# Patient Record
Sex: FEMALE | Race: BLACK OR AFRICAN AMERICAN | NOT HISPANIC OR LATINO | Employment: UNEMPLOYED | URBAN - METROPOLITAN AREA
[De-identification: names, ages, dates, MRNs, and addresses within clinical notes are randomized per-mention and may not be internally consistent; named-entity substitution may affect disease eponyms.]

---

## 2017-02-14 ENCOUNTER — OFFICE VISIT (OUTPATIENT)
Dept: NEUROSURGERY | Facility: CLINIC | Age: 57
End: 2017-02-14
Payer: COMMERCIAL

## 2017-02-14 VITALS
HEIGHT: 63 IN | HEART RATE: 74 BPM | TEMPERATURE: 98 F | DIASTOLIC BLOOD PRESSURE: 97 MMHG | SYSTOLIC BLOOD PRESSURE: 140 MMHG | BODY MASS INDEX: 36.37 KG/M2 | WEIGHT: 205.25 LBS

## 2017-02-14 DIAGNOSIS — M51.9 LUMBAR DISC DISEASE: ICD-10-CM

## 2017-02-14 DIAGNOSIS — M50.90 CERVICAL DISC DISEASE: Primary | ICD-10-CM

## 2017-02-14 PROCEDURE — 99213 OFFICE O/P EST LOW 20 MIN: CPT | Mod: S$GLB,,, | Performed by: PHYSICIAN ASSISTANT

## 2017-02-14 PROCEDURE — 99999 PR PBB SHADOW E&M-EST. PATIENT-LVL IV: CPT | Mod: PBBFAC,,, | Performed by: PHYSICIAN ASSISTANT

## 2017-02-14 NOTE — LETTER
February 20, 2017      Gavino Figueroa MD  [address]  [place] [address]           [facility] - Neurosurgery 7th Fl  [address]  [facility] [address]  Phone: [phone]          Patient: Carmen Son   MR Number: [id number]   YOB: 1960   Date of Visit: 2/14/2017       Dear Dr. Gavino Figueroa:    Thank you for referring Carmen Son to me for evaluation. Attached you will find relevant portions of my assessment and plan of care.    If you have questions, please do not hesitate to call me. I look forward to following Carmen Son along with you.    Sincerely,    ELIA Li    Enclosure  CC:  No Recipients    If you would like to receive this communication electronically, please contact [email or web address] or [phone] to request more information on [employer] Link access.    For providers and/or their staff who would like to refer a patient to [facility], please contact us through our one-stop-shop provider referral line, [facility], at [phone].    If you feel you have received this communication in error or would no longer like to receive these types of communications, please e-mail [email or web address]

## 2017-02-16 NOTE — PROGRESS NOTES
[facility] - Neurosurgery 7th Fl  Neurosurgery    SUBJECTIVE:     Chief Complaint: Follow-up neck and back pain    History of Present Illness:  Patient is a 56 y.o. female who presents for follow up after last evaluation on 11/29/2016. This is a pt with L4-5, L5-S1 disc disease with an L4-5 spondylolisthesis. She also has a disc herniation at C4-5 and C6-7. When Dr. Denton last saw her she stated that her neck paresthesias were well controlled on Xanax and following the recent occipital nerve root block. Her back pain was also improved after completing physical therapy. She presents today for routine follow-up and states that her pain is still intermittent in her neck and back. Denies any radicular upper or lower extremity pain/paresthesias. Denies any new weakness or bowel/bladder incontinence. Patient is seen by an outside pain management specialist and had a recent lumbar POORNIMA in 1/2017. She continues to take Advil and Xanax with some improvement.     Review of patient's allergies indicates:  No Known Allergies    Past Medical History   Diagnosis Date    Diabetes mellitus     Diet controlled gestational diabetes mellitus in puerperium     History of seasonal allergies      Past Surgical History   Procedure Laterality Date    Right knee scope       Family History   Problem Relation Age of Onset    Diabetes type II Sister     Hyperlipidemia Sister     Diabetes type II Other      Social History   Substance Use Topics    Smoking status: Never Smoker    Smokeless tobacco: None    Alcohol use No        Review of Systems:  Constitutional: no fever, chills or night sweats. No changes in weight   Eyes: no visual changes   ENT: no nasal congestion or sore throat   Respiratory: no cough or shortness of breath   Cardiovascular: no chest pain or palpitations   Gastrointestinal: no nausea or vomiting   Genitourinary: no hematuria or dysuria   Integument/Breast: no rash or pruritis   Hematologic/Lymphatic: no easy bruising  or lymphadenopathy   Musculoskeletal: + neck and back pain  Neurological: no seizures or tremors   Behavioral/Psych: no auditory or visual hallucinations   Endocrine: no heat or cold intolerance     OBJECTIVE:     Vital Signs (Most Recent):  Temp: 98 °F (36.7 °C) (02/14/17 1547)  Pulse: 74 (02/14/17 1547)  BP: (!) 140/97 (02/14/17 1547)    Physical Exam:  General: well developed, well nourished, no distress.   Head: normocephalic, atraumatic  Neurologic: Alert and oriented. Thought content appropriate.  GCS: Motor: 6/Verbal: 5/Eyes: 4 GCS Total: 15  Mental Status: Awake, Alert, Oriented x3  Cranial nerves: face symmetric, tongue midline, CN II-XII grossly intact.   Eyes: pupils equal, round, reactive to light with accomodation, EOMI. Unable to appreciate optic disc. Red reflex intact bilaterally.   Sensory: intact to light touch throughout    Motor Strength:Moves all extremities spontaneously with good tone.  Full strength upper and lower extremities. No abnormal movements seen.     Strength  Deltoids Triceps Biceps Wrist Extension Wrist Flexion Hand    Upper: R 5/5 5/5 5/5 5/5 5/5 5/5    L 5/5 5/5 5/5 5/5 5/5 5/5     Iliopsoas Quadriceps Knee  Flexion Tibialis  anterior Gastro- cnemius EHL   Lower: R 5/5 5/5 5/5 5/5 5/5 5/5    L 5/5 5/5 5/5 5/5 5/5 5/5     DTR's - 2 + and symmetric in UE and LE  Pronator Drift: no drift noted  Finger-to-nose: Intact bilaterally  Stover: absent  Clonus: absent  Babinski: absent  Pulses: 2+ and symmetric radial and dorsalis pedis. No lower extremity edema  Straight leg raise: negative  Gait: normal  Tandem Gait: No difficulty         Able to walk on heels & toes  Cervical ROM: full, denies pain  Lumbar ROM: full, denies   Denies pain to cervical or lumbar palpation  SI Joint tenderness: Negative   Pain on Hip ROM: Negative  Pulm: Normal respiratory effort.  Abd: Soft, no distention or tenderness.  Skin: Intact. No visible rashes or lesions.     Diagnostic Results:  None  new    ASSESSMENT/PLAN:     56 year old female with both cervical and lumbar disc disease, no evidence of radiculopathy or myelopathy.     -Patient continues to do well with conservative treatment.  -Will follow-up prn. Patient informed to contact the clinic with any worsening of her symptoms.     Thank you,    Nori Nicholson PA-C  Neurosurgery

## 2017-08-25 ENCOUNTER — TELEPHONE (OUTPATIENT)
Dept: NEUROSURGERY | Facility: CLINIC | Age: 57
End: 2017-08-25

## 2017-08-25 NOTE — TELEPHONE ENCOUNTER
CHANGE IN DR GAY'S SCHEDULE HAD TO MOVE APPT TO 09/01/2017 TO 0900. ROSANNE ON Vm, AND PUT A COPY OF APPT CARD IN THE MAIL

## 2017-09-01 ENCOUNTER — OFFICE VISIT (OUTPATIENT)
Dept: NEUROSURGERY | Facility: CLINIC | Age: 57
End: 2017-09-01
Payer: COMMERCIAL

## 2017-09-01 ENCOUNTER — TELEPHONE (OUTPATIENT)
Dept: NEUROSURGERY | Facility: CLINIC | Age: 57
End: 2017-09-01

## 2017-09-01 VITALS
BODY MASS INDEX: 36.68 KG/M2 | RESPIRATION RATE: 14 BRPM | SYSTOLIC BLOOD PRESSURE: 148 MMHG | TEMPERATURE: 98 F | WEIGHT: 207 LBS | HEIGHT: 63 IN | HEART RATE: 84 BPM | DIASTOLIC BLOOD PRESSURE: 98 MMHG

## 2017-09-01 DIAGNOSIS — M40.202 KYPHOSIS OF CERVICAL REGION, UNSPECIFIED KYPHOSIS TYPE: ICD-10-CM

## 2017-09-01 DIAGNOSIS — M51.9 LUMBAR DISC DISEASE: Primary | ICD-10-CM

## 2017-09-01 DIAGNOSIS — M50.10 CERVICAL DISC DISORDER WITH RADICULOPATHY: ICD-10-CM

## 2017-09-01 PROCEDURE — 99214 OFFICE O/P EST MOD 30 MIN: CPT | Mod: S$GLB,,, | Performed by: NEUROLOGICAL SURGERY

## 2017-09-01 PROCEDURE — 99999 PR PBB SHADOW E&M-EST. PATIENT-LVL V: CPT | Mod: PBBFAC,,, | Performed by: NEUROLOGICAL SURGERY

## 2017-09-01 PROCEDURE — 3008F BODY MASS INDEX DOCD: CPT | Mod: S$GLB,,, | Performed by: NEUROLOGICAL SURGERY

## 2017-09-01 NOTE — LETTER
September 1, 2017      Gavino Figueroa MD  [address] [facility] [facility]  [place] [address]           [facility] - Neurosurgery 7th Fl  [address]  [facility] [address]  Phone: [phone]          Patient: Carmen Son   MR Number: [id number]   YOB: 1960   Date of Visit: 9/1/2017       Dear Dr. Gavino Figueroa:    Thank you for referring Carmen Son to me for evaluation. Attached you will find relevant portions of my assessment and plan of care.    If you have questions, please do not hesitate to call me. I look forward to following Carmen Son along with you.    Sincerely,    Leo Denton MD    Enclosure  CC:  No Recipients    If you would like to receive this communication electronically, please contact [email or web address] or [phone] to request more information on [employer] Link access.    For providers and/or their staff who would like to refer a patient to [facility], please contact us through our one-stop-shop provider referral line, [facility], at [phone].    If you feel you have received this communication in error or would no longer like to receive these types of communications, please e-mail [email or web address]

## 2017-09-01 NOTE — TELEPHONE ENCOUNTER
----- Message from Paulino Goyal sent at 9/1/2017 11:05 AM CDT -----  Contact: Patient @ [phone]  Patient is returning a missed call, pls return call

## 2017-09-01 NOTE — PROGRESS NOTES
Subjective:    I, Mili Gibbs, am scribing for, and in the presence of, Dr. Denton.     Patient ID: Carmen Son is a 57 y.o. female.    Chief Complaint: No chief complaint on file.    TANISHA Pt is a 57 y.o. female who was last seen in February 2017. She has both cervical and lumbar pathology with known C4/5 and C6/7 disc herniations, as well as a lumbar L4/5 spondylolisthesis. She last saw our PA in February. At that time, there were no signs of true radiculopathy or myelopathy, hence we elected for non-operative intervention. Patient states that she is here today to discuss surgical options. She describes increasing pain in the neck and left shoulder blade with intermittent radiation down the left arm. She reports undergoing a nerve block earlier this year with some improvement of the radicular pain. She denies weakness, but endorses intermittent numbness of the LUE.. She also reports low back pain with radiation in the buttocks, bilateral hips, and thighs. Patient reports receiving a lumbar POORNIMA earlier this year.    Review of Systems   Constitutional: Negative for activity change, chills, fatigue and fever.   HENT: Negative for facial swelling, sinus pressure and trouble swallowing.    Eyes: Negative for visual disturbance.   Respiratory: Negative.    Cardiovascular: Negative.    Gastrointestinal: Negative for diarrhea, nausea and vomiting.   Genitourinary: Negative.    Musculoskeletal: Positive for arthralgias (b/l hip pain), back pain, myalgias (LUE; buttocks pain; thigh pain) and neck pain. Negative for joint swelling.   Neurological: Positive for numbness (LUE). Negative for seizures, weakness and headaches.   Psychiatric/Behavioral: Negative.        Objective:      Physical Exam:    Constitutional: She appears well-developed.     Eyes: Pupils are equal, round, and reactive to light. EOM are normal. Right eye exhibits no discharge. Left eye exhibits no discharge.     Abdominal: Soft.     Skin: Skin  displays no rash on trunk and no rash on extremities.     Psych/Behavior: She is alert. She is oriented to person, place, and time.     Musculoskeletal:        Neck: There is no tenderness.        Back: Range of motion is full.        Right Upper Extremities: Range of motion is full. Muscle strength is 5/5. Tone is normal.        Left Upper Extremities: Range of motion is full. Muscle strength is 5/5. Tone is normal.       Right Lower Extremities: Range of motion is full. Muscle strength is 5/5. Tone is normal.        Left Lower Extremities: Range of motion is full. Muscle strength is 5/5. Tone is normal.     Neurological:        Coordination: She has a normal Romberg Test and normal tandem walking coordination.        Sensory: There is no sensory deficit in the trunk. There is no sensory deficit in the extremities.        DTRs: DTRs are DTRS NORMAL AND SYMMETRICnormal and symmetric. Tricep reflexes are 2+ on the right side and 2+ on the left side. Bicep reflexes are 2+ on the right side and 2+ on the left side. Brachioradialis reflexes are 2+ on the right side and 2+ on the left side. Patellar reflexes are 2+ on the right side and 2+ on the left side. Achilles reflexes are 2+ on the right side and 2+ on the left side. She displays no Babinski's sign on the right side. She displays no Babinski's sign on the left side.        Cranial nerves: Cranial nerve(s) II, III, IV, V, VI, VII, VIII, IX, X, XI and XII are intact.       Pt complains of increasing discomfort of neck and shoulder areas with intermittent radiculopathy down to hands.  Denies any weakness, but does have intermittent numbness.  Patient also complains of increasing bilateral hip and thigh pain.  No Stover's   No clonus   Full Strength   Negative SLR    Imaging:   No recent updated imaging.    MRI C- spine, dated 6/28/2016  shows loss of cervical lordosis and increased cervical kyphosis with disc herniation at C4/5 and C6/7.     MRI L-spine, dated  6/28/2016, continues to show grade I spondylolisthesis  at L4/5 and disc bulge at L5/S1.     Assessment/Plan:   Pt with cervical and lumbar disc diseases, now with increasing symptoms. She has had ESIs in both neck and lumbar spine. Now considering possible cervical intervention, but needs updated imaging of her neck and back since it has been well over a year and a half since last imaging was done. She has had injections, but not PT, so I think it is reasonable to initiation PT for her back and neck. We will plan to see her back after CT, x-rays, and MRIs are done for neck and back to discuss any possible surgical or non-surgical treatment options.     I, Dr. Denton, personally performed the services described in this documentation as scribed by Mili Gibbs in my presence, and it is both accurate and complete.

## 2017-09-01 NOTE — TELEPHONE ENCOUNTER
NOTIFIED THE PATIENT DR GAY ORDERED XRAYS, MRI C AND L SPINE AND CT CSPINE. SCHEDULED ALL FOR 10/17/2017 STARTING AT 0900 AND FOLLOW UP WITH DR GAY DIRECTLY AFTER. I ALSO PUT A COPY IN THE MAIL

## 2017-10-17 ENCOUNTER — HOSPITAL ENCOUNTER (OUTPATIENT)
Dept: RADIOLOGY | Facility: HOSPITAL | Age: 57
Discharge: HOME | End: 2017-10-17
Attending: NEUROLOGICAL SURGERY
Payer: COMMERCIAL

## 2017-10-17 ENCOUNTER — OFFICE VISIT (OUTPATIENT)
Dept: NEUROSURGERY | Facility: CLINIC | Age: 57
End: 2017-10-17
Payer: COMMERCIAL

## 2017-10-17 VITALS
SYSTOLIC BLOOD PRESSURE: 131 MMHG | BODY MASS INDEX: 38.49 KG/M2 | DIASTOLIC BLOOD PRESSURE: 65 MMHG | WEIGHT: 217.31 LBS | TEMPERATURE: 98 F | HEART RATE: 72 BPM

## 2017-10-17 DIAGNOSIS — M50.10 CERVICAL DISC DISORDER WITH RADICULOPATHY: ICD-10-CM

## 2017-10-17 DIAGNOSIS — M51.9 LUMBAR DISC DISEASE: ICD-10-CM

## 2017-10-17 DIAGNOSIS — M40.202 KYPHOSIS OF CERVICAL REGION, UNSPECIFIED KYPHOSIS TYPE: ICD-10-CM

## 2017-10-17 DIAGNOSIS — M50.20 CERVICAL DISC HERNIATION: ICD-10-CM

## 2017-10-17 DIAGNOSIS — G95.9 CERVICAL MYELOPATHY: Primary | ICD-10-CM

## 2017-10-17 PROCEDURE — 99215 OFFICE O/P EST HI 40 MIN: CPT | Mod: S$GLB,,, | Performed by: NEUROLOGICAL SURGERY

## 2017-10-17 PROCEDURE — 72082 X-RAY EXAM ENTIRE SPI 2/3 VW: CPT | Mod: TC

## 2017-10-17 PROCEDURE — 72114 X-RAY EXAM L-S SPINE BENDING: CPT | Mod: 26,,, | Performed by: RADIOLOGY

## 2017-10-17 PROCEDURE — 72148 MRI LUMBAR SPINE W/O DYE: CPT | Mod: 26,,, | Performed by: RADIOLOGY

## 2017-10-17 PROCEDURE — 72125 CT NECK SPINE W/O DYE: CPT | Mod: 26,,, | Performed by: RADIOLOGY

## 2017-10-17 PROCEDURE — 72114 X-RAY EXAM L-S SPINE BENDING: CPT | Mod: TC

## 2017-10-17 PROCEDURE — 72148 MRI LUMBAR SPINE W/O DYE: CPT | Mod: TC

## 2017-10-17 PROCEDURE — 72141 MRI NECK SPINE W/O DYE: CPT | Mod: TC

## 2017-10-17 PROCEDURE — 72125 CT NECK SPINE W/O DYE: CPT | Mod: TC

## 2017-10-17 PROCEDURE — 99999 PR PBB SHADOW E&M-EST. PATIENT-LVL III: CPT | Mod: PBBFAC,,, | Performed by: NEUROLOGICAL SURGERY

## 2017-10-17 PROCEDURE — 72050 X-RAY EXAM NECK SPINE 4/5VWS: CPT | Mod: 26,,, | Performed by: RADIOLOGY

## 2017-10-17 PROCEDURE — 72050 X-RAY EXAM NECK SPINE 4/5VWS: CPT | Mod: TC

## 2017-10-17 PROCEDURE — 72082 X-RAY EXAM ENTIRE SPI 2/3 VW: CPT | Mod: 26,,, | Performed by: RADIOLOGY

## 2017-10-17 PROCEDURE — 72141 MRI NECK SPINE W/O DYE: CPT | Mod: 26,,, | Performed by: RADIOLOGY

## 2017-10-17 NOTE — PROGRESS NOTES
Subjective:       Patient ID: Carmen Son is a 57 y.o. female.    Chief Complaint: Follow-up    HPI Pt is a 57 y.o. female who has both cervical and lumbar issues, presents for follow up after last evaluation on 9/1/2017. We have tried to treat her with POORNIMA without success. We have initiated PT.  We wanted to get updated imaging. The patient is currently participating in PT. She complains of continued significant neck discomfort and bilateral hand tingling and numbness. Pt states she is still dropping things often. She also endorses lower back and buttock pain.     Review of Systems   Constitutional: Negative for activity change, fatigue and fever.   HENT: Negative for facial swelling.    Eyes: Negative.    Respiratory: Negative.    Cardiovascular: Negative.    Gastrointestinal: Negative for diarrhea, nausea and vomiting.   Genitourinary: Negative.    Musculoskeletal: Positive for back pain, myalgias, neck pain and neck stiffness.   Neurological: Positive for tremors, weakness and numbness. Negative for seizures and headaches.   Psychiatric/Behavioral: Negative.        Objective:      Physical Exam:    Constitutional: She appears well-developed.     Eyes: Pupils are equal, round, and reactive to light. EOM are normal. Right eye exhibits no discharge. Left eye exhibits no discharge.     Abdominal: Soft.     Skin: Skin displays no rash on trunk and no rash on extremities.     Psych/Behavior: She is alert. She is oriented to person, place, and time.     Musculoskeletal:        Neck: There is no tenderness. Tone is abnormal.        Back: Range of motion is full.        Right Upper Extremities: Range of motion is full. Muscle strength is 4/5. Tone is normal.        Left Upper Extremities: Range of motion is full. Muscle strength is 4/5. Tone is normal.       Right Lower Extremities: Range of motion is full. Muscle strength is 5/5. Tone is normal.        Left Lower Extremities: Range of motion is full. Muscle  strength is 5/5. Tone is normal.     Neurological:        Coordination: She has a normal Romberg Test and normal tandem walking coordination.        Sensory: There is no sensory deficit in the trunk. There is no sensory deficit in the extremities.        DTRs: DTRs are DTRS NORMAL AND SYMMETRICnormal and symmetric. Tricep reflexes are 2+ on the right side and 2+ on the left side. Bicep reflexes are 2+ on the right side and 2+ on the left side. Brachioradialis reflexes are 2+ on the right side and 2+ on the left side. Patellar reflexes are 2+ on the right side and 2+ on the left side. Achilles reflexes are 2+ on the right side and 2+ on the left side. She displays no Babinski's sign on the right side. She displays no Babinski's sign on the left side.        Cranial nerves: Cranial nerve(s) II, III, IV, V, VI, VII, VIII, IX, X, XI and XII are intact.             Pt has break away weakness of both arms and hands - particularly in . Still complains of tingling and numbness of both hands   Pt does intermittent drop objects and increasing difficulty with her fine hand .  Her pain fits more of a left C5 and C7 distribution.   No florian's   No clonus.       Imaging:   MRI C-spine, dated 10/17/2017, shows several herniated discs and cord compression. She has a large herniated disc at C4/5 with spinal cord compression and a large herniated disc at C6/7 with spinal cord compression. She has a small disc herniation at C3/4. There is some early cord signal change and bilateral foraminal stenosis      MRI L-spine, dated 10/17/2017, shows a mild spondylolisthesis of L4 on L5 and mild disc bulge at L5/S1.     Assessment/Plan:   Pt with two level cervical disc disease at C4-5 and C6-7. She has been symptomatic for more than 6 months and getting worse over last several months. She has signs of radiculopathy and myelopathy. She has failed PT, pain meds, muscle relaxants, and injections. I think pt would benefit as this  stage from a discectomy and  decompression. I would advocate for a disc arthoplasty at both levels due to her age and the fact that her facets are still fairly well preserved but a cervical discectomy and fusion at both levels is reasonable. Will plan for a two level disc arthoplasty, but discussed the possibility of needing a fusion. I have discussed the risks/benefits, indications, and alternatives for the proposed procedure in detail.  I have answered all of their questions and the pt wishes to proceed with surgery.  We will schedule the pt.      I, Dr. Denton, personally performed the services described in this documentation as scribed by Mili Gibbs in my presence, and it is both accurate and complete.

## 2017-10-27 ENCOUNTER — TELEPHONE (OUTPATIENT)
Dept: NEUROSURGERY | Facility: CLINIC | Age: 57
End: 2017-10-27

## 2017-10-27 DIAGNOSIS — M54.12 CERVICAL RADICULOPATHY: ICD-10-CM

## 2017-10-27 DIAGNOSIS — M50.00 HERNIATION OF INTERVERTEBRAL DISC OF CERVICAL SPINE WITH MYELOPATHY: Primary | ICD-10-CM

## 2017-10-27 DIAGNOSIS — M50.121 CERVICAL DISC DISORDER AT C4-C5 LEVEL WITH RADICULOPATHY: ICD-10-CM

## 2017-11-30 ENCOUNTER — TELEPHONE (OUTPATIENT)
Dept: NEUROSURGERY | Facility: CLINIC | Age: 57
End: 2017-11-30

## 2017-11-30 DIAGNOSIS — Z98.890 S/P CERVICAL DISC REPLACEMENT: Primary | ICD-10-CM

## 2017-12-08 ENCOUNTER — ANESTHESIA EVENT (OUTPATIENT)
Dept: SURGERY | Facility: HOSPITAL | Age: 57
End: 2017-12-08

## 2017-12-08 DIAGNOSIS — M79.603 PAIN OF UPPER EXTREMITY, UNSPECIFIED LATERALITY: Primary | ICD-10-CM

## 2017-12-08 NOTE — ANESTHESIA PREPROCEDURE EVALUATION
Anesthesia Assessment: Preoperative EQUATION    Planned Procedure: Procedure(s) (LRB):  ARTHRODESIS-CERVICAL (N/A)  Requested Anesthesia Type:General  Surgeon: Leo Denton MD  Service: Neurosurgery  Known or anticipated Date of Surgery:1/8/2018  Optimization:  Anesthesia Preop Clinic Assessment NOT Indicated   Plan:    Testing:  Hematology Profile, BMP, PT/INR and T&S       Consultation:Patient's PCP for a statement of optimization      Patient  TO scheduled Medical Appointment: 12/27/17  Cleared for surgery placed in media 12/27/17 pg 5 of 5  RECEIVED UPDATE FROM PCP PLACED IN MEDIA 1/4/18  Navigation: Tests Scheduled.              Consults scheduled.             Results will be tracked by Preop Clinic.                No anesthesia preop clinic visit.                                                                                                                  12/08/2017  Carmen Son is a 57 y.o., female.    Pre-op Assessment         Review of Systems  Anesthesia Hx:  No problems with previous Anesthesia    Social:  No Alcohol Use, Non-Smoker    Hematology/Oncology:  Hematology Normal   Oncology Normal     EENT/Dental:EENT/Dental Normal   Cardiovascular:   Exercise tolerance: good Hypertension, well controlled    Pulmonary:  Pulmonary Normal    Renal/:  Renal/ Normal     Hepatic/GI:   GERD, well controlled    Musculoskeletal:   Arthritis     Neurological:  Neurology Normal    Endocrine:   Diabetes, well controlled, type 2    Psych:   anxiety depression         NNEKA JORDAN 12/8/17    Anesthesia Plan  Type of Anesthesia, risks & benefits discussed:  Anesthesia Type:    Patient's Preference:   Intra-op Monitoring Plan:   Intra-op Monitoring Plan Comments:   Post Op Pain Control Plan:   Post Op Pain Control Plan Comments:   Induction:    Beta Blocker:         Informed Consent:    ASA Score:      Day of Surgery Review of History & Physical:        Anesthesia Plan Notes: Dr. Denton has been made aware  of patient's decreased WBC count.  He has requested that it be repeated on the AM of surgery -- ordered.

## 2018-01-02 ENCOUNTER — HOSPITAL ENCOUNTER (OUTPATIENT)
Dept: CARDIOLOGY | Facility: CLINIC | Age: 58
Discharge: HOME | End: 2018-01-02
Payer: COMMERCIAL

## 2018-01-02 ENCOUNTER — LAB VISIT (OUTPATIENT)
Dept: LAB | Facility: HOSPITAL | Age: 58
End: 2018-01-02
Attending: ANESTHESIOLOGY
Payer: COMMERCIAL

## 2018-01-02 DIAGNOSIS — M79.603 PAIN OF UPPER EXTREMITY, UNSPECIFIED LATERALITY: ICD-10-CM

## 2018-01-02 DIAGNOSIS — I10 HYPERTENSION, UNSPECIFIED TYPE: ICD-10-CM

## 2018-01-02 DIAGNOSIS — I10 HYPERTENSION, UNSPECIFIED TYPE: Primary | ICD-10-CM

## 2018-01-02 LAB
ABO + RH BLD: NORMAL
ANION GAP SERPL CALC-SCNC: 11 MMOL/L
BLD GP AB SCN CELLS X3 SERPL QL: NORMAL
BUN SERPL-MCNC: 23 MG/DL
CALCIUM SERPL-MCNC: 9.5 MG/DL
CHLORIDE SERPL-SCNC: 106 MMOL/L
CO2 SERPL-SCNC: 27 MMOL/L
CREAT SERPL-MCNC: 1 MG/DL
ERYTHROCYTE [DISTWIDTH] IN BLOOD BY AUTOMATED COUNT: 14.1 %
EST. GFR  (AFRICAN AMERICAN): >60 ML/MIN/1.73 M^2
EST. GFR  (NON AFRICAN AMERICAN): >60 ML/MIN/1.73 M^2
GLUCOSE SERPL-MCNC: 97 MG/DL
HCT VFR BLD AUTO: 42 %
HGB BLD-MCNC: 12.9 G/DL
INR PPP: 0.9
MCH RBC QN AUTO: 25.2 PG
MCHC RBC AUTO-ENTMCNC: 30.7 G/DL
MCV RBC AUTO: 82 FL
PLATELET # BLD AUTO: 229 K/UL
PMV BLD AUTO: 10.8 FL
POTASSIUM SERPL-SCNC: 3.7 MMOL/L
PROTHROMBIN TIME: 9.8 SEC
RBC # BLD AUTO: 5.11 M/UL
SODIUM SERPL-SCNC: 144 MMOL/L
WBC # BLD AUTO: 2.95 K/UL

## 2018-01-02 PROCEDURE — 85027 COMPLETE CBC AUTOMATED: CPT

## 2018-01-02 PROCEDURE — 86901 BLOOD TYPING SEROLOGIC RH(D): CPT

## 2018-01-02 PROCEDURE — 93000 ELECTROCARDIOGRAM COMPLETE: CPT | Mod: S$GLB,,, | Performed by: INTERNAL MEDICINE

## 2018-01-02 PROCEDURE — 80048 BASIC METABOLIC PNL TOTAL CA: CPT

## 2018-01-02 PROCEDURE — 85610 PROTHROMBIN TIME: CPT

## 2018-01-02 PROCEDURE — 36415 COLL VENOUS BLD VENIPUNCTURE: CPT

## 2018-01-03 ENCOUNTER — TELEPHONE (OUTPATIENT)
Dept: NEUROSURGERY | Facility: CLINIC | Age: 58
End: 2018-01-03

## 2018-01-03 DIAGNOSIS — G95.9 CERVICAL MYELOPATHY: Primary | ICD-10-CM

## 2018-01-03 NOTE — TELEPHONE ENCOUNTER
----- Message from Catalina Lazaro sent at 1/3/2018 11:13 AM CST -----  Contact: self @ [phone]  Pt says she was in on yesterday.  Would like to speak with Herlinda concerning her Surgery on Monday.  Would like to know exactly what is being done.  Pls call.

## 2018-01-05 ENCOUNTER — TELEPHONE (OUTPATIENT)
Dept: NEUROSURGERY | Facility: CLINIC | Age: 58
End: 2018-01-05

## 2018-01-05 NOTE — TELEPHONE ENCOUNTER
Patient has been informed that she is in the add on, and will be called bi the surgery desk with an arrival time. Patient was also informed that she would be given a two hour arrival time to get here for here surgery.

## 2018-01-05 NOTE — TELEPHONE ENCOUNTER
----- Message from Rochelle Lundy sent at 1/5/2018 10:11 AM CST -----  Contact: Pt can be reached at [phone]  Pt is calling to speak with the nurse concerning time and instructions for procedure on Monday.      Thank you!

## 2018-01-07 PROBLEM — M50.90 CERVICAL DISC DISEASE: Chronic | Status: ACTIVE | Noted: 2018-01-07

## 2018-01-08 ENCOUNTER — ANESTHESIA (OUTPATIENT)
Dept: SURGERY | Facility: HOSPITAL | Age: 58
End: 2018-01-08

## 2018-01-08 ENCOUNTER — TELEPHONE (OUTPATIENT)
Dept: NEUROSURGERY | Facility: CLINIC | Age: 58
End: 2018-01-08

## 2018-01-08 NOTE — TELEPHONE ENCOUNTER
----- Message from Paulino Goyal sent at 1/8/2018  9:40 AM CST -----  Contact: Patient @ [phone]  Patient is calling to cancel the 1-15 surgery, pls call

## 2018-01-16 ENCOUNTER — TELEPHONE (OUTPATIENT)
Dept: NEUROSURGERY | Facility: CLINIC | Age: 58
End: 2018-01-16

## 2018-01-16 DIAGNOSIS — G95.9 CERVICAL MYELOPATHY: ICD-10-CM

## 2018-01-16 DIAGNOSIS — M50.20 HNP (HERNIATED NUCLEUS PULPOSUS), CERVICAL: Primary | ICD-10-CM

## 2018-01-19 ENCOUNTER — TELEPHONE (OUTPATIENT)
Dept: NEUROSURGERY | Facility: CLINIC | Age: 58
End: 2018-01-19

## 2018-01-19 DIAGNOSIS — Z98.890 S/P CERVICAL DISC REPLACEMENT: Primary | ICD-10-CM

## 2018-01-23 ENCOUNTER — OFFICE VISIT (OUTPATIENT)
Dept: PAIN MEDICINE | Facility: CLINIC | Age: 58
End: 2018-01-23
Attending: ANESTHESIOLOGY
Payer: COMMERCIAL

## 2018-01-23 VITALS
SYSTOLIC BLOOD PRESSURE: 141 MMHG | BODY MASS INDEX: 37.89 KG/M2 | DIASTOLIC BLOOD PRESSURE: 96 MMHG | WEIGHT: 213.88 LBS | TEMPERATURE: 98 F | HEIGHT: 63 IN

## 2018-01-23 DIAGNOSIS — M50.90 CERVICAL DISC DISEASE: Primary | Chronic | ICD-10-CM

## 2018-01-23 DIAGNOSIS — M51.36 DDD (DEGENERATIVE DISC DISEASE), LUMBAR: ICD-10-CM

## 2018-01-23 DIAGNOSIS — M54.16 LUMBAR RADICULOPATHY: ICD-10-CM

## 2018-01-23 DIAGNOSIS — M47.899 FACET SYNDROME: ICD-10-CM

## 2018-01-23 PROBLEM — M51.369 DDD (DEGENERATIVE DISC DISEASE), LUMBAR: Status: ACTIVE | Noted: 2018-01-23

## 2018-01-23 PROCEDURE — 99204 OFFICE O/P NEW MOD 45 MIN: CPT | Mod: S$GLB,,, | Performed by: ANESTHESIOLOGY

## 2018-01-23 PROCEDURE — 99999 PR PBB SHADOW E&M-EST. PATIENT-LVL III: CPT | Mod: PBBFAC,,, | Performed by: ANESTHESIOLOGY

## 2018-01-23 RX ORDER — CELECOXIB 200 MG/1
CAPSULE ORAL
Qty: 60 CAPSULE | Refills: 2 | Status: SHIPPED | OUTPATIENT
Start: 2018-01-23 | End: 2018-04-24

## 2018-01-23 NOTE — PROGRESS NOTES
"  Chronic Pain - New Consult    Referring Physician: Zachary, [place]    Chief Complaint:   Chief Complaint   Patient presents with    Back Pain    Leg Pain        SUBJECTIVE: Disclaimer: This note has been generated using voice-recognition software. There may be typographical errors that have been missed during proof-reading    Initial encounter:    Carmen Son presents to the clinic for the evaluation of back pain. The pain started in 2015 and symptoms have been worsening.    Brief history: 58 y/o woman here with low back that started in 2015. Pain is intermittent, sharp to the hips, back of the legs to the bottom of the feet. Patient was following with Pain Management at  getting POORNIMA with benefit.    Pain Description:    The pain is located in the back area and radiates to the legs.      At BEST  5/10     At WORST  8/10 on the WORST day.      On average pain is rated as 5/10.     Today the pain is rated as 7/10    The pain is described as aching and tingling      Symptoms interfere with daily activity.     Exacerbating factors: Standing and Walking.      Mitigating factors nothing.     Patient denies .  Patient denies any suicidal or homicidal ideations    Pain Medications:  Current:  Advil 400mg BID    Tried in Past:  NSAIDs -mobic  TCA -Never  SNRI - cymbalta - caused insomnia  Anti-convulsants - gabapentin tried in the past, but caused patient to "unravel"  Muscle Relaxants -Never  Opioids-Never    Physical Therapy/Home Exercise: yes, still does exercises daily     report:  Reviewed and consistent with medication use as prescribed.     Pain Procedures: caudal epidural steroid injections (5/2017, 12/2017); 3x in 2016 and 3x in 2015    Chiropractor -never  Acupuncture - never  TENS unit -never  Spinal decompression -never  Joint replacement -never    Imaging:   MRI lumbar spine 10/17/2017  FINDINGS:    There are 5 lumbar vertebrae. There is mild grade 1 anterolisthesis of L4 on L5. Vertebral " body heights are maintained.  Bone marrow signal is preserved.  Disk heights are preserved. Conus terminates at L1-L2 and appears unremarkable. Limited evaluation of posterior abdominal structures is unremarkable.  Paraspinal musculature is within normal limits.  Evaluation of sacroiliac joints is unremarkable.    L1-L2: No spinal canal stenosis or neuroforaminal narrowing.    L2-L3: No spinal canal stenosis or neuroforaminal narrowing.    L3-L4: Mild diffuse disc bulge and mild bilateral facet arthropathy without spinal canal stenosis or neuroforaminal narrowing.    L4-L5: Mild diffuse disc bulge, ligamentum flavum hypertrophy, moderate bilateral facet arthropathy resulting in mild spinal canal stenosis and moderate left neuroforaminal narrowing.    L5-S1: Mild diffuse disc bulge and mild bilateral facet arthropathy without spinal canal stenosis or neuroforaminal narrowing.   Impression         Lumbar spondylosis, most prominent at L4-L5, resulting in mild spinal canal and moderate left neuroforaminal stenosis.    Mild/ grade 1 anterolisthesis of L4 over L5.     MRI Cervical spine 10/17/2017  Cervical spine MRI    Technique: MRI of cervical spine was performed without contrast and the following sequences were obtained: Localizer; sagittal T1, T2, and stir; axial T2 and gradient.    Comparison: CT cervical spine same date.    Findings:    There is straightening of the normal cervical lordosis. The vertebral body heights are within normal limits. There is mild disc space loss at multiple levels. Bone marrow signal is normal.    Visualized posterior fossa structures and cervical cord are unremarkable.    Limited evaluation of the neck soft tissues is unremarkable.    C2-3: No spinal canal stenosis or neuroforaminal narrowing.    C3-4: Posterior disc osteophyte complex formation without spinal canal stenosis or neuroforaminal narrowing.    C4-5: Central disc osteophyte protrusion effacing the anterior CSF sleeve  without spinal canal stenosis and no neuroforaminal narrowing.    C5-6: Moderate-sized disc osteophyte effacing the anterior CSF sleeve resulting in mild left neuroforaminal narrowing and mild spinal canal stenosis.    C6-7: Posterior disc osteophyte complex formation without significant spinal canal stenosis or neuroforaminal narrowing.    C7-T1: Moderate sized left paracentral disc osteophyte extrusion displacing the spinal cord to the right possibly impinging on the exiting left T1 nerve root resulting in mild spinal canal stenosis and mild  left neuroforaminal narrowing.   Impression         Left paracentral disc extrusion at C7-T1, possibly impinging on the exiting left T1 nerve root and resulting in mild spinal canal stenosis.    Additional mild cervical spondylosis, as above.         Past Medical History:   Diagnosis Date    Diabetes mellitus     Diet controlled gestational diabetes mellitus in puerperium     History of seasonal allergies      Past Surgical History:   Procedure Laterality Date    right knee scope       Social History     Social History    Marital status:      Spouse name: N/A    Number of children: N/A    Years of education: N/A     Occupational History    Not on file.     Social History Main Topics    Smoking status: Never Smoker    Smokeless tobacco: Never Used    Alcohol use No    Drug use: No    Sexual activity: Yes     Partners: Male     Birth control/ protection: Surgical     Other Topics Concern    Not on file     Social History Narrative    No narrative on file     Family History   Problem Relation Age of Onset    Diabetes type II Sister     Hyperlipidemia Sister     Diabetes type II Other        Review of patient's allergies indicates:  No Known Allergies    Current Outpatient Prescriptions   Medication Sig    losartan (COZAAR) 50 MG tablet Take 50 mg by mouth once daily.    propranolol (INDERAL) 80 MG tablet Take 160 mg by mouth once daily.     alprazolam  "(XANAX) 0.25 MG tablet Take 0.25 mg by mouth 3 (three) times daily.    aspirin 81 MG Chew Take 81 mg by mouth once daily.    celecoxib (CELEBREX) 200 MG capsule Take 1 a day for 7 days, if not helpful increase to BID    cetirizine (ZYRTEC) 10 MG tablet Take 10 mg by mouth once daily.     furosemide (LASIX) 20 MG tablet Take 20 mg by mouth daily as needed.     potassium chloride (KLOR-CON) 20 mEq Pack Take 20 mEq by mouth once.    rosuvastatin (CRESTOR) 20 MG tablet Take 20 mg by mouth once daily.     No current facility-administered medications for this visit.        REVIEW OF SYSTEMS:    GENERAL:  No weight loss, malaise or fevers.  HEENT:   No recent changes in vision or hearing  NECK:  Negative for lumps, no difficulty with swallowing.  RESPIRATORY:  Negative for cough, wheezing or shortness of breath, patient denies any recent URI.  CARDIOVASCULAR:  Negative for chest pain, leg swelling or palpitations. HTN  GI:  Negative for abdominal discomfort, blood in stools or black stools or change in bowel habits.  MUSCULOSKELETAL:  See HPI.  SKIN:  Negative for lesions, rash, and itching.  PSYCH:  No mood disorder or recent psychosocial stressors.  Patients sleep is not disturbed secondary to pain.  HEMATOLOGY/LYMPHOLOGY:  Negative for prolonged bleeding, bruising easily or swollen nodes.  Patient is not currently taking any anti-coagulants  ENDO: Pre-diabetic. No history of thyroid dysfunction  NEURO:   No history of headaches, syncope, paralysis, seizures or tremors.  All other reviewed and negative other than HPI.    OBJECTIVE:    BP (!) 141/96   Temp 97.5 °F (36.4 °C) (Oral)   Ht 5' 3" (1.6 m)   Wt 97 kg (213 lb 13.5 oz)   BMI 37.88 kg/m²     PHYSICAL EXAMINATION:    GENERAL: Well appearing, in no acute distress, alert and oriented x3.  PSYCH:  Mood and affect appropriate.  SKIN: Skin color, texture, turgor normal, no rashes or lesions.  HEAD/FACE:  Normocephalic, atraumatic. Cranial nerves grossly " intact.  NECK: No pain to palpation over the cervical paraspinous muscles. Spurling Negative. No pain with neck flexion, extension, or lateral flexion.   CV: RRR with palpation of the radial artery.  PULM: No evidence of respiratory difficulty, symmetric chest rise.  GI:  Soft and non-tender.  BACK: Straight leg raising in the supine position is negative to radicular pain. No pain to palpation over the facet joints of the lumbar spine or spinous processes. Normal range of motion without pain reproduction.  EXTREMITIES: Peripheral joint ROM is full and pain free without obvious instability or laxity in all four extremities. No deformities, edema, or skin discoloration. Good capillary refill.  MUSCULOSKELETAL: Shoulder, hip, and knee provocative maneuvers are negative.  There is pain with palpation over the sacroiliac joints bilaterally.  FABERs test causes low back pain bilaterally.  FADIRs test is negative.   Bilateral upper and lower extremity strength is normal and symmetric.  No atrophy or tone abnormalities are noted.  NEURO: Bilateral upper and lower extremity coordination and muscle stretch reflexes are physiologic and symmetric.  Plantar response are downgoing. No clonus.  No loss of sensation is noted.  GAIT: normal.    ASSESSMENT: 57 y.o. year old female with pain, consistent with     Encounter Diagnoses   Name Primary?    Cervical disc disease Yes    Lumbar radiculopathy     DDD (degenerative disc disease), lumbar     Facet syndrome        PLAN:     - start celebrex 200 mg QHS for 7 days, then increased to BID if not enough relief  - obtain outside records previous pain management physician  - consider start lyrica in the future  - can consider POORNIMA vs SI joint injections once patient healed from cervical arthroplasty  - RTC in 2 weeks    Krystina Mendoza MD  [facility]  Pain Management    The above plan and management options were discussed at length with patient. Patient is in agreement with  the above and verbalized understanding. It will be communicated with the referring physician via electronic record, fax, or mail.    I reviewed and edited the  resident's note, I conducted my own interview and physical examination and agree with the findings.      Lani Toledo  01/23/2018

## 2018-04-16 ENCOUNTER — TELEPHONE (OUTPATIENT)
Dept: NEUROSURGERY | Facility: CLINIC | Age: 58
End: 2018-04-16

## 2018-04-16 NOTE — TELEPHONE ENCOUNTER
----- Message from Catalina Lazaro sent at 4/16/2018  1:17 PM CDT -----  Contact: self @ [phone]  Pt is scheduled to f/u with Dr Denton on 7-3-18.  Pt would like to be seen sooner if possible.  Pls call pt with any imaging appts that may be needed.

## 2018-04-24 ENCOUNTER — OFFICE VISIT (OUTPATIENT)
Dept: PAIN MEDICINE | Facility: CLINIC | Age: 58
End: 2018-04-24
Payer: COMMERCIAL

## 2018-04-24 VITALS
TEMPERATURE: 98 F | SYSTOLIC BLOOD PRESSURE: 147 MMHG | BODY MASS INDEX: 37.1 KG/M2 | HEART RATE: 71 BPM | DIASTOLIC BLOOD PRESSURE: 97 MMHG | HEIGHT: 63 IN | WEIGHT: 209.38 LBS | RESPIRATION RATE: 18 BRPM

## 2018-04-24 DIAGNOSIS — M54.16 LUMBAR RADICULOPATHY: Primary | ICD-10-CM

## 2018-04-24 DIAGNOSIS — M50.90 CERVICAL DISC DISEASE: ICD-10-CM

## 2018-04-24 DIAGNOSIS — M51.36 DDD (DEGENERATIVE DISC DISEASE), LUMBAR: ICD-10-CM

## 2018-04-24 PROCEDURE — 3077F SYST BP >= 140 MM HG: CPT | Mod: CPTII,S$GLB,, | Performed by: NURSE PRACTITIONER

## 2018-04-24 PROCEDURE — 99999 PR PBB SHADOW E&M-EST. PATIENT-LVL III: CPT | Mod: PBBFAC,,, | Performed by: NURSE PRACTITIONER

## 2018-04-24 PROCEDURE — 99214 OFFICE O/P EST MOD 30 MIN: CPT | Mod: S$GLB,,, | Performed by: NURSE PRACTITIONER

## 2018-04-24 PROCEDURE — 3080F DIAST BP >= 90 MM HG: CPT | Mod: CPTII,S$GLB,, | Performed by: NURSE PRACTITIONER

## 2018-04-24 RX ORDER — OMEPRAZOLE 40 MG/1
40 CAPSULE, DELAYED RELEASE ORAL DAILY
COMMUNITY
End: 2019-04-18 | Stop reason: CLARIF

## 2018-04-24 NOTE — PROGRESS NOTES
Chronic Pain - Established Visit    Referring Physician: No ref. provider found    Chief Complaint:   Chief Complaint   Patient presents with    Low-back Pain        SUBJECTIVE: Disclaimer: This note has been generated using voice-recognition software. There may be typographical errors that have been missed during proof-reading    Interval History 4/24/2018:  The patient presents for follow up of lower back pain.  She was evaluated by Dr. Toledo in January and no procedures were scheduled because she was scheduled for cervical disc arthroplasty in February with Dr. Denton.  However, this procedure he recommended was denied by her insurance and ACDF was recommended, per her report.  She does not wish to have an ACDF at this time because she is worried about limited range of motion.  She would like to speak with Dr. Denton about how she should proceed.  She is trying to change her insurance in the near future.  She is scheduled to see him in July at his first available.  She would like to have a repeat POORNIMA for her back and leg pain.  She previously had caudal ESIs in Dec 2017 and May 2017 with Dr. Nash then Dr. Payne.  She did not care for Dr. Nash and Dr. Payne stopped accepting her insurance.  The pain starts across the lower back with radiation into posterior buttocks and thighs.  She also has left posterior foot pain and numbness.  She has no pain when she sits or sleeps.  Her pain is mainly with walking and activity.  She has some translation instability on XRAYs from 2017 which has been addressed with Dr. Denton.  Per her report he does not recommend back surgery at this time.  She denies any LE weakness.  She denies any bowel or bladder incontinence.  She stopped Celebrex due to limited benefit.  Her pain today is 5/10.    Initial encounter:    Carmen Son presents to the clinic for the evaluation of back pain. The pain started in 2015 and symptoms have been worsening.    Brief history: 58 y/o  "woman here with low back that started in 2015. Pain is intermittent, sharp to the hips, back of the legs to the bottom of the feet. Patient was following with Pain Management at  getting POORNIMA with benefit.    Pain Description:    The pain is located in the back area and radiates to the legs.      At BEST  5/10     At WORST  8/10 on the WORST day.      On average pain is rated as 5/10.     Today the pain is rated as 7/10    The pain is described as aching and tingling      Symptoms interfere with daily activity.     Exacerbating factors: Standing and Walking.      Mitigating factors nothing.     Patient denies .  Patient denies any suicidal or homicidal ideations    Pain Medications:  Current:  None    Tried in Past:  NSAIDs - Mobic and Celebrex  TCA -Never  SNRI - cymbalta - caused insomnia  Anti-convulsants - gabapentin tried in the past, but caused patient to "unravel"  Muscle Relaxants -Never  Opioids-Never    Physical Therapy/Home Exercise: yes, still does exercises daily     report:  Reviewed and consistent with medication use as prescribed.     Pain Procedures: caudal epidural steroid injections (5/2017, 12/2017); 3x in 2016 and 3x in 2015    Chiropractor -never  Acupuncture - never  TENS unit -never  Spinal decompression -never  Joint replacement -never    Imaging:   MRI lumbar spine 10/17/2017  FINDINGS:    There are 5 lumbar vertebrae. There is mild grade 1 anterolisthesis of L4 on L5. Vertebral body heights are maintained.  Bone marrow signal is preserved.  Disk heights are preserved. Conus terminates at L1-L2 and appears unremarkable. Limited evaluation of posterior abdominal structures is unremarkable.  Paraspinal musculature is within normal limits.  Evaluation of sacroiliac joints is unremarkable.    L1-L2: No spinal canal stenosis or neuroforaminal narrowing.    L2-L3: No spinal canal stenosis or neuroforaminal narrowing.    L3-L4: Mild diffuse disc bulge and mild bilateral facet arthropathy " without spinal canal stenosis or neuroforaminal narrowing.    L4-L5: Mild diffuse disc bulge, ligamentum flavum hypertrophy, moderate bilateral facet arthropathy resulting in mild spinal canal stenosis and moderate left neuroforaminal narrowing.    L5-S1: Mild diffuse disc bulge and mild bilateral facet arthropathy without spinal canal stenosis or neuroforaminal narrowing.   Impression         Lumbar spondylosis, most prominent at L4-L5, resulting in mild spinal canal and moderate left neuroforaminal stenosis.    Mild/ grade 1 anterolisthesis of L4 over L5.     Lumbar XRAYs with flex and ext 10/17/17     Narrative     Lumbar spine radiographs    Images: 7    Comparison: 11/29/16    Findings:    Alignment: There is grade one anterolisthesis at L4-L5 with translational instability.  There is mild dextroconvex curvature of the lumbar spine.  Vertebrae: Vertebral body heights are maintained.  No suspicious lytic or blastic lesions.  Discs and facets: Moderate disc height loss noted at L4-L5.  Soft tissues: Unremarkable.   Impression       As above.         MRI Cervical spine 10/17/2017  Cervical spine MRI    Technique: MRI of cervical spine was performed without contrast and the following sequences were obtained: Localizer; sagittal T1, T2, and stir; axial T2 and gradient.    Comparison: CT cervical spine same date.    Findings:    There is straightening of the normal cervical lordosis. The vertebral body heights are within normal limits. There is mild disc space loss at multiple levels. Bone marrow signal is normal.    Visualized posterior fossa structures and cervical cord are unremarkable.    Limited evaluation of the neck soft tissues is unremarkable.    C2-3: No spinal canal stenosis or neuroforaminal narrowing.    C3-4: Posterior disc osteophyte complex formation without spinal canal stenosis or neuroforaminal narrowing.    C4-5: Central disc osteophyte protrusion effacing the anterior CSF sleeve without spinal  canal stenosis and no neuroforaminal narrowing.    C5-6: Moderate-sized disc osteophyte effacing the anterior CSF sleeve resulting in mild left neuroforaminal narrowing and mild spinal canal stenosis.    C6-7: Posterior disc osteophyte complex formation without significant spinal canal stenosis or neuroforaminal narrowing.    C7-T1: Moderate sized left paracentral disc osteophyte extrusion displacing the spinal cord to the right possibly impinging on the exiting left T1 nerve root resulting in mild spinal canal stenosis and mild  left neuroforaminal narrowing.   Impression         Left paracentral disc extrusion at C7-T1, possibly impinging on the exiting left T1 nerve root and resulting in mild spinal canal stenosis.    Additional mild cervical spondylosis, as above.         Past Medical History:   Diagnosis Date    Diabetes mellitus     Diet controlled gestational diabetes mellitus in puerperium     History of seasonal allergies      Past Surgical History:   Procedure Laterality Date    right knee scope       Social History     Social History    Marital status:      Spouse name: N/A    Number of children: N/A    Years of education: N/A     Occupational History    Not on file.     Social History Main Topics    Smoking status: Never Smoker    Smokeless tobacco: Never Used    Alcohol use No    Drug use: No    Sexual activity: Yes     Partners: Male     Birth control/ protection: Surgical     Other Topics Concern    Not on file     Social History Narrative    No narrative on file     Family History   Problem Relation Age of Onset    Diabetes type II Sister     Hyperlipidemia Sister     Diabetes type II Other        Review of patient's allergies indicates:  No Known Allergies    Current Outpatient Prescriptions   Medication Sig    cetirizine (ZYRTEC) 10 MG tablet Take 10 mg by mouth once daily.     furosemide (LASIX) 20 MG tablet Take 20 mg by mouth daily as needed.     losartan (COZAAR) 50  "MG tablet Take 50 mg by mouth once daily.    omeprazole (PRILOSEC) 40 MG capsule Take 40 mg by mouth once daily.    potassium chloride (KLOR-CON) 20 mEq Pack Take 20 mEq by mouth once.    propranolol (INDERAL) 80 MG tablet Take 160 mg by mouth once daily.     alprazolam (XANAX) 0.25 MG tablet Take 0.25 mg by mouth 3 (three) times daily.    aspirin 81 MG Chew Take 81 mg by mouth once daily.    celecoxib (CELEBREX) 200 MG capsule Take 1 a day for 7 days, if not helpful increase to BID    rosuvastatin (CRESTOR) 20 MG tablet Take 20 mg by mouth once daily.     No current facility-administered medications for this visit.        REVIEW OF SYSTEMS:    GENERAL:  No weight loss, malaise or fevers.  HEENT:   No recent changes in vision or hearing  NECK:  Negative for lumps, no difficulty with swallowing.  RESPIRATORY:  Negative for cough, wheezing or shortness of breath, patient denies any recent URI.  CARDIOVASCULAR:  Negative for chest pain, leg swelling or palpitations. HTN.  GI:  Negative for abdominal discomfort, blood in stools or black stools or change in bowel habits.  MUSCULOSKELETAL:  See HPI.  SKIN:  Negative for lesions, rash, and itching.  PSYCH:  No mood disorder or recent psychosocial stressors.  Patients sleep is not disturbed secondary to pain.  HEMATOLOGY/LYMPHOLOGY:  Negative for prolonged bleeding, bruising easily or swollen nodes.  Patient is not currently taking any anti-coagulants  ENDO: Pre-diabetic. No history of thyroid dysfunction  NEURO:   No history of headaches, syncope, paralysis, seizures or tremors.  All other reviewed and negative other than HPI.    OBJECTIVE:    BP (!) 147/97   Pulse 71   Temp 97.8 °F (36.6 °C) (Oral)   Resp 18   Ht 5' 3" (1.6 m)   Wt 95 kg (209 lb 6.4 oz)   BMI 37.09 kg/m²     PHYSICAL EXAMINATION:    GENERAL: Well appearing, in no acute distress, alert and oriented x3.  PSYCH:  Mood and affect appropriate.  SKIN: Skin color, texture, turgor normal, no rashes " or lesions.  HEAD/FACE:  Normocephalic, atraumatic. Cranial nerves grossly intact.  CV: RRR with palpation of the radial artery.  PULM: No evidence of respiratory difficulty, symmetric chest rise.  GI:  Soft and non-tender.  BACK: Straight leg raising in the supine position is positive to radicular pain at bilateral S1 distribution.  No pain to palpation over the facet joints of the lumbar spine or spinous processes. Normal range of motion with pain on flexion and extension.  Mild facet loading on the left.  EXTREMITIES: Peripheral joint ROM is full and pain free without obvious instability or laxity in all four extremities. No deformities, edema, or skin discoloration. Good capillary refill.  MUSCULOSKELETAL:  There is pain with palpation over the sacroiliac joints bilaterally.  FABERs test is negative.  FADIRs test is negative.  5/5 strength in right ankle with plantar and dorsiflexion. 5/5 strength in left ankle with plantar and dorsiflexion. 5/5 strength with right knee flexion and extension. 5/5 strength with left knee flexion and extension. 5/5 strength in right EHL, 5/5 strength in left EHL.  No atrophy or tone abnormalities are noted.  NEURO: Bilateral upper and lower extremity coordination and muscle stretch reflexes are physiologic and symmetric.  Plantar response are downgoing. No clonus.  Decreased sensation to LLE.  GAIT: Normal.    ASSESSMENT: 57 y.o. year old female with lower back and leg ain, consistent with the following:    Encounter Diagnoses   Name Primary?    Lumbar radiculopathy Yes    DDD (degenerative disc disease), lumbar     Cervical disc disease        PLAN:     - Previous imaging was reviewed and discussed with the patient today.  She does have some translational instability which she states she has discussed with Dr. Denton.  Previous neurosurgery notes were reviewed today.    - Will schedule for caudal POORNIMA as she reports previous in Dec 2017 from outside physician provided  benefit.  The procedure, risks, benefits and options were discussed with patient. There are no contraindications to the procedure. The patient expressed understanding and agreed to proceed.      - She would like to have an injection to help her back pain so that she can tolerate neck surgery and PT in the future.  She is seeing Dr. Denton in July to discuss ACDF vs disc replacement.    - Consider lumbar MBB and SI joint injections in the future.    - Consider Lyrica.  She declined as she had significant side effects with Gabapentin.    - RTC 3 weeks after procedure.      The above plan and management options were discussed at length with patient. Patient is in agreement with the above and verbalized understanding.     [place] Srivastava  04/24/2018

## 2018-05-08 ENCOUNTER — SURGERY (OUTPATIENT)
Age: 58
End: 2018-05-08

## 2018-05-08 ENCOUNTER — HOSPITAL ENCOUNTER (OUTPATIENT)
Facility: OTHER | Age: 58
Discharge: HOME | End: 2018-05-08
Attending: ANESTHESIOLOGY | Admitting: ANESTHESIOLOGY
Payer: COMMERCIAL

## 2018-05-08 VITALS
BODY MASS INDEX: 37.39 KG/M2 | HEIGHT: 63 IN | DIASTOLIC BLOOD PRESSURE: 87 MMHG | WEIGHT: 211 LBS | SYSTOLIC BLOOD PRESSURE: 150 MMHG | TEMPERATURE: 98 F | HEART RATE: 68 BPM | RESPIRATION RATE: 18 BRPM | OXYGEN SATURATION: 98 %

## 2018-05-08 DIAGNOSIS — M51.36 DDD (DEGENERATIVE DISC DISEASE), LUMBAR: ICD-10-CM

## 2018-05-08 DIAGNOSIS — M47.899 FACET SYNDROME: ICD-10-CM

## 2018-05-08 DIAGNOSIS — M54.16 LUMBAR RADICULOPATHY: Primary | ICD-10-CM

## 2018-05-08 PROCEDURE — 63600175 PHARM REV CODE 636 W HCPCS: Performed by: ANESTHESIOLOGY

## 2018-05-08 PROCEDURE — 62323 NJX INTERLAMINAR LMBR/SAC: CPT | Performed by: ANESTHESIOLOGY

## 2018-05-08 PROCEDURE — 25000003 PHARM REV CODE 250: Performed by: ANESTHESIOLOGY

## 2018-05-08 PROCEDURE — 62322 NJX INTERLAMINAR LMBR/SAC: CPT | Performed by: ANESTHESIOLOGY

## 2018-05-08 PROCEDURE — 25500020 PHARM REV CODE 255: Performed by: ANESTHESIOLOGY

## 2018-05-08 PROCEDURE — 62323 NJX INTERLAMINAR LMBR/SAC: CPT | Mod: ,,, | Performed by: ANESTHESIOLOGY

## 2018-05-08 RX ORDER — MIDAZOLAM HYDROCHLORIDE 1 MG/ML
INJECTION INTRAMUSCULAR; INTRAVENOUS
Status: DISCONTINUED | OUTPATIENT
Start: 2018-05-08 | End: 2018-05-08 | Stop reason: HOSPADM

## 2018-05-08 RX ORDER — SODIUM CHLORIDE 9 MG/ML
INJECTION, SOLUTION INTRAVENOUS CONTINUOUS
Status: DISCONTINUED | OUTPATIENT
Start: 2018-05-08 | End: 2018-05-08 | Stop reason: HOSPADM

## 2018-05-08 RX ORDER — BUPIVACAINE HYDROCHLORIDE 2.5 MG/ML
INJECTION, SOLUTION EPIDURAL; INFILTRATION; INTRACAUDAL
Status: DISCONTINUED | OUTPATIENT
Start: 2018-05-08 | End: 2018-05-08 | Stop reason: HOSPADM

## 2018-05-08 RX ORDER — METHYLPREDNISOLONE ACETATE 40 MG/ML
INJECTION, SUSPENSION INTRA-ARTICULAR; INTRALESIONAL; INTRAMUSCULAR; SOFT TISSUE
Status: DISCONTINUED | OUTPATIENT
Start: 2018-05-08 | End: 2018-05-08 | Stop reason: HOSPADM

## 2018-05-08 RX ORDER — LIDOCAINE HYDROCHLORIDE 10 MG/ML
INJECTION INFILTRATION; PERINEURAL
Status: DISCONTINUED | OUTPATIENT
Start: 2018-05-08 | End: 2018-05-08 | Stop reason: HOSPADM

## 2018-05-08 RX ORDER — ONDANSETRON 2 MG/ML
4 INJECTION INTRAMUSCULAR; INTRAVENOUS ONCE
Status: DISCONTINUED | OUTPATIENT
Start: 2018-05-08 | End: 2018-05-08 | Stop reason: HOSPADM

## 2018-05-08 RX ORDER — ONDANSETRON 2 MG/ML
INJECTION INTRAMUSCULAR; INTRAVENOUS
Status: DISCONTINUED | OUTPATIENT
Start: 2018-05-08 | End: 2018-05-08 | Stop reason: HOSPADM

## 2018-05-08 RX ADMIN — ONDANSETRON 4 MG: 2 INJECTION INTRAMUSCULAR; INTRAVENOUS at 09:05

## 2018-05-08 RX ADMIN — MIDAZOLAM HYDROCHLORIDE 2 MG: 1 INJECTION, SOLUTION INTRAMUSCULAR; INTRAVENOUS at 09:05

## 2018-05-08 RX ADMIN — BUPIVACAINE HYDROCHLORIDE 10 ML: 2.5 INJECTION, SOLUTION EPIDURAL; INFILTRATION; INTRACAUDAL; PERINEURAL at 09:05

## 2018-05-08 RX ADMIN — SODIUM CHLORIDE: 0.9 INJECTION, SOLUTION INTRAVENOUS at 08:05

## 2018-05-08 RX ADMIN — LIDOCAINE HYDROCHLORIDE 10 ML: 10 INJECTION, SOLUTION INFILTRATION; PERINEURAL at 09:05

## 2018-05-08 RX ADMIN — IOHEXOL 5 ML: 300 INJECTION, SOLUTION INTRAVENOUS at 09:05

## 2018-05-08 RX ADMIN — METHYLPREDNISOLONE ACETATE 80 MG: 40 INJECTION, SUSPENSION INTRA-ARTICULAR; INTRALESIONAL; INTRAMUSCULAR; SOFT TISSUE at 09:05

## 2018-05-08 NOTE — OP NOTE
Patient Name: Carmen Son  MRN: [id number]    INFORMED CONSENT: The procedure, risks, benefits and options were discussed with patient. There are no contraindications to the procedure. The patient expressed understanding and agreed to proceed. The personnel performing the procedure was discussed. I verify that I personally obtained Carmen's consent prior to the start of the procedure and the signed consent can be found on the patient's chart.    Procedure Date: 05/08/2018    Anesthesia: Topical    Pre Procedure diagnosis: Lumbar radiculopathy [M54.16]  1. Lumbar radiculopathy    2. DDD (degenerative disc disease), lumbar    3. Facet syndrome      Post-Procedure diagnosis: SAME        Moderate Sedation: 4mg midazolam    PROCEDURE: CAUDAL POORNIMA        DESCRIPTION OF PROCEDURE: After fully informed written consent was obtained, the patient was brought to the procedure room and placed in the prone position. Monitoring of pulse oximetry, heart rate, and blood pressure was done pre-procedure, during the procedure, and post-procedure. The sacrococcygeal area was prepped with  chlorhexidine  and draped in a sterile fashion. After performing time out. With a 25-gauge,  1.5 inch needle, lidocaine 1% was injected subcutaneously over the entry site. The sacrum and sacral cornua were visualized in an AP view.  A 22-gauge, 3 1/2 inch spinal needle was inserted and advanced into the sacral hiatus under fluoroscopic guidance.   After the needle passed through the sacrococcygeal ligament, the needle angle was lowered and the needle was advanced 1 cm. There was no evidence of paresthesias throughout needle placement. A catheter was then threaded to the L4/5 level towards the left side. After negative aspiration for blood and cerebrospinal fluid, 3 ml of nonionic contrast agent was slowly injected.Confirmation of spread of contrast agent within the caudal epidural space was not made with fluoroscopic imaging in the AP and lateral  views.  Subsequently, 1 mL 0.25% bupivacaine, 80 mg depomedrol and 8mL sterile water was slowly administered without resistance. There was no pain on injection. The needle was removed and bleeding was nil. The patient tolerated the procedure well and was transferred to the recovery room in stable conditionl and there was no evidence of procedural complications. A sterile dressing was applied. No specimens collected. Carmen was taken back to the recovery room for further observation.     Blood Loss: Nill  Specimen: None        Lani Toledo MD

## 2018-05-08 NOTE — INTERVAL H&P NOTE
"The patient has been examined and the H&P has been reviewed:    I concur with the findings and no changes have occurred since H&P was written.    Anesthesia/Surgery risks, benefits and alternative options discussed and understood by patient/family.    HPI  56 yo female presenting for caudal POORNIMA. No changes in medical history. Not on any blood thinners.     PMHx, PSHx, Allergies, Medications reviewed in epic    ROS negative except pain complaints in HPI    OBJECTIVE:    /84 (BP Location: Right arm, Patient Position: Lying)   Pulse (!) 54   Temp 98.1 °F (36.7 °C) (Oral)   Resp 14   Ht 5' 3" (1.6 m)   Wt 95.7 kg (211 lb)   SpO2 99%   Breastfeeding? No   BMI 37.38 kg/m²     PHYSICAL EXAMINATION:    GENERAL: Well appearing, in no acute distress, alert and oriented x3.  PSYCH:  Mood and affect appropriate.  SKIN: Skin color, texture, turgor normal, no rashes or lesions.  CV: RRR with palpation of the radial artery.  PULM: No evidence of respiratory difficulty, symmetric chest rise. Clear to auscultation.  NEURO: Cranial nerves grossly intact.    Plan:    Proceed with procedure as planned    Roman Evans Jr.  05/08/2018        There are no hospital problems to display for this patient.    "

## 2018-05-08 NOTE — DISCHARGE SUMMARY
Discharge Note  Short Stay      SUMMARY     Admit Date: 5/8/2018    Attending Physician: Lani Toledo    Discharge Diagnosis: Lumbar radiculopathy [M54.16]    Discharge Physician: Lani Toledo      Discharge Date: 5/8/2018 9:17 AM     Pre Procedure diagnosis: Lumbar radiculopathy [M54.16]  1. Lumbar radiculopathy    2. DDD (degenerative disc disease), lumbar    3. Facet syndrome      Post-Procedure diagnosis: SAME        Moderate Sedation: 4mg midazolam    PROCEDURE: CAUDAL POORNIMA    Disposition: Home or self care    Patient Instructions:   Current Discharge Medication List      CONTINUE these medications which have NOT CHANGED    Details   aspirin 81 MG Chew Take 81 mg by mouth once daily.      cetirizine (ZYRTEC) 10 MG tablet Take 10 mg by mouth once daily.   Refills: 0      furosemide (LASIX) 20 MG tablet Take 20 mg by mouth daily as needed.       losartan (COZAAR) 50 MG tablet Take 50 mg by mouth once daily.      omeprazole (PRILOSEC) 40 MG capsule Take 40 mg by mouth once daily.      potassium chloride (KLOR-CON) 20 mEq Pack Take 20 mEq by mouth once.      propranolol (INDERAL) 80 MG tablet Take 160 mg by mouth once daily.       alprazolam (XANAX) 0.25 MG tablet Take 0.25 mg by mouth 3 (three) times daily.      rosuvastatin (CRESTOR) 20 MG tablet Take 20 mg by mouth once daily.             Resume home diet and activity

## 2018-05-08 NOTE — H&P (VIEW-ONLY)
Chronic Pain - Established Visit    Referring Physician: No ref. provider found    Chief Complaint:   Chief Complaint   Patient presents with    Low-back Pain        SUBJECTIVE: Disclaimer: This note has been generated using voice-recognition software. There may be typographical errors that have been missed during proof-reading    Interval History 4/24/2018:  The patient presents for follow up of lower back pain.  She was evaluated by Dr. Toledo in January and no procedures were scheduled because she was scheduled for cervical disc arthroplasty in February with Dr. Denton.  However, this procedure he recommended was denied by her insurance and ACDF was recommended, per her report.  She does not wish to have an ACDF at this time because she is worried about limited range of motion.  She would like to speak with Dr. Denton about how she should proceed.  She is trying to change her insurance in the near future.  She is scheduled to see him in July at his first available.  She would like to have a repeat POORNIMA for her back and leg pain.  She previously had caudal ESIs in Dec 2017 and May 2017 with Dr. Nash then Dr. Payne.  She did not care for Dr. Nash and Dr. Payne stopped accepting her insurance.  The pain starts across the lower back with radiation into posterior buttocks and thighs.  She also has left posterior foot pain and numbness.  She has no pain when she sits or sleeps.  Her pain is mainly with walking and activity.  She has some translation instability on XRAYs from 2017 which has been addressed with Dr. Denton.  Per her report he does not recommend back surgery at this time.  She denies any LE weakness.  She denies any bowel or bladder incontinence.  She stopped Celebrex due to limited benefit.  Her pain today is 5/10.    Initial encounter:    Carmen Son presents to the clinic for the evaluation of back pain. The pain started in 2015 and symptoms have been worsening.    Brief history: 56 y/o  "woman here with low back that started in 2015. Pain is intermittent, sharp to the hips, back of the legs to the bottom of the feet. Patient was following with Pain Management at  getting POORNIMA with benefit.    Pain Description:    The pain is located in the back area and radiates to the legs.      At BEST  5/10     At WORST  8/10 on the WORST day.      On average pain is rated as 5/10.     Today the pain is rated as 7/10    The pain is described as aching and tingling      Symptoms interfere with daily activity.     Exacerbating factors: Standing and Walking.      Mitigating factors nothing.     Patient denies .  Patient denies any suicidal or homicidal ideations    Pain Medications:  Current:  None    Tried in Past:  NSAIDs - Mobic and Celebrex  TCA -Never  SNRI - cymbalta - caused insomnia  Anti-convulsants - gabapentin tried in the past, but caused patient to "unravel"  Muscle Relaxants -Never  Opioids-Never    Physical Therapy/Home Exercise: yes, still does exercises daily     report:  Reviewed and consistent with medication use as prescribed.     Pain Procedures: caudal epidural steroid injections (5/2017, 12/2017); 3x in 2016 and 3x in 2015    Chiropractor -never  Acupuncture - never  TENS unit -never  Spinal decompression -never  Joint replacement -never    Imaging:   MRI lumbar spine 10/17/2017  FINDINGS:    There are 5 lumbar vertebrae. There is mild grade 1 anterolisthesis of L4 on L5. Vertebral body heights are maintained.  Bone marrow signal is preserved.  Disk heights are preserved. Conus terminates at L1-L2 and appears unremarkable. Limited evaluation of posterior abdominal structures is unremarkable.  Paraspinal musculature is within normal limits.  Evaluation of sacroiliac joints is unremarkable.    L1-L2: No spinal canal stenosis or neuroforaminal narrowing.    L2-L3: No spinal canal stenosis or neuroforaminal narrowing.    L3-L4: Mild diffuse disc bulge and mild bilateral facet arthropathy " without spinal canal stenosis or neuroforaminal narrowing.    L4-L5: Mild diffuse disc bulge, ligamentum flavum hypertrophy, moderate bilateral facet arthropathy resulting in mild spinal canal stenosis and moderate left neuroforaminal narrowing.    L5-S1: Mild diffuse disc bulge and mild bilateral facet arthropathy without spinal canal stenosis or neuroforaminal narrowing.   Impression         Lumbar spondylosis, most prominent at L4-L5, resulting in mild spinal canal and moderate left neuroforaminal stenosis.    Mild/ grade 1 anterolisthesis of L4 over L5.     Lumbar XRAYs with flex and ext 10/17/17     Narrative     Lumbar spine radiographs    Images: 7    Comparison: 11/29/16    Findings:    Alignment: There is grade one anterolisthesis at L4-L5 with translational instability.  There is mild dextroconvex curvature of the lumbar spine.  Vertebrae: Vertebral body heights are maintained.  No suspicious lytic or blastic lesions.  Discs and facets: Moderate disc height loss noted at L4-L5.  Soft tissues: Unremarkable.   Impression       As above.         MRI Cervical spine 10/17/2017  Cervical spine MRI    Technique: MRI of cervical spine was performed without contrast and the following sequences were obtained: Localizer; sagittal T1, T2, and stir; axial T2 and gradient.    Comparison: CT cervical spine same date.    Findings:    There is straightening of the normal cervical lordosis. The vertebral body heights are within normal limits. There is mild disc space loss at multiple levels. Bone marrow signal is normal.    Visualized posterior fossa structures and cervical cord are unremarkable.    Limited evaluation of the neck soft tissues is unremarkable.    C2-3: No spinal canal stenosis or neuroforaminal narrowing.    C3-4: Posterior disc osteophyte complex formation without spinal canal stenosis or neuroforaminal narrowing.    C4-5: Central disc osteophyte protrusion effacing the anterior CSF sleeve without spinal  canal stenosis and no neuroforaminal narrowing.    C5-6: Moderate-sized disc osteophyte effacing the anterior CSF sleeve resulting in mild left neuroforaminal narrowing and mild spinal canal stenosis.    C6-7: Posterior disc osteophyte complex formation without significant spinal canal stenosis or neuroforaminal narrowing.    C7-T1: Moderate sized left paracentral disc osteophyte extrusion displacing the spinal cord to the right possibly impinging on the exiting left T1 nerve root resulting in mild spinal canal stenosis and mild  left neuroforaminal narrowing.   Impression         Left paracentral disc extrusion at C7-T1, possibly impinging on the exiting left T1 nerve root and resulting in mild spinal canal stenosis.    Additional mild cervical spondylosis, as above.         Past Medical History:   Diagnosis Date    Diabetes mellitus     Diet controlled gestational diabetes mellitus in puerperium     History of seasonal allergies      Past Surgical History:   Procedure Laterality Date    right knee scope       Social History     Social History    Marital status:      Spouse name: N/A    Number of children: N/A    Years of education: N/A     Occupational History    Not on file.     Social History Main Topics    Smoking status: Never Smoker    Smokeless tobacco: Never Used    Alcohol use No    Drug use: No    Sexual activity: Yes     Partners: Male     Birth control/ protection: Surgical     Other Topics Concern    Not on file     Social History Narrative    No narrative on file     Family History   Problem Relation Age of Onset    Diabetes type II Sister     Hyperlipidemia Sister     Diabetes type II Other        Review of patient's allergies indicates:  No Known Allergies    Current Outpatient Prescriptions   Medication Sig    cetirizine (ZYRTEC) 10 MG tablet Take 10 mg by mouth once daily.     furosemide (LASIX) 20 MG tablet Take 20 mg by mouth daily as needed.     losartan (COZAAR) 50  "MG tablet Take 50 mg by mouth once daily.    omeprazole (PRILOSEC) 40 MG capsule Take 40 mg by mouth once daily.    potassium chloride (KLOR-CON) 20 mEq Pack Take 20 mEq by mouth once.    propranolol (INDERAL) 80 MG tablet Take 160 mg by mouth once daily.     alprazolam (XANAX) 0.25 MG tablet Take 0.25 mg by mouth 3 (three) times daily.    aspirin 81 MG Chew Take 81 mg by mouth once daily.    celecoxib (CELEBREX) 200 MG capsule Take 1 a day for 7 days, if not helpful increase to BID    rosuvastatin (CRESTOR) 20 MG tablet Take 20 mg by mouth once daily.     No current facility-administered medications for this visit.        REVIEW OF SYSTEMS:    GENERAL:  No weight loss, malaise or fevers.  HEENT:   No recent changes in vision or hearing  NECK:  Negative for lumps, no difficulty with swallowing.  RESPIRATORY:  Negative for cough, wheezing or shortness of breath, patient denies any recent URI.  CARDIOVASCULAR:  Negative for chest pain, leg swelling or palpitations. HTN.  GI:  Negative for abdominal discomfort, blood in stools or black stools or change in bowel habits.  MUSCULOSKELETAL:  See HPI.  SKIN:  Negative for lesions, rash, and itching.  PSYCH:  No mood disorder or recent psychosocial stressors.  Patients sleep is not disturbed secondary to pain.  HEMATOLOGY/LYMPHOLOGY:  Negative for prolonged bleeding, bruising easily or swollen nodes.  Patient is not currently taking any anti-coagulants  ENDO: Pre-diabetic. No history of thyroid dysfunction  NEURO:   No history of headaches, syncope, paralysis, seizures or tremors.  All other reviewed and negative other than HPI.    OBJECTIVE:    BP (!) 147/97   Pulse 71   Temp 97.8 °F (36.6 °C) (Oral)   Resp 18   Ht 5' 3" (1.6 m)   Wt 95 kg (209 lb 6.4 oz)   BMI 37.09 kg/m²     PHYSICAL EXAMINATION:    GENERAL: Well appearing, in no acute distress, alert and oriented x3.  PSYCH:  Mood and affect appropriate.  SKIN: Skin color, texture, turgor normal, no rashes " or lesions.  HEAD/FACE:  Normocephalic, atraumatic. Cranial nerves grossly intact.  CV: RRR with palpation of the radial artery.  PULM: No evidence of respiratory difficulty, symmetric chest rise.  GI:  Soft and non-tender.  BACK: Straight leg raising in the supine position is positive to radicular pain at bilateral S1 distribution.  No pain to palpation over the facet joints of the lumbar spine or spinous processes. Normal range of motion with pain on flexion and extension.  Mild facet loading on the left.  EXTREMITIES: Peripheral joint ROM is full and pain free without obvious instability or laxity in all four extremities. No deformities, edema, or skin discoloration. Good capillary refill.  MUSCULOSKELETAL:  There is pain with palpation over the sacroiliac joints bilaterally.  FABERs test is negative.  FADIRs test is negative.  5/5 strength in right ankle with plantar and dorsiflexion. 5/5 strength in left ankle with plantar and dorsiflexion. 5/5 strength with right knee flexion and extension. 5/5 strength with left knee flexion and extension. 5/5 strength in right EHL, 5/5 strength in left EHL.  No atrophy or tone abnormalities are noted.  NEURO: Bilateral upper and lower extremity coordination and muscle stretch reflexes are physiologic and symmetric.  Plantar response are downgoing. No clonus.  Decreased sensation to LLE.  GAIT: Normal.    ASSESSMENT: 57 y.o. year old female with lower back and leg ain, consistent with the following:    Encounter Diagnoses   Name Primary?    Lumbar radiculopathy Yes    DDD (degenerative disc disease), lumbar     Cervical disc disease        PLAN:     - Previous imaging was reviewed and discussed with the patient today.  She does have some translational instability which she states she has discussed with Dr. Denton.  Previous neurosurgery notes were reviewed today.    - Will schedule for caudal POORNIMA as she reports previous in Dec 2017 from outside physician provided  benefit.  The procedure, risks, benefits and options were discussed with patient. There are no contraindications to the procedure. The patient expressed understanding and agreed to proceed.      - She would like to have an injection to help her back pain so that she can tolerate neck surgery and PT in the future.  She is seeing Dr. Denton in July to discuss ACDF vs disc replacement.    - Consider lumbar MBB and SI joint injections in the future.    - Consider Lyrica.  She declined as she had significant side effects with Gabapentin.    - RTC 3 weeks after procedure.      The above plan and management options were discussed at length with patient. Patient is in agreement with the above and verbalized understanding.     [place] Srivastava  04/24/2018

## 2018-05-08 NOTE — DISCHARGE INSTRUCTIONS
Thank you for allowing us to care for you today. You may receive a survey about the care we provided. Your feedback is valuable and helps us provide excellent care throughout the community. Home Care Instructions Pain Management:    1. DIET:   You may resume your normal diet today.   2. BATHING:   You may shower with luke warm water.  3. DRESSING:   You may remove your bandage today.   4. ACTIVITY LEVEL:   You may resume your normal activities 24 hrs after your procedure.  5. MEDICATIONS:   You may resume your normal medications today.   6. SPECIAL INSTRUCTIONS:   No heat to the injection site for 24 hrs including, bath or shower, heating pad, moist heat, or hot tubs.    Use ice pack to injection site for any pain or discomfort.  Apply ice packs for 20 minute intervals as needed.   If you have received any sedatives by mouth today you may not drive for 12 hours.    If you have received any sedation through your IV, you may not drive for 24 hrs.     PLEASE CALL YOUR DOCTOR IF:  1. Redness or swelling around the injection site.  2. Fever of 101 degrees  3. Drainage (pus) from the injection site.  4. For any continuous bleeding (some dried blood over the incision is normal.)    FOR EMERGENCIES:   If any unusual problems or difficulties occur during clinic hours, call [phone] or [phone].   Adult Procedural Sedation Instructions    Recovery After Procedural Sedation (Adult)  You have been given medicine by vein to make you sleep during your surgery. This may have included both a pain medicine and sleeping medicine. Most of the effects have worn off. But you may still have some drowsiness for the next 6 to 8 hours.  Home care  Follow these guidelines when you get home:  · For the next 8 hours, you should be watched by a responsible adult. This person should make sure your condition is not getting worse.  · Don't drink any alcohol for the next 24 hours.  · Don't drive, operate dangerous machinery, or make important  business or personal decisions during the next 24 hours.  Note: Your healthcare provider may tell you not to take any medicine by mouth for pain or sleep in the next 4 hours. These medicines may react with the medicines you were given in the hospital. This could cause a much stronger response than usual.  Follow-up care  Follow up with your healthcare provider if you are not alert and back to your usual level of activity within 12 hours.  When to seek medical advice  Call your healthcare provider right away if any of these occur:  · Drowsiness gets worse  · Weakness or dizziness gets worse  · Repeated vomiting  · You can't be awakened   Date Last Reviewed: 10/18/2016  © 1794-2044 [employer]. [address] [place] [address], [place] [address]. All rights reserved. This information is not intended as a substitute for professional medical care. Always follow your healthcare professional's instructions.

## 2018-07-03 ENCOUNTER — OFFICE VISIT (OUTPATIENT)
Dept: NEUROSURGERY | Facility: CLINIC | Age: 58
End: 2018-07-03
Payer: COMMERCIAL

## 2018-07-03 VITALS
TEMPERATURE: 99 F | HEART RATE: 75 BPM | SYSTOLIC BLOOD PRESSURE: 125 MMHG | WEIGHT: 213.63 LBS | BODY MASS INDEX: 37.84 KG/M2 | DIASTOLIC BLOOD PRESSURE: 75 MMHG

## 2018-07-03 DIAGNOSIS — M54.16 LUMBAR RADICULOPATHY: ICD-10-CM

## 2018-07-03 DIAGNOSIS — M50.90 CERVICAL DISC DISEASE: Primary | Chronic | ICD-10-CM

## 2018-07-03 DIAGNOSIS — R93.7 ABNORMAL X-RAY OF CERVICAL SPINE: ICD-10-CM

## 2018-07-03 DIAGNOSIS — M51.36 DDD (DEGENERATIVE DISC DISEASE), LUMBAR: ICD-10-CM

## 2018-07-03 PROCEDURE — 99214 OFFICE O/P EST MOD 30 MIN: CPT | Mod: S$GLB,,, | Performed by: NEUROLOGICAL SURGERY

## 2018-07-03 PROCEDURE — 99999 PR PBB SHADOW E&M-EST. PATIENT-LVL III: CPT | Mod: PBBFAC,,, | Performed by: NEUROLOGICAL SURGERY

## 2018-07-03 PROCEDURE — 3008F BODY MASS INDEX DOCD: CPT | Mod: CPTII,S$GLB,, | Performed by: NEUROLOGICAL SURGERY

## 2018-07-03 PROCEDURE — 3074F SYST BP LT 130 MM HG: CPT | Mod: CPTII,S$GLB,, | Performed by: NEUROLOGICAL SURGERY

## 2018-07-03 PROCEDURE — 3078F DIAST BP <80 MM HG: CPT | Mod: CPTII,S$GLB,, | Performed by: NEUROLOGICAL SURGERY

## 2018-07-03 NOTE — PROGRESS NOTES
Subjective:    I, Sapna Acharya, attest that this documentation has been prepared under the direction and in the presence of ROCÍO Denton MD.     Patient ID: Carmen Son is a 58 y.o. female.    Chief Complaint: Follow-up    HPI   Pt is a 58 y.o. female who presents for follow up after last evaluation January 2018. Pt has 2 level disc disease at C4-5, C6-7 we had planned on 2 level arthoplasty in January, but pt had canceled. Pt recently had lumbar injections by Dr. Garibay at the beginning of May 2018. Pt states that she continues to endure chronic neck and back pain radiating to bilateral lower extremities, left more severe than right. Pt denies pain radiating to fingers.     Review of Systems   Constitutional: Negative for chills, fatigue and fever.   HENT: Negative for sinus pressure and trouble swallowing.    Eyes: Negative.  Negative for visual disturbance.   Respiratory: Negative.    Cardiovascular: Negative.    Gastrointestinal: Negative.  Negative for nausea and vomiting.   Endocrine: Negative.    Genitourinary: Negative.    Musculoskeletal: Positive for back pain and neck pain.   Neurological: Negative for dizziness, seizures, syncope, speech difficulty, weakness and numbness.       Objective:      Physical Exam:  Nursing note and vitals reviewed.    Constitutional: She appears well-developed.     Eyes: Pupils are equal, round, and reactive to light. Conjunctivae and EOM are normal.     Cardiovascular: Normal rate, regular rhythm, normal pulses and intact distal pulses.     Abdominal: Soft.     Psych/Behavior: She is alert. She is oriented to person, place, and time. She has a normal mood and affect.     Musculoskeletal: Gait is normal.        Neck: Range of motion is full. There is no tenderness. Muscle strength is 5/5. Tone is normal.        Back: Range of motion is full. There is no tenderness. Muscle strength is 5/5. Tone is normal.        Right Upper Extremities: Range of motion is full. There is  no tenderness. Muscle strength is 5/5. Tone is normal.        Left Upper Extremities: Range of motion is full. There is no tenderness. Muscle strength is 5/5. Tone is normal.       Right Lower Extremities: Range of motion is full. There is no tenderness. Muscle strength is 5/5. Tone is normal.        Left Lower Extremities: Range of motion is full. There is no tenderness. Muscle strength is 5/5. Tone is normal.     Neurological:        Coordination: She has a normal Romberg Test, normal finger to nose coordination, normal heel to shin coordination and normal tandem walking coordination.        DTRs: DTRs are normal. Tricep reflexes are 2+ on the right side and 2+ on the left side. Bicep reflexes are 2+ on the right side and 2+ on the left side. Brachioradialis reflexes are 2+ on the right side and 2+ on the left side. Patellar reflexes are 2+ on the right side and 2+ on the left side. Achilles reflexes are 2+ on the right side and 2+ on the left side.        Cranial nerves: Cranial nerve(s) II, III, IV, V, VI, VII, VIII, IX, X, XI and XII are intact.       Pt has new left sided radiculopathy that was not prominent during last evaluation. Injections did not help her.   Trace Stover's on the right.   Negative SLR.    Imaging:   MRI C spine, dated 10/17/2018, shows 2 disc herniations at C4-5, C6-7    MRI L spine, dated 10/17/2018,  shows disc bulges at L5-S1    IROCÍO MD, personally reviewed the imaging and interpreted independent of the radiology report.    Assessment/Plan:   Pt with known 2 level disc disease with herniation at C4-5, C4-6. Pt was scheduled for surgery at beginning of the year, she but canceled. She is now considering surgery. I think her symptoms are progressing enough to intervene operatively.We will plan for MRI scans since her last scan was last year.     I have discussed the risks/benefits, indications, and alternatives for the proposed procedure in detail. I have answered all of their  questions and patient wishes to proceed with surgery. We will schedule patient.     I, ROCÍO Denton MD, personally performed the services described in this documentation. All medical record entries made by the scribe, Sapna Acharya, were at my direction and in my presence.  I have reviewed the chart and agree that the record reflects my personal performance and is accurate and complete.

## 2018-07-05 ENCOUNTER — HOSPITAL ENCOUNTER (EMERGENCY)
Facility: HOSPITAL | Age: 58
Discharge: HOME | End: 2018-07-05
Attending: FAMILY MEDICINE
Payer: COMMERCIAL

## 2018-07-05 VITALS
TEMPERATURE: 98 F | OXYGEN SATURATION: 100 % | BODY MASS INDEX: 37.74 KG/M2 | DIASTOLIC BLOOD PRESSURE: 66 MMHG | RESPIRATION RATE: 18 BRPM | SYSTOLIC BLOOD PRESSURE: 113 MMHG | HEIGHT: 63 IN | WEIGHT: 213 LBS | HEART RATE: 66 BPM

## 2018-07-05 DIAGNOSIS — J02.9 ACUTE PHARYNGITIS, UNSPECIFIED ETIOLOGY: ICD-10-CM

## 2018-07-05 DIAGNOSIS — R29.898 TMJ CLICK: ICD-10-CM

## 2018-07-05 DIAGNOSIS — J02.9 PHARYNGITIS, UNSPECIFIED ETIOLOGY: Primary | ICD-10-CM

## 2018-07-05 PROCEDURE — 99283 EMERGENCY DEPT VISIT LOW MDM: CPT

## 2018-07-05 RX ORDER — GUAIFENESIN/DEXTROMETHORPHAN 100-10MG/5
5 SYRUP ORAL EVERY 6 HOURS PRN
Qty: 120 ML | Refills: 0 | Status: SHIPPED | OUTPATIENT
Start: 2018-07-05 | End: 2018-07-15

## 2018-07-05 RX ORDER — AMOXICILLIN 500 MG/1
500 CAPSULE ORAL
COMMUNITY
End: 2018-07-05

## 2018-07-05 RX ORDER — METHYLPREDNISOLONE 4 MG/1
TABLET ORAL
Qty: 1 PACKAGE | Refills: 0 | Status: SHIPPED | OUTPATIENT
Start: 2018-07-05 | End: 2018-07-26

## 2018-07-05 RX ORDER — LEVOFLOXACIN 500 MG/1
500 TABLET, FILM COATED ORAL DAILY
Qty: 5 TABLET | Refills: 0 | Status: SHIPPED | OUTPATIENT
Start: 2018-07-05 | End: 2018-07-10

## 2018-07-05 NOTE — ED PROVIDER NOTES
Encounter Date: 7/5/2018       History     Chief Complaint   Patient presents with    Cough     Pt states has had cough, hoarseness, left ear pain x 2 weeks, states was seen by ENT and given antibiotics,new antibiotic called in 4 days ago.  Pt states at pm productive cough worse.      The patient is a 58-year-old female with a complaint of productive cough, hoarseness, sore throat and left ear pain for a couple of weeks.  She was just placed on amoxicillin approximately 4 days ago with no decrease in symptoms.          Review of patient's allergies indicates:  No Known Allergies  Past Medical History:   Diagnosis Date    Diabetes mellitus     Diet controlled gestational diabetes mellitus in puerperium     History of seasonal allergies      Past Surgical History:   Procedure Laterality Date    right knee scope       Family History   Problem Relation Age of Onset    Diabetes type II Sister     Hyperlipidemia Sister     Diabetes type II Other      Social History   Substance Use Topics    Smoking status: Never Smoker    Smokeless tobacco: Never Used    Alcohol use No     Review of Systems   Constitutional: Negative for diaphoresis, fatigue and fever.        14 Point ROS completed and negative with the exception of HPI and Below.   HENT: Positive for ear pain, rhinorrhea and sore throat. Negative for congestion and trouble swallowing.    Eyes: Negative for discharge and itching.   Respiratory: Positive for cough. Negative for chest tightness, shortness of breath and wheezing.    Cardiovascular: Negative for chest pain, palpitations and leg swelling.   Gastrointestinal: Negative for abdominal distention, abdominal pain, nausea and vomiting.   Genitourinary: Negative for dysuria, flank pain and frequency.   Musculoskeletal: Negative for back pain, neck pain and neck stiffness.   Skin: Negative for pallor, rash and wound.   Neurological: Negative for dizziness, syncope, facial asymmetry, weakness and headaches.    Hematological: Does not bruise/bleed easily.   Psychiatric/Behavioral: Negative for agitation, behavioral problems and confusion.   All other systems reviewed and are negative.      Physical Exam     Initial Vitals [07/05/18 1633]   BP Pulse Resp Temp SpO2   133/72 76 18 97.8 °F (36.6 °C) 99 %      MAP       --         Physical Exam    Constitutional: She appears well-developed and well-nourished.   HENT:   Head: Normocephalic and atraumatic.   Right Ear: External ear normal.   Left Ear: External ear normal.   TMs are clear however the left temporomandibular joint is tender to palpation indicative of a TMJ abnormality.  Posterior pharynx is erythematous with exudate, there is bronchial rhonchi that clears with coughing.  Lower turbinates with erythema and clear discharge.     Eyes: Conjunctivae and EOM are normal. Pupils are equal, round, and reactive to light. Right eye exhibits no discharge. Left eye exhibits no discharge. No scleral icterus.   Neck: Normal range of motion. Neck supple. No thyromegaly present. No tracheal deviation present. No JVD present.   Cardiovascular: Normal rate, regular rhythm, normal heart sounds and intact distal pulses. Exam reveals no gallop and no friction rub.    No murmur heard.  Pulmonary/Chest: Breath sounds normal. No stridor. No respiratory distress. She has no wheezes. She has no rhonchi. She has no rales. She exhibits no tenderness.   Very mild bronchial rhonchi that clears with coughing otherwise breath sounds clear   Abdominal: Soft. Bowel sounds are normal. She exhibits no distension and no mass. There is no tenderness. There is no rebound and no guarding.   Musculoskeletal: Normal range of motion. She exhibits no edema or tenderness.   Lymphadenopathy:     She has no cervical adenopathy.   Neurological: She is alert and oriented to person, place, and time. She has normal strength and normal reflexes. She displays normal reflexes. No cranial nerve deficit or sensory  deficit.   Skin: Skin is warm and dry. Capillary refill takes less than 2 seconds. No rash and no abscess noted. No erythema. No pallor.   Psychiatric: She has a normal mood and affect. Her behavior is normal. Thought content normal.         ED Course   Procedures  Labs Reviewed - No data to display       Imaging Results    None          Medical Decision Making:   Initial Assessment:   The patient is a 58-year-old female with a complaint of productive cough, hoarseness, sore throat and left ear pain for a couple of weeks.  She was just placed on amoxicillin approximately 4 days ago with no decrease in symptoms.     On exam the TMs are clear however the left temporomandibular joint is tender to palpation indicative of a TMJ abnormality.  Posterior pharynx is erythematous with exudate, there is bronchial rhonchi that clears with coughing.  Lower turbinates with erythema and clear discharge.  Differential Diagnosis:   Upper respiratory infection, pharyngitis, temporomandibular joint inflammation  ED Management:  Patient will be discharged home on antibiotic, steroid pack and Mucinex.  She is to follow up with her primary care physician within 3 days if no improvement.  Patient verbalized understanding                      Clinical Impression:   The primary encounter diagnosis was Pharyngitis, unspecified etiology. Diagnoses of TMJ click and Acute pharyngitis, unspecified etiology were also pertinent to this visit.                             Reyes Vaz PA-C  07/05/18 2007

## 2018-07-20 ENCOUNTER — TELEPHONE (OUTPATIENT)
Dept: NEUROSURGERY | Facility: CLINIC | Age: 58
End: 2018-07-20

## 2018-07-20 DIAGNOSIS — Z98.890 S/P CERVICAL DISC REPLACEMENT: Primary | ICD-10-CM

## 2018-07-20 DIAGNOSIS — M50.10 CERVICAL DISC DISORDER WITH RADICULOPATHY: ICD-10-CM

## 2018-07-20 DIAGNOSIS — M50.00 HERNIATED NUCLEUS PULPOSUS WITH MYELOPATHY, CERVICAL: Primary | ICD-10-CM

## 2018-07-25 ENCOUNTER — ANESTHESIA EVENT (OUTPATIENT)
Dept: SURGERY | Facility: HOSPITAL | Age: 58
End: 2018-07-25

## 2018-07-25 DIAGNOSIS — Z01.818 PREOPERATIVE TESTING: Primary | ICD-10-CM

## 2018-07-25 NOTE — ANESTHESIA PREPROCEDURE EVALUATION
Pre Admission Screening  Jennie Sotelo RN      []Hide copied text  Anesthesia Assessment: Preoperative EQUATION     Planned Procedure: Procedure(s) (LRB):  REPLACEMENT, INTERVERTEBRAL DISC, CERVICAL, TOTAL C4-5, C6-7 (N/A)  Requested Anesthesia Type:General  Surgeon: Leo Denton MD  Service: Neurosurgery  Known or anticipated Date of Surgery:8/9/2018     Surgeon notes: reviewed     Electronic QUestionnaire Assessment completed via nurse interview with patient.  NO AQ  TRIage considerations:         Previous anesthesia records:GETA and No problems   HAS TMJ CLICK  4/23/2013 TOTAL HYSTERECTOMY, ABD SALPINGECTOMY AND OOPHORECTOMY  Direct laryngoscopy; Other (AUDRA MOTT); Endotracheal Tube; Other (RSI); Postinduction; Esqueda #2; 7.0; Cuffed; Minimal occlusive pressure; Auscultation; Grade I; Obesity; Positive EtCO2, Bilateral breath sounds, Atraumatic/Condition of teeth unchanged; 18 cm; Lips; None; 1  Airway/Jaw/Neck:  Airway Findings:  Mouth Opening: Normal  Tongue: Normal  General Airway Assessment: Adult and Average  Mallampati: III  Improves with phonation: II      Last PCP note: 6-12 months ago , outside [facility]   Subspecialty notes: Pain Management, NEUROSURGERY     Other important co-morbidities:PER EPIC: PREDIABETIC      Tests already available:  Available tests,  6-12 months ago , within [facility] .1/2/2018 EKG                            Instructions given. (See in Nurse's note)     Optimization:  Anesthesia Preop Clinic Assessment  Indicated    Medical Opinion Indicated                               Plan:              Testing:  Hematology Profile, BMP, PT/INR and T&S   Pre-anesthesia  visit                                        Visit focus: concerns in complex and/or prolonged anesthesia                           Consultation:Patient's PCP for re-evaluation Patient's PCP for a statement of optimization                            Patient  has previously scheduled Medical Appointment:7/23 PHYSICAL  THERAPY, 7/30   XRAY AND MRI     Navigation: Tests Scheduled.TBD                         Consults scheduled.TBD                        Results will be tracked by Preop Clinic.                                Electronically signed by Jennie Sotelo RN at 7/25/2018  9:03 AM        Pre-admit on 8/9/2018            Detailed Report       8/6 Note by DR. ADELFO PROCTOR on 8/2:  Patient is medically optimized.( scanned to media)                                                                                                              07/25/2018  Carmen Son is a 58 y.o., female.    Pre-op Assessment         Review of Systems

## 2018-07-30 ENCOUNTER — HOSPITAL ENCOUNTER (OUTPATIENT)
Dept: RADIOLOGY | Facility: HOSPITAL | Age: 58
Discharge: HOME | End: 2018-07-30
Attending: NEUROLOGICAL SURGERY
Payer: COMMERCIAL

## 2018-07-30 DIAGNOSIS — M51.36 DDD (DEGENERATIVE DISC DISEASE), LUMBAR: ICD-10-CM

## 2018-07-30 DIAGNOSIS — M54.16 LUMBAR RADICULOPATHY: ICD-10-CM

## 2018-07-30 DIAGNOSIS — R93.7 ABNORMAL X-RAY OF CERVICAL SPINE: ICD-10-CM

## 2018-07-30 DIAGNOSIS — M50.90 CERVICAL DISC DISEASE: Chronic | ICD-10-CM

## 2018-07-30 PROCEDURE — 72148 MRI LUMBAR SPINE W/O DYE: CPT | Mod: TC,PO

## 2018-07-30 PROCEDURE — 72050 X-RAY EXAM NECK SPINE 4/5VWS: CPT | Mod: TC,FY,PO

## 2018-08-09 ENCOUNTER — ANESTHESIA (OUTPATIENT)
Dept: SURGERY | Facility: HOSPITAL | Age: 58
End: 2018-08-09

## 2018-08-13 ENCOUNTER — HOSPITAL ENCOUNTER (OUTPATIENT)
Dept: RADIOLOGY | Facility: HOSPITAL | Age: 58
Discharge: HOME | End: 2018-08-13
Attending: NEUROLOGICAL SURGERY
Payer: COMMERCIAL

## 2018-08-13 DIAGNOSIS — R93.7 ABNORMAL X-RAY OF CERVICAL SPINE: ICD-10-CM

## 2018-08-13 PROCEDURE — 72141 MRI NECK SPINE W/O DYE: CPT | Mod: TC,PO

## 2018-08-14 ENCOUNTER — OFFICE VISIT (OUTPATIENT)
Dept: NEUROSURGERY | Facility: CLINIC | Age: 58
End: 2018-08-14
Payer: COMMERCIAL

## 2018-08-14 VITALS
HEART RATE: 75 BPM | BODY MASS INDEX: 38.3 KG/M2 | TEMPERATURE: 98 F | WEIGHT: 216.19 LBS | SYSTOLIC BLOOD PRESSURE: 131 MMHG | DIASTOLIC BLOOD PRESSURE: 74 MMHG

## 2018-08-14 DIAGNOSIS — M50.10 CERVICAL DISC DISORDER WITH RADICULOPATHY: ICD-10-CM

## 2018-08-14 DIAGNOSIS — G95.9 CERVICAL MYELOPATHY: ICD-10-CM

## 2018-08-14 DIAGNOSIS — M51.36 DDD (DEGENERATIVE DISC DISEASE), LUMBAR: ICD-10-CM

## 2018-08-14 DIAGNOSIS — M50.90 CERVICAL DISC DISEASE: Primary | Chronic | ICD-10-CM

## 2018-08-14 PROCEDURE — 99215 OFFICE O/P EST HI 40 MIN: CPT | Mod: S$GLB,,, | Performed by: NEUROLOGICAL SURGERY

## 2018-08-14 PROCEDURE — 99999 PR PBB SHADOW E&M-EST. PATIENT-LVL III: CPT | Mod: PBBFAC,,, | Performed by: NEUROLOGICAL SURGERY

## 2018-08-14 PROCEDURE — 3078F DIAST BP <80 MM HG: CPT | Mod: CPTII,S$GLB,, | Performed by: NEUROLOGICAL SURGERY

## 2018-08-14 PROCEDURE — 3075F SYST BP GE 130 - 139MM HG: CPT | Mod: CPTII,S$GLB,, | Performed by: NEUROLOGICAL SURGERY

## 2018-08-14 PROCEDURE — 3008F BODY MASS INDEX DOCD: CPT | Mod: CPTII,S$GLB,, | Performed by: NEUROLOGICAL SURGERY

## 2018-08-14 NOTE — PROGRESS NOTES
Subjective:    I, Sapna Acharya, attest that this documentation has been prepared under the direction and in the presence of ROCÍO Denton MD.     Patient ID: Carmen Son is a 58 y.o. female.    Chief Complaint: Follow-up    HPI   Pt is a 58 y.o. female who presents for follow up after last evaluation on 7/3/2018. Patient has long history of progressive worsening neck and arm pain. Pt states that she endures chronic neck pain with ROM and tingling and pain radiating to RUE and fingers. Pt notes that she also endures lower back pain and groin pain radiating to LLE with associated tingling in left foot. Pt states that her pain is now inhibiting her daily living. She had been scheduled for total disc athroplasty but that was denied by insurance.  She feels both neck and back issues are worse but neck and arm symptoms more severe.    Review of Systems   Constitutional: Negative for chills, fatigue and fever.   HENT: Negative for sinus pressure and trouble swallowing.    Eyes: Negative.  Negative for visual disturbance.   Respiratory: Negative.    Cardiovascular: Negative.    Gastrointestinal: Negative.  Negative for nausea and vomiting.   Endocrine: Negative.    Genitourinary: Negative.    Musculoskeletal: Positive for neck pain and neck stiffness.   Neurological: Negative for dizziness, seizures, syncope, speech difficulty, weakness and numbness.       Objective:      Physical Exam:  Nursing note and vitals reviewed.    Constitutional: She appears well-developed.     Eyes: Pupils are equal, round, and reactive to light. Conjunctivae and EOM are normal.     Cardiovascular: Normal rate, regular rhythm, normal pulses and intact distal pulses.     Abdominal: Soft.     Psych/Behavior: She is alert. She is oriented to person, place, and time. She has a normal mood and affect.     Musculoskeletal: Gait is normal.        Neck: Range of motion is full. There is no tenderness. Muscle strength is 5/5. Tone is normal.         Back: Range of motion is full. There is no tenderness. Muscle strength is 5/5. Tone is normal.        Right Upper Extremities: Range of motion is full. There is no tenderness. Muscle strength is 5/5. Tone is normal.        Left Upper Extremities: Range of motion is full. There is no tenderness. Muscle strength is 5/5. Tone is normal.       Right Lower Extremities: Range of motion is full. There is no tenderness. Muscle strength is 5/5. Tone is normal.        Left Lower Extremities: Range of motion is full. There is no tenderness. Muscle strength is 5/5. Tone is normal.     Neurological:        Coordination: She has a normal Romberg Test, normal finger to nose coordination, normal heel to shin coordination and normal tandem walking coordination.        DTRs: DTRs are normal. Tricep reflexes are 2+ on the right side and 2+ on the left side. Bicep reflexes are 2+ on the right side and 2+ on the left side. Brachioradialis reflexes are 2+ on the right side and 2+ on the left side. Patellar reflexes are 2+ on the right side and 2+ on the left side. Achilles reflexes are 2+ on the right side and 2+ on the left side.        Cranial nerves: Cranial nerve(s) II, III, IV, V, VI, VII, VIII, IX, X, XI and XII are intact.       Pt has negative straight leg raise   Decreased neck ROM but clear right C5 radiculopathy and possible left  L5 radiculopathy.  Decreased sensation in right C5 and C6 distribution.  Patient does have (+) Stover's bilaterally and some subtle weakness of hand intrinsincs    Imaging:   MRI C spine, shows loss of cervical lordosis and reversal of curve, there are two central disc herniation/osteophytes at C3-4 and C4-5 w cord compression and severe flattening of thecal sac      IROCÍO MD, personally reviewed the imaging and interpreted independent of the radiology report.      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  She has been  symptomatic for more than 1 year, failed PT and multiple neck injections.   If she doesn't now qualify to TDA then I think she needs C3-4 and C4-5 ACDF. .  I have discussed the risks/benefits, indications, and alternatives for the proposed procedure in detail. I have answered all of their questions and patient wish to proceed with surgery. We will schedule patient.       As for her back, I dont think this is surgical at this point and would like to try PT and L L4-5 POORNIMA.    Medrol dose pack   PT    I, ROCÍO Denton MD, personally performed the services described in this documentation. All medical record entries made by the scribe, Sapna Acharya, were at my direction and in my presence.  I have reviewed the chart and agree that the record reflects my personal performance and is accurate and complete.

## 2018-08-17 ENCOUNTER — TELEPHONE (OUTPATIENT)
Dept: NEUROSURGERY | Facility: CLINIC | Age: 58
End: 2018-08-17

## 2018-08-17 NOTE — TELEPHONE ENCOUNTER
Received preop clearance. Cleared for sx but advised to watch for sleep apnea in the periop period. Faxed a copy to preop department

## 2018-08-27 ENCOUNTER — TELEPHONE (OUTPATIENT)
Dept: NEUROSURGERY | Facility: CLINIC | Age: 58
End: 2018-08-27

## 2018-08-27 ENCOUNTER — CLINICAL SUPPORT (OUTPATIENT)
Dept: REHABILITATION | Facility: HOSPITAL | Age: 58
End: 2018-08-27
Attending: NEUROLOGICAL SURGERY
Payer: COMMERCIAL

## 2018-08-27 DIAGNOSIS — M54.42 CHRONIC BILATERAL LOW BACK PAIN WITH BILATERAL SCIATICA: ICD-10-CM

## 2018-08-27 DIAGNOSIS — M50.00 HNP (HERNIATED NUCLEUS PULPOSUS) WITH MYELOPATHY, CERVICAL: Primary | ICD-10-CM

## 2018-08-27 DIAGNOSIS — M48.061 SPINAL STENOSIS AT L4-L5 LEVEL: ICD-10-CM

## 2018-08-27 DIAGNOSIS — M50.10 CERVICAL DISC DISORDER WITH RADICULOPATHY: ICD-10-CM

## 2018-08-27 DIAGNOSIS — M54.41 CHRONIC BILATERAL LOW BACK PAIN WITH BILATERAL SCIATICA: ICD-10-CM

## 2018-08-27 DIAGNOSIS — G89.29 CHRONIC BILATERAL LOW BACK PAIN WITH BILATERAL SCIATICA: ICD-10-CM

## 2018-08-27 PROCEDURE — 97161 PT EVAL LOW COMPLEX 20 MIN: CPT | Mod: PO

## 2018-08-27 PROCEDURE — 97110 THERAPEUTIC EXERCISES: CPT | Mod: PO

## 2018-08-27 PROCEDURE — 97140 MANUAL THERAPY 1/> REGIONS: CPT | Mod: PO

## 2018-08-27 NOTE — TELEPHONE ENCOUNTER
----- Message from Paulino Goyal sent at 8/27/2018  3:06 PM CDT -----  Contact: Patient @ [phone]  Patient is returning a missed call from liana Pate return call

## 2018-08-27 NOTE — PLAN OF CARE
[facility]  Physical Therapy Initial Evaluation    Name: Carmen Son  Clinic Number: [id number]    Therapy Diagnosis:   Encounter Diagnoses   Name Primary?    Chronic bilateral low back pain with bilateral sciatica     Spinal stenosis at L4-L5 level      Physician: Leo Denton MD    Physician Orders:       Medical Diagnosis from Referral:   M50.90 (ICD-10-CM) - Cervical disc disease   M51.36 (ICD-10-CM) - DDD (degenerative disc disease), lumbar   G95.9 (ICD-10-CM) - Cervical myelopathy   M50.10 (ICD-10-CM) - Cervical disc disorder with radiculopathy     Evaluation Date: 8/27/2018  Authorization Period Expiration: 12/31/2018  Plan of Care Expiration: 10/27/18  Visit # / Visits authorized: 1/ 30    Time In: 1:05  Time Out: 1:50  Total Billable Time: 45    Precautions: Standard    Subjective   Date of onset: 08/17/18  History of current condition - Carmen reports:  Chronic LBP since 2016, L > R.  She states having pain in the glutes and it rarely goes all the way down the leg.  She states having an epidural which helped some, and PT in the past made her 85% better.  She states understanding what she has to do get better, and she has to get back into a routine.  She states her neck bothers her more, and she never responded well to PT in the past.  She states the pain is worse when she stands and has no pain when she sits  She prefers to concentrate on her back and will probably have to get sx on her neck          Past Medical History:   Diagnosis Date    Diabetes mellitus     Diet controlled gestational diabetes mellitus in puerperium     History of seasonal allergies      Carmen Son  has a past surgical history that includes right knee scope; INJECTION-STEROID-EPIDURAL-CAUDAL (N/A, 5/8/2018); HYSTERECTOMY, TOTAL, ABDOMINAL (N/A, 4/23/2013); SALPINGECTOMY (Right, 4/23/2013); and SALPINGO-OOPHORECTOMY (Left, 4/23/2013).    Carmen has a current medication list which  includes the following prescription(s): alprazolam, ascorbic acid (vitamin c), aspirin, cetirizine, furosemide, losartan, multivitamin, omeprazole, potassium chloride, propranolol, and rosuvastatin.    Review of patient's allergies indicates:  No Known Allergies     Imaging,   FINDINGS:  The distal cord and conus appear normal.  There is mild T10-11 and T11-12 degenerative disc disease with T11-12 disc bulge.    T12-L1: Unremarkable    L1-2:  Unremarkable    L2-3:  Unremarkable    L3-4:  Mild generalized annular bulge.  Mild hypertrophic ligamentum flavum and facet arthritis.  Mild left foraminal stenosis.    L4-5:  Moderate degenerative disc disease with disc narrowing, desiccation and disc bulge.  Moderate to severe hypertrophic ligamentum flavum and facet arthritis.  Dorsal sac impingement with moderate central stenosis.  Moderate to severe left and moderate right foraminal stenosis.  Possible left L4 nerve root impingement.    L5-S1:  Mild degenerative disc disease with disc desiccation and annular bulge.  Mild hypertrophic ligamentum flavum and facet arthritis.  Mild bilateral foraminal stenosis.      Impression       Multilevel lumbar degenerative disc disease most pronounced at the L4-5 level.  L4-5 central and foraminal stenosis with possible left L4 nerve root impingement.  Please see above for detail.       Prior Therapy: yes  Social History:  Occupation:   Prior Level of Function: Recreational Exerciser  Current Level of Function: Difficulty in ADLs/ambulation    Pain:  Current 6/10, worst 8/10, best 4/10   Location: bilateral back , L > R  Description: Tingling and Shooting  Aggravating Factors: standing/walking  Easing Factors: heating pad and rest    Pts goals: TO improve her functioning and decrease her pain    Objective   Observation: Pt ambulates into the clinic without AD and mildly antalgic on the L    Posture:  Decreased lumbar lordosis    Lumbar Range of Motion:    Degrees Pain   Flexion 50     +      Extension 10    +        Left Side Bending 20         Right Side Bending 20         Left rotation   40         Right Rotation   45                Lower Extremity Strength  Right LE  Left LE    Knee extension: 4+/5 Knee extension: 4+/5   Knee flexion: 4+/5 Knee flexion: 4+/5   Hip flexion: 4+/5 Hip flexion: 4+/5   Hip extension:  4+/5 Hip extension: 4+/5   Hip abduction: 4/5 Hip abduction: 4/5   Hip adduction: 4+/5 Hip adduction 4+/5   Ankle dorsiflexion: 4+/5 Ankle dorsiflexion: 4+/5   Ankle plantarflexion: 4+/5 Ankle plantarflexion: 4+/5         Special Tests:  -Repeated Flexion: (+) for relieving symptoms  -Repeated Ext: (+)  -Piriformis Test: (-)  -Combined motions: extension, L sidebending, R rotation (+)           SLR:      L = (+)    Decreased transverse abdominus activation with abdominal bracing               Palpation:  B glutes and B lumbar paraspinals    Sensation: WNL        CMS Impairment/Limitation/Restriction for FOTO lumbar Survey    Therapist reviewed FOTO scores for Carmen Son on 8/27/2018.   FOTO documents entered into [employer] - see Media section.    Limitation Score:47 %  Category: Mobility    Current : CK = at least 40% but < 60% impaired, limited or restricted  Goal: CJ = at least 20% but < 40% impaired, limited or restricted  Discharge: NA         TREATMENT   Treatment Time In: 1:05  Treatment Time Out: 1:50  Total Treatment time separate from Evaluation: 0 minutes    Home Exercises and Patient Education Provided    Education provided:   Pt instructed on flex based exercises and core strengthening    Written Home Exercises Provided: yes.  Exercises were reviewed and Carmen was able to demonstrate them prior to the end of the session.  Carmen demonstrated good  understanding of the education provided.     See EMR under Patient Instructions for exercises provided 8/27/2018.    Assessment   Carmen is a 58 y.o. female referred to outpatient Physical Therapy with a medical diagnosis of  low back pain. Pt presents with decreased ROM, core strength, pain/tenderness, and difficulty in ADL's/ambulation.  Pt is a good candidate for PT services to address the above deficits.  Based on pt's MRI and her symptoms, she has more of a degenerative spine and some stenosis.  She would benefit from a flexion based exercise program and she was instructed on the use of dry needling to help her pain and improve her functioning.    Pt prognosis is Good.   Pt will benefit from skilled outpatient Physical Therapy to address the deficits stated above and in the chart below, provide pt/family education, and to maximize pt's level of independence.     Plan of care discussed with patient: Yes  Pt's spiritual, cultural and educational needs considered and patient is agreeable to the plan of care and goals as stated below:     Anticipated Barriers for therapy: chronic LBP    Medical Necessity is demonstrated by the following  History  Co-morbidities and personal factors that may impact the plan of care Co-morbidities:   diabetes and high BMI    Personal Factors:   lifestyle     moderate   Examination  Body Structures and Functions, activity limitations and participation restrictions that may impact the plan of care Body Regions:   back    Body Systems:    ROM  strength    Participation Restrictions:       Activity limitations:   Learning and applying knowledge  no deficits    General Tasks and Commands  no deficits    Communication  no deficits    Mobility  walking    Self care  no deficits    Domestic Life  no deficits    Interactions/Relationships  no deficits    Life Areas  no deficits    Community and Social Life  no deficits         low   Clinical Presentation stable and uncomplicated low   Decision Making/ Complexity Score: low     Goals:  Short Term Goals: 3 weeks   Pt to improve standing and walking tolerance to 15 minutes with < 5/10 pain  Pt to improve B hip abd strength to 4+/5  Pt to improve lumbar flex ROM to 70  degrees  Independent with HEP    Long Term Goals: 6 weeks   Pt to improve standing and walking tolerance to 30 minutes with  > 3/10 pain  Pt to be performing recreational exercises to improve lifestyle and prevent reoccurrence of LBP  Lumbar flexibility is WNL    Plan   Plan of care Certification: 8/27/2018 to 10/27/2018    Outpatient Physical Therapy 2 times weekly for 6 weeks to include the following interventions: Manual Therapy, Moist Heat/ Ice and Therapeutic Exercise.     Alan Hayes, PT

## 2018-08-29 ENCOUNTER — CLINICAL SUPPORT (OUTPATIENT)
Dept: REHABILITATION | Facility: HOSPITAL | Age: 58
End: 2018-08-29
Attending: NEUROLOGICAL SURGERY
Payer: COMMERCIAL

## 2018-08-29 DIAGNOSIS — M48.061 SPINAL STENOSIS AT L4-L5 LEVEL: ICD-10-CM

## 2018-08-29 DIAGNOSIS — G89.29 CHRONIC BILATERAL LOW BACK PAIN WITH BILATERAL SCIATICA: ICD-10-CM

## 2018-08-29 DIAGNOSIS — M54.41 CHRONIC BILATERAL LOW BACK PAIN WITH BILATERAL SCIATICA: ICD-10-CM

## 2018-08-29 DIAGNOSIS — M54.42 CHRONIC BILATERAL LOW BACK PAIN WITH BILATERAL SCIATICA: ICD-10-CM

## 2018-08-29 PROBLEM — M54.50 LOW BACK PAIN: Status: ACTIVE | Noted: 2018-08-29

## 2018-09-04 ENCOUNTER — TELEPHONE (OUTPATIENT)
Dept: PREADMISSION TESTING | Facility: HOSPITAL | Age: 58
End: 2018-09-04

## 2018-09-04 ENCOUNTER — ANESTHESIA EVENT (OUTPATIENT)
Dept: SURGERY | Facility: HOSPITAL | Age: 58
DRG: 473 | End: 2018-09-04
Payer: COMMERCIAL

## 2018-09-04 DIAGNOSIS — Z01.818 PREOPERATIVE TESTING: Primary | ICD-10-CM

## 2018-09-04 NOTE — PRE-PROCEDURE INSTRUCTIONS
Preop instructions given. Hold asa, asa containing products, nsaids, vitamins and supplements one week prior to surgery. Will need a medical clearance from your PCP, Dr. Gavino Figueroa. She was lst seen by him 8/2. I told her to call his office and see if he can write a new clearance from that visit or may need to have a new appt. Will also need poc appt, and labs.  Our  will call to set up these appts. Verbalizes understanding.

## 2018-09-04 NOTE — TELEPHONE ENCOUNTER
----- Message from Jennie Sotelo RN sent at 9/4/2018  9:52 AM CDT -----  Please schedule labs and poc appt. Thanks!

## 2018-09-04 NOTE — PRE ADMISSION SCREENING
Anesthesia Assessment: Preoperative EQUATION    Planned Procedure: Procedure(s) (LRB):  DISCECTOMY, SPINE, CERVICAL, ANTERIOR APPROACH, WITH FUSION C3-4, C4-5 (N/A)  Requested Anesthesia Type:General  Surgeon: Leo Denton MD  Service: Neurosurgery  Known or anticipated Date of Surgery:9/17/2018    Surgeon notes: reviewed    Electronic QUestionnaire Assessment completed via nurse interview with patient.    No aq    Triage considerations:     Previous anesthesia records:GETA and No problems  HAS TMJ CLICK  4/23/2013 TOTAL HYSTERECTOMY, ABD SALPINGECTOMY AND OOPHORECTOMY  Direct laryngoscopy; Other (AUDRA MOTT); Endotracheal Tube; Other (RSI); Postinduction; Esqueda #2; 7.0; Cuffed; Minimal occlusive pressure; Auscultation; Grade I; Obesity; Positive EtCO2, Bilateral breath sounds, Atraumatic/Condition of teeth unchanged; 18 cm; Lips; None; 1  Airway/Jaw/Neck:  Airway Findings:  Mouth Opening: Normal  Tongue: Normal  General Airway Assessment: Adult and Average  Mallampati: III  Improves with phonation: II    Last PCP note: within 3 months , outside [facility]   Subspecialty notes: Pain Management, NEUROSURGERY    Other important co-morbidities:PER Epic: PREDIABETIC      Tests already available:  Available tests,  6-12 months ago , within [facility] . 1/2/2018 EKG            Instructions given. (See in Nurse's note)    Optimization:  Anesthesia Preop Clinic Assessment  Indicated    lt      Plan:    Testing:  CBC, BMP, PT/INR and T&S   Pre-anesthesia  visit       Visit focus: concerns in complex and/or prolonged anesthesia     Consultation:Patient's PCP for a statement of optimization      Patient  has previously scheduled Medical Appointment:9/4 & 9/6 PHYSICAL THERAPY  Navigation: Tests Scheduled. TBD             Consults scheduled.TBD             Results will be tracked by Preop Clinic.

## 2018-09-04 NOTE — ANESTHESIA PREPROCEDURE EVALUATION
Anesthesia Assessment: Preoperative EQUATION     Planned Procedure: Procedure(s) (LRB):  DISCECTOMY, SPINE, CERVICAL, ANTERIOR APPROACH, WITH FUSION C3-4, C4-5 (N/A)  Requested Anesthesia Type:General  Surgeon: Leo Denton MD  Service: Neurosurgery  Known or anticipated Date of Surgery:9/17/2018     Surgeon notes: reviewed     Electronic QUestionnaire Assessment completed via nurse interview with patient.    No aq     Triage considerations:      Previous anesthesia records:GETA and No problems  HAS TMJ CLICK  4/23/2013 TOTAL HYSTERECTOMY, ABD SALPINGECTOMY AND OOPHORECTOMY  Direct laryngoscopy; Other (AUDRA MOTT); Endotracheal Tube; Other (RSI); Postinduction; Esqueda #2; 7.0; Cuffed; Minimal occlusive pressure; Auscultation; Grade I; Obesity; Positive EtCO2, Bilateral breath sounds, Atraumatic/Condition of teeth unchanged; 18 cm; Lips; None; 1  Airway/Jaw/Neck:  Airway Findings:  Mouth Opening: Normal  Tongue: Normal  General Airway Assessment: Adult and Average  Mallampati: III  Improves with phonation: II     Last PCP note: within 3 months , outside [facility]   Subspecialty notes: Pain Management, NEUROSURGERY     Other important co-morbidities:PER Epic: PREDIABETIC      Tests already available:  Available tests,  6-12 months ago , within [facility] . 1/2/2018 EKG                            Instructions given. (See in Nurse's note)     Optimization:  Anesthesia Preop Clinic Assessment  Indicated    lt                Plan:    Testing:  CBC, BMP, PT/INR and T&S   Pre-anesthesia  visit                                        Visit focus: concerns in complex and/or prolonged anesthesia                           Consultation:Patient's PCP for a statement of optimization                            Patient  has previously scheduled Medical Appointment:9/4 & 9/6 PHYSICAL THERAPY  Navigation: Tests Scheduled. TBD                        Consults scheduled.TBD                        Results will be tracked by Preop  Clinic.                                    Electronically signed by Jennie Sotelo RN at 9/4/2018  9:45 AM       Pre-admit on 9/17/2018            Detailed Report    9/4 Patient is still cleared for surgery(medical clearance) note from Dr. Gavino Figueroa, PCP.(scanned to media).     Jennie Sotelo RN BSN  Preop Center                                                                                                                 09/04/2018  Carmen Son is a 58 y.o., female.    Anesthesia Evaluation    I have reviewed the Patient Summary Reports.    I have reviewed the Nursing Notes.   I have reviewed the Medications.   Steroids Taken In Past Year:     Review of Systems  Anesthesia Hx:  No problems with previous Anesthesia Denies Hx of Anesthetic complications  History of prior surgery of interest to airway management or planning: Previous anesthesia: General 2015: right knee scope with general anesthesia.  Denies Family Hx of Anesthesia complications.   Denies Personal Hx of Anesthesia complications.   Social:  Non-Smoker, No Alcohol Use  Denies Tobacco Use. Denies Alcohol Use.   Hematology/Oncology:     Oncology Normal   Hematology Comments: Anemia   EENT/Dental:   Chipped tooth Denies Throat Symptoms  Jaw Problems:  Clicking (TMJ)   Cardiovascular:  Cardiovascular Normal Hypertension   Functional Capacity Physical Therapy 2x weekly: denies CP/SOB  Denies Coronary Artery Disease.  Denies Deep Venous Thrombosis (DVT)  Hypertension , Recent typical clinic B/P of 121/78 @ POC visit    Pulmonary:  Pulmonary Normal  Denies Asthma.  Denies Chronic Obstructive Pulmonary Disease (COPD).  Possible Obstructive Sleep Apnea , (STOP/BANG) Symptoms S - Snoring (loud), P - Pressure being treated for high BP and A - Age > 50    Renal/:  Renal/ Normal   Denies Kidney Function/Disease    Hepatic/GI:   Hiatal Hernia, GERD, well controlled  Esophageal / Stomach Disorders Controlled by chronic antireflux medication.  Denies  Liver Disease    Musculoskeletal:   Arthritis   Joint Disease:  Arthritis bilateral knee Spine Disorders: Herniated Disc, Cervical Herniated Disc Cervical Spine Disorder myelopathy; right side radiculopathy   Neurological:   Neuromuscular Disease,  Neuro Symptoms of numbness, tingling bilateral handDenies Seizure Disorder  Denies CVA - Cerebrovasular Accident  Denies TIA - Transient Ischemic Attack    Endocrine:   Diabetes  Diabetes, Type 2 Diabetes , controlled by diet.  Denies Thyroid Disease  Metabolic Disorders, Hyperlipoproteinemia, hypercholesterolemia, Obesity / BMI > 30  Dermatological:  Skin Normal    Psych:  Psychiatric Normal           Physical Exam  General:  Obesity    Airway/Jaw/Neck:  Airway Findings: Mouth Opening: Normal Jaw/Neck Findings:  Neck ROM: Extension Decreased, Mod.      Dental:  Dental Findings: In tact   Chest/Lungs:  Chest/Lungs Findings: Clear to auscultation, Normal Respiratory Rate     Heart/Vascular:  Heart Findings: Rate: Normal  Rhythm: Regular Rhythm  Vascular Findings: Normal       Mental Status:  Mental Status Findings:  Cooperative, Alert and Oriented         Anesthesia Plan  Type of Anesthesia, risks & benefits discussed:  Anesthesia Type:  general  Patient's Preference:   Intra-op Monitoring Plan: standard ASA monitors and arterial line  Intra-op Monitoring Plan Comments:   Post Op Pain Control Plan: multimodal analgesia  Post Op Pain Control Plan Comments:   Induction:   IV  Beta Blocker:  Patient is not currently on a Beta-Blocker (No further documentation required).       Informed Consent: Patient understands risks and agrees with Anesthesia plan.  Questions answered. Anesthesia consent signed with patient.  ASA Score: 3     Day of Surgery Review of History & Physical:    H&P update referred to the surgeon.         Ready For Surgery From Anesthesia Perspective.     Cleared per outside PCP (Henry) 9/4/18- scanned in media 9/5/18    Reza Maguire RN

## 2018-09-05 ENCOUNTER — CLINICAL SUPPORT (OUTPATIENT)
Dept: REHABILITATION | Facility: HOSPITAL | Age: 58
End: 2018-09-05
Attending: NEUROLOGICAL SURGERY
Payer: COMMERCIAL

## 2018-09-05 DIAGNOSIS — M54.41 CHRONIC BILATERAL LOW BACK PAIN WITH BILATERAL SCIATICA: ICD-10-CM

## 2018-09-05 DIAGNOSIS — G89.29 CHRONIC BILATERAL LOW BACK PAIN WITH BILATERAL SCIATICA: ICD-10-CM

## 2018-09-05 DIAGNOSIS — M48.061 SPINAL STENOSIS AT L4-L5 LEVEL: ICD-10-CM

## 2018-09-05 DIAGNOSIS — M54.42 CHRONIC BILATERAL LOW BACK PAIN WITH BILATERAL SCIATICA: ICD-10-CM

## 2018-09-05 PROCEDURE — 97140 MANUAL THERAPY 1/> REGIONS: CPT | Mod: PO

## 2018-09-05 PROCEDURE — 97110 THERAPEUTIC EXERCISES: CPT | Mod: PO

## 2018-09-05 NOTE — PROGRESS NOTES
Physical Therapy Daily Treatment Note     Name: Carmen Burnette Tarkio  Clinic Number: [id number]    Therapy Diagnosis:   Encounter Diagnoses   Name Primary?    Spinal stenosis at L4-L5 level     Chronic bilateral low back pain with bilateral sciatica      Physician: Leo Denton MD    Visit Date: 9/5/2018  Physician Orders:         Medical Diagnosis from Referral:   M50.90 (ICD-10-CM) - Cervical disc disease   M51.36 (ICD-10-CM) - DDD (degenerative disc disease), lumbar   G95.9 (ICD-10-CM) - Cervical myelopathy   M50.10 (ICD-10-CM) - Cervical disc disorder with radiculopathy      Evaluation Date: 8/27/2018  Authorization Period Expiration: 12/31/2018  Plan of Care Expiration: 10/27/18  Visit # / Visits authorized: 1/ 30      Time In: 2 20  Time Out: 3 00  Total Billable Time: 36    Precautions: Standard    Subjective     Pt reports: Pt reports feeling some good relief after the dry needling, and she wants to do it on B sides today.  She states her back started getting sore after doing a lot of activities.  She was compliant with home exercise program.  Response to previous treatment: Good  Functional change:  More ADL's with less breaks    Pain: 5/10  Location:R > L lumbar      Objective     Carmen received therapeutic exercises to develop core stabilization, strengthening, ROM for 35 minutes including:    Date 9/5 08/29   Visit 3 2   Gcode     FOTO     Cap visit  Cap total     MHP 6' 10'   MT 10' 10'   HS stretch 5  x 10s 10 x 10'   piriformis 5 x 10 s -   Supine:     TAs 2 x 10 x 5'' 2 x 10 x 5''   LTR 5 x 10'' --   March 2 x 10 2x 10   Hip adduction with ball 2 x 10 x 5'' 2 x 10   Hip abduction 2 x 10 x G T-band 2 x 10 x GTB   Bridge  --   SLR  --   Seated:     TAs  --   March  --   LAQs  --   Lats  --   Rows  --   UBE  --   Leg Press  --     Initials  CD       Carmen received the following manual therapy techniques: Soft tissue Mobilization were applied to the: lumbar spine for 10 minutes, including:  Dry  needling with manual therapy techniques to decrease pain, inflammation and swelling, increase circulation and promote healing process to B lumbar paraspinals and B glutes  Dry needling with trigger point/manual therapy techniques was performed.Dry needling performed to lumbar   All needles were removed and changes in signs and symptoms were noted .  Dry needling was performed to decrease inflammation, increase circulation, decrease pain and restore homeostasis.  Dry needling consent form was reviewed with the patient addressing all questions and concerns and signed by patient.  Copy of the consent form was provided to patient and copy of consent form scanned to patient Epic chart.          aCrmen received hot pack for 6 minutes to lumbar spine in supine        Home Exercises Provided and Patient Education Provided     Education provided:   Pt instructed that she still may have some soreness, but she should recover quicker from the procedure this time    Written Home Exercises Provided: Patient instructed to cont prior HEP.  Exercises were reviewed and Carmen was able to demonstrate them prior to the end of the session.  Carmen demonstrated good  understanding of the education provided.     See EMR under Patient Instructions for exercises provided prior visit.    Assessment     Pt responded well to today's treatment for progression with dry needling to B lumbar.  She did well with her core strengthening exercises, and she was given VC's on breathing techniques in combo with core strengthening  Carmen is progressing well towards her goals.   Pt prognosis is Good.     Pt will continue to benefit from skilled outpatient physical therapy to address the deficits listed in the problem list box on initial evaluation, provide pt/family education and to maximize pt's level of independence in the home and community environment.     Pt's spiritual, cultural and educational needs considered and pt agreeable to plan of care and  goals.    Anticipated barriers to physical therapy: none    Goals:     Short Term Goals: 3 weeks   Pt to improve standing and walking tolerance to 15 minutes with < 5/10 pain  Pt to improve B hip abd strength to 4+/5  Pt to improve lumbar flex ROM to 70 degrees  Independent with HEP     Long Term Goals: 6 weeks   Pt to improve standing and walking tolerance to 30 minutes with  > 3/10 pain  Pt to be performing recreational exercises to improve lifestyle and prevent reoccurrence of LBP  Lumbar flexibility is WNL  Plan     Cont with POC and progress there ex, especially core strengthening    Alan Hayes, PT

## 2018-09-05 NOTE — PROGRESS NOTES
Physical Therapy Daily Treatment Note     Name: Carmen Son  Clinic Number: [id number]    Therapy Diagnosis:   Encounter Diagnoses   Name Primary?    Spinal stenosis at L4-L5 level     Chronic bilateral low back pain with bilateral sciatica      Physician: Leo Denton MD    Visit Date: 8/29/2018  Physician Orders:         Medical Diagnosis from Referral:   M50.90 (ICD-10-CM) - Cervical disc disease   M51.36 (ICD-10-CM) - DDD (degenerative disc disease), lumbar   G95.9 (ICD-10-CM) - Cervical myelopathy   M50.10 (ICD-10-CM) - Cervical disc disorder with radiculopathy      Evaluation Date: 8/27/2018  Authorization Period Expiration: 12/31/2018  Plan of Care Expiration: 10/27/18  Visit # / Visits authorized: 1/ 30      Time In: 1:05  Time Out: 2:00  Total Billable Time: 45    Precautions: Standard    Subjective     Pt reports: Pt reports feeling pretty good after thee first day, and she did some of her exercises at home.  She was compliant with home exercise program.  Response to previous treatment: Good  Functional change: NA    Pain: 5/10  Location: left back      Objective     Carmen received therapeutic exercises to develop core stabilization, strengthening, ROM for 35 minutes including:    Date 08/29   Visit 2   Gcode    FOTO    Cap visit  Cap total    MHP 10'   MT 10'   HS stretch 10 x 10'   piriformis -   Supine:    TAs 2 x 10 x 5''   LTR --   March 2x 10   Hip adduction with ball 2 x 10   Hip abduction 2 x 10 x GTB   Bridge --   SLR --   Seated:    TAs --   March --   LAQs --   Lats --   Rows --   UBE --   Leg Press --     Initials CD       Carmen received the following manual therapy techniques: Soft tissue Mobilization were applied to the: lumbar spine for 10 minutes, including:  Dry needling with manual therapy techniques to decrease pain, inflammation and swelling, increase circulation and promote healing process  Dry needling with trigger point/manual therapy techniques was performed.Dry  needling performed to lumbar   All needles were removed and changes in signs and symptoms were noted .  Dry needling was performed to decrease inflammation, increase circulation, decrease pain and restore homeostasis.  Dry needling consent form was reviewed with the patient addressing all questions and concerns and signed by patient.  Copy of the consent form was provided to patient and copy of consent form scanned to patient Epic chart.          Carmen received hot pack for 10 minutes to lumbar spine in supine        Home Exercises Provided and Patient Education Provided     Education provided:   Pt instructed that she may have some soreness from the dry needling, but it will subside after 1-2 days.    Written Home Exercises Provided: Patient instructed to cont prior HEP.  Exercises were reviewed and Carmen was able to demonstrate them prior to the end of the session.  Carmen demonstrated good  understanding of the education provided.     See EMR under Patient Instructions for exercises provided prior visit.    Assessment     Pt tolerated her second treatment well, especially dry needling procedures. Pt demo good tolerance to core strengthening and instructed to make sure she breathes with her exercises.  Carmen is progressing well towards her goals.   Pt prognosis is Good.     Pt will continue to benefit from skilled outpatient physical therapy to address the deficits listed in the problem list box on initial evaluation, provide pt/family education and to maximize pt's level of independence in the home and community environment.     Pt's spiritual, cultural and educational needs considered and pt agreeable to plan of care and goals.    Anticipated barriers to physical therapy: none    Goals:     Short Term Goals: 3 weeks   Pt to improve standing and walking tolerance to 15 minutes with < 5/10 pain  Pt to improve B hip abd strength to 4+/5  Pt to improve lumbar flex ROM to 70 degrees  Independent with HEP     Long Term  Goals: 6 weeks   Pt to improve standing and walking tolerance to 30 minutes with  > 3/10 pain  Pt to be performing recreational exercises to improve lifestyle and prevent reoccurrence of LBP  Lumbar flexibility is WNL  Plan     Cont with POC and progress there ex    Alan Hayes, PT

## 2018-09-12 ENCOUNTER — HOSPITAL ENCOUNTER (OUTPATIENT)
Dept: PREADMISSION TESTING | Facility: HOSPITAL | Age: 58
Discharge: HOME | End: 2018-09-12
Attending: ANESTHESIOLOGY
Payer: COMMERCIAL

## 2018-09-12 ENCOUNTER — CLINICAL SUPPORT (OUTPATIENT)
Dept: REHABILITATION | Facility: HOSPITAL | Age: 58
End: 2018-09-12
Attending: NEUROLOGICAL SURGERY
Payer: COMMERCIAL

## 2018-09-12 VITALS
DIASTOLIC BLOOD PRESSURE: 78 MMHG | BODY MASS INDEX: 38.45 KG/M2 | SYSTOLIC BLOOD PRESSURE: 121 MMHG | HEART RATE: 74 BPM | HEIGHT: 63 IN | OXYGEN SATURATION: 97 % | WEIGHT: 217 LBS | TEMPERATURE: 98 F | RESPIRATION RATE: 16 BRPM

## 2018-09-12 DIAGNOSIS — G89.29 CHRONIC BILATERAL LOW BACK PAIN WITH BILATERAL SCIATICA: ICD-10-CM

## 2018-09-12 DIAGNOSIS — M48.061 SPINAL STENOSIS AT L4-L5 LEVEL: ICD-10-CM

## 2018-09-12 DIAGNOSIS — M54.41 CHRONIC BILATERAL LOW BACK PAIN WITH BILATERAL SCIATICA: ICD-10-CM

## 2018-09-12 DIAGNOSIS — M54.42 CHRONIC BILATERAL LOW BACK PAIN WITH BILATERAL SCIATICA: ICD-10-CM

## 2018-09-12 PROCEDURE — 97110 THERAPEUTIC EXERCISES: CPT | Mod: PO

## 2018-09-12 PROCEDURE — 97140 MANUAL THERAPY 1/> REGIONS: CPT | Mod: PO

## 2018-09-12 NOTE — PRE-PROCEDURE INSTRUCTIONS
Preop instructions given. Hold asa, asa containing products, nsaids, vitamins and supplements one week prior to surgery.She said she has been holding these. Did medication instructions. Nothing to eat after midnight prior to surgery. May have gum, and hard candy until 8 hours before surgery/procedure time.  May have clear liquids( water, gatorade, powerade or apple juice) until 2 hours prior to surgery/procedure time.  No red or orange drinks. If in doubt , drink water. Nothing to drink 2 hours before arrival time for surgery/procedure. If you are told to take medication in the morning of surgery, it may be taken with a sip of water. If your doctor tells you something different pertaining to when to stop eating or drinking, follow your doctor's instructions.  Call for changes in status or questions. Verbalizes understanding.

## 2018-09-12 NOTE — PROGRESS NOTES
Physical Therapy Daily Treatment Note     Name: Carmen Son  Clinic Number: [id number]    Therapy Diagnosis:   Encounter Diagnoses   Name Primary?    Spinal stenosis at L4-L5 level     Chronic bilateral low back pain with bilateral sciatica      Physician: Leo Denton MD    Visit Date: 9/12/2018  Physician Orders:         Medical Diagnosis from Referral:   M50.90 (ICD-10-CM) - Cervical disc disease   M51.36 (ICD-10-CM) - DDD (degenerative disc disease), lumbar   G95.9 (ICD-10-CM) - Cervical myelopathy   M50.10 (ICD-10-CM) - Cervical disc disorder with radiculopathy      Evaluation Date: 8/27/2018  Authorization Period Expiration: 12/31/2018  Plan of Care Expiration: 10/27/18  Visit # / Visits authorized: 4/ 30      Time In: 8: 15  Time Out: 9 00  Total Billable Time: 30    Precautions: Standard    Subjective     Pt reports: Pt reports her pain cont to improve.  She states having more glute pain than midline LBP..  She states feeling the dry needling is really helping.  She was compliant with home exercise program.  Response to previous treatment: Good  Functional change:  More ADL's with less breaks    Pain: 3/10  Location:B glutes      Objective     Carmen received therapeutic exercises to develop core stabilization, strengthening, ROM for 25 minutes including:    Date 9/12 9/5 08/29   Visit 4 3 2   Gcode      FOTO      Cap visit  Cap total      MHP 5' 6' 10'   MT 10' 10' 10'   HS stretch 5 x 10s 5  x 10s 10 x 10'   piriformis 5 x 10s 5 x 10 s -   Supine:      TAs 2 x 10 x 5'' 2 x 10 x 5'' 2 x 10 x 5''   LTR 5 x 10'' 5 x 10'' --   March 2 x 10 2 x 10 2x 10   Hip adduction with ball 2 x 10 x 5'' 2 x 10 x 5'' 2 x 10   Hip abduction 2 x 10 x B T-band 2 x 10 x G T-band 2 x 10 x GTB   Bridge 2 x 10 -- --   SLR -- -- --   Seated:  --    TAs -- -- --   March -- -- --   LAQs -- -- --   Lats -- -- --   Rows -- -- --   UBE -- -- --   Leg Press -- -- --     Initials CD DARRYL Morales received the following  manual therapy techniques: Soft tissue Mobilization were applied to the: lumbar spine for 10 minutes, including:  Dry needling with manual therapy techniques to decrease pain, inflammation and swelling, increase circulation and promote healing process to B lumbar paraspinals and B glutes  Dry needling with trigger point/manual therapy techniques was performed.Dry needling performed to lumbar   All needles were removed and changes in signs and symptoms were noted .  Dry needling was performed to decrease inflammation, increase circulation, decrease pain and restore homeostasis.  Dry needling consent form was reviewed with the patient addressing all questions and concerns and signed by patient.  Copy of the consent form was provided to patient and copy of consent form scanned to patient Epic chart.          Carmen received hot pack for 5 minutes to lumbar spine in supine        Home Exercises Provided and Patient Education Provided     Education provided:   Pt instructed on purpose of hip stabilization exercises to aid in core strengthening    Written Home Exercises Provided: Patient instructed to cont prior HEP.  Exercises were reviewed and Carmen was able to demonstrate them prior to the end of the session.  Carmen demonstrated good  understanding of the education provided.     See EMR under Patient Instructions for exercises provided prior visit.    Assessment     Pt did well with today's treatment, and she cont to have less pain in her lumbar.  She is progressing well with core stabilization and cont to tolerate dry needling well.  Carmen is progressing well towards her goals.   Pt prognosis is Good.     Pt will continue to benefit from skilled outpatient physical therapy to address the deficits listed in the problem list box on initial evaluation, provide pt/family education and to maximize pt's level of independence in the home and community environment.     Pt's spiritual, cultural and educational needs considered  and pt agreeable to plan of care and goals.    Anticipated barriers to physical therapy: none    Goals:     Short Term Goals: 3 weeks   Pt to improve standing and walking tolerance to 15 minutes with < 5/10 pain  Pt to improve B hip abd strength to 4+/5  Pt to improve lumbar flex ROM to 70 degrees  Independent with HEP     Long Term Goals: 6 weeks   Pt to improve standing and walking tolerance to 30 minutes with  > 3/10 pain  Pt to be performing recreational exercises to improve lifestyle and prevent reoccurrence of LBP  Lumbar flexibility is WNL  Plan     Cont with POC and progress there ex, especially core strengthening.  Pt to perform 1 more treatment before she undergoes a cervical discectomy next week    Alan Hayes, PT

## 2018-09-14 ENCOUNTER — CLINICAL SUPPORT (OUTPATIENT)
Dept: REHABILITATION | Facility: HOSPITAL | Age: 58
End: 2018-09-14
Attending: NEUROLOGICAL SURGERY
Payer: COMMERCIAL

## 2018-09-14 DIAGNOSIS — M54.42 CHRONIC BILATERAL LOW BACK PAIN WITH BILATERAL SCIATICA: ICD-10-CM

## 2018-09-14 DIAGNOSIS — G89.29 CHRONIC BILATERAL LOW BACK PAIN WITH BILATERAL SCIATICA: ICD-10-CM

## 2018-09-14 DIAGNOSIS — M54.41 CHRONIC BILATERAL LOW BACK PAIN WITH BILATERAL SCIATICA: ICD-10-CM

## 2018-09-14 DIAGNOSIS — M48.061 SPINAL STENOSIS AT L4-L5 LEVEL: ICD-10-CM

## 2018-09-14 PROCEDURE — 97110 THERAPEUTIC EXERCISES: CPT | Mod: PO

## 2018-09-14 NOTE — PROGRESS NOTES
"  Physical Therapy Daily Treatment Note     Name: Carmen Burnette Huy  Clinic Number: [id number]    Therapy Diagnosis:   Encounter Diagnoses   Name Primary?    Spinal stenosis at L4-L5 level     Chronic bilateral low back pain with bilateral sciatica      Physician: Leo Denton MD    Visit Date: 9/14/2018  Physician Orders:         Medical Diagnosis from Referral:   M50.90 (ICD-10-CM) - Cervical disc disease   M51.36 (ICD-10-CM) - DDD (degenerative disc disease), lumbar   G95.9 (ICD-10-CM) - Cervical myelopathy   M50.10 (ICD-10-CM) - Cervical disc disorder with radiculopathy      Evaluation Date: 8/27/2018  Authorization Period Expiration: 12/31/2018  Plan of Care Expiration: 10/27/18  Visit # / Visits authorized: 5/ 30      Time In: 11:00  Time Out: 11:30  Total Billable Time: 30    Precautions: Standard    Subjective     Pt reports: Pt reports her pain cont to improve. She was walking yesterday when felt it go down her R leg.   She was compliant with home exercise program.  Response to previous treatment: Good  Functional change:  Not as stiff in the morning.     Pain: 5/10 L side low back, 3/10 right  Location:B glutes      Objective     Carmen received therapeutic exercises to develop core stabilization, strengthening, ROM for 30 minutes including:    Date 09/14/18 9/12 9/5 08/29   Visit 5/30 4 3 2   FTF 9/27/18      FOTO 5/5      POC exp 10/27/18      MHP held 5' 6' 10'   MT held 10' 10' 10'   HS stretch 5 x 10" 5 x 10s 5  x 10s 10 x 10'   piriformis 5 x 10" 5 x 10s 5 x 10 s -   Supine:       TAs 2 x 10 x 5" 2 x 10 x 5'' 2 x 10 x 5'' 2 x 10 x 5''   LTR 5 x 10" 5 x 10'' 5 x 10'' --   March 2 x 10 2 x 10 2 x 10 2x 10   Hip adduction with ball 2 x 10 x 5" 2 x 10 x 5'' 2 x 10 x 5'' 2 x 10   Hip abduction 2 x 10 BTB 2 x 10 x B T-band 2 x 10 x G T-band 2 x 10 x GTB   Bridge 2 x 10 2 x 10 -- --   SLR -- -- -- --   Seated:   --    TAs -- -- -- --   March -- -- -- --   LAQs -- -- -- --   Lats -- -- -- --   Rows -- -- " -- --   UBE -- -- -- --   Leg Press -- -- -- --     Initials DG CD CD CD       Carmen received the following manual therapy techniques: Soft tissue Mobilization were applied to the: lumbar spine for 10 minutes, including: HELD  Dry needling with manual therapy techniques to decrease pain, inflammation and swelling, increase circulation and promote healing process to B lumbar paraspinals and B glutes  Dry needling with trigger point/manual therapy techniques was performed.Dry needling performed to lumbar   All needles were removed and changes in signs and symptoms were noted .  Dry needling was performed to decrease inflammation, increase circulation, decrease pain and restore homeostasis.  Dry needling consent form was reviewed with the patient addressing all questions and concerns and signed by patient.  Copy of the consent form was provided to patient and copy of consent form scanned to patient Epic chart.    Carmen received hot pack for 5 minutes to lumbar spine in supine HELD    FOTO 56, 40%<60% impaired, limited, or restricted category  Home Exercises Provided and Patient Education Provided     Education provided:   Pt instructed on purpose of hip stabilization exercises to aid in core strengthening  - correct body mechanics when getting out of bed    Written Home Exercises Provided: Patient instructed to cont prior HEP.  Exercises were reviewed and Carmen was able to demonstrate them prior to the end of the session.  Carmen demonstrated good  understanding of the education provided.     See EMR under Patient Instructions for exercises provided prior visit.    Assessment     Patient was able to perform all of today's activities with no increase in symptoms prior to leaving the clinic. Her current score on FOTO Lumbar Spine places her in the 40%<60% impaired, limited, or restricted category.   Carmen is progressing well towards her goals.   Pt prognosis is Good.     Pt will continue to benefit from skilled outpatient  physical therapy to address the deficits listed in the problem list box on initial evaluation, provide pt/family education and to maximize pt's level of independence in the home and community environment.     Pt's spiritual, cultural and educational needs considered and pt agreeable to plan of care and goals.    Anticipated barriers to physical therapy: none    Goals:     Short Term Goals: 3 weeks   Pt to improve standing and walking tolerance to 15 minutes with < 5/10 pain  Pt to improve B hip abd strength to 4+/5  Pt to improve lumbar flex ROM to 70 degrees  Independent with HEP     Long Term Goals: 6 weeks   Pt to improve standing and walking tolerance to 30 minutes with  > 3/10 pain  Pt to be performing recreational exercises to improve lifestyle and prevent reoccurrence of LBP  Lumbar flexibility is WNL  Plan     Cont with POC and progress there ex, especially core strengthening.  Patient to return to therapy when cleared by MD after her cervical surgery.     Yakelin yDson, PT

## 2018-09-17 ENCOUNTER — ANESTHESIA (OUTPATIENT)
Dept: SURGERY | Facility: HOSPITAL | Age: 58
DRG: 473 | End: 2018-09-17
Payer: COMMERCIAL

## 2018-09-17 ENCOUNTER — HOSPITAL ENCOUNTER (INPATIENT)
Facility: HOSPITAL | Age: 58
LOS: 1 days | Discharge: HOME | DRG: 473 | End: 2018-09-17
Attending: NEUROLOGICAL SURGERY | Admitting: NEUROLOGICAL SURGERY
Payer: COMMERCIAL

## 2018-09-17 VITALS
OXYGEN SATURATION: 98 % | HEIGHT: 63 IN | RESPIRATION RATE: 18 BRPM | SYSTOLIC BLOOD PRESSURE: 143 MMHG | WEIGHT: 217 LBS | BODY MASS INDEX: 38.45 KG/M2 | TEMPERATURE: 98 F | HEART RATE: 70 BPM | DIASTOLIC BLOOD PRESSURE: 84 MMHG

## 2018-09-17 DIAGNOSIS — Z01.818 PREOPERATIVE TESTING: ICD-10-CM

## 2018-09-17 DIAGNOSIS — M48.02 CERVICAL STENOSIS OF SPINE: ICD-10-CM

## 2018-09-17 DIAGNOSIS — M50.90 CERVICAL DISC DISEASE: Primary | Chronic | ICD-10-CM

## 2018-09-17 LAB
ANION GAP SERPL CALC-SCNC: 9 MMOL/L
BASOPHILS # BLD AUTO: 0.01 K/UL
BASOPHILS NFR BLD: 0.3 %
BUN SERPL-MCNC: 11 MG/DL
CALCIUM SERPL-MCNC: 7.8 MG/DL
CHLORIDE SERPL-SCNC: 112 MMOL/L
CO2 SERPL-SCNC: 20 MMOL/L
CREAT SERPL-MCNC: 0.8 MG/DL
DIFFERENTIAL METHOD: ABNORMAL
EOSINOPHIL # BLD AUTO: 0.1 K/UL
EOSINOPHIL NFR BLD: 2.3 %
ERYTHROCYTE [DISTWIDTH] IN BLOOD BY AUTOMATED COUNT: 14.1 %
EST. GFR  (AFRICAN AMERICAN): >60 ML/MIN/1.73 M^2
EST. GFR  (NON AFRICAN AMERICAN): >60 ML/MIN/1.73 M^2
GLUCOSE SERPL-MCNC: 115 MG/DL
HCT VFR BLD AUTO: 35.1 %
HGB BLD-MCNC: 11 G/DL
IMM GRANULOCYTES # BLD AUTO: 0.01 K/UL
IMM GRANULOCYTES NFR BLD AUTO: 0.3 %
LYMPHOCYTES # BLD AUTO: 1.9 K/UL
LYMPHOCYTES NFR BLD: 55.1 %
MCH RBC QN AUTO: 25.6 PG
MCHC RBC AUTO-ENTMCNC: 31.3 G/DL
MCV RBC AUTO: 82 FL
MONOCYTES # BLD AUTO: 0.3 K/UL
MONOCYTES NFR BLD: 7.1 %
NEUTROPHILS # BLD AUTO: 1.2 K/UL
NEUTROPHILS NFR BLD: 34.9 %
NRBC BLD-RTO: 0 /100 WBC
PLATELET # BLD AUTO: 218 K/UL
PMV BLD AUTO: 11.9 FL
POCT GLUCOSE: 141 MG/DL (ref 70–110)
POCT GLUCOSE: 95 MG/DL (ref 70–110)
POTASSIUM SERPL-SCNC: 3.5 MMOL/L
RBC # BLD AUTO: 4.29 M/UL
SODIUM SERPL-SCNC: 141 MMOL/L
WBC # BLD AUTO: 3.5 K/UL

## 2018-09-17 PROCEDURE — 36000711: Performed by: NEUROLOGICAL SURGERY

## 2018-09-17 PROCEDURE — P9045 ALBUMIN (HUMAN), 5%, 250 ML: HCPCS | Mod: JG | Performed by: NURSE ANESTHETIST, CERTIFIED REGISTERED

## 2018-09-17 PROCEDURE — 63600175 PHARM REV CODE 636 W HCPCS: Performed by: NEUROLOGICAL SURGERY

## 2018-09-17 PROCEDURE — 37000008 HC ANESTHESIA 1ST 15 MINUTES: Performed by: NEUROLOGICAL SURGERY

## 2018-09-17 PROCEDURE — 80048 BASIC METABOLIC PNL TOTAL CA: CPT

## 2018-09-17 PROCEDURE — 0RG20A0 FUSION OF 2 OR MORE CERVICAL VERTEBRAL JOINTS WITH INTERBODY FUSION DEVICE, ANTERIOR APPROACH, ANTERIOR COLUMN, OPEN APPROACH: ICD-10-PCS | Performed by: NEUROLOGICAL SURGERY

## 2018-09-17 PROCEDURE — 25000003 PHARM REV CODE 250: Performed by: STUDENT IN AN ORGANIZED HEALTH CARE EDUCATION/TRAINING PROGRAM

## 2018-09-17 PROCEDURE — 63600175 PHARM REV CODE 636 W HCPCS: Mod: JG | Performed by: NURSE ANESTHETIST, CERTIFIED REGISTERED

## 2018-09-17 PROCEDURE — 20930 SP BONE ALGRFT MORSEL ADD-ON: CPT | Mod: ,,, | Performed by: NEUROLOGICAL SURGERY

## 2018-09-17 PROCEDURE — 27000221 HC OXYGEN, UP TO 24 HOURS

## 2018-09-17 PROCEDURE — 85025 COMPLETE CBC W/AUTO DIFF WBC: CPT

## 2018-09-17 PROCEDURE — D9220A PRA ANESTHESIA: Mod: ANES,,, | Performed by: ANESTHESIOLOGY

## 2018-09-17 PROCEDURE — 94761 N-INVAS EAR/PLS OXIMETRY MLT: CPT

## 2018-09-17 PROCEDURE — 71000039 HC RECOVERY, EACH ADD'L HOUR: Performed by: NEUROLOGICAL SURGERY

## 2018-09-17 PROCEDURE — 71000033 HC RECOVERY, INTIAL HOUR: Performed by: NEUROLOGICAL SURGERY

## 2018-09-17 PROCEDURE — 27800903 OPTIME MED/SURG SUP & DEVICES OTHER IMPLANTS: Performed by: NEUROLOGICAL SURGERY

## 2018-09-17 PROCEDURE — 22551 ARTHRD ANT NTRBDY CERVICAL: CPT | Mod: ,,, | Performed by: NEUROLOGICAL SURGERY

## 2018-09-17 PROCEDURE — 63600175 PHARM REV CODE 636 W HCPCS: Performed by: STUDENT IN AN ORGANIZED HEALTH CARE EDUCATION/TRAINING PROGRAM

## 2018-09-17 PROCEDURE — 0RB30ZZ EXCISION OF CERVICAL VERTEBRAL DISC, OPEN APPROACH: ICD-10-PCS | Performed by: NEUROLOGICAL SURGERY

## 2018-09-17 PROCEDURE — 36620 INSERTION CATHETER ARTERY: CPT | Mod: 59,,, | Performed by: ANESTHESIOLOGY

## 2018-09-17 PROCEDURE — 12000002 HC ACUTE/MED SURGE SEMI-PRIVATE ROOM

## 2018-09-17 PROCEDURE — 25000003 PHARM REV CODE 250: Performed by: NURSE ANESTHETIST, CERTIFIED REGISTERED

## 2018-09-17 PROCEDURE — 22552 ARTHRD ANT NTRBD CERVICAL EA: CPT | Mod: ,,, | Performed by: NEUROLOGICAL SURGERY

## 2018-09-17 PROCEDURE — 82962 GLUCOSE BLOOD TEST: CPT | Performed by: NEUROLOGICAL SURGERY

## 2018-09-17 PROCEDURE — 37000009 HC ANESTHESIA EA ADD 15 MINS: Performed by: NEUROLOGICAL SURGERY

## 2018-09-17 PROCEDURE — 22853 INSJ BIOMECHANICAL DEVICE: CPT | Mod: ,,, | Performed by: NEUROLOGICAL SURGERY

## 2018-09-17 PROCEDURE — 27201423 OPTIME MED/SURG SUP & DEVICES STERILE SUPPLY: Performed by: NEUROLOGICAL SURGERY

## 2018-09-17 PROCEDURE — 36000710: Performed by: NEUROLOGICAL SURGERY

## 2018-09-17 PROCEDURE — 25000003 PHARM REV CODE 250: Performed by: NEUROLOGICAL SURGERY

## 2018-09-17 PROCEDURE — D9220A PRA ANESTHESIA: Mod: CRNA,,, | Performed by: NURSE ANESTHETIST, CERTIFIED REGISTERED

## 2018-09-17 PROCEDURE — C1713 ANCHOR/SCREW BN/BN,TIS/BN: HCPCS | Performed by: NEUROLOGICAL SURGERY

## 2018-09-17 PROCEDURE — 71000015 HC POSTOP RECOV 1ST HR: Performed by: NEUROLOGICAL SURGERY

## 2018-09-17 RX ORDER — NAPROXEN SODIUM 220 MG/1
81 TABLET, FILM COATED ORAL DAILY
Refills: 0 | COMMUNITY
Start: 2018-09-17 | End: 2021-05-27

## 2018-09-17 RX ORDER — SODIUM CHLORIDE 9 MG/ML
INJECTION, SOLUTION INTRAVENOUS CONTINUOUS
Status: DISCONTINUED | OUTPATIENT
Start: 2018-09-17 | End: 2018-09-24 | Stop reason: HOSPADM

## 2018-09-17 RX ORDER — LABETALOL HYDROCHLORIDE 5 MG/ML
INJECTION, SOLUTION INTRAVENOUS
Status: DISCONTINUED
Start: 2018-09-17 | End: 2018-09-18 | Stop reason: HOSPADM

## 2018-09-17 RX ORDER — OXYCODONE AND ACETAMINOPHEN 7.5; 325 MG/1; MG/1
1 TABLET ORAL EVERY 6 HOURS PRN
Qty: 60 TABLET | Refills: 0 | Status: SHIPPED | OUTPATIENT
Start: 2018-09-17 | End: 2018-09-17 | Stop reason: SDUPTHER

## 2018-09-17 RX ORDER — ONDANSETRON 2 MG/ML
4 INJECTION INTRAMUSCULAR; INTRAVENOUS EVERY 12 HOURS PRN
Status: DISCONTINUED | OUTPATIENT
Start: 2018-09-17 | End: 2018-09-24 | Stop reason: HOSPADM

## 2018-09-17 RX ORDER — ACETAMINOPHEN 10 MG/ML
INJECTION, SOLUTION INTRAVENOUS
Status: DISCONTINUED | OUTPATIENT
Start: 2018-09-17 | End: 2018-09-17

## 2018-09-17 RX ORDER — HYDROMORPHONE HYDROCHLORIDE 1 MG/ML
0.2 INJECTION, SOLUTION INTRAMUSCULAR; INTRAVENOUS; SUBCUTANEOUS EVERY 5 MIN PRN
Status: DISCONTINUED | OUTPATIENT
Start: 2018-09-17 | End: 2018-09-24 | Stop reason: HOSPADM

## 2018-09-17 RX ORDER — MUPIROCIN 20 MG/G
1 OINTMENT TOPICAL 2 TIMES DAILY
Status: DISCONTINUED | OUTPATIENT
Start: 2018-09-17 | End: 2018-09-22 | Stop reason: HOSPADM

## 2018-09-17 RX ORDER — MIDAZOLAM HYDROCHLORIDE 1 MG/ML
INJECTION INTRAMUSCULAR; INTRAVENOUS
Status: DISCONTINUED | OUTPATIENT
Start: 2018-09-17 | End: 2018-09-17

## 2018-09-17 RX ORDER — BACITRACIN 50000 [IU]/1
INJECTION, POWDER, FOR SOLUTION INTRAMUSCULAR
Status: DISCONTINUED | OUTPATIENT
Start: 2018-09-17 | End: 2018-09-17 | Stop reason: HOSPADM

## 2018-09-17 RX ORDER — MUPIROCIN 20 MG/G
1 OINTMENT TOPICAL DAILY
Status: CANCELLED | OUTPATIENT
Start: 2018-09-17

## 2018-09-17 RX ORDER — LIDOCAINE HCL/PF 100 MG/5ML
SYRINGE (ML) INTRAVENOUS
Status: DISCONTINUED | OUTPATIENT
Start: 2018-09-17 | End: 2018-09-17

## 2018-09-17 RX ORDER — FENTANYL CITRATE 50 UG/ML
25 INJECTION, SOLUTION INTRAMUSCULAR; INTRAVENOUS EVERY 5 MIN PRN
Status: DISCONTINUED | OUTPATIENT
Start: 2018-09-17 | End: 2018-09-17

## 2018-09-17 RX ORDER — ROCURONIUM BROMIDE 10 MG/ML
INJECTION, SOLUTION INTRAVENOUS
Status: DISCONTINUED | OUTPATIENT
Start: 2018-09-17 | End: 2018-09-17

## 2018-09-17 RX ORDER — LIDOCAINE 50 MG/G
1 PATCH TOPICAL
Status: DISCONTINUED | OUTPATIENT
Start: 2018-09-17 | End: 2018-09-24 | Stop reason: HOSPADM

## 2018-09-17 RX ORDER — ONDANSETRON 2 MG/ML
INJECTION INTRAMUSCULAR; INTRAVENOUS
Status: DISCONTINUED | OUTPATIENT
Start: 2018-09-17 | End: 2018-09-17

## 2018-09-17 RX ORDER — SODIUM CHLORIDE 0.9 % (FLUSH) 0.9 %
3 SYRINGE (ML) INJECTION
Status: DISCONTINUED | OUTPATIENT
Start: 2018-09-17 | End: 2018-09-24 | Stop reason: HOSPADM

## 2018-09-17 RX ORDER — METHYLPREDNISOLONE ACETATE 40 MG/ML
INJECTION, SUSPENSION INTRA-ARTICULAR; INTRALESIONAL; INTRAMUSCULAR; SOFT TISSUE
Status: DISCONTINUED | OUTPATIENT
Start: 2018-09-17 | End: 2018-09-17 | Stop reason: HOSPADM

## 2018-09-17 RX ORDER — PHENYLEPHRINE HYDROCHLORIDE 10 MG/ML
INJECTION INTRAVENOUS
Status: DISCONTINUED | OUTPATIENT
Start: 2018-09-17 | End: 2018-09-17

## 2018-09-17 RX ORDER — CEFAZOLIN SODIUM 1 G/3ML
2 INJECTION, POWDER, FOR SOLUTION INTRAMUSCULAR; INTRAVENOUS
Status: COMPLETED | OUTPATIENT
Start: 2018-09-17 | End: 2018-09-17

## 2018-09-17 RX ORDER — PROPOFOL 10 MG/ML
VIAL (ML) INTRAVENOUS CONTINUOUS PRN
Status: DISCONTINUED | OUTPATIENT
Start: 2018-09-17 | End: 2018-09-17

## 2018-09-17 RX ORDER — MUPIROCIN 20 MG/G
1 OINTMENT TOPICAL
Status: COMPLETED | OUTPATIENT
Start: 2018-09-17 | End: 2018-09-17

## 2018-09-17 RX ORDER — SUCCINYLCHOLINE CHLORIDE 20 MG/ML
INJECTION INTRAMUSCULAR; INTRAVENOUS
Status: DISCONTINUED | OUTPATIENT
Start: 2018-09-17 | End: 2018-09-17

## 2018-09-17 RX ORDER — LABETALOL HYDROCHLORIDE 5 MG/ML
10 INJECTION, SOLUTION INTRAVENOUS ONCE
Status: COMPLETED | OUTPATIENT
Start: 2018-09-17 | End: 2018-09-17

## 2018-09-17 RX ORDER — OXYCODONE AND ACETAMINOPHEN 7.5; 325 MG/1; MG/1
1 TABLET ORAL EVERY 6 HOURS PRN
Qty: 60 TABLET | Refills: 0 | Status: SHIPPED | OUTPATIENT
Start: 2018-09-17 | End: 2018-09-21 | Stop reason: SDUPTHER

## 2018-09-17 RX ORDER — PROPOFOL 10 MG/ML
VIAL (ML) INTRAVENOUS
Status: DISCONTINUED | OUTPATIENT
Start: 2018-09-17 | End: 2018-09-17

## 2018-09-17 RX ORDER — LIDOCAINE HYDROCHLORIDE AND EPINEPHRINE 10; 10 MG/ML; UG/ML
INJECTION, SOLUTION INFILTRATION; PERINEURAL
Status: DISCONTINUED | OUTPATIENT
Start: 2018-09-17 | End: 2018-09-17 | Stop reason: HOSPADM

## 2018-09-17 RX ORDER — ALBUMIN HUMAN 50 G/1000ML
SOLUTION INTRAVENOUS CONTINUOUS PRN
Status: DISCONTINUED | OUTPATIENT
Start: 2018-09-17 | End: 2018-09-17

## 2018-09-17 RX ORDER — SODIUM CHLORIDE 0.9 % (FLUSH) 0.9 %
3 SYRINGE (ML) INJECTION EVERY 8 HOURS
Status: DISCONTINUED | OUTPATIENT
Start: 2018-09-17 | End: 2018-09-24 | Stop reason: HOSPADM

## 2018-09-17 RX ORDER — LIDOCAINE HYDROCHLORIDE 10 MG/ML
1 INJECTION, SOLUTION EPIDURAL; INFILTRATION; INTRACAUDAL; PERINEURAL
Status: DISCONTINUED | OUTPATIENT
Start: 2018-09-17 | End: 2018-09-24 | Stop reason: HOSPADM

## 2018-09-17 RX ORDER — KETAMINE HYDROCHLORIDE 100 MG/ML
INJECTION, SOLUTION INTRAMUSCULAR; INTRAVENOUS
Status: DISCONTINUED | OUTPATIENT
Start: 2018-09-17 | End: 2018-09-17

## 2018-09-17 RX ORDER — AMOXICILLIN 250 MG
1 CAPSULE ORAL 2 TIMES DAILY
Status: DISCONTINUED | OUTPATIENT
Start: 2018-09-17 | End: 2018-09-24 | Stop reason: HOSPADM

## 2018-09-17 RX ORDER — GLYCOPYRROLATE 0.2 MG/ML
INJECTION INTRAMUSCULAR; INTRAVENOUS
Status: DISCONTINUED | OUTPATIENT
Start: 2018-09-17 | End: 2018-09-17

## 2018-09-17 RX ADMIN — ACETAMINOPHEN 1000 MG: 10 INJECTION, SOLUTION INTRAVENOUS at 11:09

## 2018-09-17 RX ADMIN — KETAMINE HYDROCHLORIDE 10 MG: 100 INJECTION, SOLUTION, CONCENTRATE INTRAMUSCULAR; INTRAVENOUS at 12:09

## 2018-09-17 RX ADMIN — PROPOFOL 150 MCG/KG/MIN: 10 INJECTION, EMULSION INTRAVENOUS at 10:09

## 2018-09-17 RX ADMIN — ROCURONIUM BROMIDE 10 MG: 10 INJECTION, SOLUTION INTRAVENOUS at 10:09

## 2018-09-17 RX ADMIN — PHENYLEPHRINE HYDROCHLORIDE 100 MCG: 10 INJECTION INTRAVENOUS at 11:09

## 2018-09-17 RX ADMIN — PHENYLEPHRINE HYDROCHLORIDE 50 MCG: 10 INJECTION INTRAVENOUS at 12:09

## 2018-09-17 RX ADMIN — Medication 0.2 MG: at 03:09

## 2018-09-17 RX ADMIN — ONDANSETRON 4 MG: 2 INJECTION INTRAMUSCULAR; INTRAVENOUS at 02:09

## 2018-09-17 RX ADMIN — MUPIROCIN 1 G: 20 OINTMENT TOPICAL at 09:09

## 2018-09-17 RX ADMIN — PHENYLEPHRINE HYDROCHLORIDE 100 MCG: 10 INJECTION INTRAVENOUS at 12:09

## 2018-09-17 RX ADMIN — CEFAZOLIN 2 G: 330 INJECTION, POWDER, FOR SOLUTION INTRAMUSCULAR; INTRAVENOUS at 11:09

## 2018-09-17 RX ADMIN — SODIUM CHLORIDE: 0.9 INJECTION, SOLUTION INTRAVENOUS at 10:09

## 2018-09-17 RX ADMIN — SODIUM CHLORIDE, SODIUM GLUCONATE, SODIUM ACETATE, POTASSIUM CHLORIDE, MAGNESIUM CHLORIDE, SODIUM PHOSPHATE, DIBASIC, AND POTASSIUM PHOSPHATE: .53; .5; .37; .037; .03; .012; .00082 INJECTION, SOLUTION INTRAVENOUS at 11:09

## 2018-09-17 RX ADMIN — GLYCOPYRROLATE 0.2 MG: 0.2 INJECTION, SOLUTION INTRAMUSCULAR; INTRAVENOUS at 11:09

## 2018-09-17 RX ADMIN — KETAMINE HYDROCHLORIDE 50 MG: 100 INJECTION, SOLUTION, CONCENTRATE INTRAMUSCULAR; INTRAVENOUS at 11:09

## 2018-09-17 RX ADMIN — LABETALOL HYDROCHLORIDE 10 MG: 5 INJECTION, SOLUTION INTRAVENOUS at 04:09

## 2018-09-17 RX ADMIN — ALBUMIN (HUMAN): 12.5 SOLUTION INTRAVENOUS at 01:09

## 2018-09-17 RX ADMIN — LIDOCAINE HYDROCHLORIDE 100 MG: 20 INJECTION, SOLUTION INTRAVENOUS at 10:09

## 2018-09-17 RX ADMIN — REMIFENTANIL HYDROCHLORIDE 0.1 MCG/KG/MIN: 1 INJECTION, POWDER, LYOPHILIZED, FOR SOLUTION INTRAVENOUS at 10:09

## 2018-09-17 RX ADMIN — PHENYLEPHRINE HYDROCHLORIDE 100 MCG: 10 INJECTION INTRAVENOUS at 01:09

## 2018-09-17 RX ADMIN — SODIUM CHLORIDE: 0.9 INJECTION, SOLUTION INTRAVENOUS at 03:09

## 2018-09-17 RX ADMIN — ONDANSETRON 4 MG: 2 INJECTION INTRAMUSCULAR; INTRAVENOUS at 01:09

## 2018-09-17 RX ADMIN — PROPOFOL 200 MG: 10 INJECTION, EMULSION INTRAVENOUS at 10:09

## 2018-09-17 RX ADMIN — MIDAZOLAM HYDROCHLORIDE 2 MG: 1 INJECTION, SOLUTION INTRAMUSCULAR; INTRAVENOUS at 10:09

## 2018-09-17 RX ADMIN — SUCCINYLCHOLINE CHLORIDE 160 MG: 20 INJECTION, SOLUTION INTRAMUSCULAR; INTRAVENOUS at 10:09

## 2018-09-17 RX ADMIN — SODIUM CHLORIDE, SODIUM GLUCONATE, SODIUM ACETATE, POTASSIUM CHLORIDE, MAGNESIUM CHLORIDE, SODIUM PHOSPHATE, DIBASIC, AND POTASSIUM PHOSPHATE: .53; .5; .37; .037; .03; .012; .00082 INJECTION, SOLUTION INTRAVENOUS at 01:09

## 2018-09-17 NOTE — TRANSFER OF CARE
"Anesthesia Transfer of Care Note    Patient: Carmen Son    Procedure(s) Performed: Procedure(s) (LRB):  DISCECTOMY, SPINE, CERVICAL, ANTERIOR APPROACH, WITH FUSION C3-4, C4-5 (N/A)    Patient location: PACU    Anesthesia Type: general    Transport from OR: Transported from OR on 6-10 L/min O2 by face mask with adequate spontaneous ventilation    Post pain: adequate analgesia    Post assessment: no apparent anesthetic complications and tolerated procedure well    Post vital signs: stable    Level of consciousness: awake, alert and oriented    Nausea/Vomiting: no nausea/vomiting    Complications: none    Transfer of care protocol was followed      Last vitals:   Visit Vitals  BP (!) 140/87   Pulse 86   Temp 36.1 °C (97 °F) (Temporal)   Resp 18   Ht 5' 3" (1.6 m)   Wt 98.4 kg (217 lb)   LMP 04/07/2013   SpO2 100%   Breastfeeding? No   BMI 38.44 kg/m²     "

## 2018-09-17 NOTE — PROGRESS NOTES
"Spoke with Dr. Mcarthur regarding discharge or admit order clarification. Dr. Mcarthur stated "patient will be discharged"  "

## 2018-09-17 NOTE — H&P
Patient ID: Carmen Son is a 58 y.o. female.     Chief Complaint: neck pain, numbness in fingers      HPI   Pt is a 58 y.o. female who presents for follow up after last evaluation on 7/3/2018. Patient has long history of progressive worsening neck and arm pain. Pt states that she endures chronic neck pain with ROM and tingling and pain radiating to RUE and fingers. Pt notes that she also endures lower back pain and groin pain radiating to LLE with associated tingling in left foot. Pt states that her pain is now inhibiting her daily living. She had been scheduled for total disc athroplasty but that was denied by insurance.  She feels both neck and back issues are worse but neck and arm symptoms more severe.     Review of Systems   Constitutional: Negative for chills, fatigue and fever.   HENT: Negative for sinus pressure and trouble swallowing.    Eyes: Negative.  Negative for visual disturbance.   Respiratory: Negative.    Cardiovascular: Negative.    Gastrointestinal: Negative.  Negative for nausea and vomiting.   Endocrine: Negative.    Genitourinary: Negative.    Musculoskeletal: Positive for neck pain and neck stiffness.   Neurological: Negative for dizziness, seizures, syncope, speech difficulty, weakness and numbness.       Objective:   Physical Exam:  Nursing note and vitals reviewed.     Constitutional: She appears well-developed.      Eyes: Pupils are equal, round, and reactive to light. Conjunctivae and EOM are normal.      Cardiovascular: Normal rate, regular rhythm, normal pulses and intact distal pulses.      Abdominal: Soft.     Psych/Behavior: She is alert. She is oriented to person, place, and time. She has a normal mood and affect.     Musculoskeletal: Gait is normal.        Neck: Range of motion is full. There is no tenderness. Muscle strength is 5/5. Tone is normal.        Back: Range of motion is full. There is no tenderness. Muscle strength is 5/5. Tone is normal.        Right  Upper Extremities: Range of motion is full. There is no tenderness. Muscle strength is 5/5. Tone is normal.        Left Upper Extremities: Range of motion is full. There is no tenderness. Muscle strength is 5/5. Tone is normal.       Right Lower Extremities: Range of motion is full. There is no tenderness. Muscle strength is 5/5. Tone is normal.        Left Lower Extremities: Range of motion is full. There is no tenderness. Muscle strength is 5/5. Tone is normal.     Neurological:        Coordination: She has a normal Romberg Test, normal finger to nose coordination, normal heel to shin coordination and normal tandem walking coordination.        DTRs: DTRs are normal. Tricep reflexes are 2+ on the right side and 2+ on the left side. Bicep reflexes are 2+ on the right side and 2+ on the left side. Brachioradialis reflexes are 2+ on the right side and 2+ on the left side. Patellar reflexes are 2+ on the right side and 2+ on the left side. Achilles reflexes are 2+ on the right side and 2+ on the left side.        Cranial nerves: Cranial nerve(s) II, III, IV, V, VI, VII, VIII, IX, X, XI and XII are intact.       Pt has negative straight leg raise   Decreased neck ROM but clear right C5 radiculopathy and possible left  L5 radiculopathy.  Decreased sensation in right C5 and C6 distribution.  Patient does have (+) Stover's bilaterally and some subtle weakness of hand intrinsincs     Imaging:   MRI C spine, shows loss of cervical lordosis and reversal of curve, there are two central disc herniation/osteophytes at C3-4 and C4-5 w cord compression and severe flattening of thecal sac        IROCÍO MD, personally reviewed the imaging and interpreted independent of the radiology report.        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  She has been symptomatic for more than 1 year, failed PT and multiple neck injections.   If she doesn't now qualify  to TDA then I think she needs C3-4 and C4-5 ACDF. .  I have discussed the risks/benefits, indications, and alternatives for the proposed procedure in detail. I have answered all of their questions and patient wish to proceed with surgery.     Proceed with surgery as planned

## 2018-09-17 NOTE — DISCHARGE INSTRUCTIONS
Please follow ONLY the instructions that are checked below.    Activity Restrictions:  []  Return to work will be determined on an individual basis.  [x]  No lifting greater than 10 pounds.  [x]  Avoid bending and twisting the area of your surgery more than 45 degrees from neutral position in any direction.  [x]  No driving or operating machinery:  []  until cleared by your surgeon.  [x]  while taking narcotic pain medications or muscle relaxants.  []  No cervical collar, soft collar, or lumbar brace required.  []  Wear your brace at all times. You may be given an extra brace or soft collar to wear when showering.  []  Wear your brace at all times except when flat in bed.  [x]  Wear brace for comfort only.  [x]  Increase ambulation over the next 2 weeks so that you are walking 2 miles per day at 2 weeks post-operatively.  [x]  Walk on paved surfaces only. It is okay to walk up and down stairs while holding onto a side rail.  []  No sexual activity for 2-3 weeks.    Discharge Medication/Follow-up:  [x]  Please refer to discharge medication reconciliation form.  [x]  Do not take ANY non-steroidal anti-inflammatory drugs (NSAIDS), including the following: ibuprofen, naprosyn, Aleve, Advil, Indocin, Mobic, or Celebrex for:  []  4 weeks  [x]  8 weeks  []  6 months  [x]  Prescriptions for appropriate medication will be given upon discharge.   []  Pain control:             []  Muscle relaxer:            [x]  Take docusate (Colace 100 mg): take one capsule a day as needed for constipation. You can get this over the counter.  [x]  Follow-up appointment:  [x]  10-14 days post-op for wound check by physician assistant/nurse  [x]  4-6 weeks with MD:  [x]  with x-rays  []  without x-rays  []  An appointment will be mailed to you.    Wound Care:  []  Remove dressing or bandaid in    days.  [x]  No bandage required. Keep your incision open to the air.  [x]  You may shower on the 2nd day after your surgery. Have the force of water  hit you opposite from the incision. Pat the incision dry after your shower; do not scrub the incision.  [x]  You cannot take a bath until 8 weeks after surgery.  []  Apply bacitracin to incision twice a day for    more days.    Call your doctor or go to the Emergency Room for any signs of infection, including: increased redness, drainage, pain, or fever (temperature ?101.5 for 24 hours). Call your doctor or go to the Emergency Room if there are any localized neurological changes; problems with speech, vision, numbness, tingling, weakness, or severe headache; or for other concerns.    Special Instructions:  [x]  No use of tobacco products.  [x]  Diet: Please eat a regular diet as tolerated.  []  Other diet:              Specific physician instructions:           Physicians need 3 days' notice for pain medicine to be refilled. Pain medicine will only be refilled between 8 AM and 5 PM, Monday through Friday, due to Food and Drug Administration regulation of documentation.    If you have any questions about this form, please call [phone].    Form No. 40194 (Revised 10/31/2013)            PATIENT INSTRUCTIONS  POST-ANESTHESIA    IMMEDIATELY FOLLOWING SURGERY:  Do not drive or operate machinery for the first twenty four hours after surgery.  Do not make any important decisions for twenty four hours after surgery or while taking narcotic pain medications or sedatives.  If you develop intractable nausea and vomiting or a severe headache please notify your doctor immediately.    FOLLOW-UP:  Please make an appointment with your surgeon as instructed. You do not need to follow up with anesthesia unless specifically instructed to do so.    WOUND CARE INSTRUCTIONS (if applicable):  Keep a dry clean dressing on the anesthesia/puncture wound site if there is drainage.  Once the wound has quit draining you may leave it open to air.  Generally you should leave the bandage intact for twenty four hours unless there is drainage.   If the epidural site drains for more than 36-48 hours please call the anesthesia department.    QUESTIONS?:  Please feel free to call your physician or the hospital  if you have any questions, and they will be happy to assist you.         Recovery After Procedural Sedation (Adult)  You have been given medicine by vein to make you sleep during your surgery. This may have included both a pain medicine and sleeping medicine. Most of the effects have worn off. But you may still have some drowsiness for the next 6 to 8 hours.  Home care  Follow these guidelines when you get home:  · For the next 8 hours, you should be watched by a responsible adult. This person should make sure your condition is not getting worse.  · Don't drink any alcohol for the next 24 hours.  · Don't drive, operate dangerous machinery, or make important business or personal decisions during the next 24 hours.  Note: Your healthcare provider may tell you not to take any medicine by mouth for pain or sleep in the next 4 hours. These medicines may react with the medicines you were given in the hospital. This could cause a much stronger response than usual.  Follow-up care  Follow up with your healthcare provider if you are not alert and back to your usual level of activity within 12 hours.  When to seek medical advice  Call your healthcare provider right away if any of these occur:  · Drowsiness gets worse  · Weakness or dizziness gets worse  · Repeated vomiting  · You can't be awakened   Date Last Reviewed: 10/18/2016  © 4698-9058 [employer]. [address] [place] [address], [place] [address]. All rights reserved. This information is not intended as a substitute for professional medical care. Always follow your healthcare professional's instructions.

## 2018-09-17 NOTE — ANESTHESIA PROCEDURE NOTES
Arterial      Patient location during procedure: done in OR  Procedure start time: 9/17/2018 11:06 AM  Timeout: 9/17/2018 11:05 AM  Procedure end time: 9/17/2018 11:08 AM  Staffing  Anesthesiologist: James Adams MD  Resident/CRNA: Krystina Kang CRNA  Performed: resident/CRNA   Anesthesiologist was present at the time of the procedure.  Preanesthetic Checklist  Completed: patient identified, site marked, surgical consent, pre-op evaluation, timeout performed, IV checked, risks and benefits discussed, monitors and equipment checked and anesthesia consent givenArterial  Skin Prep: chlorhexidine gluconate  Local Infiltration: none  Orientation: right  Location: radial  Catheter Size: 20 G  Catheter placement by Anatomical landmarks. Heme positive aspiration all ports.Insertion Attempts: 1  Assessment  Dressing: secured with tape and tegaderm  Patient: Tolerated well

## 2018-09-17 NOTE — PLAN OF CARE
NIKKI Barakat. at bedside 2 unsuccessful attempts at IV start. Dr Adams notified and now at bedside.

## 2018-09-17 NOTE — DISCHARGE SUMMARY
[facility]  Neurosurgery  Discharge Summary      Patient Name: Carmen Son  MRN: [id number]  Admission Date: 9/17/2018  Hospital Length of Stay: 0 days  Discharge Date and Time:  09/17/2018 1:57 PM  Attending Physician: Leo Denton MD   Discharging Provider: Wali Mcarthur MD  Primary Care Provider: Gavino Figueroa MD     HPI: Patient presented for elective C3-5 ACDF. Surgery proceeded without complication. Patient recovered fully from anesthesia and is ready for discharge home.       Procedure(s) (LRB):  DISCECTOMY, SPINE, CERVICAL, ANTERIOR APPROACH, WITH FUSION C3-4, C4-5 (N/A)     Hospital Course: as above    Consults:     Significant Diagnostic Studies: post op C spine xrays    Pending Diagnostic Studies:     None        Final Active Diagnoses:    Diagnosis Date Noted POA    PRINCIPAL PROBLEM:  Cervical stenosis of spine [M48.02] 09/17/2018 Yes      Problems Resolved During this Admission:      Discharged Condition: good    Disposition: Home or Self Care    Follow Up:    Patient Instructions:      Diet Adult Regular     No dressing needed   Order Comments: See ACDF Spine discharge instructions     Call MD for:  temperature >100.4     Call MD for:  persistent nausea and vomiting or diarrhea     Call MD for:  severe uncontrolled pain     Call MD for:  redness, tenderness, or signs of infection (pain, swelling, redness, odor or green/yellow discharge around incision site)     Call MD for:  difficulty breathing or increased cough     Call MD for:  severe persistent headache     Call MD for:  worsening rash     Call MD for:  persistent dizziness, light-headedness, or visual disturbances     Call MD for:  increased confusion or weakness     Medications:  Reconciled Home Medications:      Medication List      START taking these medications    oxyCODONE-acetaminophen 7.5-325 mg per tablet  Commonly known as:  PERCOCET  Take 1 tablet by mouth every 6 (six) hours as needed for Pain.         CHANGE how you take these medications    aspirin 81 MG Chew  Take 1 tablet (81 mg total) by mouth once daily. May resume in 2 weeks post op  What changed:  additional instructions        CONTINUE taking these medications    cetirizine 10 MG tablet  Commonly known as:  ZYRTEC  Take 10 mg by mouth once daily.     furosemide 20 MG tablet  Commonly known as:  LASIX  Take 20 mg by mouth daily as needed.     losartan 50 MG tablet  Commonly known as:  COZAAR  Take 50 mg by mouth once daily.     omeprazole 40 MG capsule  Commonly known as:  PRILOSEC  Take 40 mg by mouth once daily.     ONE DAILY MULTIVITAMIN per tablet  Generic drug:  multivitamin  Take 1 tablet by mouth once daily.     potassium chloride 20 mEq Pack  Commonly known as:  KLOR-CON  Take 20 mEq by mouth once.     propranolol 80 MG tablet  Commonly known as:  INDERAL  Take 160 mg by mouth once daily.     rosuvastatin 20 MG tablet  Commonly known as:  CRESTOR  Take 20 mg by mouth once daily.     VITAMIN C 500 MG tablet  Generic drug:  ascorbic acid (vitamin C)  Take 500 mg by mouth once daily.            Wali Mcarthur MD  Neurosurgery  [facility]

## 2018-09-18 PROCEDURE — 12000002 HC ACUTE/MED SURGE SEMI-PRIVATE ROOM

## 2018-09-18 NOTE — PLAN OF CARE
Discharge instructions reviewed with pt and family. Understanding verbalized. No complaints of pain reported. Paper prescriptions given. X rays taken.Pt able to tolerate PO intake and urinate. To be transported to car by PCT.

## 2018-09-18 NOTE — ANESTHESIA POSTPROCEDURE EVALUATION
"Anesthesia Post Evaluation    Patient: Carmen Son    Procedure(s) Performed: Procedure(s) (LRB):  DISCECTOMY, SPINE, CERVICAL, ANTERIOR APPROACH, WITH FUSION C3-4, C4-5 (N/A)    Final Anesthesia Type: general  Patient location during evaluation: PACU  Patient participation: Yes- Able to Participate  Level of consciousness: awake and alert, awake and oriented  Post-procedure vital signs: reviewed and stable  Pain management: adequate  Airway patency: patent  PONV status at discharge: No PONV  Anesthetic complications: no      Cardiovascular status: blood pressure returned to baseline, stable and hemodynamically stable  Respiratory status: unassisted, spontaneous ventilation and room air  Hydration status: euvolemic  Follow-up not needed.        Visit Vitals  BP (!) 143/84   Pulse 70   Temp 36.7 °C (98 °F) (Skin)   Resp 18   Ht 5' 3" (1.6 m)   Wt 98.4 kg (217 lb)   LMP 04/07/2013   SpO2 98%   Breastfeeding? No   BMI 38.44 kg/m²       Pain/Jeovany Score: Pain Assessment Performed: Yes (9/17/2018  7:23 PM)  Presence of Pain: denies (9/17/2018  7:23 PM)  Pain Rating Prior to Med Admin: 5 (9/17/2018  3:25 PM)  Pain Rating Post Med Admin: 0 (9/17/2018  6:00 PM)  Jeovany Score: 9 (9/17/2018  6:33 PM)        "

## 2018-09-19 ENCOUNTER — TELEPHONE (OUTPATIENT)
Dept: NEUROSURGERY | Facility: CLINIC | Age: 58
End: 2018-09-19

## 2018-09-19 PROCEDURE — 12000002 HC ACUTE/MED SURGE SEMI-PRIVATE ROOM

## 2018-09-19 NOTE — TELEPHONE ENCOUNTER
Advised the Pt to contact the pharmacy to send a request for prior Auth for the Rx she is filliing per nurse Pate

## 2018-09-20 PROCEDURE — 12000002 HC ACUTE/MED SURGE SEMI-PRIVATE ROOM

## 2018-09-21 ENCOUNTER — TELEPHONE (OUTPATIENT)
Dept: NEUROSURGERY | Facility: CLINIC | Age: 58
End: 2018-09-21

## 2018-09-21 PROCEDURE — 12000002 HC ACUTE/MED SURGE SEMI-PRIVATE ROOM

## 2018-09-21 RX ORDER — OXYCODONE AND ACETAMINOPHEN 7.5; 325 MG/1; MG/1
1 TABLET ORAL EVERY 6 HOURS PRN
Qty: 30 TABLET | Refills: 0 | Status: SHIPPED | OUTPATIENT
Start: 2018-09-21 | End: 2018-10-04

## 2018-09-21 NOTE — TELEPHONE ENCOUNTER
SW the Pt about her request for another Refill script and the a solution for a new neckbrace  Per Herlinda advise calling [facility] about the brace and call her when script is wrote

## 2018-09-21 NOTE — TELEPHONE ENCOUNTER
----- Message from Paulino Goyal sent at 9/21/2018 10:09 AM CDT -----  Contact: Patient @ [phone]  Patient is requesting a return call from Herlinda, she didn't get into details, pls call

## 2018-09-22 PROCEDURE — 12000002 HC ACUTE/MED SURGE SEMI-PRIVATE ROOM

## 2018-09-23 PROCEDURE — 12000002 HC ACUTE/MED SURGE SEMI-PRIVATE ROOM

## 2018-09-24 ENCOUNTER — TELEPHONE (OUTPATIENT)
Dept: NEUROSURGERY | Facility: CLINIC | Age: 58
End: 2018-09-24

## 2018-09-24 ENCOUNTER — HOSPITAL ENCOUNTER (EMERGENCY)
Facility: HOSPITAL | Age: 58
Discharge: HOME | End: 2018-09-24
Payer: COMMERCIAL

## 2018-09-24 VITALS
TEMPERATURE: 98 F | WEIGHT: 209 LBS | HEIGHT: 63 IN | HEART RATE: 76 BPM | BODY MASS INDEX: 37.03 KG/M2 | SYSTOLIC BLOOD PRESSURE: 132 MMHG | DIASTOLIC BLOOD PRESSURE: 90 MMHG | OXYGEN SATURATION: 97 % | RESPIRATION RATE: 18 BRPM

## 2018-09-24 DIAGNOSIS — R42 DIZZINESS: Primary | ICD-10-CM

## 2018-09-24 DIAGNOSIS — J34.89 SINUS PRESSURE: ICD-10-CM

## 2018-09-24 DIAGNOSIS — R51.9 NONINTRACTABLE EPISODIC HEADACHE, UNSPECIFIED HEADACHE TYPE: ICD-10-CM

## 2018-09-24 PROCEDURE — 99283 EMERGENCY DEPT VISIT LOW MDM: CPT

## 2018-09-24 PROCEDURE — 25000003 PHARM REV CODE 250: Performed by: PHYSICIAN ASSISTANT

## 2018-09-24 RX ORDER — FLUTICASONE PROPIONATE 50 MCG
1 SPRAY, SUSPENSION (ML) NASAL 2 TIMES DAILY PRN
Qty: 15 G | Refills: 0 | Status: SHIPPED | OUTPATIENT
Start: 2018-09-24 | End: 2019-03-22

## 2018-09-24 RX ORDER — MECLIZINE HYDROCHLORIDE 25 MG/1
25 TABLET ORAL 2 TIMES DAILY PRN
Qty: 6 TABLET | Refills: 0 | Status: SHIPPED | OUTPATIENT
Start: 2018-09-24 | End: 2018-10-04

## 2018-09-24 RX ORDER — MECLIZINE HCL 12.5 MG 12.5 MG/1
25 TABLET ORAL
Status: COMPLETED | OUTPATIENT
Start: 2018-09-24 | End: 2018-09-24

## 2018-09-24 RX ADMIN — MECLIZINE 25 MG: 12.5 TABLET ORAL at 05:09

## 2018-09-24 NOTE — TELEPHONE ENCOUNTER
Patient has complaints of intolerance to light, nausea, tingling around her eye and nose,  and off balance. Not the typical complaints I hear after an ACDF. I advised that the pain medication can cause nausea and difficulty with balance if your not use to taking it. She also has a cold and is taking mucinex. The cold can be causing those symptoms as well. Advised her if it persists she may need to see PCP to make sure she doesn't have a sinus infection

## 2018-09-24 NOTE — ED TRIAGE NOTES
Pt presents to ED c/o dizziness x 1 week. Recently had surgery on cervical spine last week at main campus and states she has been dizzy since. Taking percocet for pain. No nausea. No other symptoms. Pt ambulatory with steady gait. Wearing sunglasses because of mild photophobia.

## 2018-09-24 NOTE — ED PROVIDER NOTES
Encounter Date: 9/24/2018       History     Chief Complaint   Patient presents with    Dizziness     dizziness x 1 week. recent hard surgery on cervical discs per pt. pt taking percocet for pain-pt also may think her sinuses are acting up     Patient is a 58-year-old female presenting to ED today with complaints of intermittent dizziness, nasal comfort congestion and sinus pressure x1 week.  Patient reports recently being hospitalized with surgery done 9/17 for anterior approach discectomy.  Patient reports experiencing symptoms since the surgery.  Patient reports intermittent associated headache as well. Patient denies any vision changes, nausea, vomiting, numbness, tingling, fevers or any other physical complaints this time.  Patient reports compliance with all daily medications.  Patient does admit to mild relief from OTC Mucinex and Zyrtec although no full resolution of symptoms thus far.      The history is provided by the patient and medical records.     Review of patient's allergies indicates:  No Known Allergies  Past Medical History:   Diagnosis Date    Diabetes mellitus     Diet controlled gestational diabetes mellitus in puerperium     History of seasonal allergies      Past Surgical History:   Procedure Laterality Date    ANTERIOR CERVICAL DISCECTOMY W/ FUSION N/A 9/17/2018    Procedure: DISCECTOMY, SPINE, CERVICAL, ANTERIOR APPROACH, WITH FUSION C3-4, C4-5;  Surgeon: Leo Denton MD;  Location: [facility] OR [address] [place] [address];  Service: Neurosurgery;  Laterality: N/A;  toronto II, asa 2, regular bed, supine, josefina    DISCECTOMY, SPINE, CERVICAL, ANTERIOR APPROACH, WITH FUSION C3-4, C4-5 N/A 9/17/2018    Performed by Leo Denton MD at [facility] OR [address] [place] [address]    HYSTERECTOMY, TOTAL, ABDOMINAL N/A 4/23/2013    Performed by [facility] at [facility] OR    INJECTION-STEROID-EPIDURAL-CAUDAL N/A 5/8/2018    Performed by L[facility] Toledo [facility] at [facility] PAIN MGT    right knee scope      SALPINGECTOMY Right 4/23/2013     [facility] at [facility] OR    SALPINGO-OOPHORECTOMY Left 4/23/2013    Performed by Sridhar Ngo MD at [facility] OR     Family History   Problem Relation Age of Onset    Diabetes type II Sister     Hyperlipidemia Sister     Diabetes type II Other      Social History     Tobacco Use    Smoking status: Never Smoker    Smokeless tobacco: Never Used   Substance Use Topics    Alcohol use: No    Drug use: No     Review of Systems   Constitutional: Negative for chills, diaphoresis, fatigue and fever.   HENT: Positive for congestion and sinus pressure. Negative for ear pain, facial swelling, sinus pain, sore throat and trouble swallowing.    Eyes: Negative for photophobia, pain and visual disturbance.   Respiratory: Negative for shortness of breath, wheezing and stridor.    Cardiovascular: Negative for chest pain and palpitations.   Gastrointestinal: Negative for abdominal pain, nausea and vomiting.   Endocrine: Negative.    Genitourinary: Negative for difficulty urinating and dysuria.   Musculoskeletal: Negative for back pain and myalgias.   Skin: Negative for rash and wound.   Allergic/Immunologic: Negative.    Neurological: Positive for dizziness and headaches. Negative for tremors, seizures, syncope, facial asymmetry, speech difficulty, weakness, light-headedness and numbness.   Hematological: Negative.    Psychiatric/Behavioral: Negative.        Physical Exam     Initial Vitals [09/24/18 1645]   BP Pulse Resp Temp SpO2   (!) 132/90 76 18 98.3 °F (36.8 °C) 97 %      MAP       --         Physical Exam    Nursing note and vitals reviewed.  Constitutional: She appears well-developed and well-nourished. She is not diaphoretic. No distress.   Patient is a 58-year-old female sitting upright in bed in no acute distress, nontoxic, AAO x4 and breathing comfortably on room air.   HENT:   Head: Normocephalic and atraumatic. Head is without raccoon's eyes, without Boyce's sign, without abrasion,  without contusion and without laceration. Hair is normal.   Right Ear: Hearing, external ear and ear canal normal. No tenderness. No mastoid tenderness. Tympanic membrane is bulging. Tympanic membrane is not perforated, not erythematous and not retracted.   Left Ear: Hearing, external ear and ear canal normal. No tenderness. No mastoid tenderness. Tympanic membrane is bulging. Tympanic membrane is not perforated, not erythematous and not retracted.   Nose: Nose normal. No mucosal edema, rhinorrhea or sinus tenderness. Right sinus exhibits no maxillary sinus tenderness and no frontal sinus tenderness. Left sinus exhibits no maxillary sinus tenderness and no frontal sinus tenderness.   Mouth/Throat: Uvula is midline, oropharynx is clear and moist and mucous membranes are normal. No oral lesions. No trismus in the jaw. No uvula swelling. No oropharyngeal exudate, posterior oropharyngeal edema, posterior oropharyngeal erythema or tonsillar abscesses.   Bilateral mild bulging TMs although no erythema, no perforation, no discharge or drainage, no mastoid tenderness. Sinus congestion likely cause symptoms, no concern for sinus infection currently.   Eyes: EOM are normal. Pupils are equal, round, and reactive to light.   Neck: Normal range of motion. Neck supple.   Right-sided anterior cervical region postop wound healing very well. No erythema, no discharge or drainage.   Cardiovascular: Normal rate, regular rhythm, normal heart sounds and intact distal pulses.   Pulmonary/Chest: Breath sounds normal. No stridor. No respiratory distress. She exhibits no tenderness.   Abdominal: Soft. Bowel sounds are normal. She exhibits no distension. There is no tenderness.   Musculoskeletal: Normal range of motion. She exhibits no edema or tenderness.   Lymphadenopathy:     She has no cervical adenopathy.   Neurological: She is alert and oriented to person, place, and time. She has normal strength. She displays no tremor. No sensory  deficit. She exhibits normal muscle tone. She displays a negative Romberg sign. She displays no seizure activity. Coordination and gait normal. GCS score is 15. GCS eye subscore is 4. GCS verbal subscore is 5. GCS motor subscore is 6.   Neurovascularly intact in full.   Skin: Skin is warm and dry. Capillary refill takes less than 2 seconds. No rash noted. No erythema.         ED Course   Procedures  Labs Reviewed - No data to display       Imaging Results    None          Medical Decision Making:   Initial Assessment:   Patient is a 58-year-old female presenting to ED today with complaints of intermittent dizziness, nasal comfort congestion and sinus pressure x1 week.  Patient reports recently being hospitalized with surgery done 9/17 for anterior approach discectomy.  Patient reports experiencing symptoms since the surgery.  Patient reports intermittent associated headache as well. Patient denies any vision changes, nausea, vomiting, numbness, tingling, fevers or any other physical complaints this time.  Patient reports compliance with all daily medications.  Patient does admit to mild relief from OTC Mucinex and Zyrtec although no full resolution of symptoms thus far.  Differential Diagnosis:   Sinus congestion  Vertigo  Sinus headache    ED Management:  Discussed care plan with patient.  Discussed clinical exam findings of fully intact neurovascular examination. Patient has no sinus tenderness appreciable.  No mucosal edema.  Bilateral TMs show bulging although no erythema, discharge or drainage, no concern for sinus infection or otitis media.  Patient likely experiencing intermittent dizziness/vertigo symptoms secondary to sinus pressure and inflammation.  Patient given meclizine in the emergency department with relief in symptoms.  Patient will be discharged home with Flonase and meclizine to assist symptomatic leak.  Patient educated on PCP follow-up an ED return precautions.  Patient with recent surgery for  discectomy of her cervical spine, which appears to be unrelated to any symptoms today.  Surgical scar to anterior neck appears to be healing very well. Patient is stable, no acute distress, nontoxic, neurovascular intact, vital signs stable, all questions answered.                      Clinical Impression:   The primary encounter diagnosis was Dizziness. Diagnoses of Nonintractable episodic headache, unspecified headache type and Sinus pressure were also pertinent to this visit.                             Haley Landa PA-C  09/24/18 1432

## 2018-09-24 NOTE — TELEPHONE ENCOUNTER
----- Message from Ramona Cintron MA sent at 9/24/2018 11:18 AM CDT -----  Contact: Patient @ [phone]      ----- Message -----  From: Paulino Goyal  Sent: 9/24/2018  11:03 AM  To: Corrie STEINER Staff    Patient is requesting a return call from Herlinda she didn't get into details, pls jena

## 2018-09-26 ENCOUNTER — HOSPITAL ENCOUNTER (EMERGENCY)
Facility: HOSPITAL | Age: 58
Discharge: HOME | End: 2018-09-26
Attending: SURGERY
Payer: COMMERCIAL

## 2018-09-26 VITALS
HEIGHT: 63 IN | RESPIRATION RATE: 18 BRPM | OXYGEN SATURATION: 100 % | BODY MASS INDEX: 36.68 KG/M2 | HEART RATE: 83 BPM | WEIGHT: 207 LBS | SYSTOLIC BLOOD PRESSURE: 119 MMHG | TEMPERATURE: 98 F | DIASTOLIC BLOOD PRESSURE: 79 MMHG

## 2018-09-26 DIAGNOSIS — N30.00 ACUTE CYSTITIS WITHOUT HEMATURIA: Primary | ICD-10-CM

## 2018-09-26 DIAGNOSIS — R42 DIZZINESS: ICD-10-CM

## 2018-09-26 LAB
ALBUMIN SERPL BCP-MCNC: 4.2 G/DL
ALP SERPL-CCNC: 92 U/L
ALT SERPL W/O P-5'-P-CCNC: 24 U/L
ANION GAP SERPL CALC-SCNC: 11 MMOL/L
AST SERPL-CCNC: 25 U/L
BASOPHILS # BLD AUTO: 0.02 K/UL
BASOPHILS NFR BLD: 0.4 %
BILIRUB SERPL-MCNC: 0.7 MG/DL
BILIRUB UR QL STRIP: NEGATIVE
BUN SERPL-MCNC: 19 MG/DL
CALCIUM SERPL-MCNC: 9.6 MG/DL
CHLORIDE SERPL-SCNC: 103 MMOL/L
CLARITY UR REFRACT.AUTO: ABNORMAL
CO2 SERPL-SCNC: 26 MMOL/L
COLOR UR AUTO: ABNORMAL
CREAT SERPL-MCNC: 0.91 MG/DL
DIFFERENTIAL METHOD: ABNORMAL
EOSINOPHIL # BLD AUTO: 0.1 K/UL
EOSINOPHIL NFR BLD: 1.5 %
ERYTHROCYTE [DISTWIDTH] IN BLOOD BY AUTOMATED COUNT: 14.7 %
EST. GFR  (AFRICAN AMERICAN): >60 ML/MIN/1.73 M^2
EST. GFR  (NON AFRICAN AMERICAN): >60 ML/MIN/1.73 M^2
GLUCOSE SERPL-MCNC: 114 MG/DL
GLUCOSE UR QL STRIP: NEGATIVE
HCT VFR BLD AUTO: 42.6 %
HGB BLD-MCNC: 13.3 G/DL
HGB UR QL STRIP: NEGATIVE
KETONES UR QL STRIP: NEGATIVE
LEUKOCYTE ESTERASE UR QL STRIP: ABNORMAL
LYMPHOCYTES # BLD AUTO: 2.1 K/UL
LYMPHOCYTES NFR BLD: 45.1 %
MCH RBC QN AUTO: 25.4 PG
MCHC RBC AUTO-ENTMCNC: 31.2 G/DL
MCV RBC AUTO: 81 FL
MICROSCOPIC COMMENT: NORMAL
MONOCYTES # BLD AUTO: 0.4 K/UL
MONOCYTES NFR BLD: 7.7 %
NEUTROPHILS # BLD AUTO: 2.1 K/UL
NEUTROPHILS NFR BLD: 45.3 %
NITRITE UR QL STRIP: NEGATIVE
PH UR STRIP: 5 [PH] (ref 5–8)
PLATELET # BLD AUTO: 260 K/UL
PMV BLD AUTO: 10.9 FL
POTASSIUM SERPL-SCNC: 4.1 MMOL/L
PROT SERPL-MCNC: 7.9 G/DL
PROT UR QL STRIP: ABNORMAL
RBC # BLD AUTO: 5.24 M/UL
SODIUM SERPL-SCNC: 140 MMOL/L
SP GR UR STRIP: 1.03 (ref 1–1.03)
URN SPEC COLLECT METH UR: ABNORMAL
UROBILINOGEN UR STRIP-ACNC: NEGATIVE EU/DL
WBC # BLD AUTO: 4.55 K/UL
WBC #/AREA URNS AUTO: 5 /HPF (ref 0–5)

## 2018-09-26 PROCEDURE — 80053 COMPREHEN METABOLIC PANEL: CPT

## 2018-09-26 PROCEDURE — 93005 ELECTROCARDIOGRAM TRACING: CPT

## 2018-09-26 PROCEDURE — 85025 COMPLETE CBC W/AUTO DIFF WBC: CPT

## 2018-09-26 PROCEDURE — 81000 URINALYSIS NONAUTO W/SCOPE: CPT

## 2018-09-26 PROCEDURE — 99284 EMERGENCY DEPT VISIT MOD MDM: CPT

## 2018-09-26 PROCEDURE — 93010 ELECTROCARDIOGRAM REPORT: CPT | Mod: ,,, | Performed by: INTERNAL MEDICINE

## 2018-09-26 RX ORDER — NITROFURANTOIN 25; 75 MG/1; MG/1
100 CAPSULE ORAL 2 TIMES DAILY
Qty: 10 CAPSULE | Refills: 0 | Status: SHIPPED | OUTPATIENT
Start: 2018-09-26 | End: 2018-10-01

## 2018-09-26 NOTE — ED NOTES
"Pt stated " I came in the other day and the doctor gave me some medicine for vertigo and I took it and felt better yesterday but today im starting to fell the same way I was before I came like with the dizziness."  "

## 2018-09-27 NOTE — OP NOTE
DATE OF PROCEDURE:  09/17/2018    PREOPERATIVE DIAGNOSES:  C3 to C4, C4 to C5 herniated nucleus pulposus with   radiculopathy and intractable neck Pain.    POSTOPERATIVE DIAGNOSES:  C3 to C4, C4 to C5 herniated nucleus pulposus with   radiculopathy and intractable neck Pain.    OPERATIVE PROCEDURE:  1.  Anterior approach at C3 to C4, C4 to C5.  2.  Anterior cervical diskectomy with C3 to C4, C4 to C5 interbody PEEK   arthrodesis with morselized allograft.  3.  Use of microsurgical technique.    SURGEON:  Leo Denton M.D.    :  Wali Mcarthur M.D. (RES)    ANESTHESIA:  General.    INDICATIONS FOR PROCEDURE:  This is a 58-year-old female with significant C3 to   C4, C4 to C5 disk disease.  She failed conservative management and felt the   patient would benefit from surgical intervention.    OPERATIVE NOTE:  The patient was taken to the Operating Room, anesthetized and   intubated by Anesthesia.  Preop antibiotics were administered.  The patient was   placed in the supine position, head initially in extended position, face is   midline.  The shoulders were taped down.  The right side of the neck was prepped   and draped in sterile fashion.  We used fluoroscopy to localize the appropriate   level.  We made a transverse incision of the skin, dissected down through the   platysmus, mobilized the sternocleidomastoid, the carotid sheath and its content   laterally, mobilized trachea and the esophagus medially, got down to the   prevertebral fascia.  We relocalized with x-rays and found that we were at C4 to   C5.  I incised the prevertebral fascia, mobilized longus colli bilaterally at   the C4 to C5 levels easy but C3 to C4 level was very difficult due to the angle   as well as the patient's neck anatomy.  We placed distraction pins in at C3 and   C5, distracted it.  A self-retaining retractor was in place, brought in the   microscope.  Under microsurgical technique, we incised the disk space of C4 to    C5.  Again, there was also very difficult to do the anterior osteophyte.  Once   we were able to get through them, we then continued to take down the disk   brought in the microscope.  Under microsurgical technique, complete the   diskectomy and performed bilateral foraminotomy, decompressed the thecal sac,   went through the PLL, controlled epidural venous bleeding, checked both foramens   were decompressed.  We then did a trial, then we placed PEEK interbody device   with morselized allograft into position under AP and lateral fluoroscopy.  We   deployed the blade into the vertebral body of C4 and C5.  Once we were happy   with the position, we then moved our attention to the C3 to C4 disk space.  At   this point, I took out the C3 to C4 disk in the same fashion as C4 to C5, went   through the PLL for bilateral foraminotomies and decompression, prepared the   endplate, placing the PEEK interbody device with morselized allograft in good   position at C3 to C4.  I checked with fluoroscopy, deployed the blade into   vertebral body of C3 to C4.  Once we were happy with the position of the   hardware in both levels, neuromonitoring was stable to irrigate out the wound.    Then, we placed a small drain into position with some Depo-Medrol.  The   esophagus was removed with the self-retaining retractor, closed the wound in   layers.  Sterile dressing was put in place.  Neuromonitoring was stable   throughout the whole case.  EBL was minimal.  Specimen sent was disk material.    No complications.      MERLIN/IN  dd: 09/27/2018 15:00:17 (CDT)  td: 09/27/2018 16:30:28 (JUVENCIO)  Doc ID   #[id number]  Job ID #[id number]    CC:

## 2018-09-27 NOTE — ED PROVIDER NOTES
Encounter Date: 9/26/2018       History     Chief Complaint   Patient presents with    Dizziness     patient states that she was recently seen for the same symptoms 2 days ago      58-year-old female presents to the emergency department for evaluation of dizziness, neck pain, and headache.  She reports that on 09/17/2018 that she had an anterior approach discotomy.  She states that she was evaluated at this facility 2 days ago for similar symptoms and was advised that might be vertigo.  She reports that since the surgery she has experienced anterior sinus pressure, intermittent dizziness, intermittent frontal headache and neck pain since the incident.  She denies any vision changes, fever, neck stiffness, cough, sore throat, abdominal pain, nausea, dysuria or numbness/weakness/tingling/swelling to the upper lower extremities bilaterally. She has attempted treatment with meclizine with minimal relief of symptoms. She reports that she took a dose of amoxicillin and felt better yesterday.          Review of patient's allergies indicates:  No Known Allergies  Past Medical History:   Diagnosis Date    Diabetes mellitus     Diet controlled gestational diabetes mellitus in puerperium     History of seasonal allergies      Past Surgical History:   Procedure Laterality Date    ANTERIOR CERVICAL DISCECTOMY W/ FUSION N/A 9/17/2018    Procedure: DISCECTOMY, SPINE, CERVICAL, ANTERIOR APPROACH, WITH FUSION C3-4, C4-5;  Surgeon: Leo Denton MD;  Location: [facility] OR [address] [place] [address];  Service: Neurosurgery;  Laterality: N/A;  toronto II, asa 2, regular bed, supine, josefina    DISCECTOMY, SPINE, CERVICAL, ANTERIOR APPROACH, WITH FUSION C3-4, C4-5 N/A 9/17/2018    Performed by Leo Denton MD at [facility] OR [address] [place] [address]    HYSTERECTOMY, TOTAL, ABDOMINAL N/A 4/23/2013    Performed by Sridhar [facility]go [facility] at [facility] OR    INJECTION-STEROID-EPIDURAL-CAUDAL N/A 5/8/2018    Performed by Lani Toledo MD at [facility]    right knee scope       SALPINGECTOMY Right 4/23/2013    [facility] OR    SALPINGO-OOPHORECTOMY Left 4/23/2013    Performed by [facility] at [facility] OR     Family History   Problem Relation Age of Onset    Diabetes type II Sister     Hyperlipidemia Sister     Diabetes type II Other      Social History     Tobacco Use    Smoking status: Never Smoker    Smokeless tobacco: Never Used   Substance Use Topics    Alcohol use: No    Drug use: No     Review of Systems   Constitutional: Negative for activity change, appetite change and fever.   HENT: Positive for sinus pressure. Negative for congestion, ear discharge, facial swelling, nosebleeds, postnasal drip, rhinorrhea, sinus pain, sore throat, trouble swallowing and voice change.    Eyes: Negative for photophobia and visual disturbance.   Respiratory: Negative for cough, chest tightness, shortness of breath and wheezing.    Cardiovascular: Negative for chest pain and leg swelling.   Gastrointestinal: Negative for abdominal pain, constipation, diarrhea, nausea and vomiting.   Genitourinary: Negative for decreased urine volume, dysuria, flank pain, hematuria, vaginal bleeding, vaginal discharge and vaginal pain.   Musculoskeletal: Positive for neck pain. Negative for back pain, joint swelling and neck stiffness.   Neurological: Positive for dizziness and headaches. Negative for seizures, syncope, weakness, light-headedness and numbness.       Physical Exam     Initial Vitals [09/26/18 1511]   BP Pulse Resp Temp SpO2   130/86 99 18 97.8 °F (36.6 °C) 98 %      MAP       --         Physical Exam    Nursing note and vitals reviewed.  Constitutional: She appears well-developed and well-nourished. She is not diaphoretic. No distress.   HENT:   Head: Normocephalic and atraumatic.   Right Ear: External ear normal.   Left Ear: External ear normal.   Nose: Nose normal.   Mouth/Throat: Oropharynx is clear and moist.   TMs reveal no erythema.  No tenderness  to percussion noted over the frontal or maxillary sinuses bilaterally.  Nasal terminates reveal no bogginess or purulent discharge. Posterior pharynx reveals erythema, edema or tonsillar exudate. No uvular edema or deviation noted.   Eyes: Conjunctivae and EOM are normal. Pupils are equal, round, and reactive to light.   Neck: Normal range of motion. Neck supple.       Cardiovascular: Normal rate, regular rhythm and normal heart sounds.   Pulmonary/Chest: Breath sounds normal. No respiratory distress. She has no wheezes. She has no rhonchi. She has no rales. She exhibits no tenderness.   Abdominal: Soft. Bowel sounds are normal. She exhibits no distension. There is no tenderness. There is no rebound.   Musculoskeletal: Normal range of motion.        Right shoulder: She exhibits normal range of motion, no tenderness and no bony tenderness.        Left shoulder: She exhibits normal range of motion, no tenderness and no bony tenderness.        Cervical back: She exhibits tenderness. She exhibits normal range of motion, no bony tenderness and no swelling.        Thoracic back: She exhibits normal range of motion, no tenderness and no bony tenderness.        Lumbar back: She exhibits normal range of motion, no tenderness and no bony tenderness.   Lymphadenopathy:     She has no cervical adenopathy.   Neurological: She is alert and oriented to person, place, and time. She has normal strength. No cranial nerve deficit. She displays a negative Romberg sign. Coordination and gait normal. GCS eye subscore is 4. GCS verbal subscore is 5. GCS motor subscore is 6.   Skin: Skin is warm and dry.   Psychiatric: She has a normal mood and affect.         ED Course   Procedures  Labs Reviewed   CBC W/ AUTO DIFFERENTIAL - Abnormal; Notable for the following components:       Result Value    MCV 81 (*)     MCH 25.4 (*)     MCHC 31.2 (*)     RDW 14.7 (*)     All other components within normal limits   COMPREHENSIVE METABOLIC PANEL -  Abnormal; Notable for the following components:    Glucose 114 (*)     BUN, Bld 19 (*)     All other components within normal limits   URINALYSIS, REFLEX TO URINE CULTURE - Abnormal; Notable for the following components:    Appearance, UA Cloudy (*)     Protein, UA Trace (*)     Leukocytes, UA 3+ (*)     All other components within normal limits    Narrative:     Preferred Collection Type->Urine, Clean Catch   URINALYSIS MICROSCOPIC     EKG Readings: (Independently Interpreted)   Initial Reading: No STEMI. Rhythm: Normal Sinus Rhythm. Heart Rate: 84.       Imaging Results          CT Head Without Contrast (Final result)  Result time 09/26/18 18:21:03    Final result by Arturo Rai MD (09/26/18 18:21:03)                 Impression:      Normal head CT.    All CT scans at this facility are performed  using dose modulation techniques as appropriate to performed exam including the following:  automated exposure control; adjustment of mA and/or kV according to the patients size (this includes techniques or standardized protocols for targeted exams where dose is matched to indication/reason for exam: i.e. extremities or head);  iterative reconstruction technique.      Electronically signed by: Arturo Rai MD  Date:    09/26/2018  Time:    18:21             Narrative:    EXAMINATION:  CT HEAD WITHOUT CONTRAST    CLINICAL HISTORY:  Dizziness and giddiness.;    TECHNIQUE:  Routine noncontrast head CT.    COMPARISON:  None    FINDINGS:  There is no acute intracranial hemorrhage or abnormal extra-axial fluid collection.  There is no abnormal increased or decreased density within the brain parenchyma.  Gray-white differentiation preserved normal ventricles.  There is no intracranial mass or mass effect.  The calvarium is intact.  Visualized paranasal sinuses and mastoids are well aerated.                               CT Cervical Spine Without Contrast (Final result)  Result time 09/26/18 18:25:44    Final result by Arturo  CHRISTINA Rai MD (09/26/18 18:25:44)                 Impression:      Postoperative cervical spine with C3-4 and C4-5 disc spacers with expected small overlying postoperative fluid collection anterior to the vertebral bodies with couple intermixed air bubbles.    All CT scans at this facility are performed  using dose modulation techniques as appropriate to performed exam including the following:  automated exposure control; adjustment of mA and/or kV according to the patients size (this includes techniques or standardized protocols for targeted exams where dose is matched to indication/reason for exam: i.e. extremities or head);  iterative reconstruction technique.      Electronically signed by: Arturo Rai MD  Date:    09/26/2018  Time:    18:25             Narrative:    EXAMINATION:  CT CERVICAL SPINE WITHOUT CONTRAST    CLINICAL HISTORY:  Neck pain status post surgery;    TECHNIQUE:  CT cervical spine performed with 2.5 mm axial imaging through the bone and soft tissue algorithms.  Coronal and sagittal reformations.    COMPARISON:  MR cervical spine 08/13/2018.    FINDINGS:  Negative for fracture.  There is loss of cervical lordosis with straightening of the spine.  No subluxation.    There has been recent surgical intervention with C3-4 and C4-5 disc spacers anchored into the vertebral bodies with overlying small postoperative fluid collection anterior to the vertebral bodies, as expected, with a couple intermixed air bubbles.  There is no prevertebral soft tissue swelling spreading up or down from the surgical bed.    C3-4, C4-5 posterior disc osteophyte complexes are noted.  C5-6, C6-7 degenerative disc disease with disc space narrowing and annular disc osteophyte complexes.  No evidence of significant central spinal canal or neural foraminal stenosis.    The lung apices are clear.                                 Medical Decision Making:   Initial Assessment:   58-year-old female presents for evaluation of  intermittent frontal headaches, dizziness, neck pain and sinus pain status post surgery.  Physical exam reveals a nontoxic-appearing female in no acute distress. Patient is afebrile vital signs within normal limits.  Neurological exam reveals an alert and oriented patient.  No cranial nerve deficits noted. No evidence of head injury noted. Neck is supple, no meningeal signs noted..Well-healing scar noted over the anterior aspect of the neck.  No surrounding erythema, edema or induration noted.  No wound dehiscence or discharge noted.  No subcutaneous emphysema noted. Mild tenderness to palpation noted over the paraspinal musculature of the cervical spine.  No spinous process tenderness noted. No tenderness to palpation noted of the paraspinal musculature or the spinous processes of the thoracic or lumbar spine.  Lungs clear to auscultation bilaterally.  No respiratory distress or accessory muscle use noted.  Abdominal exam reveals soft abdomen, nontender to palpation. No CVA tenderness noted. Full range of motion, sensation of peripheral pulses intact in upper and lower extremities bilaterally.  Differential Diagnosis:   CT of the head  ordered to assess for possible serious intracranial etiology including stroke, hemorrhage or space-occupying mass.  CT of the cervical spine ordered to assess for possible serious etiology including osseous injury or changes.  I carefully considered but doubt serious etiology including meningitis.  Electrolyte imbalance  ED Management:  CBC reveals no acute leukocytosis or anemia.  CMP results within normal limits. Urinalysis reveals no evidence of UTI.  CT of the head reveals no acute findings.  Cervical spine reveals postoperative cervical spine with C3 and C4 and C4 and C5 disc spacers with expected small overlying postoperative fluid collection anterior to the vertebral bodies with couple and her mixed air bubbles.  No soft tissue swelling noted. Urinalysis reveals evidence of  UTI.  Will treat the patient with antibiotics upon discharge. Discussed these findings at length with the patient verbalizes understanding and agreement course treatment.  Instructed patient to follow up with her primary care provider for re-evaluation within 2 days.  Instructed the patient to return to the emergency department immediately for any new or worsening symptoms.  Dr. Puckett evaluated this patient and is in agreement course of treatment.                      Clinical Impression:   The primary encounter diagnosis was Acute cystitis without hematuria. A diagnosis of Dizziness was also pertinent to this visit.                             Emely Cash PA-C  10/02/18 1954

## 2018-09-27 NOTE — DISCHARGE INSTRUCTIONS
You are instructed to follow up with your primary care provider for re-evaluation within 3 days.  You are instructed to return to the emergency department immediately for any new or worsening symptoms.

## 2018-10-01 ENCOUNTER — CLINICAL SUPPORT (OUTPATIENT)
Dept: NEUROSURGERY | Facility: CLINIC | Age: 58
End: 2018-10-01
Payer: COMMERCIAL

## 2018-10-01 VITALS
WEIGHT: 210.38 LBS | BODY MASS INDEX: 37.27 KG/M2 | HEART RATE: 68 BPM | DIASTOLIC BLOOD PRESSURE: 72 MMHG | TEMPERATURE: 97 F | SYSTOLIC BLOOD PRESSURE: 118 MMHG

## 2018-10-01 PROCEDURE — 99999 PR PBB SHADOW E&M-EST. PATIENT-LVL III: CPT | Mod: PBBFAC,,,

## 2018-10-01 RX ORDER — METHOCARBAMOL 750 MG/1
750 TABLET, FILM COATED ORAL
Qty: 90 TABLET | Refills: 2 | Status: SHIPPED | OUTPATIENT
Start: 2018-10-01 | End: 2018-10-02 | Stop reason: SDUPTHER

## 2018-10-01 RX ORDER — OXYCODONE AND ACETAMINOPHEN 5; 325 MG/1; MG/1
1 TABLET ORAL EVERY 6 HOURS PRN
Qty: 60 TABLET | Refills: 0 | Status: SHIPPED | OUTPATIENT
Start: 2018-10-01 | End: 2018-10-04

## 2018-10-01 NOTE — PROGRESS NOTES
Patient seen in clinic for 2 week post op s/p C3-5 ACDF with Dr Denton on _09/17/2018          Incision on anterior neck assessed, dermabond used for closure and still intact. no swelling or purulent drainage, edges well approximated.         Patient was instructed as follows:    Discontinue Bacitracin after tonight.   May shower normally but pat dry after shower.   Do not submerge wound in bath tub or go swimming until released by the physician   Keep incision clean, dry and open to air as much as possible.   Patient encouraged to walk as much as possible but advised to walk with family member or friend and rest as necessary.   No lifting >10lbs.   Avoid bending and twisting the area of your surgery more than 45 degrees from neutral position in any direction.   Return to work will be determined on an individual basis.   No driving or operating machinery while taking narcotic pain medication or muscle relaxants and until cleared by a surgeon.   Wear brace at all times except when lay flat in bed.    A copy of post-operative instructions provided to the patient.     All questions were answered. Patient will follow up with Dr Denton 11/20/2018 w/ xrays first . Patient was encouraged to call clinic with any future concerns prior to follow up appt. If any worsening symptoms, patient should report to ED.       Herlinda Castillo RN, BSN  Neurosurgery

## 2018-10-02 ENCOUNTER — TELEPHONE (OUTPATIENT)
Dept: NEUROSURGERY | Facility: CLINIC | Age: 58
End: 2018-10-02

## 2018-10-02 RX ORDER — METHOCARBAMOL 750 MG/1
750 TABLET, FILM COATED ORAL
Qty: 90 TABLET | Refills: 2 | Status: SHIPPED | OUTPATIENT
Start: 2018-10-02 | End: 2018-11-01

## 2018-10-02 RX ORDER — HYDROCODONE BITARTRATE AND ACETAMINOPHEN 5; 325 MG/1; MG/1
1 TABLET ORAL EVERY 6 HOURS PRN
Qty: 60 TABLET | Refills: 0 | Status: SHIPPED | OUTPATIENT
Start: 2018-10-02 | End: 2018-10-15 | Stop reason: SDUPTHER

## 2018-10-02 NOTE — TELEPHONE ENCOUNTER
----- Message from Colleen Johnson MA sent at 10/1/2018  5:17 PM CDT -----  Contact: Amna ([facility]) [phone]      ----- Message -----  From: Susie Palmer  Sent: 10/1/2018   4:55 PM  To: Izabella Covarrubias Staff    Pharmacy Calling    Reason for call:They called for more info regarding the rx. They need a diagnosis.    Pharmacy Name:  [facility] [address] [place] [address] [address] [facility] [address]  Phone: [phone] Fax: [phone]      Prescription Name:Oxycodone 5-325      Additional Information:

## 2018-10-02 NOTE — TELEPHONE ENCOUNTER
----- Message from Ramona Cintron MA sent at 10/2/2018 10:12 AM CDT -----  Contact: Pt      ----- Message -----  From: Farheen Saucedo  Sent: 10/2/2018   9:33 AM  To: Corrie STEINER Staff    Pt is requesting a callback from nurse at [phone] says she need to get another pain medication    Thanks

## 2018-10-04 ENCOUNTER — OFFICE VISIT (OUTPATIENT)
Dept: FAMILY MEDICINE | Facility: CLINIC | Age: 58
End: 2018-10-04
Payer: COMMERCIAL

## 2018-10-04 ENCOUNTER — TELEPHONE (OUTPATIENT)
Dept: FAMILY MEDICINE | Facility: CLINIC | Age: 58
End: 2018-10-04

## 2018-10-04 VITALS
HEIGHT: 63 IN | SYSTOLIC BLOOD PRESSURE: 110 MMHG | TEMPERATURE: 98 F | BODY MASS INDEX: 37.4 KG/M2 | WEIGHT: 211.06 LBS | OXYGEN SATURATION: 99 % | HEART RATE: 75 BPM | DIASTOLIC BLOOD PRESSURE: 72 MMHG

## 2018-10-04 DIAGNOSIS — R73.03 PREDIABETES: ICD-10-CM

## 2018-10-04 DIAGNOSIS — R42 DIZZINESS: ICD-10-CM

## 2018-10-04 DIAGNOSIS — N39.0 URINARY TRACT INFECTION WITHOUT HEMATURIA, SITE UNSPECIFIED: Primary | ICD-10-CM

## 2018-10-04 LAB
BILIRUB SERPL-MCNC: NORMAL MG/DL
BLOOD URINE, POC: NORMAL
COLOR, POC UA: NORMAL
GLUCOSE UR QL STRIP: NORMAL
KETONES UR QL STRIP: NORMAL
LEUKOCYTE ESTERASE URINE, POC: NORMAL
NITRITE, POC UA: NORMAL
PH, POC UA: 5
PROTEIN, POC: NORMAL
SPECIFIC GRAVITY, POC UA: 1.03
UROBILINOGEN, POC UA: NORMAL

## 2018-10-04 PROCEDURE — 99203 OFFICE O/P NEW LOW 30 MIN: CPT | Mod: 25,S$GLB,, | Performed by: FAMILY MEDICINE

## 2018-10-04 PROCEDURE — 3078F DIAST BP <80 MM HG: CPT | Mod: CPTII,S$GLB,, | Performed by: FAMILY MEDICINE

## 2018-10-04 PROCEDURE — 3008F BODY MASS INDEX DOCD: CPT | Mod: CPTII,S$GLB,, | Performed by: FAMILY MEDICINE

## 2018-10-04 PROCEDURE — 3074F SYST BP LT 130 MM HG: CPT | Mod: CPTII,S$GLB,, | Performed by: FAMILY MEDICINE

## 2018-10-04 PROCEDURE — 81002 URINALYSIS NONAUTO W/O SCOPE: CPT | Mod: S$GLB,,, | Performed by: FAMILY MEDICINE

## 2018-10-04 NOTE — PROGRESS NOTES
"Chief Complaint  Chief Complaint   Patient presents with    Urinary Tract Infection     f/up uti/ dizziness        HPI  Carmen Son is a 58 y.o. female with multiple medical diagnoses as listed in the medical history and problem list that presents for ER f/u.  Patient was seen in the ER for dizziness on 9/26.  She was diagnosed with a UTI.  Head CT was normal.  She was treated with macrobid.  She has finished the antibiotics.  She continues to have some dizziness.  Not a spinning sensation, but a feeling of being off balance.  She reports that she has been having some balance issues for about 1 year.  She describes it as a "shifting" sensation or moving side to side.  Prior to her ER visit, though the dizziness was much more severe.  She has a history of sinusitis in the past.  She recently had surgery on her c-spine  And thought this might help with her dizziness, but it hasn't.  She notes that exposure to bright light or sunlight seems to make the dizziness worse.      PAST MEDICAL HISTORY:  Past Medical History:   Diagnosis Date    Diabetes mellitus     Diet controlled gestational diabetes mellitus in puerperium     History of seasonal allergies        PAST SURGICAL HISTORY:  Past Surgical History:   Procedure Laterality Date    ANTERIOR CERVICAL DISCECTOMY W/ FUSION N/A 9/17/2018    Procedure: DISCECTOMY, SPINE, CERVICAL, ANTERIOR APPROACH, WITH FUSION C3-4, C4-5;  Surgeon: Leo Denton MD;  Location: [facility] OR [address] [place] [address];  Service: Neurosurgery;  Laterality: N/A;  toronto II, asa 2, regular bed, supine, josefina    DISCECTOMY, SPINE, CERVICAL, ANTERIOR APPROACH, WITH FUSION C3-4, C4-5 N/A 9/17/2018    Performed by Leo Denton MD at [facility] OR [address] [place] [address]    HYSTERECTOMY, TOTAL, ABDOMINAL N/A 4/23/2013    Performed by Sridhar Ngo [facility] at [facility] OR    INJECTION-STEROID-EPIDURAL-CAUDAL N/A 5/8/2018    Performed by Lani Toledo MD at [facility] PAIN MGT    right knee scope      SALPINGECTOMY Right " 4/23/2013    Performed by [facility]har [facility]go MD at [facility] OR    SALPINGO-OOPHORECTOMY Left 4/23/2013    Performed by [facility]ridhar Ngo MD at [facility] OR       SOCIAL HISTORY:  Social History     Socioeconomic History    Marital status:      Spouse name: Not on file    Number of children: Not on file    Years of education: Not on file    Highest education level: Not on file   Social Needs    Financial resource strain: Not on file    Food insecurity - worry: Not on file    Food insecurity - inability: Not on file    Transportation needs - medical: Not on file    Transportation needs - non-medical: Not on file   Occupational History    Not on file   Tobacco Use    Smoking status: Never Smoker    Smokeless tobacco: Never Used   Substance and Sexual Activity    Alcohol use: No    Drug use: No    Sexual activity: Yes     Partners: Male     Birth control/protection: Surgical   Other Topics Concern    Not on file   Social History Narrative    Not on file       FAMILY HISTORY:  Family History   Problem Relation Age of Onset    Diabetes type II Sister     Hyperlipidemia Sister     Diabetes type II Other        ALLERGIES AND MEDICATIONS: updated and reviewed.  Review of patient's allergies indicates:  No Known Allergies  Current Outpatient Medications   Medication Sig Dispense Refill    aspirin 81 MG Chew Take 1 tablet (81 mg total) by mouth once daily. May resume in 2 weeks post op  0    cetirizine (ZYRTEC) 10 MG tablet Take 10 mg by mouth once daily.   0    fluticasone (FLONASE) 50 mcg/actuation nasal spray 1 spray (50 mcg total) by Each Nare route 2 (two) times daily as needed for Rhinitis. 15 g 0    furosemide (LASIX) 20 MG tablet Take 20 mg by mouth daily as needed.       HYDROcodone-acetaminophen (NORCO) 5-325 mg per tablet Take 1 tablet by mouth every 6 (six) hours as needed for Pain. 60 tablet 0    losartan (COZAAR) 50 MG tablet Take 50 mg by mouth once daily.      methocarbamol  (ROBAXIN) 750 MG Tab Take 1 tablet (750 mg total) by mouth before meals as needed. 90 tablet 2    omeprazole (PRILOSEC) 40 MG capsule Take 40 mg by mouth once daily.      OPW TEST CLAIM - DO NOT FILL Take by mouth. OPW test claim. Do not fill. 30 tablet 0    potassium chloride (KLOR-CON) 20 mEq Pack Take 20 mEq by mouth once.      propranolol (INDERAL) 80 MG tablet Take 160 mg by mouth once daily.        No current facility-administered medications for this visit.          ROS  Review of Systems   Constitutional: Negative for activity change, appetite change, chills and fatigue.   HENT: Negative for congestion, ear discharge, ear pain, postnasal drip, rhinorrhea, sinus pressure, sinus pain, sore throat and trouble swallowing.    Eyes: Negative for photophobia, pain, redness, itching and visual disturbance.   Respiratory: Negative for cough, chest tightness, shortness of breath and wheezing.    Cardiovascular: Negative for chest pain, palpitations and leg swelling.   Gastrointestinal: Negative for abdominal distention, abdominal pain, blood in stool, constipation, diarrhea, nausea and vomiting.   Endocrine: Negative for cold intolerance, heat intolerance, polydipsia, polyphagia and polyuria.   Genitourinary: Negative for decreased urine volume, difficulty urinating, dyspareunia, dysuria, enuresis, flank pain, frequency, genital sores, hematuria, menstrual problem, pelvic pain, urgency, vaginal bleeding, vaginal discharge and vaginal pain.   Musculoskeletal: Negative for arthralgias, back pain, gait problem, joint swelling, myalgias and neck pain.   Skin: Negative for color change, pallor and rash.   Neurological: Negative for dizziness, tremors, weakness, light-headedness, numbness and headaches.   Hematological: Does not bruise/bleed easily.   Psychiatric/Behavioral: Negative for agitation, behavioral problems, confusion, decreased concentration, sleep disturbance and suicidal ideas.           PHYSICAL  "EXAM  Vitals:    10/04/18 1305   BP: 110/72   BP Location: Right arm   Patient Position: Sitting   Pulse: 75   Temp: 98.2 °F (36.8 °C)   TempSrc: Oral   SpO2: 99%   Weight: 95.8 kg (211 lb 1.5 oz)   Height: 5' 3" (1.6 m)    Body mass index is 37.39 kg/m².  Weight: 95.8 kg (211 lb 1.5 oz)   Height: 5' 3" (160 cm)     Physical Exam   Constitutional: She is oriented to person, place, and time. She appears well-developed and well-nourished. No distress.   HENT:   Head: Normocephalic and atraumatic.   Right Ear: External ear normal.   Left Ear: External ear normal.   Mouth/Throat: Oropharynx is clear and moist. No oropharyngeal exudate.   Eyes: Conjunctivae and EOM are normal. Pupils are equal, round, and reactive to light. Right eye exhibits no discharge. Left eye exhibits no discharge.   Neck: Normal range of motion. Neck supple. No tracheal deviation present. No thyromegaly present.   Cardiovascular: Normal rate, regular rhythm, normal heart sounds and intact distal pulses. Exam reveals no gallop and no friction rub.   No murmur heard.  Pulmonary/Chest: Effort normal and breath sounds normal. No respiratory distress. She has no wheezes. She has no rales. She exhibits no tenderness.   Abdominal: Soft. Bowel sounds are normal. She exhibits no distension. There is no tenderness. There is no guarding.   Musculoskeletal: Normal range of motion. She exhibits no edema, tenderness or deformity.   Neurological: She is alert and oriented to person, place, and time. She displays normal reflexes. No cranial nerve deficit. She exhibits normal muscle tone. Coordination normal.   Exam is somewhat limited by patient's cervical collar.    Skin: Skin is warm and dry. Capillary refill takes less than 2 seconds. No rash noted. She is not diaphoretic. No erythema. No pallor.   Psychiatric: She has a normal mood and affect. Her behavior is normal. Judgment normal.         Health Maintenance       Date Due Completion Date    Lipid Panel " 1960 ---    TETANUS VACCINE 06/04/1980 ---    Influenza Vaccine 08/01/2018 ---    Mammogram 09/10/2020 9/10/2018 (Done)    Override on 9/10/2018: Done    Colonoscopy 09/01/2024 9/1/2014 (Done)    Override on 9/1/2014: Done            Assessment & Plan      Carmen was seen today for urinary tract infection.    Diagnoses and all orders for this visit:    Urinary tract infection without hematuria, site unspecified  -     POCT URINE DIPSTICK WITHOUT MICROSCOPE    - Resolved.      Prediabetes  -     CBC auto differential; Future  -     Comprehensive metabolic panel; Future  -     Lipid panel; Future  -     Hemoglobin A1c; Future    Dizziness  -     TSH; Future  -     Ambulatory referral to ENT:            Follow-up: No Follow-up on file.

## 2018-10-08 ENCOUNTER — TELEPHONE (OUTPATIENT)
Dept: NEUROSURGERY | Facility: CLINIC | Age: 58
End: 2018-10-08

## 2018-10-08 RX ORDER — OXYCODONE AND ACETAMINOPHEN 5; 325 MG/1; MG/1
1 TABLET ORAL EVERY 6 HOURS PRN
Qty: 60 TABLET | Refills: 0 | OUTPATIENT
Start: 2018-10-08 | End: 2019-02-20

## 2018-10-08 NOTE — TELEPHONE ENCOUNTER
----- Message from Stephanie Son sent at 10/8/2018 11:30 AM CDT -----  Contact: [phone]/ self   Patient would like refill of the following medication sent to [facility] [address] [place] [address], [place] - [address] [facility].     1. losartan (COZAAR) 50 MG tablet  Sig: Take 50 mg by mouth once daily.

## 2018-10-08 NOTE — TELEPHONE ENCOUNTER
----- Message from Catailna Lazaro sent at 10/8/2018  9:58 AM CDT -----  Contact: self @ [phone]  Pt is calling to request a new prescription for oxycodone.  Pt says it works better the the hydrocodone.  She says she will need the prescription for tomorrow.  Pls call.

## 2018-10-09 ENCOUNTER — LAB VISIT (OUTPATIENT)
Dept: LAB | Facility: HOSPITAL | Age: 58
End: 2018-10-09
Attending: FAMILY MEDICINE
Payer: COMMERCIAL

## 2018-10-09 DIAGNOSIS — R73.03 PREDIABETES: ICD-10-CM

## 2018-10-09 DIAGNOSIS — R42 DIZZINESS: ICD-10-CM

## 2018-10-09 LAB
ALBUMIN SERPL BCP-MCNC: 4.1 G/DL
ALP SERPL-CCNC: 111 U/L
ALT SERPL W/O P-5'-P-CCNC: 18 U/L
ANION GAP SERPL CALC-SCNC: 6 MMOL/L
AST SERPL-CCNC: 24 U/L
BASOPHILS # BLD AUTO: 0.01 K/UL
BASOPHILS NFR BLD: 0.3 %
BILIRUB SERPL-MCNC: 0.8 MG/DL
BUN SERPL-MCNC: 16 MG/DL
CALCIUM SERPL-MCNC: 9.6 MG/DL
CHLORIDE SERPL-SCNC: 101 MMOL/L
CHOLEST SERPL-MCNC: 283 MG/DL
CHOLEST/HDLC SERPL: 5 {RATIO}
CO2 SERPL-SCNC: 32 MMOL/L
CREAT SERPL-MCNC: 1.14 MG/DL
DIFFERENTIAL METHOD: ABNORMAL
EOSINOPHIL # BLD AUTO: 0.1 K/UL
EOSINOPHIL NFR BLD: 2.3 %
ERYTHROCYTE [DISTWIDTH] IN BLOOD BY AUTOMATED COUNT: 14.6 %
EST. GFR  (AFRICAN AMERICAN): >60 ML/MIN/1.73 M^2
EST. GFR  (NON AFRICAN AMERICAN): 53.9 ML/MIN/1.73 M^2
ESTIMATED AVG GLUCOSE: 128 MG/DL
GLUCOSE SERPL-MCNC: 120 MG/DL
HBA1C MFR BLD HPLC: 6.1 %
HCT VFR BLD AUTO: 41.8 %
HDLC SERPL-MCNC: 57 MG/DL
HDLC SERPL: 20.1 %
HGB BLD-MCNC: 12.9 G/DL
LDLC SERPL CALC-MCNC: 193.4 MG/DL
LYMPHOCYTES # BLD AUTO: 1.7 K/UL
LYMPHOCYTES NFR BLD: 48.7 %
MCH RBC QN AUTO: 24.8 PG
MCHC RBC AUTO-ENTMCNC: 30.9 G/DL
MCV RBC AUTO: 80 FL
MONOCYTES # BLD AUTO: 0.2 K/UL
MONOCYTES NFR BLD: 4.3 %
NEUTROPHILS # BLD AUTO: 1.5 K/UL
NEUTROPHILS NFR BLD: 44.4 %
NONHDLC SERPL-MCNC: 226 MG/DL
PLATELET # BLD AUTO: 267 K/UL
PMV BLD AUTO: 11 FL
POTASSIUM SERPL-SCNC: 4.2 MMOL/L
PROT SERPL-MCNC: 8 G/DL
RBC # BLD AUTO: 5.2 M/UL
SODIUM SERPL-SCNC: 139 MMOL/L
TRIGL SERPL-MCNC: 163 MG/DL
TSH SERPL DL<=0.005 MIU/L-ACNC: 0.78 UIU/ML
WBC # BLD AUTO: 3.47 K/UL

## 2018-10-09 PROCEDURE — 83036 HEMOGLOBIN GLYCOSYLATED A1C: CPT

## 2018-10-09 PROCEDURE — 80053 COMPREHEN METABOLIC PANEL: CPT | Mod: PO

## 2018-10-09 PROCEDURE — 85025 COMPLETE CBC W/AUTO DIFF WBC: CPT | Mod: PO

## 2018-10-09 PROCEDURE — 84443 ASSAY THYROID STIM HORMONE: CPT | Mod: PO

## 2018-10-09 PROCEDURE — 80061 LIPID PANEL: CPT

## 2018-10-09 PROCEDURE — 36415 COLL VENOUS BLD VENIPUNCTURE: CPT | Mod: PO

## 2018-10-10 ENCOUNTER — OFFICE VISIT (OUTPATIENT)
Dept: FAMILY MEDICINE | Facility: CLINIC | Age: 58
End: 2018-10-10
Payer: COMMERCIAL

## 2018-10-10 VITALS
OXYGEN SATURATION: 96 % | BODY MASS INDEX: 37.65 KG/M2 | WEIGHT: 212.5 LBS | TEMPERATURE: 98 F | HEIGHT: 63 IN | DIASTOLIC BLOOD PRESSURE: 78 MMHG | HEART RATE: 76 BPM | SYSTOLIC BLOOD PRESSURE: 102 MMHG

## 2018-10-10 DIAGNOSIS — J32.9 SINUSITIS, UNSPECIFIED CHRONICITY, UNSPECIFIED LOCATION: ICD-10-CM

## 2018-10-10 DIAGNOSIS — R73.03 PREDIABETES: ICD-10-CM

## 2018-10-10 DIAGNOSIS — E78.5 HYPERLIPIDEMIA, UNSPECIFIED HYPERLIPIDEMIA TYPE: Primary | ICD-10-CM

## 2018-10-10 PROCEDURE — 3008F BODY MASS INDEX DOCD: CPT | Mod: CPTII,S$GLB,, | Performed by: FAMILY MEDICINE

## 2018-10-10 PROCEDURE — 3074F SYST BP LT 130 MM HG: CPT | Mod: CPTII,S$GLB,, | Performed by: FAMILY MEDICINE

## 2018-10-10 PROCEDURE — 3078F DIAST BP <80 MM HG: CPT | Mod: CPTII,S$GLB,, | Performed by: FAMILY MEDICINE

## 2018-10-10 PROCEDURE — 99214 OFFICE O/P EST MOD 30 MIN: CPT | Mod: S$GLB,,, | Performed by: FAMILY MEDICINE

## 2018-10-10 RX ORDER — AMOXICILLIN 875 MG/1
875 TABLET, FILM COATED ORAL EVERY 12 HOURS
Qty: 10 TABLET | Refills: 0 | Status: SHIPPED | OUTPATIENT
Start: 2018-10-10 | End: 2019-03-22

## 2018-10-10 RX ORDER — ONDANSETRON 4 MG/1
4 TABLET, ORALLY DISINTEGRATING ORAL EVERY 8 HOURS PRN
Qty: 24 TABLET | Refills: 0 | Status: SHIPPED | OUTPATIENT
Start: 2018-10-10 | End: 2020-06-10 | Stop reason: CLARIF

## 2018-10-10 RX ORDER — ROSUVASTATIN CALCIUM 10 MG/1
10 TABLET, COATED ORAL DAILY
Qty: 90 TABLET | Refills: 3 | Status: SHIPPED | OUTPATIENT
Start: 2018-10-10 | End: 2019-09-10

## 2018-10-10 RX ORDER — LOSARTAN POTASSIUM 50 MG/1
50 TABLET ORAL DAILY
OUTPATIENT
Start: 2018-10-10

## 2018-10-10 NOTE — PROGRESS NOTES
Subjective:       Patient ID: Carmen Son is a 58 y.o. female.    Chief Complaint:   Chief Complaint   Patient presents with    Eye Problem     x's 1 day mucus in eyes       Patient presents for multiple problems as follows:    Prediabetes:  A1C is 6.1.  Patient controls this with diet and exercise.  She reports that this is stable when compared to levels by her previous PCP    Hyperlipidemia:  LDL is greater than 190.  Medication is recommended.        Eye Problem    Both eyes are affected.This is a new problem. The current episode started yesterday. The problem occurs constantly. The problem has been gradually improving (Started amoxicillin yesterday. ). Associated symptoms include an eye discharge, eye redness and itching. Pertinent negatives include no nausea, photophobia, vomiting or weakness. The treatment provided significant relief.       Lab Visit on 10/09/2018   Component Date Value Ref Range Status    WBC 10/09/2018 3.47* 3.90 - 12.70 K/uL Final    RBC 10/09/2018 5.20  4.00 - 5.40 M/uL Final    Hemoglobin 10/09/2018 12.9  12.0 - 16.0 g/dL Final    Hematocrit 10/09/2018 41.8  37.0 - 48.5 % Final    MCV 10/09/2018 80* 82 - 98 fL Final    MCH 10/09/2018 24.8* 27.0 - 31.0 pg Final    MCHC 10/09/2018 30.9* 32.0 - 36.0 g/dL Final    RDW 10/09/2018 14.6* 11.5 - 14.5 % Final    Platelets 10/09/2018 267  150 - 350 K/uL Final    MPV 10/09/2018 11.0  9.2 - 12.9 fL Final    Gran # (ANC) 10/09/2018 1.5* 1.8 - 7.7 K/uL Final    Lymph # 10/09/2018 1.7  1.0 - 4.8 K/uL Final    Mono # 10/09/2018 0.2* 0.3 - 1.0 K/uL Final    Eos # 10/09/2018 0.1  0.0 - 0.5 K/uL Final    Baso # 10/09/2018 0.01  0.00 - 0.20 K/uL Final    Gran% 10/09/2018 44.4  38.0 - 73.0 % Final    Lymph% 10/09/2018 48.7* 18.0 - 48.0 % Final    Mono% 10/09/2018 4.3  4.0 - 15.0 % Final    Eosinophil% 10/09/2018 2.3  0.0 - 8.0 % Final    Basophil% 10/09/2018 0.3  0.0 - 1.9 % Final    Differential Method 10/09/2018 Automated    Final    Sodium 10/09/2018 139  136 - 145 mmol/L Final    Potassium 10/09/2018 4.2  3.5 - 5.1 mmol/L Final    Chloride 10/09/2018 101  95 - 110 mmol/L Final    CO2 10/09/2018 32* 23 - 29 mmol/L Final    Glucose 10/09/2018 120* 70 - 110 mg/dL Final    BUN, Bld 10/09/2018 16  7 - 17 mg/dL Final    Creatinine 10/09/2018 1.14  0.50 - 1.40 mg/dL Final    Calcium 10/09/2018 9.6  8.7 - 10.5 mg/dL Final    Total Protein 10/09/2018 8.0  6.0 - 8.4 g/dL Final    Albumin 10/09/2018 4.1  3.5 - 5.2 g/dL Final    Total Bilirubin 10/09/2018 0.8  0.1 - 1.0 mg/dL Final    Comment: For infants and newborns, interpretation of results should be based  on gestational age, weight and in agreement with clinical  observations.  Premature Infant recommended reference ranges:  Up to 24 hours.............<8.0 mg/dL  Up to 48 hours............<12.0 mg/dL  3-5 days..................<15.0 mg/dL  6-29 days.................<15.0 mg/dL      Alkaline Phosphatase 10/09/2018 111  38 - 126 U/L Final    AST 10/09/2018 24  15 - 46 U/L Final    ALT 10/09/2018 18  10 - 44 U/L Final    Anion Gap 10/09/2018 6* 8 - 16 mmol/L Final    eGFR if African American 10/09/2018 >60.0  >60 mL/min/1.73 m^2 Final    eGFR if non African American 10/09/2018 53.9* >60 mL/min/1.73 m^2 Final    Comment: Calculation used to obtain the estimated glomerular filtration  rate (eGFR) is the CKD-EPI equation.       Cholesterol 10/09/2018 283* 120 - 199 mg/dL Final    Comment: The National Cholesterol Education Program (NCEP) has set the  following guidelines (reference ranges) for Cholesterol:  Optimal.....................<200 mg/dL  Borderline High.............200-239 mg/dL  High........................> or = 240 mg/dL      Triglycerides 10/09/2018 163* 30 - 150 mg/dL Final    Comment: The National Cholesterol Education Program (NCEP) has set the  following guidelines (reference values) for triglycerides:  Normal......................<150 mg/dL  Borderline  High.............150-199 mg/dL  High........................200-499 mg/dL      HDL 10/09/2018 57  40 - 75 mg/dL Final    Comment: The National Cholesterol Education Program (NCEP) has set the  following guidelines (reference values) for HDL Cholesterol:  Low...............<40 mg/dL  Optimal...........>60 mg/dL      LDL Cholesterol 10/09/2018 193.4* 63.0 - 159.0 mg/dL Final    Comment: The National Cholesterol Education Program (NCEP) has set the  following guidelines (reference values) for LDL Cholesterol:  Optimal.......................<130 mg/dL  Borderline High...............130-159 mg/dL  High..........................160-189 mg/dL  Very High.....................>190 mg/dL      HDL/Chol Ratio 10/09/2018 20.1  20.0 - 50.0 % Final    Total Cholesterol/HDL Ratio 10/09/2018 5.0  2.0 - 5.0 Final    Non-HDL Cholesterol 10/09/2018 226  mg/dL Final    Comment: Risk category and Non-HDL cholesterol goals:  Coronary heart disease (CHD)or equivalent (10-year risk of CHD >20%):  Non-HDL cholesterol goal     <130 mg/dL  Two or more CHD risk factors and 10-year risk of CHD <= 20%:  Non-HDL cholesterol goal     <160 mg/dL  0 to 1 CHD risk factor:  Non-HDL cholesterol goal     <190 mg/dL      Hemoglobin A1C 10/09/2018 6.1* 4.0 - 5.6 % Final    Comment: ADA Screening Guidelines:  5.7-6.4%  Consistent with prediabetes  >or=6.5%  Consistent with diabetes  High levels of fetal hemoglobin interfere with the HbA1C  assay. Heterozygous hemoglobin variants (HbS, HgC, etc)do  not significantly interfere with this assay.   However, presence of multiple variants may affect accuracy.      Estimated Avg Glucose 10/09/2018 128  68 - 131 mg/dL Final    TSH 10/09/2018 0.775  0.400 - 4.000 uIU/mL Final    Comment: Warning:  Heterophilic antibodies in serum or plasma of   certain individuals are known to cause interference with   immunoassays. These antibodies may be present in blood samples   from individuals regularly exposed to animal  or who have been   treated with animal products.  Patients taking high doses of supplemental biotin may have  negatively biased results.      Office Visit on 10/04/2018   Component Date Value Ref Range Status    Color, UA 10/04/2018 Tigist   Final    Spec Grav UA 10/04/2018 1.030   Final    pH, UA 10/04/2018 5   Final    WBC, UA 10/04/2018 +   Final    Nitrite, UA 10/04/2018 neg   Final    Protein 10/04/2018 trace   Final    Glucose, UA 10/04/2018 normal   Final    Ketones, UA 10/04/2018 neg   Final    Urobilinogen, UA 10/04/2018 normal   Final    Bilirubin 10/04/2018 ++   Final    Blood, UA 10/04/2018 neg   Final       Review of Systems   Constitutional: Negative for activity change, appetite change, chills and fatigue.   HENT: Negative for congestion, ear discharge, ear pain, rhinorrhea, sinus pain, sore throat and trouble swallowing.    Eyes: Positive for discharge, redness and itching. Negative for photophobia, pain and visual disturbance.   Respiratory: Negative for cough, chest tightness, shortness of breath and wheezing.    Cardiovascular: Negative for chest pain, palpitations and leg swelling.   Gastrointestinal: Negative for abdominal distention, abdominal pain, blood in stool, diarrhea, nausea and vomiting.   Genitourinary: Negative for dysuria, pelvic pain, vaginal bleeding, vaginal discharge and vaginal pain.   Musculoskeletal: Negative for arthralgias, back pain, gait problem and neck pain.   Skin: Negative for color change, pallor and rash.   Neurological: Negative for dizziness, tremors, weakness, light-headedness, numbness and headaches.   Psychiatric/Behavioral: Negative for agitation, behavioral problems, confusion and sleep disturbance.       Past Medical History:   Diagnosis Date    Diabetes mellitus     Diet controlled gestational diabetes mellitus in puerperium     History of seasonal allergies      Social History     Socioeconomic History    Marital status:      Spouse  "name: Not on file    Number of children: Not on file    Years of education: Not on file    Highest education level: Not on file   Social Needs    Financial resource strain: Not on file    Food insecurity - worry: Not on file    Food insecurity - inability: Not on file    Transportation needs - medical: Not on file    Transportation needs - non-medical: Not on file   Occupational History    Not on file   Tobacco Use    Smoking status: Never Smoker    Smokeless tobacco: Never Used   Substance and Sexual Activity    Alcohol use: No    Drug use: No    Sexual activity: Yes     Partners: Male     Birth control/protection: Surgical   Other Topics Concern    Not on file   Social History Narrative    Not on file       Objective:     /78 (BP Location: Right arm, Patient Position: Sitting)   Pulse 76   Temp 97.8 °F (36.6 °C) (Oral)   Ht 5' 3" (1.6 m)   Wt 96.4 kg (212 lb 8.4 oz)   LMP 04/07/2013   SpO2 96%   BMI 37.65 kg/m²     Physical Exam   Constitutional: She appears well-developed and well-nourished.   HENT:   Head: Normocephalic.   Eyes: Conjunctivae, EOM and lids are normal.   Neck: Normal range of motion. Neck supple.   Cardiovascular: Normal rate, regular rhythm and normal heart sounds.   Pulmonary/Chest: Effort normal and breath sounds normal.   Neurological: She is alert.   Skin: Skin is warm and dry.   Psychiatric: Her behavior is normal.       Assessment:       Hyperlipidemia, unspecified hyperlipidemia type  -     rosuvastatin (CRESTOR) 10 MG tablet; Take 1 tablet (10 mg total) by mouth once daily.  Dispense: 90 tablet; Refill: 3  -     Comprehensive metabolic panel; Future; Expected date: 10/10/2018  -     Lipid panel; Future; Expected date: 10/10/2018    Sinusitis, unspecified chronicity, unspecified location  -     amoxicillin (AMOXIL) 875 MG tablet; Take 1 tablet (875 mg total) by mouth every 12 (twelve) hours.  Dispense: 10 tablet; Refill: 0    Prediabetes  -     Hemoglobin A1c; " Future; Expected date: 10/10/2018    Other orders  -     ondansetron (ZOFRAN-ODT) 4 MG TbDL; Take 1 tablet (4 mg total) by mouth every 8 (eight) hours as needed.  Dispense: 24 tablet; Refill: 0

## 2018-10-11 RX ORDER — LOSARTAN POTASSIUM 50 MG/1
50 TABLET ORAL DAILY
Qty: 90 TABLET | Refills: 3 | Status: SHIPPED | OUTPATIENT
Start: 2018-10-11 | End: 2019-09-20 | Stop reason: SDUPTHER

## 2018-10-11 NOTE — TELEPHONE ENCOUNTER
----- Message from Shanda Chirinos sent at 10/11/2018 10:02 AM CDT -----  Contact: Self [phone]  Patient would like a refill for losartan (COZAAR) 50 MG tablet sent to [facility], [place] - [address] [facility]. Patient states she still has not gotten the medication and would like the message sent on high alert.

## 2018-10-15 ENCOUNTER — TELEPHONE (OUTPATIENT)
Dept: NEUROSURGERY | Facility: CLINIC | Age: 58
End: 2018-10-15

## 2018-10-15 RX ORDER — HYDROCODONE BITARTRATE AND ACETAMINOPHEN 5; 325 MG/1; MG/1
1 TABLET ORAL EVERY 6 HOURS PRN
Qty: 28 TABLET | Refills: 0 | Status: SHIPPED | OUTPATIENT
Start: 2018-10-15 | End: 2018-11-01 | Stop reason: SDUPTHER

## 2018-10-15 NOTE — TELEPHONE ENCOUNTER
----- Message from Ramona Cintron MA sent at 10/15/2018  2:14 PM CDT -----  Contact: Pt. [phone]   Your friend wants a refill can she receive it   ----- Message -----  From: Susie Palmer  Sent: 10/15/2018  12:58 PM  To: Corrie STEINER Staff    Rx Refill/Request    Refill Rx:      Rx Name and Strength:  HYDROcodone-acetaminophen (NORCO) 5-325 mg     Preferred Pharmacy with phone number:   She'll pick the hard copy      Communication Preference:PHONE     Additional Information:

## 2018-10-18 ENCOUNTER — HOSPITAL ENCOUNTER (EMERGENCY)
Facility: HOSPITAL | Age: 58
Discharge: HOME | End: 2018-10-18
Attending: SURGERY
Payer: COMMERCIAL

## 2018-10-18 VITALS
WEIGHT: 211 LBS | TEMPERATURE: 98 F | OXYGEN SATURATION: 98 % | BODY MASS INDEX: 37.38 KG/M2 | RESPIRATION RATE: 15 BRPM | DIASTOLIC BLOOD PRESSURE: 81 MMHG | SYSTOLIC BLOOD PRESSURE: 139 MMHG | HEART RATE: 82 BPM

## 2018-10-18 DIAGNOSIS — M26.622 ARTHRALGIA OF LEFT TEMPOROMANDIBULAR JOINT: Primary | ICD-10-CM

## 2018-10-18 PROCEDURE — 96372 THER/PROPH/DIAG INJ SC/IM: CPT

## 2018-10-18 PROCEDURE — 99284 EMERGENCY DEPT VISIT MOD MDM: CPT | Mod: 25

## 2018-10-18 PROCEDURE — 63600175 PHARM REV CODE 636 W HCPCS: Performed by: PHYSICIAN ASSISTANT

## 2018-10-18 RX ORDER — KETOROLAC TROMETHAMINE 30 MG/ML
15 INJECTION, SOLUTION INTRAMUSCULAR; INTRAVENOUS
Status: COMPLETED | OUTPATIENT
Start: 2018-10-18 | End: 2018-10-18

## 2018-10-18 RX ADMIN — KETOROLAC TROMETHAMINE 15 MG: 30 INJECTION, SOLUTION INTRAMUSCULAR at 04:10

## 2018-10-18 NOTE — ED PROVIDER NOTES
Encounter Date: 10/18/2018       History     Chief Complaint   Patient presents with    Otalgia     Left ear pain since a week but last night it got worse     58-year-old female presents for evaluation of 1 month history of right ear pain. She reports that symptoms have been constant for approximately 1 month but last night seemed to get mildly worse.  She reports that she was evaluated by her ENT who diagnosed her with TMJ.  She reports that over the last 1 month she has worn a cervical collar after cervical fusion.  She reports that the collar seemed to help her neck pain and she has to wear it until until cleared by the orthopedist.  She reports that she was evaluated 1 week ago by her ENT and was diagnosed with left-sided TMJ.  She wanted to make sure that nothing was wrong with her your as the TMJ pain seemed to radiate to her ear.She has been taking her Percocet and Norco as prescribed for neck pain, last dose 3 o'clock this afternoon.  She reports that the pain sometimes radiates from her jaw to her cheeks and the sides of her head intermittently.  She denies any swelling, headache, dizziness, vision changes, fever, chills chest pain, shortness of breath or cough.          Review of patient's allergies indicates:  No Known Allergies  Past Medical History:   Diagnosis Date    Diabetes mellitus     Diabetes mellitus, type 2     Diet controlled gestational diabetes mellitus in puerperium     History of seasonal allergies     Hypertension      Past Surgical History:   Procedure Laterality Date    ANTERIOR CERVICAL DISCECTOMY W/ FUSION N/A 9/17/2018    Procedure: DISCECTOMY, SPINE, CERVICAL, ANTERIOR APPROACH, WITH FUSION C3-4, C4-5;  Surgeon: Leo Denton MD;  Location: [facility] OR [address] [place] [address];  Service: Neurosurgery;  Laterality: N/A;  toronto II, asa 2, regular bed, supine, josefina    DISCECTOMY, SPINE, CERVICAL, ANTERIOR APPROACH, WITH FUSION C3-4, C4-5 N/A 9/17/2018    Performed by Leo Denton MD at [facility] OR  2ND FLR    HYSTERECTOMY, TOTAL, ABDOMINAL N/A 4/23/2013    [facility] by [facility] at [facility] OR    INJECTION-STEROID-EPIDURAL-CAUDAL N/A 5/8/2018    Performed by Lani Toledo [facility] at [facility] PAIN MGT    right knee scope      SALPINGECTOMY Right 4/23/2013    [facility] by [facility] at [facility] OR    SALPINGO-OOPHORECTOMY Left 4/23/2013    Performed by [facility] at [facility] OR     Family History   Problem Relation Age of Onset    Diabetes type II Sister     Hyperlipidemia Sister     Diabetes type II Other      Social History     Tobacco Use    Smoking status: Never Smoker    Smokeless tobacco: Never Used   Substance Use Topics    Alcohol use: No    Drug use: No     Review of Systems   Constitutional: Negative for activity change, appetite change and fever.   HENT: Positive for ear pain. Negative for congestion, dental problem, ear discharge, facial swelling, postnasal drip, sinus pressure, sinus pain, sore throat, trouble swallowing and voice change.    Eyes: Negative for photophobia and visual disturbance.   Respiratory: Negative for cough and shortness of breath.    Cardiovascular: Negative for chest pain.   Gastrointestinal: Negative for abdominal pain, diarrhea, nausea and vomiting.   Genitourinary: Negative for decreased urine volume.   Musculoskeletal: Negative for back pain and neck pain.   Skin: Negative for rash.   Neurological: Negative for dizziness, syncope, weakness, light-headedness, numbness and headaches.       Physical Exam     Initial Vitals [10/18/18 1547]   BP Pulse Resp Temp SpO2   139/81 82 15 98.1 °F (36.7 °C) 98 %      MAP       --         Physical Exam    Nursing note and vitals reviewed.  Constitutional: She appears well-developed and well-nourished. She is not diaphoretic. No distress.   HENT:   Head: Normocephalic and atraumatic.       Right Ear: Tympanic membrane, external ear and ear canal normal. Tympanic membrane is not bulging.   Left Ear:  Tympanic membrane, external ear and ear canal normal. Tympanic membrane is not bulging.   Nose: Nose normal.   Mouth/Throat: Oropharynx is clear and moist.   Eyes: Conjunctivae and EOM are normal. Pupils are equal, round, and reactive to light.   Neck: Normal range of motion. Neck supple.   Cardiovascular: Normal rate, regular rhythm and normal heart sounds.   Pulmonary/Chest: Breath sounds normal. No respiratory distress. She has no wheezes. She has no rhonchi. She has no rales. She exhibits no tenderness.   Abdominal: Soft. Bowel sounds are normal. She exhibits no distension. There is no tenderness. There is no rebound.   Lymphadenopathy:     She has no cervical adenopathy.   Neurological: She is alert and oriented to person, place, and time. No cranial nerve deficit.   Skin: Skin is warm and dry.   Psychiatric: She has a normal mood and affect.         ED Course   Procedures  Labs Reviewed - No data to display       Imaging Results    None          Medical Decision Making:   Initial Assessment:   58-year-old female presents for evaluation of Devan J and left-sided otalgia.  Physical exam reveals a nontoxic-appearing female in no acute distress. Patient is afebrile vital signs within normal limits.  Neurological exam reveals an alert and oriented patient.  TMs reveal no erythema or bulging noted.  No tenderness to palpation noted over the pinna, tragus or mastoid bilaterally.Tenderness to palpation noted over the left TMJ.  No erythema, edema or ecchymosis noted. No bony instability or crepitus noted. No trismus.  The posterior pharynx reveals erythema, edema or tonsillar exudate.  No uvular edema or deviation noted. Lungs clear to auscultation bilaterally.  No respiratory distress or accessory muscle use noted.  Differential Diagnosis:   TMJ pain/arthralgia  I carefully considered but doubt serious etiology including otitis media, otitis externa or mastoiditis.    ED Management:  Patient declined UPT stating she  is not pregnant.  Patient given Toradol for pain control.  Discussed these findings at length with the patient verbalizes understanding and agreement course of treatment.  Instructed the patient to follow up with her ENT for re-evaluation within 3 days.  Instructed the patient to return to the emergency department immediately for any new or worsening symptoms.                      Clinical Impression:   The encounter diagnosis was Arthralgia of left temporomandibular joint.                             Emely Cash PA-C  10/18/18 3194

## 2018-10-18 NOTE — DISCHARGE INSTRUCTIONS
You are instructed to follow up with your ENT for re-evaluation within 1 week.  You are instructed to return to the emergency department immediately for any new or worsening symptoms.

## 2018-10-22 ENCOUNTER — TELEPHONE (OUTPATIENT)
Dept: NEUROSURGERY | Facility: CLINIC | Age: 58
End: 2018-10-22

## 2018-10-22 NOTE — TELEPHONE ENCOUNTER
----- Message from Ramona Cintron MA sent at 10/22/2018 11:51 AM CDT -----      ----- Message -----  From: Jin Lee  Sent: 10/22/2018  11:39 AM  To: Corrie STEINER Staff    Patient Returning Call from [facility]    Who Left Message for Patient: Herlinda  Communication Preference: [phone]   Additional Information:

## 2018-10-22 NOTE — TELEPHONE ENCOUNTER
Patient complained of pain in  The back of her neck when she lays down at night. I advised that is a common complaint and a normal part of healing

## 2018-10-22 NOTE — TELEPHONE ENCOUNTER
----- Message from Ramona Cintron MA sent at 10/22/2018 11:28 AM CDT -----  Contact: Patient @ [phone]      ----- Message -----  From: Paulino Goyal  Sent: 10/22/2018  11:18 AM  To: Corrie STEINER Staff    Patient is requesting a return call from Herlinda, she didn't get into details,  pls call

## 2018-10-26 ENCOUNTER — TELEPHONE (OUTPATIENT)
Dept: NEUROSURGERY | Facility: CLINIC | Age: 58
End: 2018-10-26

## 2018-10-26 NOTE — TELEPHONE ENCOUNTER
Patient is concerned about some intermittent tingling she still feels. I advised that this is normal healing

## 2018-10-26 NOTE — TELEPHONE ENCOUNTER
----- Message from Susie Palmer sent at 10/26/2018  1:05 PM CDT -----  Contact: Pt. [phone]  Needs Advice    Reason for call: The patient is requesting a call back but refused to provide any information regarding her call. Please contact the patient to discuss further.          Communication Preference:PHONE     Additional Information:

## 2018-10-29 ENCOUNTER — TELEPHONE (OUTPATIENT)
Dept: NEUROSURGERY | Facility: CLINIC | Age: 58
End: 2018-10-29

## 2018-10-29 NOTE — TELEPHONE ENCOUNTER
----- Message from Cuauhtemoc Morataya sent at 10/29/2018  2:15 PM CDT -----  Contact: Pt  Pt would like to be called back asap. Pt would like to discuss further.    Pt can be reached at [phone].    Thanks

## 2018-10-29 NOTE — TELEPHONE ENCOUNTER
Patient wanted to know if she could get her shampoo'd in a shampoo bowl at a salon. I advised against

## 2018-10-30 ENCOUNTER — TELEPHONE (OUTPATIENT)
Dept: NEUROSURGERY | Facility: CLINIC | Age: 58
End: 2018-10-30

## 2018-10-30 NOTE — TELEPHONE ENCOUNTER
Patient has c/o back pain and asked for another rx. RX of norco and zofran will be at the  for her to

## 2018-10-30 NOTE — TELEPHONE ENCOUNTER
----- Message from Ramona Cintron MA sent at 10/30/2018  1:27 PM CDT -----  Contact: Patient      ----- Message -----  From: Kavitha Bonilla  Sent: 10/30/2018   1:14 PM  To: Corrie STEINER Staff    Patient would like aaron to call her back.     Callback: [phone]

## 2018-11-01 ENCOUNTER — TELEPHONE (OUTPATIENT)
Dept: NEUROSURGERY | Facility: CLINIC | Age: 58
End: 2018-11-01

## 2018-11-01 RX ORDER — HYDROCODONE BITARTRATE AND ACETAMINOPHEN 5; 325 MG/1; MG/1
1 TABLET ORAL EVERY 6 HOURS PRN
Qty: 28 TABLET | Refills: 0 | OUTPATIENT
Start: 2018-11-01 | End: 2019-02-20

## 2018-11-01 RX ORDER — ONDANSETRON 4 MG/1
4 TABLET, ORALLY DISINTEGRATING ORAL EVERY 8 HOURS PRN
Qty: 20 TABLET | Refills: 0 | Status: SHIPPED | OUTPATIENT
Start: 2018-11-01 | End: 2019-03-22 | Stop reason: SDUPTHER

## 2018-11-01 NOTE — TELEPHONE ENCOUNTER
----- Message from Ramona Cintron MA sent at 11/1/2018 11:32 AM CDT -----  Contact: Patient @ [phone]      ----- Message -----  From: Paulino Goyal  Sent: 11/1/2018  10:57 AM  To: Corrie STEINER Staff    Patient is requesting a return call from Herlinda she didn't get into details, pls call

## 2018-11-14 ENCOUNTER — OFFICE VISIT (OUTPATIENT)
Dept: OTOLARYNGOLOGY | Facility: CLINIC | Age: 58
End: 2018-11-14
Payer: COMMERCIAL

## 2018-11-14 ENCOUNTER — CLINICAL SUPPORT (OUTPATIENT)
Dept: AUDIOLOGY | Facility: CLINIC | Age: 58
End: 2018-11-14
Payer: COMMERCIAL

## 2018-11-14 VITALS — DIASTOLIC BLOOD PRESSURE: 88 MMHG | TEMPERATURE: 98 F | HEART RATE: 62 BPM | SYSTOLIC BLOOD PRESSURE: 135 MMHG

## 2018-11-14 DIAGNOSIS — R42 DIZZINESS: ICD-10-CM

## 2018-11-14 DIAGNOSIS — K08.109 MISSING TEETH, ACQUIRED: ICD-10-CM

## 2018-11-14 DIAGNOSIS — H81.319 AUDITORY VERTIGO, UNSPECIFIED LATERALITY: Primary | ICD-10-CM

## 2018-11-14 DIAGNOSIS — H93.8X9 BLOCKED EAR, UNSPECIFIED LATERALITY: ICD-10-CM

## 2018-11-14 DIAGNOSIS — J34.3 HYPERTROPHY OF BOTH INFERIOR NASAL TURBINATES: ICD-10-CM

## 2018-11-14 DIAGNOSIS — Z87.19 HISTORY OF GASTROESOPHAGEAL REFLUX (GERD): ICD-10-CM

## 2018-11-14 DIAGNOSIS — Z98.890 H/O CERVICAL SPINE SURGERY: Primary | ICD-10-CM

## 2018-11-14 DIAGNOSIS — Z91.09 HISTORY OF ENVIRONMENTAL ALLERGIES: ICD-10-CM

## 2018-11-14 DIAGNOSIS — R20.8 NASAL BURNING: ICD-10-CM

## 2018-11-14 PROCEDURE — 69210 REMOVE IMPACTED EAR WAX UNI: CPT | Mod: S$GLB,,, | Performed by: OTOLARYNGOLOGY

## 2018-11-14 PROCEDURE — 99203 OFFICE O/P NEW LOW 30 MIN: CPT | Mod: 25,S$GLB,, | Performed by: OTOLARYNGOLOGY

## 2018-11-14 PROCEDURE — 3075F SYST BP GE 130 - 139MM HG: CPT | Mod: CPTII,S$GLB,, | Performed by: OTOLARYNGOLOGY

## 2018-11-14 PROCEDURE — 92557 COMPREHENSIVE HEARING TEST: CPT | Mod: S$GLB,,, | Performed by: AUDIOLOGIST

## 2018-11-14 PROCEDURE — 99999 PR PBB SHADOW E&M-EST. PATIENT-LVL III: CPT | Mod: PBBFAC,,, | Performed by: OTOLARYNGOLOGY

## 2018-11-14 PROCEDURE — 3079F DIAST BP 80-89 MM HG: CPT | Mod: CPTII,S$GLB,, | Performed by: OTOLARYNGOLOGY

## 2018-11-14 PROCEDURE — 99999 PR PBB SHADOW E&M-EST. PATIENT-LVL I: CPT | Mod: PBBFAC,,,

## 2018-11-14 PROCEDURE — 92567 TYMPANOMETRY: CPT | Mod: S$GLB,,, | Performed by: AUDIOLOGIST

## 2018-11-14 NOTE — LETTER
November 15, 2018      Deanna Conde, DO  [address] [address]           [facility] - Otorhinolaryngology  [address]  [facility] [address]  Phone: [phone]  Fax: [phone]          Patient: Carmen Son   MR Number: [id number]   YOB: 1960   Date of Visit: 11/14/2018       Dear Dr. Deanna Conde:    Thank you for referring Carmen Son to me for evaluation. Attached you will find relevant portions of my assessment and plan of care.    If you have questions, please do not hesitate to call me. I look forward to following Carmen Son along with you.    Sincerely,    Mehul Ashraf III, MD    Enclosure  CC:  No Recipients    If you would like to receive this communication electronically, please contact [email or web address] or [phone] to request more information on [employer] access.    For providers and/or their staff who would like to refer a patient to [facility], please contact us through our one-stop-shop provider referral line, [facility], at [phone].    If you feel you have received this communication in error or would no longer like to receive these types of communications, please e-mail [email or web address]

## 2018-11-14 NOTE — PROGRESS NOTES
Subjective:       Patient ID: Carmen Son is a 58 y.o. female.    Chief Complaint: Cerumen Impaction (burnind around the nose, )    HPI: Ms. Son is a 58 year old AAF who evidently did not complete a medical questionnaire or review of systems questionnaire today.  She is wearing a cervical collar.  Her chief complaint is a burning sensation around her nose and under her eyes; she gets some relief by applying pressure to her upper cheeks.    She indicates use of Flonase for environmental allergies.  The Flonase may be burning her nose.   She used to take allergy injections given to her by her previous ENT Doctor Yesy who recently retired.  She is looking for another allergist.  She also indicates some ear issues.  She was evaluated in the ED 09/24/2018 for dizziness of 1 week duration; she was 1 week postop from ACDF surgery.    She was diagnosed with dizziness and non intractable episodic headache as well as sinus pressure.  She indicates some tingling in her neck.  She is status post anterior cervical fusion procedure performed by Dr. Denton 09/17/2018, C3-4 and C 4-5.  She will visit him next week.  She was also evaluated in the ED 10/18/2018 for arthralgia of the left TMJ; her ENT physician had diagnosed her with a TMJ condition affecting the left joint.  This diagnosis was later called into question by her dentist.    A CT of the head without contrast was completed 09/26/2018.  There was no evidence of acute intracranial hemorrhage or abnormal extra-axial fluid collection.  The visualized paranasal sinuses and mastoid air cells were well aerated.    She indicates the possibility of medication effects/side effects with regard to her dizziness symptoms. Her dizziness symptoms began 1 week after surgery and improved/subsided over time; she was prescribed meclizine initially.  She indicates dizziness symptoms prior to her surgery.    She indicates GERD symptoms.  She tries to avoid spicy foods such  as cocktail sauce which causes burning and irritation of her throat and, possibly, her nasal/nasopharyngeal  tissues.      Past Medical History:   Diagnosis Date    Diabetes mellitus     Diabetes mellitus, type 2     Diet controlled gestational diabetes mellitus in puerperium     History of seasonal allergies     Hypertension      Current Outpatient Medications on File Prior to Visit   Medication Sig Dispense Refill    amoxicillin (AMOXIL) 875 MG tablet Take 1 tablet (875 mg total) by mouth every 12 (twelve) hours. 10 tablet 0    aspirin 81 MG Chew Take 1 tablet (81 mg total) by mouth once daily. May resume in 2 weeks post op  0    cetirizine (ZYRTEC) 10 MG tablet Take 10 mg by mouth once daily.   0    fluticasone (FLONASE) 50 mcg/actuation nasal spray 1 spray (50 mcg total) by Each Nare route 2 (two) times daily as needed for Rhinitis. 15 g 0    furosemide (LASIX) 20 MG tablet Take 20 mg by mouth daily as needed.       HYDROcodone-acetaminophen (NORCO) 5-325 mg per tablet Take 1 tablet by mouth every 6 (six) hours as needed for Pain. 28 tablet 0    HYDROcodone-acetaminophen (NORCO) 5-325 mg per tablet Take 1 tablet by mouth every 6 hours as needed for pain. 28 tablet 0    losartan (COZAAR) 50 MG tablet Take 1 tablet (50 mg total) by mouth once daily. 90 tablet 3    omeprazole (PRILOSEC) 40 MG capsule Take 40 mg by mouth once daily.      ondansetron (ZOFRAN-ODT) 4 MG TbDL Take 1 tablet (4 mg total) by mouth every 8 (eight) hours as needed. 24 tablet 0    ondansetron (ZOFRAN-ODT) 4 MG TbDL Take 1 tablet (4 mg total) by mouth every 8 (eight) hours as needed (nausea). 20 tablet 0    ondansetron (ZOFRAN-ODT) 4 MG TbDL Dissolve 1 tablet under the tongue every 8 hours as needed for nausea. 20 tablet 0    OPW TEST CLAIM - DO NOT FILL Take by mouth. OPW test claim. Do not fill. 30 tablet 0    oxyCODONE-acetaminophen (PERCOCET) 5-325 mg per tablet Take 1 tablet by mouth every 6 (six) hours as needed for  Pain. 60 tablet 0    potassium chloride (KLOR-CON) 20 mEq Pack Take 20 mEq by mouth once.      propranolol (INDERAL) 80 MG tablet Take 160 mg by mouth once daily.       rosuvastatin (CRESTOR) 10 MG tablet Take 1 tablet (10 mg total) by mouth once daily. 90 tablet 3     No current facility-administered medications on file prior to visit.      Past Surgical History:   Procedure Laterality Date    ANTERIOR CERVICAL DISCECTOMY W/ FUSION N/A 9/17/2018    Procedure: DISCECTOMY, SPINE, CERVICAL, ANTERIOR APPROACH, WITH FUSION C3-4, C4-5;  Surgeon: Leo Denton MD;  Location: [facility] OR [address] [place] [address];  Service: Neurosurgery;  Laterality: N/A;  toronto II, asa 2, regular bed, supine, josefina    DISCECTOMY, SPINE, CERVICAL, ANTERIOR APPROACH, WITH FUSION C3-4, C4-5 N/A 9/17/2018    [facility]    HYSTERECTOMY, TOTAL, ABDOMINAL N/A 4/23/2013    [facility]    INJECTION-STEROID-EPIDURAL-CAUDAL N/A 5/8/2018    Per[facility] by [facility] at [facility] PAIN MGT    right knee scope      SALPINGECTOMY Right 4/23/2013    [facility] at [facility] OR    SALPINGO-OOPHORECTOMY Left 4/23/2013    Performed by [facility]go MD at [facility] OR       Review of Systems     Other:  Negative for rash.         The patient completed an audiometric study performed by the [facility] audiology service after her right ear was cleaned.  Study is duplicated below and the results are reviewed with her in detail  Objective:         Blood pressure 135/88 pulse 62 temperature 97.5° height 5 ft 3 in weight 211 lb  General:  Alert and oriented lady in no acute distress; she is wearing a cervical collar; she has slight proptosis.  Physical Exam   Constitutional: She is oriented to person, place, and time. She appears well-developed and well-nourished.   HENT:   Head: Normocephalic.       Right Ear: Tympanic membrane and external ear normal. No drainage. No foreign bodies. No  mastoid tenderness. Tympanic membrane is not perforated. No decreased hearing is noted.   Left Ear: Tympanic membrane and external ear normal. No drainage. No foreign bodies. No mastoid tenderness. Tympanic membrane is not perforated. No decreased hearing is noted.   Ears:    Nose: Nose normal. No nasal deformity, septal deviation or nasal septal hematoma. No epistaxis. Right sinus exhibits no maxillary sinus tenderness and no frontal sinus tenderness. Left sinus exhibits no maxillary sinus tenderness and no frontal sinus tenderness.       Mouth/Throat: Uvula is midline, oropharynx is clear and moist and mucous membranes are normal. No oral lesions. No trismus in the jaw. No uvula swelling. No oropharyngeal exudate or tonsillar abscesses.       Neck: Neck supple. No tracheal deviation present. No thyromegaly present.       Pulmonary/Chest: Effort normal. No stridor.   Lymphadenopathy:     She has no cervical adenopathy.   Neurological: She is alert and oriented to person, place, and time.   Skin: No rash noted.       Assessment:       1. H/O cervical spine surgery    2. Nasal burning    3. History of gastroesophageal reflux (GERD)    4. Blocked ear, unspecified laterality    5. Dizziness    6. Missing teeth, acquired    7. Hypertrophy of both inferior nasal turbinates    8. History of environmental allergies        Plan:     Cerumen impaction removed from AD eac  Audiometry reviewed  Anti GERD literature provided  Discontinue Flonase for now  Consider allergy testing/Rx with Dr. Lebron Banda or with allergist Dr. JOSE Awad ( [phone])  Consider VNG testing pending course ( if cleared by neurosurgeon) ; vertigo work-up literature provided

## 2018-11-14 NOTE — PATIENT INSTRUCTIONS
Cerumen impaction removed from AD eac  Audiometry reviewed  Anti GERD literature provided  Discontinue Flonase for now  Consider allergy testing/Rx with Dr. Lebron Banda ( [facility]) or with allergist Dr. JOSE Awad ( [phone])  Consider VNG testing pending course( if cleared by neurosurgeon) ; vertigo work-up literature provided

## 2018-11-20 ENCOUNTER — OFFICE VISIT (OUTPATIENT)
Dept: NEUROSURGERY | Facility: CLINIC | Age: 58
End: 2018-11-20
Payer: COMMERCIAL

## 2018-11-20 ENCOUNTER — HOSPITAL ENCOUNTER (OUTPATIENT)
Dept: RADIOLOGY | Facility: HOSPITAL | Age: 58
Discharge: HOME | End: 2018-11-20
Attending: NEUROLOGICAL SURGERY
Payer: COMMERCIAL

## 2018-11-20 DIAGNOSIS — M50.00 HNP (HERNIATED NUCLEUS PULPOSUS) WITH MYELOPATHY, CERVICAL: ICD-10-CM

## 2018-11-20 DIAGNOSIS — M48.02 CERVICAL STENOSIS OF SPINE: ICD-10-CM

## 2018-11-20 DIAGNOSIS — M50.90 CERVICAL DISC DISEASE: Primary | Chronic | ICD-10-CM

## 2018-11-20 DIAGNOSIS — M51.36 DDD (DEGENERATIVE DISC DISEASE), LUMBAR: ICD-10-CM

## 2018-11-20 DIAGNOSIS — M50.10 CERVICAL DISC DISORDER WITH RADICULOPATHY: ICD-10-CM

## 2018-11-20 PROCEDURE — 72040 X-RAY EXAM NECK SPINE 2-3 VW: CPT | Mod: TC

## 2018-11-20 PROCEDURE — 99999 PR PBB SHADOW E&M-EST. PATIENT-LVL II: CPT | Mod: PBBFAC,,, | Performed by: NEUROLOGICAL SURGERY

## 2018-11-20 PROCEDURE — 99024 POSTOP FOLLOW-UP VISIT: CPT | Mod: S$GLB,,, | Performed by: NEUROLOGICAL SURGERY

## 2018-11-20 PROCEDURE — 72040 X-RAY EXAM NECK SPINE 2-3 VW: CPT | Mod: 26,,, | Performed by: RADIOLOGY

## 2018-11-20 NOTE — PROGRESS NOTES
Subjective:    I, Sapna Acharya, attest that this documentation has been prepared under the direction and in the presence of ROCÍO Denton MD.     Patient ID: Carmen Son is a 58 y.o. female.    Chief Complaint: No chief complaint on file.    HPI   Pt is a 58 y.o. female s/p C3-4, C4-5 ACDF 9/17/2018. Pt states that she continues to endure intermittent bilateral upper extremity weakness. Pt also notes intermittent facial burning and tingling.     Review of Systems   Constitutional: Negative for chills, fatigue and fever.   HENT: Negative for sinus pressure and trouble swallowing.    Eyes: Negative.  Negative for visual disturbance.   Respiratory: Negative.    Cardiovascular: Negative.    Gastrointestinal: Negative.  Negative for nausea and vomiting.   Endocrine: Negative.    Genitourinary: Negative.    Musculoskeletal: Positive for neck pain.   Neurological: Negative for dizziness, seizures, syncope, speech difficulty, weakness and numbness.       Objective:      Physical Exam:  Nursing note and vitals reviewed.    Constitutional: She appears well-developed.     Eyes: Pupils are equal, round, and reactive to light. Conjunctivae and EOM are normal.     Cardiovascular: Normal rate, regular rhythm, normal pulses and intact distal pulses.     Abdominal: Soft.     Psych/Behavior: She is alert. She is oriented to person, place, and time. She has a normal mood and affect.     Musculoskeletal: Gait is normal.        Neck: Range of motion is full. There is no tenderness. Muscle strength is 5/5. Tone is normal.        Back: Range of motion is full. There is no tenderness. Muscle strength is 5/5. Tone is normal.        Right Upper Extremities: Range of motion is full. There is no tenderness. Muscle strength is 5/5. Tone is normal.        Left Upper Extremities: Range of motion is full. There is no tenderness. Muscle strength is 5/5. Tone is normal.       Right Lower Extremities: Range of motion is full. There is no  tenderness. Muscle strength is 5/5. Tone is normal.        Left Lower Extremities: Range of motion is full. There is no tenderness. Muscle strength is 5/5. Tone is normal.     Neurological:        Coordination: She has a normal Romberg Test, normal finger to nose coordination, normal heel to shin coordination and normal tandem walking coordination.        DTRs: DTRs are normal. Tricep reflexes are 2+ on the right side and 2+ on the left side. Bicep reflexes are 2+ on the right side and 2+ on the left side. Brachioradialis reflexes are 2+ on the right side and 2+ on the left side. Patellar reflexes are 2+ on the right side and 2+ on the left side. Achilles reflexes are 2+ on the right side and 2+ on the left side.        Cranial nerves: Cranial nerve(s) II, III, IV, V, VI, VII, VIII, IX, X, XI and XII are intact.       Pt overall done well since the operation. Still has some C6 radiculopathy. Overall, better than pre-op. Chasidy has some posterior neck pain, but has not impared her daily living.     Neck wound well healed.   Good ROM    Imaging:   X ray C spine shows that hardware is in good position. Good alignment.    ROCÍO SONI MD, personally reviewed the imaging and interpreted independent of the radiology report.    Assessment/Plan:   Pt s/p C3-4, C4-5 ACDF still has some disease, but pt did not want another ACDF. We will plan for epidural injection C5-6 and get her into PT for neck and back strengthening. We will check her back with CT scan in 6 months.     ROCÍO SONI MD, personally performed the services described in this documentation. All medical record entries made by the scribe, Sapna Acharya, were at my direction and in my presence.  I have reviewed the chart and agree that the record reflects my personal performance and is accurate and complete.

## 2018-11-27 ENCOUNTER — TELEPHONE (OUTPATIENT)
Dept: NEUROSURGERY | Facility: CLINIC | Age: 58
End: 2018-11-27

## 2018-11-27 DIAGNOSIS — M48.02 CERVICAL STENOSIS OF SPINE: ICD-10-CM

## 2018-11-27 DIAGNOSIS — M48.02 CERVICAL STENOSIS OF SPINE: Primary | ICD-10-CM

## 2018-11-27 DIAGNOSIS — M50.90 CERVICAL DISC DISEASE: Primary | ICD-10-CM

## 2018-11-27 DIAGNOSIS — M50.90 CERVICAL DISC DISEASE: ICD-10-CM

## 2018-11-27 NOTE — TELEPHONE ENCOUNTER
----- Message from Heydi Black MA sent at 11/27/2018 12:18 PM CST -----  Yes, as long as and order is placed.     Once that is done. Marce stevens will reach out to the patient.       ----- Message -----  From: Herlinda Castillo RN  Sent: 11/27/2018  11:27 AM  To: Heydi Black MA    Can we just set up the procedure?  ----- Message -----  From: Heydi Black MA  Sent: 11/27/2018  10:08 AM  To: Herlinda Castillo RN    Is this for the procedure or in office consult?       ----- Message -----  From: Herlinda Castillo RN  Sent: 11/27/2018   9:58 AM  To: [facility] Pain Management Clinical Support Staff    Per Dr mosley can we get this patient an appt to consult for C5-6 POORNIMA

## 2018-11-29 ENCOUNTER — CLINICAL SUPPORT (OUTPATIENT)
Dept: REHABILITATION | Facility: HOSPITAL | Age: 58
End: 2018-11-29
Attending: NEUROLOGICAL SURGERY
Payer: COMMERCIAL

## 2018-11-29 DIAGNOSIS — Z98.1 S/P CERVICAL SPINAL FUSION: ICD-10-CM

## 2018-11-29 DIAGNOSIS — M54.2 NECK PAIN: ICD-10-CM

## 2018-11-29 PROCEDURE — 97110 THERAPEUTIC EXERCISES: CPT | Mod: PO

## 2018-11-29 PROCEDURE — 97161 PT EVAL LOW COMPLEX 20 MIN: CPT | Mod: PO
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
## 2018-12-03 ENCOUNTER — CLINICAL SUPPORT (OUTPATIENT)
Dept: REHABILITATION | Facility: HOSPITAL | Age: 58
End: 2018-12-03
Attending: NEUROLOGICAL SURGERY
Payer: COMMERCIAL

## 2018-12-03 DIAGNOSIS — M54.2 NECK PAIN: ICD-10-CM

## 2018-12-03 DIAGNOSIS — Z98.1 S/P CERVICAL SPINAL FUSION: ICD-10-CM

## 2018-12-03 PROCEDURE — 97110 THERAPEUTIC EXERCISES: CPT | Mod: PO

## 2018-12-03 NOTE — PROGRESS NOTES
"Name: Carmen Burnette [facility] Number: [id number]  Date of Treatment: 12/03/2018   Diagnosis:   Encounter Diagnoses   Name Primary?    Neck pain     S/P cervical spinal fusion        Physician: Leo Denton MD    Time in: 12:00  Time Out: 12:40  Total Treatment Time: 40  Date of eval: 11/29/18  Visit #: 2/20  Auth expiration: 12/31/18  POC expiration: 2/29/19    Precautions: Standard, Diabetes, and C3-C5 fusion    Subjective     Carmen reports she is feeling about the same since the evaluation, continued neck pain in the front, unsure if is coming from lymph nodes or sinuses. Minimal R handed "stabbing pain" into digits 3-4 while sitting up in bed watching TV otherwise hasn't noticed any radicular sx.  Patient reports their pain to be 6/10 on a 0-10 scale with 0 being no pain and 10 being the worst pain imaginable.    Objective     Carmen received therapeutic exercises to develop strength, endurance, ROM, flexibility, posture and core stabilization for 30 minutes including:     Supine    Supine chin tucks 3 sec holds x15 (verbal/tactile cuing for positioning and to dec accessory muscle compensation)  Scapular retraction/depression 3 sec holds x15    Standing    OTB I's x15  Serratus wall slide w/ foam roller 2x15  YTB wall clocks 2x8    Prone (head in face pillow)  - retraction against manual resistance    Prone (over green SB supported on chair/ box)  - retraction w/ shoulder extension/ER 2x10    Carmen received the following manual therapy techniques: Joint mobilizations, Manual traction and Soft tissue Mobilization were applied to the: cervical spine for 10 minutes.     STM to bilat upper traps, scalenes, SCM  Cervical traction    Written Home Exercises Provided: Rows, doorway pec stretch    Pt educated on posture, importance of continued walking and performing HEP. Pt demo fair understanding of the education provided. Carmen demonstrated fair return demonstration of activities.     Assessment   Carmen " participated in today's treatment session without symptom provocation or adverse effects. Pt demonstrated increased upper trap recruitment with scap retraction, benefits from manual cuing for retraction and depression. Pt able to perform upward scap rotation w/ foam roller without compensation of upper trap after recruiting. Pt demonstrates increased fatigue at the end of treatment. Will continue to progress postural endurance as tolerated.  Pt will continue to benefit from skilled PT intervention. Medical Necessity is demonstrated by:  Unable to participate in daily activities, Continued inability to participate in vocational pursuits, Pain limits function of effected part for some activities, Unable to participate fully in daily activities, Requires skilled supervision to complete and progress HEP and Weakness.    Patient is making good progress towards established goals.    Short Term Goals: 3 weeks   1. Pt will demonstrate good understanding of HEP and report compliance on at least 4 days/week  2. Pt will demonstrate good understanding of radicular sx/ unsteadiness on her feet and will be able to report: when she experiences her sx, how long the sx last, and what she did when her sx went away.  3. Pt will be able to tolerate 30 minutes of light household chores (making modifications when necessary) reporting less than 2/10 pain  4. Pt will be able to perform full shoulder abduction without reports of pain/tension in  deg arc in order to demonstrate improved functional mobility     Long Term Goals: 12 weeks   1. Pt will be able to get her hair done (and understand what is a safe position for her neck and what positions leave her neck vulnerable) reporting less than 2/10 pain  2. Pt will be able to drive for 30 minutes and safely check her blind sports reporting less than 2/10 pain  3. Pt will be able to bake at PLOF (lifting mixer/ baking sheet etc.) making modifications when necessary reporting less than  2/10 pain  4. Pt will be able to exercise for 30 minutes/ day on at least 5 days / week (including cardio/ resistance training) in order to maintain strength and ROM    New/Revised goals: none at this time    Plan     Next: horizontal abduction, protraction plank,      Continue with established Plan of Care towards PT goals.

## 2018-12-10 ENCOUNTER — CLINICAL SUPPORT (OUTPATIENT)
Dept: REHABILITATION | Facility: HOSPITAL | Age: 58
End: 2018-12-10
Attending: NEUROLOGICAL SURGERY
Payer: COMMERCIAL

## 2018-12-10 DIAGNOSIS — M54.2 NECK PAIN: ICD-10-CM

## 2018-12-10 DIAGNOSIS — Z98.1 S/P CERVICAL SPINAL FUSION: ICD-10-CM

## 2018-12-10 PROCEDURE — 97140 MANUAL THERAPY 1/> REGIONS: CPT | Mod: PO

## 2018-12-10 PROCEDURE — 97110 THERAPEUTIC EXERCISES: CPT | Mod: PO

## 2018-12-10 NOTE — PROGRESS NOTES
"Name: Carmen Burnette [facility] Number: [id number]  Date of Treatment: 12/10/2018   Diagnosis:   Encounter Diagnoses   Name Primary?    Neck pain     S/P cervical spinal fusion        Physician: Leo Denton MD    Time in: 2:00 PM  Time Out: 3:00 PM  Total Treatment Time: 60  Date of eval: 11/29/18  Visit #: 3/20  Auth expiration: 12/31/18  POC expiration: 2/29/19    Precautions: Standard, Diabetes, and C3-C5 fusion    Subjective     Carmen reports she is feeling "so-so". Patient reports she feels tight today "like i've been stung by a bee or wasp". Patient reports their pain to be 4/10 on a 0-10 scale with 0 being no pain and 10 being the worst pain imaginable.    Objective     Carmen received therapeutic exercises to develop strength, endurance, ROM, flexibility, posture and core stabilization for 35 minutes including:     UBE 3f/3b    Supine    Supine chin tucks 3 sec holds x15 (verbal/tactile cuing for positioning and to dec accessory muscle compensation)    Standing    OTB I's 2 x 10  OTB Rows 2 x 10  Serratus wall slide w/ foam roller 2x15  YTB wall clocks 2x8  Protraction planks 2 x 10  Scapular retraction/depression 3 sec holds x15    Prone (head in face pillow)  - retraction against manual resistance- NP  - Rows x 15 each (forehead on towel roll)    Prone (over green SB supported on chair/ box)  - retraction w/ shoulder extension/ER x12    Carmen received the following manual therapy techniques: Soft tissue Mobilization were applied to the: cervical spine for 10 minutes.     STM to bilat upper traps, scalenes, SCM    MHP to the neck at end of session x 10 minutes       Written Home Exercises Provided: no change  Pt educated on posture, importance of continued walking and performing HEP. Pt demo fair understanding of the education provided. Carmen demonstrated fair return demonstration of activities.     Assessment   Carmen participated in today's treatment session without symptom provocation or adverse " effects. Pt benefits from cuing to minimize UT recruitment during scapular retractions and serratus wall slides. Patient fatigues at end of serratus wall slides and prone rows. Patient tolerated additional exercises well with no onset of increased pain. Will continue to progress postural endurance as tolerated.  Pt will continue to benefit from skilled PT intervention. Medical Necessity is demonstrated by:  Unable to participate in daily activities, Continued inability to participate in vocational pursuits, Pain limits function of effected part for some activities, Unable to participate fully in daily activities, Requires skilled supervision to complete and progress HEP and Weakness.    Patient is making good progress towards established goals.    Short Term Goals: 3 weeks   1. Pt will demonstrate good understanding of HEP and report compliance on at least 4 days/week  2. Pt will demonstrate good understanding of radicular sx/ unsteadiness on her feet and will be able to report: when she experiences her sx, how long the sx last, and what she did when her sx went away.  3. Pt will be able to tolerate 30 minutes of light household chores (making modifications when necessary) reporting less than 2/10 pain  4. Pt will be able to perform full shoulder abduction without reports of pain/tension in  deg arc in order to demonstrate improved functional mobility     Long Term Goals: 12 weeks   1. Pt will be able to get her hair done (and understand what is a safe position for her neck and what positions leave her neck vulnerable) reporting less than 2/10 pain  2. Pt will be able to drive for 30 minutes and safely check her blind sports reporting less than 2/10 pain  3. Pt will be able to bake at PLOF (lifting mixer/ baking sheet etc.) making modifications when necessary reporting less than 2/10 pain  4. Pt will be able to exercise for 30 minutes/ day on at least 5 days / week (including cardio/ resistance training) in  order to maintain strength and ROM    New/Revised goals: none at this time    Plan     Next: horizontal abduction, protraction plank,      Continue with established Plan of Care towards PT goals.

## 2018-12-12 ENCOUNTER — CLINICAL SUPPORT (OUTPATIENT)
Dept: REHABILITATION | Facility: HOSPITAL | Age: 58
End: 2018-12-12
Attending: NEUROLOGICAL SURGERY
Payer: COMMERCIAL

## 2018-12-12 DIAGNOSIS — Z98.1 S/P CERVICAL SPINAL FUSION: ICD-10-CM

## 2018-12-12 DIAGNOSIS — M54.2 NECK PAIN: ICD-10-CM

## 2018-12-12 PROCEDURE — 97110 THERAPEUTIC EXERCISES: CPT | Mod: PO

## 2018-12-12 PROCEDURE — 97140 MANUAL THERAPY 1/> REGIONS: CPT | Mod: PO

## 2018-12-12 NOTE — PROGRESS NOTES
Name: Carmen Burnette [facility] Number: [id number]  Date of Treatment: 12/12/2018   Diagnosis:   Encounter Diagnoses   Name Primary?    Neck pain     S/P cervical spinal fusion        Physician: Leo Denton MD    Time in: 1:00 PM  Time Out: 2:00 PM  Total Treatment Time: 60  Date of eval: 11/29/18  Visit #: 4/20  Auth expiration: 12/31/18  POC expiration: 2/29/19    Precautions: Standard, Diabetes, and C3-C5 fusion    Subjective     Carmen reports she is feeling a little better and she doesn't feel as tight. Patient reports their pain to be 5/10 on a 0-10 scale with 0 being no pain and 10 being the worst pain imaginable.    Objective     Carmen received therapeutic exercises to develop strength, endurance, ROM, flexibility, posture and core stabilization for 35 minutes including:     UBE 4f/4b    Supine    Supine chin tucks 3 sec holds x15 (verbal/tactile cuing for positioning and to dec accessory muscle compensation)    Standing    OTB I's 2 x 15  GTB Rows 2 x 15  Serratus wall slide w/ foam roller 2x15  YTB wall clocks 2x8 - NP  Protraction planks 2 x 10  Scapular retraction/depression 3 sec holds x15  Standing Horizontal Abduction with YTB x 15     Prone (head in face pillow)  - retraction w/ shoulder extension 20 x 5sec   - Rows x 15 each (forehead on towel roll)    Prone (over green SB supported on chair/ box)  - retraction w/ shoulder extension/ER x12 - NP    Carmen received the following manual therapy techniques: Soft tissue Mobilization were applied to the: cervical spine for 10 minutes.     STM to bilat upper traps, scalenes, SCM    MHP to the neck at end of session x 10 minutes       Written Home Exercises Provided: chin tucks, rows, extensions, scap retractions   Pt educated on posture, importance of continued walking and performing HEP. Pt demo fair understanding of the education provided. Carmen demonstrated fair return demonstration of activities.     Assessment   Carmen participated in today's  treatment session without symptom provocation or adverse effects. Patient requires cuing to perform protraction planks properly as well as cuing for scap retraction exercises. Patient with muscle fatigue at the end of serratus wall slides and horizontal abduction. Post treatment patient noted she was feeling better. Will continue to progress postural endurance as tolerated.  Pt will continue to benefit from skilled PT intervention. Medical Necessity is demonstrated by:  Unable to participate in daily activities, Continued inability to participate in vocational pursuits, Pain limits function of effected part for some activities, Unable to participate fully in daily activities, Requires skilled supervision to complete and progress HEP and Weakness.    Patient is making good progress towards established goals.    Short Term Goals: 3 weeks   1. Pt will demonstrate good understanding of HEP and report compliance on at least 4 days/week  2. Pt will demonstrate good understanding of radicular sx/ unsteadiness on her feet and will be able to report: when she experiences her sx, how long the sx last, and what she did when her sx went away.  3. Pt will be able to tolerate 30 minutes of light household chores (making modifications when necessary) reporting less than 2/10 pain  4. Pt will be able to perform full shoulder abduction without reports of pain/tension in  deg arc in order to demonstrate improved functional mobility     Long Term Goals: 12 weeks   1. Pt will be able to get her hair done (and understand what is a safe position for her neck and what positions leave her neck vulnerable) reporting less than 2/10 pain  2. Pt will be able to drive for 30 minutes and safely check her blind sports reporting less than 2/10 pain  3. Pt will be able to bake at [employer] (lifting mixer/ baking sheet etc.) making modifications when necessary reporting less than 2/10 pain  4. Pt will be able to exercise for 30 minutes/ day on at  least 5 days / week (including cardio/ resistance training) in order to maintain strength and ROM    New/Revised goals: none at this time    Plan     Next: horizontal abduction, protraction plank,      Continue with established Plan of Care towards PT goals.

## 2018-12-17 ENCOUNTER — CLINICAL SUPPORT (OUTPATIENT)
Dept: REHABILITATION | Facility: HOSPITAL | Age: 58
End: 2018-12-17
Attending: NEUROLOGICAL SURGERY
Payer: COMMERCIAL

## 2018-12-17 DIAGNOSIS — M54.2 NECK PAIN: ICD-10-CM

## 2018-12-17 DIAGNOSIS — Z98.1 S/P CERVICAL SPINAL FUSION: ICD-10-CM

## 2018-12-17 PROCEDURE — 97110 THERAPEUTIC EXERCISES: CPT | Mod: PO

## 2018-12-17 NOTE — PROGRESS NOTES
Name: Carmen Burnette [facility]  Clinic Number: [id number]  Date of Treatment: 12/17/2018   Diagnosis:   Encounter Diagnoses   Name Primary?    Neck pain     S/P cervical spinal fusion      Physician: Leo Denton MD    Time in: 11:05 AM (pt w/ late arrival)  Time Out: 12:00 PM  Total Treatment Time: 55  Date of eval: 11/29/18  Visit #: 5/20  Auth expiration: 12/31/18  POC expiration: 2/29/19    Precautions: Standard, Diabetes, and C3-C5 fusion    Subjective     Carmen reports exercises are getting easier however her neck continues to feel restricted Patient reports their pain to be 4/10 on a 0-10 scale with 0 being no pain and 10 being the worst pain imaginable.    Objective     Carmen received therapeutic exercises to develop strength, endurance, ROM, flexibility, posture and core stabilization for 40 minutes including:     UBE 2f/2b at resistance 1.2    Supine    Supine chin tucks 3 sec holds x15 (verbal/tactile cuing for positioning and to dec accessory muscle compensation)  Pelvic tilt 2x20    Standing    OTB I's 2 x 15  GTB Rows 2 x 15  Serratus wall slide w/ foam roller 2x15  YTB wall clocks 2x8 - NP  Protraction planks 2 x 10  Scapular retraction/depression 3 sec holds x15  Standing Horizontal Abduction with YTB x 15   Theracane to upper traps 5 min    Prone (head in face pillow)  - retraction w/ shoulder extension 20 x 5sec      Prone (over green SB supported on chair/ box)  - retraction w/ shoulder extension/ER x12    Carmen received the following manual therapy techniques: Soft tissue Mobilization were applied to the: cervical spine for 10 minutes.     STM to bilat upper traps, scalenes, SCM  Cervical traction    MHP to the neck at end of session x 10 minutes     Written Home Exercises Provided: pelvic tilt  Pt educated on posture, importance of continued walking and performing HEP. Pt demo fair understanding of the education provided. Cramen demonstrated fair return demonstration of activities.     Assessment    Carmen presents with fair carryover of exercise form, continues to require cuing for scapular positioning during exercises. Pt demonstrates increased anterior pelvic tilt/ flared ribs when performing overhead exercises. Pt was educated that performing posterior pelvic tilt can help with her back pain as well as putting her neck/ shoulders in a more adventitious position for functional activities. Will continue to progress postural endurance as tolerated.     Pt will continue to benefit from skilled PT intervention. Medical Necessity is demonstrated by:  Unable to participate in daily activities, Continued inability to participate in vocational pursuits, Pain limits function of effected part for some activities, Unable to participate fully in daily activities, Requires skilled supervision to complete and progress HEP and Weakness.    Patient is making good progress towards established goals.    Short Term Goals: 3 weeks   1. Pt will demonstrate good understanding of HEP and report compliance on at least 4 days/week  2. Pt will demonstrate good understanding of radicular sx/ unsteadiness on her feet and will be able to report: when she experiences her sx, how long the sx last, and what she did when her sx went away.  3. Pt will be able to tolerate 30 minutes of light household chores (making modifications when necessary) reporting less than 2/10 pain  4. Pt will be able to perform full shoulder abduction without reports of pain/tension in  deg arc in order to demonstrate improved functional mobility     Long Term Goals: 12 weeks   1. Pt will be able to get her hair done (and understand what is a safe position for her neck and what positions leave her neck vulnerable) reporting less than 2/10 pain  2. Pt will be able to drive for 30 minutes and safely check her blind sports reporting less than 2/10 pain  3. Pt will be able to bake at [employer] (lifting mixer/ baking sheet etc.) making modifications when necessary  reporting less than 2/10 pain  4. Pt will be able to exercise for 30 minutes/ day on at least 5 days / week (including cardio/ resistance training) in order to maintain strength and ROM    New/Revised goals: none at this time    Plan     Next: horizontal abduction, supine protraction, open close book    Continue with established Plan of Care towards PT goals.

## 2018-12-19 ENCOUNTER — CLINICAL SUPPORT (OUTPATIENT)
Dept: REHABILITATION | Facility: HOSPITAL | Age: 58
End: 2018-12-19
Attending: NEUROLOGICAL SURGERY
Payer: COMMERCIAL

## 2018-12-19 DIAGNOSIS — Z98.1 S/P CERVICAL SPINAL FUSION: ICD-10-CM

## 2018-12-19 DIAGNOSIS — M54.2 NECK PAIN: ICD-10-CM

## 2018-12-19 PROCEDURE — 97110 THERAPEUTIC EXERCISES: CPT | Mod: PO

## 2018-12-19 NOTE — PROGRESS NOTES
Re-assessment    Name: Carmen Burnette [facility] Number: [id number]  Date of Treatment: 12/19/2018   Diagnosis:   Encounter Diagnoses   Name Primary?    Neck pain     S/P cervical spinal fusion      Physician: Leo Denton MD    Time in: 10:00 AM  Time Out: 10:50 PM  Total Treatment Time: 50  Date of eval: 11/29/18  Visit #: 6/20  Auth expiration: 12/31/18  POC expiration: 2/29/19    Precautions: Standard, Diabetes, and C3-C5 fusion    Subjective     Carmen reports since the evaluation her neck is getting better, continues to have pain when turning around to look. Pt reports one instance of radicular pain down her R UE into her ring finger while laying down but otherwise has not noticed it. Pt feels the exercises have been helping and she has been more aware of her posture  Patient reports their pain to be 4/10 on a 0-10 scale with 0 being no pain and 10 being the worst pain imaginable.    Objective     Objective measures taken today to assess progress    Posture: FHP, FSP, inc scap protraction bilat, inc lumbar lordosis     Cervical Range of Motion:     % Pain   Flexion WFL  tension / tightness through low back      Extension WFL    Right Rotation 75% P! In upper trap      Left Rotation 75% P! In upper trap      Right Side Bending 50%    Left Side Bending 50% Pulling in upper trap      Shoulder Range of Motion: AROM WNL and pain free      Upper Extremity Strength  (R) UE   (L) UE     Shoulder flexion: 4/5* Shoulder flexion: 4/5*   Shoulder Abduction: 5/5* Shoulder abduction: 5/5*   Shoulder ER 4+/5 Shoulder ER 4+/5   Shoulder IR 5/5* Shoulder IR 5/5*    *Reports of diffuse pain throughout the arms bijan Morales received therapeutic exercises to develop strength, endurance, ROM, flexibility, posture and core stabilization for 45 minutes including:     Bolded exercises performed during today's treatment    UBE 2f/2b at resistance 1.2    Supine    Supine chin tucks 3 sec holds x15 (verbal/tactile cuing for  positioning and to dec accessory muscle compensation)  Pelvic tilt 2x20    Standing    OTB I's 2 x 15  GTB Rows 2 x 15  Serratus wall slide w/ foam roller 2x15  YTB wall clocks 2x8 - NP  Protraction planks 2 x 10  Scapular retraction/depression 3 sec holds x15  Standing Horizontal Abduction with YTB x 15   Theracane to upper traps 5 min    Prone (head in face pillow)  - retraction w/ shoulder extension 20 x 5sec      Prone (over green SB supported on chair/ box)  - retraction w/ shoulder extension/ER x12    Carmen received the following manual therapy techniques: Soft tissue Mobilization were applied to the: cervical spine for 5 minutes.     STM to bilat upper traps, scalenes, SCM  Cervical traction    MHP to the neck at end of session x 10 minutes     Written Home Exercises Provided: pelvic tilt in supine and standing  Pt educated on posture, importance of continued walking and performing HEP. Pt demo fair understanding of the education provided. Carmen demonstrated fair return demonstration of activities.     Assessment   Carmen presents with modest subjective and objective gains since initial evaluation. Pt demonstrates improved ROM and strength gains since initial evaluation. Pt demonstrates improved carryover of exercise form, continues to benefit from tactile cuing for scapular positioning. Pt demonstrates good core activation in supine performing pelvic tilts however pt is unable to perform while standing up. Will continue to progress periscapular strengthening/ postural endurance as tolerated  Pt will continue to benefit from skilled PT intervention. Medical Necessity is demonstrated by:  Unable to participate in daily activities, Continued inability to participate in vocational pursuits, Pain limits function of effected part for some activities, Unable to participate fully in daily activities, Requires skilled supervision to complete and progress HEP and Weakness.    Patient is making good progress towards  established goals.    Short Term Goals: 3 weeks   1. Pt will demonstrate good understanding of HEP and report compliance on at least 4 days/week - Met  2. Pt will demonstrate good understanding of radicular sx/ unsteadiness on her feet and will be able to report: when she experiences her sx, how long the sx last, and what she did when her sx went away. - met (pt reports only when laying down the R UE into ring finger)  3. Pt will be able to tolerate 30 minutes of light household chores (making modifications when necessary) reporting less than 2/10 pain - Met  4. Pt will be able to perform full shoulder abduction without reports of pain/tension in  deg arc in order to demonstrate improved functional mobility - Met     Long Term Goals: 12 weeks   1. Pt will be able to get her hair done (and understand what is a safe position for her neck and what positions leave her neck vulnerable) reporting less than 2/10 pain  2. Pt will be able to drive for 30 minutes and safely check her blind sports reporting less than 2/10 pain  3. Pt will be able to bake at [employer] (lifting mixer/ baking sheet etc.) making modifications when necessary reporting less than 2/10 pain  4. Pt will be able to exercise for 30 minutes/ day on at least 5 days / week (including cardio/ resistance training) in order to maintain strength and ROM    New/Revised goals: none at this time    Plan     Next: horizontal abduction, supine protraction, open close book    Continue with established Plan of Care towards PT goals.

## 2018-12-27 ENCOUNTER — TELEPHONE (OUTPATIENT)
Dept: NEUROSURGERY | Facility: CLINIC | Age: 58
End: 2018-12-27

## 2018-12-27 ENCOUNTER — CLINICAL SUPPORT (OUTPATIENT)
Dept: REHABILITATION | Facility: HOSPITAL | Age: 58
End: 2018-12-27
Attending: NEUROLOGICAL SURGERY
Payer: COMMERCIAL

## 2018-12-27 DIAGNOSIS — M54.2 NECK PAIN: ICD-10-CM

## 2018-12-27 DIAGNOSIS — Z98.1 S/P CERVICAL SPINAL FUSION: ICD-10-CM

## 2018-12-27 PROCEDURE — 97110 THERAPEUTIC EXERCISES: CPT | Mod: PO

## 2018-12-27 PROCEDURE — 97140 MANUAL THERAPY 1/> REGIONS: CPT | Mod: PO

## 2018-12-27 NOTE — PROGRESS NOTES
Re-assessment    Name: Carmen Burnette [facility] Number: [id number]  Date of Treatment: 12/27/2018   Diagnosis:   Encounter Diagnoses   Name Primary?    Neck pain     S/P cervical spinal fusion      Physician: Leo Denton MD    Time in: 3:00 PM  Time Out: 4:00 PM  Total Treatment Time: 50  Date of eval: 11/29/18  Visit #: 7/20  Auth expiration: 12/31/18  POC expiration: 2/29/19    Precautions: Standard, Diabetes, and C3-C5 fusion    Subjective     Carmen reports her neck still hurts. Reports she purchased a pillow with support and that has helped some. Patient reports their pain to be 5/10 on a 0-10 scale with 0 being no pain and 10 being the worst pain imaginable.    Objective     Carmen received therapeutic exercises to develop strength, endurance, ROM, flexibility, posture and core stabilization for 35 minutes including:     Bolded exercises performed during today's treatment    UBE 2f/2b at resistance 1.0    Supine    Supine chin tucks 3 sec holds x15 (verbal/tactile cuing for positioning and to dec accessory muscle compensation)  Pelvic tilt 2x20    Standing    OTB I's 2 x 10  GTB Rows 2 x 10  Serratus wall slide w/ foam roller 2x15  YTB wall clocks 2x8   Protraction planks 2 x 10  Scapular retraction/depression 3 sec holds x15  Standing Horizontal Abduction with YTB 2 x 10  Theracane to upper traps 5 min    Prone (head in face pillow)  - retraction w/ shoulder extension 20 x 5sec      Prone (over green SB supported on chair/ box)  - retraction w/ shoulder extension/ER 2 x 10 - prone on table     Carmen received the following manual therapy techniques: Soft tissue Mobilization were applied to the: cervical spine for 10 minutes.     STM to bilat upper traps, scalenes, SCM  Suboccipital Release     MHP to the neck at end of session x 10 minutes     Written Home Exercises Provided: no change   Pt educated on posture, importance of continued walking and performing HEP. Pt demo fair understanding of the education  provided. Carmen demonstrated fair return demonstration of activities.     Assessment   Carmen tolerated treatment well today. Pt demonstrates proper form during scap retraction exercises with minimal cues needed. Pt continues to benefit from tactile cuing for proper form during protraction planks. Will continue to progress periscapular strengthening/ postural endurance as tolerated  Pt will continue to benefit from skilled PT intervention. Medical Necessity is demonstrated by:  Unable to participate in daily activities, Continued inability to participate in vocational pursuits, Pain limits function of effected part for some activities, Unable to participate fully in daily activities, Requires skilled supervision to complete and progress HEP and Weakness.    Patient is making good progress towards established goals.    Short Term Goals: 3 weeks   1. Pt will demonstrate good understanding of HEP and report compliance on at least 4 days/week - Met  2. Pt will demonstrate good understanding of radicular sx/ unsteadiness on her feet and will be able to report: when she experiences her sx, how long the sx last, and what she did when her sx went away. - met (pt reports only when laying down the R UE into ring finger)  3. Pt will be able to tolerate 30 minutes of light household chores (making modifications when necessary) reporting less than 2/10 pain - Met  4. Pt will be able to perform full shoulder abduction without reports of pain/tension in  deg arc in order to demonstrate improved functional mobility - Met     Long Term Goals: 12 weeks   1. Pt will be able to get her hair done (and understand what is a safe position for her neck and what positions leave her neck vulnerable) reporting less than 2/10 pain  2. Pt will be able to drive for 30 minutes and safely check her blind sports reporting less than 2/10 pain  3. Pt will be able to bake at [employer] (lifting mixer/ baking sheet etc.) making modifications when  necessary reporting less than 2/10 pain  4. Pt will be able to exercise for 30 minutes/ day on at least 5 days / week (including cardio/ resistance training) in order to maintain strength and ROM    New/Revised goals: none at this time    Plan     Next: horizontal abduction, supine protraction, open close book    Continue with established Plan of Care towards PT goals.

## 2018-12-27 NOTE — TELEPHONE ENCOUNTER
----- Message from Catalina Lazaro sent at 12/27/2018  1:42 PM CST -----  Contact: self @ [phone]  Pt is requesting orders to continue out pt therapy.  Pt says she will discuss where she wants to do the therapy when you call her.

## 2018-12-27 NOTE — TELEPHONE ENCOUNTER
Called no answer. Left message on VM to leave fax num,[phone] where she wants the continue PT order sent

## 2018-12-31 ENCOUNTER — TELEPHONE (OUTPATIENT)
Dept: NEUROSURGERY | Facility: CLINIC | Age: 58
End: 2018-12-31

## 2018-12-31 DIAGNOSIS — M51.36 DEGENERATION OF LUMBAR INTERVERTEBRAL DISC: ICD-10-CM

## 2018-12-31 DIAGNOSIS — M48.02 SPINAL STENOSIS IN CERVICAL REGION: Primary | ICD-10-CM

## 2018-12-31 NOTE — TELEPHONE ENCOUNTER
----- Message from Susie Palmer sent at 12/31/2018 10:54 AM CST -----  Contact: Pt. [phone]  Needs Advice    Reason for call: Please contact the patient regarding her order for therapy        Communication Preference:PHONE     Additional Information:

## 2019-01-04 ENCOUNTER — TELEPHONE (OUTPATIENT)
Dept: NEUROSURGERY | Facility: CLINIC | Age: 59
End: 2019-01-04

## 2019-02-20 ENCOUNTER — HOSPITAL ENCOUNTER (EMERGENCY)
Facility: HOSPITAL | Age: 59
Discharge: HOME | End: 2019-02-20
Attending: EMERGENCY MEDICINE
Payer: COMMERCIAL

## 2019-02-20 VITALS
OXYGEN SATURATION: 98 % | WEIGHT: 215 LBS | RESPIRATION RATE: 20 BRPM | HEART RATE: 65 BPM | TEMPERATURE: 98 F | DIASTOLIC BLOOD PRESSURE: 85 MMHG | SYSTOLIC BLOOD PRESSURE: 140 MMHG | HEIGHT: 63 IN | BODY MASS INDEX: 38.09 KG/M2

## 2019-02-20 DIAGNOSIS — S39.012A LUMBAR STRAIN, INITIAL ENCOUNTER: Primary | ICD-10-CM

## 2019-02-20 DIAGNOSIS — M51.36 DEGENERATIVE DISC DISEASE, LUMBAR: ICD-10-CM

## 2019-02-20 LAB
BILIRUB UR QL STRIP: NEGATIVE
CLARITY UR REFRACT.AUTO: CLEAR
COLOR UR AUTO: YELLOW
GLUCOSE UR QL STRIP: NEGATIVE
HGB UR QL STRIP: NEGATIVE
KETONES UR QL STRIP: ABNORMAL
LEUKOCYTE ESTERASE UR QL STRIP: NEGATIVE
NITRITE UR QL STRIP: NEGATIVE
PH UR STRIP: 5 [PH] (ref 5–8)
PROT UR QL STRIP: NEGATIVE
SP GR UR STRIP: 1.02 (ref 1–1.03)
URN SPEC COLLECT METH UR: ABNORMAL
UROBILINOGEN UR STRIP-ACNC: NEGATIVE EU/DL

## 2019-02-20 PROCEDURE — 63600175 PHARM REV CODE 636 W HCPCS: Mod: ER | Performed by: PHYSICIAN ASSISTANT

## 2019-02-20 PROCEDURE — 25000003 PHARM REV CODE 250: Mod: ER | Performed by: PHYSICIAN ASSISTANT

## 2019-02-20 PROCEDURE — 99284 EMERGENCY DEPT VISIT MOD MDM: CPT | Mod: 25,ER

## 2019-02-20 PROCEDURE — 81003 URINALYSIS AUTO W/O SCOPE: CPT | Mod: ER

## 2019-02-20 PROCEDURE — 96372 THER/PROPH/DIAG INJ SC/IM: CPT | Mod: ER

## 2019-02-20 RX ORDER — HYDROCODONE BITARTRATE AND ACETAMINOPHEN 5; 325 MG/1; MG/1
1 TABLET ORAL EVERY 6 HOURS PRN
Qty: 10 TABLET | Refills: 0 | Status: SHIPPED | OUTPATIENT
Start: 2019-02-20 | End: 2019-04-18

## 2019-02-20 RX ORDER — HYDROCODONE BITARTRATE AND ACETAMINOPHEN 5; 325 MG/1; MG/1
1 TABLET ORAL EVERY 6 HOURS PRN
Qty: 10 TABLET | Refills: 0 | Status: SHIPPED | OUTPATIENT
Start: 2019-02-20 | End: 2019-02-20 | Stop reason: SDUPTHER

## 2019-02-20 RX ORDER — KETOROLAC TROMETHAMINE 30 MG/ML
30 INJECTION, SOLUTION INTRAMUSCULAR; INTRAVENOUS
Status: COMPLETED | OUTPATIENT
Start: 2019-02-20 | End: 2019-02-20

## 2019-02-20 RX ORDER — HYDROCODONE BITARTRATE AND ACETAMINOPHEN 5; 325 MG/1; MG/1
1 TABLET ORAL
Status: COMPLETED | OUTPATIENT
Start: 2019-02-20 | End: 2019-02-20

## 2019-02-20 RX ORDER — HYDROCODONE BITARTRATE AND ACETAMINOPHEN 5; 325 MG/1; MG/1
TABLET ORAL
Qty: 28 TABLET | Refills: 0 | Status: SHIPPED | OUTPATIENT
Start: 2019-02-20 | End: 2019-02-20

## 2019-02-20 RX ADMIN — KETOROLAC TROMETHAMINE 30 MG: 30 INJECTION, SOLUTION INTRAMUSCULAR at 11:02

## 2019-02-20 RX ADMIN — HYDROCODONE BITARTRATE AND ACETAMINOPHEN 1 TABLET: 5; 325 TABLET ORAL at 11:02

## 2019-02-20 NOTE — DISCHARGE INSTRUCTIONS
Follow up with your spine specialist for further evaluation and treatment.  Return to the ED for severe pain, numbness, weakness, urinary/fecal incontinence or worse in any way

## 2019-02-21 NOTE — ED PROVIDER NOTES
"Encounter Date: 2/20/2019       History     Chief Complaint   Patient presents with    Back Pain     "My lower back is hurting me since yesterday and when I walk it hurts worse" denies trauma     Patient is a 58-year-old female with history of diabetes, hypertension, cervical degenerative discs and recently diagnosed with lumbar degenerative discs.  She has been going to physical therapy for the low back pain.  She is presenting with complaint of constant moderate to severe aching pain in the left low back and buttocks that is different than the pain she was experiencing before.  The pain is worse with movement.  It does not radiate.  They have been doing some dry needling in physical therapy.  She took a Robaxin she had left over from her neck surgery without improvement.  No numbness, focal weakness, urinary/fecal incontinence, saddle anesthesia, fever or chills.  No injury.          Review of patient's allergies indicates:  No Known Allergies  Past Medical History:   Diagnosis Date    Diabetes mellitus     Diabetes mellitus, type 2     Diet controlled gestational diabetes mellitus in puerperium     History of seasonal allergies     Hypertension      Past Surgical History:   Procedure Laterality Date    DISCECTOMY, SPINE, CERVICAL, ANTERIOR APPROACH, WITH FUSION C3-4, C4-5 N/A 9/17/2018    Performed by Leo [facility]enton MD at [facility] OR 2ND FLR    HYSTERECTOMY, TOTAL, ABDOMINAL N/A 4/23/2013    [facility] at [facility] OR    INJECTION-STEROID-EPIDURAL-CAUDAL N/A 5/8/2018    Performed [facility] at [facility] PAIN [facility]GT    right knee scope      SALPINGECTOMY Right 4/23/2013    [facility] at [facility] OR    SALPINGO-OOPHORECTOMY Left 4/23/2013    Performed by [facility] at [facility] OR     Family History   Problem Relation Age of Onset    Diabetes type II Sister     Hyperlipidemia Sister     Diabetes type II Other      Social History     Tobacco Use    " Smoking status: Never Smoker    Smokeless tobacco: Never Used   Substance Use Topics    Alcohol use: No    Drug use: No     Review of Systems   Constitutional: Negative for activity change, appetite change, chills, fatigue and fever.   Respiratory: Negative for cough, shortness of breath and wheezing.    Cardiovascular: Negative for chest pain, palpitations and leg swelling.   Gastrointestinal: Negative for abdominal pain, diarrhea, nausea and vomiting.   Genitourinary: Negative for dysuria, flank pain, frequency, hematuria, pelvic pain and urgency.   Musculoskeletal: Positive for back pain. Negative for joint swelling, neck pain and neck stiffness.   Skin: Negative for rash.   Neurological: Negative for dizziness, weakness, numbness and headaches.   All other systems reviewed and are negative.      Physical Exam     Initial Vitals [02/20/19 0831]   BP Pulse Resp Temp SpO2   138/87 64 19 97.7 °F (36.5 °C) 100 %      MAP       --         Physical Exam    Nursing note and vitals reviewed.  Constitutional: She appears well-developed and well-nourished. She appears distressed (Moderate.  Purposeful movement.).   HENT:   Head: Normocephalic and atraumatic.   Nose: Nose normal.   Mouth/Throat: Oropharynx is clear and moist.   Eyes: Conjunctivae and EOM are normal.   Neck: Normal range of motion. Neck supple.   Cardiovascular: Normal rate, regular rhythm, normal heart sounds and intact distal pulses.   Pulmonary/Chest: Breath sounds normal. No respiratory distress.   Abdominal: Soft. Bowel sounds are normal. There is no tenderness.   No pulsatile abdominal mass   Musculoskeletal: She exhibits no edema.   No midline or spinous tenderness.  Left lumbar paraspinous tenderness and into the gluteals.  Pain with rotation and flexion.  Negative straight leg raise bilaterally.   Lymphadenopathy:     She has no cervical adenopathy.   Neurological: She is alert and oriented to person, place, and time.   Skin: Skin is warm and  dry. No rash noted.   Psychiatric: She has a normal mood and affect. Her behavior is normal. Judgment and thought content normal.         ED Course   Procedures  Labs Reviewed   URINALYSIS - Abnormal; Notable for the following components:       Result Value    Ketones, UA 1+ (*)     All other components within normal limits          Imaging Results    None          Medical Decision Making:   Clinical Tests:   Lab Tests: Ordered and Reviewed  The following lab test(s) were unremarkable: Urinalysis  No acute findings on urinalysis.  She has a history of degenerative disc disease and is in physical therapy.  The pain appears musculoskeletal in nature.  She was treated with medications and feeling better prior to discharge. Prescription for Norco.  She states she is not in any pain management.  She will follow up with her PCP and orthopedic.  Return to the ED if worse in any way.  No evidence of cauda equina                      Clinical Impression:       ICD-10-CM ICD-9-CM   1. Lumbar strain, initial encounter S39.012A 847.2   2. Degenerative disc disease, lumbar M51.36 722.52         Disposition:   Disposition: Discharged                        ELIA Mendoza  02/21/19 5319

## 2019-03-22 ENCOUNTER — OFFICE VISIT (OUTPATIENT)
Dept: OTOLARYNGOLOGY | Facility: CLINIC | Age: 59
End: 2019-03-22
Payer: COMMERCIAL

## 2019-03-22 VITALS
HEART RATE: 65 BPM | BODY MASS INDEX: 38.98 KG/M2 | HEIGHT: 63 IN | SYSTOLIC BLOOD PRESSURE: 140 MMHG | WEIGHT: 220 LBS | TEMPERATURE: 97 F | DIASTOLIC BLOOD PRESSURE: 86 MMHG

## 2019-03-22 DIAGNOSIS — J34.3 HYPERTROPHY OF INFERIOR NASAL TURBINATE: ICD-10-CM

## 2019-03-22 DIAGNOSIS — J30.89 NON-SEASONAL ALLERGIC RHINITIS, UNSPECIFIED TRIGGER: Primary | ICD-10-CM

## 2019-03-22 PROCEDURE — 3077F SYST BP >= 140 MM HG: CPT | Mod: CPTII,S$GLB,, | Performed by: OTOLARYNGOLOGY

## 2019-03-22 PROCEDURE — 3079F PR MOST RECENT DIASTOLIC BLOOD PRESSURE 80-89 MM HG: ICD-10-PCS | Mod: CPTII,S$GLB,, | Performed by: OTOLARYNGOLOGY

## 2019-03-22 PROCEDURE — 99999 PR PBB SHADOW E&M-EST. PATIENT-LVL IV: ICD-10-PCS | Mod: PBBFAC,,, | Performed by: OTOLARYNGOLOGY

## 2019-03-22 PROCEDURE — 3008F BODY MASS INDEX DOCD: CPT | Mod: CPTII,S$GLB,, | Performed by: OTOLARYNGOLOGY

## 2019-03-22 PROCEDURE — 99214 OFFICE O/P EST MOD 30 MIN: CPT | Mod: S$GLB,,, | Performed by: OTOLARYNGOLOGY

## 2019-03-22 PROCEDURE — 3079F DIAST BP 80-89 MM HG: CPT | Mod: CPTII,S$GLB,, | Performed by: OTOLARYNGOLOGY

## 2019-03-22 PROCEDURE — 99214 PR OFFICE/OUTPT VISIT, EST, LEVL IV, 30-39 MIN: ICD-10-PCS | Mod: S$GLB,,, | Performed by: OTOLARYNGOLOGY

## 2019-03-22 PROCEDURE — 3077F PR MOST RECENT SYSTOLIC BLOOD PRESSURE >= 140 MM HG: ICD-10-PCS | Mod: CPTII,S$GLB,, | Performed by: OTOLARYNGOLOGY

## 2019-03-22 PROCEDURE — 3008F PR BODY MASS INDEX (BMI) DOCUMENTED: ICD-10-PCS | Mod: CPTII,S$GLB,, | Performed by: OTOLARYNGOLOGY

## 2019-03-22 PROCEDURE — 99999 PR PBB SHADOW E&M-EST. PATIENT-LVL IV: CPT | Mod: PBBFAC,,, | Performed by: OTOLARYNGOLOGY

## 2019-03-22 RX ORDER — MINERAL OIL
180 ENEMA (ML) RECTAL DAILY
Qty: 30 TABLET | Refills: 12 | Status: SHIPPED | OUTPATIENT
Start: 2019-03-22 | End: 2024-03-05

## 2019-03-22 RX ORDER — FLUTICASONE PROPIONATE 50 MCG
2 SPRAY, SUSPENSION (ML) NASAL DAILY
Qty: 1 BOTTLE | Refills: 12 | Status: SHIPPED | OUTPATIENT
Start: 2019-03-22 | End: 2019-03-22

## 2019-03-22 RX ORDER — AZELASTINE 1 MG/ML
1 SPRAY, METERED NASAL 2 TIMES DAILY
Qty: 30 ML | Refills: 0 | Status: SHIPPED | OUTPATIENT
Start: 2019-03-22 | End: 2019-06-25

## 2019-03-22 RX ORDER — CHLORPHENIRAMINE MALEATE 4 MG
4 TABLET ORAL EVERY 6 HOURS PRN
COMMUNITY
End: 2020-09-01

## 2019-03-22 NOTE — PROGRESS NOTES
Chief Complaint   Patient presents with    Nasal Congestion   .     HPI: Carmen Son is here for evaluation of possible allergic rhinitis. Patient's symptoms include clear rhinorrhea, nasal congestion and postnasal drip. She feels like it is worse. These symptoms are perennial with seasonal exacerbation. Current triggers include exposure to fumes/strong odors. The patient has been suffering from these symptoms for approximately several years.  She notes that she has had allergy testing  cat, dust mite, grasses. She was on immunotherapy with Dr. Alan MCCULLOUGH for 6 years. She felt it was beneficial at the time.  She states the physician is retired and has been off of them for 7 months.  She is looking to re-establish this therapy.    The patient has never had nasal polyps. The patient has no history of asthma. The patient does not suffer from frequent sinopulmonary infections. The patient has not had sinus surgery in the past. The patient has no history of eczema.      Past Medical History:   Diagnosis Date    Diabetes mellitus     Diabetes mellitus, type 2     Diet controlled gestational diabetes mellitus in puerperium     History of seasonal allergies     Hypertension      Social History     Socioeconomic History    Marital status:      Spouse name: Not on file    Number of children: Not on file    Years of education: Not on file    Highest education level: Not on file   Occupational History    Not on file   Social Needs    Financial resource strain: Not on file    Food insecurity:     Worry: Not on file     Inability: Not on file    Transportation needs:     Medical: Not on file     Non-medical: Not on file   Tobacco Use    Smoking status: Never Smoker    Smokeless tobacco: Never Used   Substance and Sexual Activity    Alcohol use: No    Drug use: No    Sexual activity: Yes     Partners: Male     Birth control/protection: Surgical   Lifestyle    Physical activity:     Days  per week: Not on file     Minutes per session: Not on file    Stress: Not on file   Relationships    Social connections:     Talks on phone: Not on file     Gets together: Not on file     Attends Jewish service: Not on file     Active member of club or organization: Not on file     Attends meetings of clubs or organizations: Not on file     Relationship status: Not on file    Intimate partner violence:     Fear of current or ex partner: Not on file     Emotionally abused: Not on file     Physically abused: Not on file     Forced sexual activity: Not on file   Other Topics Concern    Not on file   Social History Narrative    Not on file     Past Surgical History:   Procedure Laterality Date    DISCECTOMY, SPINE, CERVICAL, ANTERIOR APPROACH, WITH FUSION C3-4, C4-5 N/A 9/17/2018    [facility] 2ND FLR    HYSTERECTOMY, TOTAL, ABDOMINAL N/A 4/23/2013    Performed by [facility] at [facility] OR    INJECTION-STEROID-EPIDURAL-CAUDAL N/A 5/8/2018    Per[facility]    right knee scope      SALPINGECTOMY Right 4/23/2013    Performed by [facility] at [facility] OR    SALPINGO-OOPHORECTOMY Left 4/23/2013    Performed by [facility]go [facility] at [facility] OR     Family History   Problem Relation Age of Onset    Diabetes type II Sister     Hyperlipidemia Sister     Diabetes type II Other            Review of Systems  General: negative for chills, fever or weight loss  Psychological: negative for mood changes or depression  Ophthalmic: negative for blurry vision, photophobia or eye pain  ENT: see HPI  Respiratory: no cough, shortness of breath, or wheezing  Cardiovascular: no chest pain or dyspnea on exertion  Gastrointestinal: no abdominal pain, change in bowel habits, or black/ bloody stools  Musculoskeletal: negative for gait disturbance or muscular weakness  Neurological: no syncope or seizures; no ataxia  Dermatological: negative for  puritis,  rash and jaundice  Hematologic/lymphatic: no easy bruising, no new lumps or bumps      Physical Exam:    Vitals:    03/22/19 1102   BP: (!) 140/86   Pulse: 65   Temp: 97.1 °F (36.2 °C)       Constitutional: Well appearing / communicating without difficutly.  NAD.  Eyes: EOM I Bilaterally  Head/Face: Normocephalic.  Negative paranasal sinus pressure/tenderness.  Salivary glands WNL.  House Brackmann I Bilaterally.    Right Ear: Auricle normal appearance. External Auditory Canal within normal limits no lesions or masses,TM w/o masses/lesions/perforations. TM mobility noted.   Left Ear: Auricle normal appearance. External Auditory Canal within normal limits no lesions or masses,TM w/o masses/lesions/perforations. TM mobility noted.  Nose: No gross nasal septal deviation. Boggy Inferior Turbinates bilaterally with edematous mucosa bilaterally. No septal perforation. No masses/lesions. External nasal skin appears normal without masses/lesions.  Oral Cavity: Gingiva/lips within normal limits.  Dentition/gingiva healthy appearing. Mucus membranes moist. Floor of mouth soft, no masses palpated. Oral Tongue mobile. Hard Palate appears normal.    Oropharynx: Base of tongue appears normal. No masses/lesions noted. Tonsillar fossa/pharyngeal wall without lesions. Posterior oropharynx WNL.  Soft palate without masses. Midline uvula.   Neck/Lymphatic: No LAD I-VI bilaterally.  No thyromegaly.  No masses noted on exam.    Mirror laryngoscopy/nasopharyngoscopy: Active gag reflex.  Unable to perform.    Neuro/Psychiatric: AOx3.  Normal mood and affect.   Cardiovascular: Normal carotid pulses bilaterally, no increasing jugular venous distention noted at cervical region bilaterally.    Respiratory: Normal respiratory effort, no stridor, no retractions noted.        Assessment:    ICD-10-CM ICD-9-CM    1. Non-seasonal allergic rhinitis, unspecified trigger J30.89 477.8 Ambulatory referral to Allergy   2. Hypertrophy of  inferior nasal turbinate J34.3 478.0      The primary encounter diagnosis was Non-seasonal allergic rhinitis, unspecified trigger. A diagnosis of Hypertrophy of inferior nasal turbinate was also pertinent to this visit.      Plan:  Orders Placed This Encounter   Procedures    Ambulatory referral to Allergy     Astelin nasal spray.  Allegra 180mg PO daily.   Obtain records from Dr. Phillips.  Refer to allergy/immunology for further follow up as patient would like to continue immunotherapy.     Ceci Bonds MD

## 2019-04-04 ENCOUNTER — OFFICE VISIT (OUTPATIENT)
Dept: ALLERGY | Facility: CLINIC | Age: 59
End: 2019-04-04
Payer: COMMERCIAL

## 2019-04-04 ENCOUNTER — LAB VISIT (OUTPATIENT)
Dept: LAB | Facility: HOSPITAL | Age: 59
End: 2019-04-04
Attending: ALLERGY & IMMUNOLOGY
Payer: COMMERCIAL

## 2019-04-04 VITALS
WEIGHT: 216.5 LBS | BODY MASS INDEX: 38.36 KG/M2 | DIASTOLIC BLOOD PRESSURE: 80 MMHG | HEART RATE: 60 BPM | HEIGHT: 63 IN | SYSTOLIC BLOOD PRESSURE: 128 MMHG

## 2019-04-04 DIAGNOSIS — H10.423 SIMPLE CHRONIC CONJUNCTIVITIS OF BOTH EYES: ICD-10-CM

## 2019-04-04 DIAGNOSIS — J31.0 CHRONIC RHINITIS: ICD-10-CM

## 2019-04-04 DIAGNOSIS — J31.0 CHRONIC RHINITIS: Primary | ICD-10-CM

## 2019-04-04 PROCEDURE — 99244 PR OFFICE CONSULTATION,LEVEL IV: ICD-10-PCS | Mod: S$GLB,,, | Performed by: ALLERGY & IMMUNOLOGY

## 2019-04-04 PROCEDURE — 99244 OFF/OP CNSLTJ NEW/EST MOD 40: CPT | Mod: S$GLB,,, | Performed by: ALLERGY & IMMUNOLOGY

## 2019-04-04 PROCEDURE — 86003 ALLG SPEC IGE CRUDE XTRC EA: CPT

## 2019-04-04 PROCEDURE — 86003 ALLG SPEC IGE CRUDE XTRC EA: CPT | Mod: 59

## 2019-04-04 PROCEDURE — 82785 ASSAY OF IGE: CPT

## 2019-04-04 PROCEDURE — 99999 PR PBB SHADOW E&M-EST. PATIENT-LVL III: CPT | Mod: PBBFAC,,, | Performed by: ALLERGY & IMMUNOLOGY

## 2019-04-04 PROCEDURE — 99999 PR PBB SHADOW E&M-EST. PATIENT-LVL III: ICD-10-PCS | Mod: PBBFAC,,, | Performed by: ALLERGY & IMMUNOLOGY

## 2019-04-04 RX ORDER — OLOPATADINE HYDROCHLORIDE 1 MG/ML
1 SOLUTION/ DROPS OPHTHALMIC 2 TIMES DAILY PRN
Qty: 5 ML | Refills: 12 | Status: SHIPPED | OUTPATIENT
Start: 2019-04-04 | End: 2019-09-10

## 2019-04-04 NOTE — LETTER
April 4, 2019      Ceci Bonds MD  [address]  [address]  [place] [address]           [place] [facility]  [address]  [place] [address]  Phone: [phone]          Patient: Carmen Son   MR Number: [id number]   YOB: 1960   Date of Visit: 4/4/2019       Dear Dr. Ceci Bonds:    Thank you for referring Carmen Son to me for evaluation. Attached you will find relevant portions of my assessment and plan of care.    If you have questions, please do not hesitate to call me. I look forward to following Carmen Son along with you.    Sincerely,    Odilia Moreno MD    Enclosure  CC:  No Recipients    If you would like to receive this communication electronically, please contact [email or web address] or [phone] to request more information on [employer] access.    For providers and/or their staff who would like to refer a patient to [facility], please contact us through our one-stop-shop provider referral line, Chikis Brown, at [phone].    If you feel you have received this communication in error or would no longer like to receive these types of communications, please e-mail [email or web address]

## 2019-04-04 NOTE — PROGRESS NOTES
Subjective:       Patient ID: Carmen Son is a 58 y.o. female.    Chief Complaint:  other (allergy symptoms all year long, nasal congestion, puffy eyes, rhinorrhea, sneezing, ithcy eyes, PND)      57 yo woman presents for consult from Dr Ceci Bonds for allergie. She states she was seeing an ENT and did allergy shots for 7-8 years. Was on monthly. He retired so off for about 7 months. She ha sneeze, runny nose, congestion, itchy watery eyes and PND. Some AM her eyes are puffy and crusty. congestion not daily more with flare. No chest symptoms. Has all year long, no season worse. No time of day worse. Has inside and out. Worse with dust and cats. Is on allegra, azelastine 1 SEN BID. Was on zyrtec in past. Was in [place] years ago. No eye drops. She has no H/o asthma or eczema. No known food, insect or latex allergy. No heart problems.      Environmental History: see history section for home environment  Review of Systems   Constitutional: Negative for appetite change, chills, fatigue and fever.   HENT: Positive for congestion, facial swelling, postnasal drip, rhinorrhea, sinus pressure and sneezing. Negative for ear discharge, ear pain, nosebleeds, sore throat, trouble swallowing and voice change.    Eyes: Positive for discharge, redness and itching. Negative for visual disturbance.   Respiratory: Negative for cough, choking, chest tightness, shortness of breath and wheezing.    Cardiovascular: Negative for chest pain, palpitations and leg swelling.   Gastrointestinal: Negative for abdominal distention, abdominal pain, constipation, diarrhea, nausea and vomiting.   Genitourinary: Negative for difficulty urinating.   Musculoskeletal: Positive for arthralgias, back pain and neck pain. Negative for gait problem, joint swelling and myalgias.   Skin: Negative for color change and rash.   Neurological: Negative for dizziness, syncope, weakness, light-headedness and headaches.   Hematological:  Negative for adenopathy. Does not bruise/bleed easily.   Psychiatric/Behavioral: Negative for agitation, behavioral problems, confusion and sleep disturbance. The patient is not nervous/anxious.         Objective:      Physical Exam   Constitutional: She is oriented to person, place, and time. She appears well-developed and well-nourished. No distress.   HENT:   Head: Normocephalic and atraumatic.   Right Ear: Hearing, tympanic membrane, external ear and ear canal normal.   Left Ear: Hearing, tympanic membrane, external ear and ear canal normal.   Nose: No mucosal edema, rhinorrhea, sinus tenderness or septal deviation. No epistaxis. Right sinus exhibits no maxillary sinus tenderness and no frontal sinus tenderness. Left sinus exhibits no maxillary sinus tenderness and no frontal sinus tenderness.   Mouth/Throat: Uvula is midline, oropharynx is clear and moist and mucous membranes are normal. No uvula swelling.   Eyes: Conjunctivae are normal. Right eye exhibits no discharge. Left eye exhibits no discharge.   Neck: Normal range of motion. No thyromegaly present.   Cardiovascular: Normal rate, regular rhythm and normal heart sounds.   No murmur heard.  Pulmonary/Chest: Effort normal and breath sounds normal. No respiratory distress. She has no wheezes.   Abdominal: Soft. She exhibits no distension. There is no tenderness.   Musculoskeletal: Normal range of motion. She exhibits no edema or tenderness.   Lymphadenopathy:     She has no cervical adenopathy.   Neurological: She is alert and oriented to person, place, and time.   Skin: Skin is warm and dry. No rash noted. No erythema.   Psychiatric: She has a normal mood and affect. Her behavior is normal. Judgment and thought content normal.   Nursing note and vitals reviewed.      Laboratory:   none performed   Assessment:       1. Chronic rhinitis    2. Simple chronic conjunctivitis of both eyes         Plan:       1. Labs today for immunocaps  2. continue azelastine  but use 2 SEN BID  3. continue allegra 180 mg daily  4. Add Patanol 1 drop each eye BID  5. phone review  6. Dr donte Bonds notified of completed consult via [facility]

## 2019-04-05 LAB — IGE SERPL-ACNC: 193 IU/ML (ref 0–100)

## 2019-04-08 LAB
A ALTERNATA IGE QN: 1.82 KU/L
A FUMIGATUS IGE QN: <0.35 KU/L
ALLERGEN CHAETOMIUM GLOBOSUM IGE: <0.35 KU/L
ALLERGEN WALNUT TREE IGE: 0.65 KU/L
ALLERGEN WHITE PINE TREE IGE: <0.35 KU/L
BAHIA GRASS IGE QN: 0.36 KU/L
BALD CYPRESS IGE QN: 1.77 KU/L
BERMUDA GRASS IGE QN: <0.35 KU/L
C HERBARUM IGE QN: <0.35 KU/L
C LUNATA IGE QN: 0.62 KU/L
CAT DANDER IGE QN: 1.61 KU/L
CHAETOMIUM GLOB. CLASS: NORMAL
COMMON RAGWEED IGE QN: <0.35 KU/L
COTTONWOOD IGE QN: 0.59 KU/L
D FARINAE IGE QN: <0.35 KU/L
D PTERONYSS IGE QN: <0.35 KU/L
DEPRECATED A ALTERNATA IGE RAST QL: ABNORMAL
DEPRECATED A FUMIGATUS IGE RAST QL: NORMAL
DEPRECATED BAHIA GRASS IGE RAST QL: ABNORMAL
DEPRECATED BALD CYPRESS IGE RAST QL: ABNORMAL
DEPRECATED BERMUDA GRASS IGE RAST QL: NORMAL
DEPRECATED C HERBARUM IGE RAST QL: NORMAL
DEPRECATED C LUNATA IGE RAST QL: ABNORMAL
DEPRECATED CAT DANDER IGE RAST QL: ABNORMAL
DEPRECATED COMMON RAGWEED IGE RAST QL: NORMAL
DEPRECATED COTTONWOOD IGE RAST QL: ABNORMAL
DEPRECATED D FARINAE IGE RAST QL: NORMAL
DEPRECATED D PTERONYSS IGE RAST QL: NORMAL
DEPRECATED DOG DANDER IGE RAST QL: NORMAL
DEPRECATED ELDER IGE RAST QL: NORMAL
DEPRECATED ENGL PLANTAIN IGE RAST QL: NORMAL
DEPRECATED HORSE DANDER IGE RAST QL: NORMAL
DEPRECATED JOHNSON GRASS IGE RAST QL: NORMAL
DEPRECATED LONDON PLANE IGE RAST QL: NORMAL
DEPRECATED MUGWORT IGE RAST QL: NORMAL
DEPRECATED P NOTATUM IGE RAST QL: NORMAL
DEPRECATED PECAN/HICK TREE IGE RAST QL: ABNORMAL
DEPRECATED ROACH IGE RAST QL: NORMAL
DEPRECATED S ROSTRATA IGE RAST QL: ABNORMAL
DEPRECATED SALTWORT IGE RAST QL: ABNORMAL
DEPRECATED SILVER BIRCH IGE RAST QL: ABNORMAL
DEPRECATED TIMOTHY IGE RAST QL: ABNORMAL
DEPRECATED WEST RAGWEED IGE RAST QL: NORMAL
DEPRECATED WHITE OAK IGE RAST QL: ABNORMAL
DEPRECATED WILLOW IGE RAST QL: ABNORMAL
DOG DANDER IGE QN: <0.35 KU/L
ELDER IGE QN: <0.35 KU/L
ENGL PLANTAIN IGE QN: <0.35 KU/L
HORSE DANDER IGE QN: <0.35 KU/L
JOHNSON GRASS IGE QN: <0.35 KU/L
LONDON PLANE IGE QN: <0.35 KU/L
MUGWORT IGE QN: <0.35 KU/L
P NOTATUM IGE QN: <0.35 KU/L
PECAN/HICK TREE IGE QN: 0.37 KU/L
ROACH IGE QN: <0.35 KU/L
S ROSTRATA IGE QN: 0.9 KU/L
SALTWORT IGE QN: 0.37 KU/L
SILVER BIRCH IGE QN: 0.38 KU/L
TIMOTHY IGE QN: 0.92 KU/L
WALNUT TREE CLASS: ABNORMAL
WEST RAGWEED IGE QN: <0.35 KU/L
WHITE OAK IGE QN: 0.9 KU/L
WHITE PINE CLASS: NORMAL
WILLOW IGE QN: 0.49 KU/L

## 2019-04-09 ENCOUNTER — PATIENT MESSAGE (OUTPATIENT)
Dept: ALLERGY | Facility: CLINIC | Age: 59
End: 2019-04-09

## 2019-04-11 ENCOUNTER — TELEPHONE (OUTPATIENT)
Dept: NEUROSURGERY | Facility: CLINIC | Age: 59
End: 2019-04-11

## 2019-04-11 NOTE — TELEPHONE ENCOUNTER
----- Message from Susie Palmer sent at 4/11/2019 12:45 PM CDT -----  Contact: Pt. [phone]  Needs Advice    Reason for call: Please contact the patient if imaging is needed        Communication Preference:    Additional Information:

## 2019-04-17 PROBLEM — E78.5 HYPERLIPIDEMIA: Status: ACTIVE | Noted: 2019-04-17

## 2019-04-17 NOTE — PROGRESS NOTES
Subjective:       Patient ID: Carmen Son is a 58 y.o. female.    Chief Complaint:   Chief Complaint   Patient presents with    Medication Problem     discuss medications        Hypertension   This is a chronic problem. The current episode started more than 1 year ago. The problem is unchanged. Pertinent negatives include no anxiety, chest pain, malaise/fatigue, orthopnea, palpitations, peripheral edema, shortness of breath or sweats. There are no associated agents to hypertension. Risk factors for coronary artery disease include obesity. Past treatments include angiotensin blockers, beta blockers and diuretics. The current treatment provides significant improvement. There is no history of angina, kidney disease, CAD/MI, CVA, heart failure, left ventricular hypertrophy, PVD or retinopathy. There is no history of chronic renal disease.   Hyperlipidemia   This is a chronic problem. The problem is uncontrolled. Recent lipid tests were reviewed and are high. Exacerbating diseases include obesity. She has no history of chronic renal disease, diabetes, hypothyroidism, liver disease or nephrotic syndrome. Pertinent negatives include no chest pain, focal sensory loss, focal weakness, leg pain or shortness of breath. Current antihyperlipidemic treatment includes diet change and statins.   Gastroesophageal Reflux   She complains of abdominal pain and belching. She reports no chest pain, no choking, no coughing, no dysphagia, no early satiety, no globus sensation, no heartburn, no hoarse voice, no nausea, no sore throat, no stridor, no tooth decay, no water brash or no wheezing. This is a chronic problem. The current episode started more than 1 year ago. The problem occurs frequently. The problem has been waxing and waning. The symptoms are aggravated by certain foods. Pertinent negatives include no anemia, fatigue, melena, muscle weakness, orthopnea or weight loss. There are no known risk factors. She has tried a  PPI (Needs a different PPi that is covered on her insurance. ) for the symptoms. The treatment provided significant relief.     Review of Systems   Constitutional: Negative for activity change, appetite change, chills, fatigue, malaise/fatigue and weight loss.   HENT: Negative for ear discharge, hoarse voice, sore throat and trouble swallowing.    Eyes: Negative for photophobia, pain, redness, itching and visual disturbance.   Respiratory: Negative for cough, choking, chest tightness, shortness of breath and wheezing.    Cardiovascular: Negative for chest pain, palpitations, orthopnea and leg swelling.   Gastrointestinal: Positive for abdominal pain. Negative for abdominal distention, blood in stool, dysphagia, heartburn, melena and nausea.   Genitourinary: Negative for pelvic pain, vaginal bleeding, vaginal discharge and vaginal pain.   Musculoskeletal: Negative for arthralgias, back pain, gait problem and muscle weakness.   Skin: Negative for color change and pallor.   Neurological: Negative for dizziness, tremors, focal weakness, weakness, light-headedness and numbness.   Psychiatric/Behavioral: Negative for agitation, behavioral problems, confusion and sleep disturbance.       Past Medical History:   Diagnosis Date    Diabetes mellitus     Diabetes mellitus, type 2     Diet controlled gestational diabetes mellitus in puerperium     History of seasonal allergies     Hypertension      Social History     Socioeconomic History    Marital status:      Spouse name: Not on file    Number of children: Not on file    Years of education: Not on file    Highest education level: Not on file   Occupational History    Not on file   Social Needs    Financial resource strain: Not on file    Food insecurity:     Worry: Not on file     Inability: Not on file    Transportation needs:     Medical: Not on file     Non-medical: Not on file   Tobacco Use    Smoking status: Never Smoker    Smokeless tobacco: Never  "Used   Substance and Sexual Activity    Alcohol use: No    Drug use: No    Sexual activity: Yes     Partners: Male     Birth control/protection: Surgical   Lifestyle    Physical activity:     Days per week: Not on file     Minutes per session: Not on file    Stress: Not on file   Relationships    Social connections:     Talks on phone: Not on file     Gets together: Not on file     Attends Restorationist service: Not on file     Active member of club or organization: Not on file     Attends meetings of clubs or organizations: Not on file     Relationship status: Not on file   Other Topics Concern    Not on file   Social History Narrative    Not on file       Objective:     /80 (BP Location: Left arm, Patient Position: Sitting)   Pulse 68   Temp 98.2 °F (36.8 °C) (Oral)   Ht 5' 3" (1.6 m)   Wt 96.1 kg (211 lb 13.8 oz)   LMP 04/07/2013   SpO2 99%   BMI 37.53 kg/m²     Physical Exam   Constitutional: She appears well-developed and well-nourished.   HENT:   Head: Normocephalic.   Eyes: Conjunctivae are normal.   Neck: Normal range of motion. Neck supple.   Cardiovascular: Normal rate, regular rhythm and normal heart sounds.   Pulmonary/Chest: Effort normal and breath sounds normal.   Neurological: She is alert.   Skin: Skin is warm and dry.   Psychiatric: Her behavior is normal.       Assessment:       1. Hyperlipidemia, unspecified hyperlipidemia type    2. Essential hypertension        Plan:       Hyperlipidemia, unspecified hyperlipidemia type    Essential hypertension  -     CBC auto differential; Future; Expected date: 04/18/2019    Gastroesophageal reflux disease, esophagitis presence not specified  -     esomeprazole (NEXIUM PACKET) 40 mg GrPS; Take 40 mg by mouth before breakfast.  Dispense: 1200 mg; Refill: 11    Vitamin D deficiency  -     Vitamin D; Future; Expected date: 07/18/2019    Screening mammogram, encounter for  -     Mammo Digital Screening Bilateral With CAD; Future; Expected date: " 04/18/2019

## 2019-04-18 ENCOUNTER — LAB VISIT (OUTPATIENT)
Dept: LAB | Facility: HOSPITAL | Age: 59
End: 2019-04-18
Attending: FAMILY MEDICINE
Payer: COMMERCIAL

## 2019-04-18 ENCOUNTER — OFFICE VISIT (OUTPATIENT)
Dept: FAMILY MEDICINE | Facility: CLINIC | Age: 59
End: 2019-04-18
Payer: COMMERCIAL

## 2019-04-18 VITALS
OXYGEN SATURATION: 99 % | TEMPERATURE: 98 F | HEART RATE: 68 BPM | BODY MASS INDEX: 37.54 KG/M2 | WEIGHT: 211.88 LBS | HEIGHT: 63 IN | DIASTOLIC BLOOD PRESSURE: 80 MMHG | SYSTOLIC BLOOD PRESSURE: 112 MMHG

## 2019-04-18 DIAGNOSIS — I10 ESSENTIAL HYPERTENSION: ICD-10-CM

## 2019-04-18 DIAGNOSIS — E55.9 VITAMIN D DEFICIENCY: ICD-10-CM

## 2019-04-18 DIAGNOSIS — E78.5 HYPERLIPIDEMIA, UNSPECIFIED HYPERLIPIDEMIA TYPE: Primary | ICD-10-CM

## 2019-04-18 DIAGNOSIS — K21.9 GASTROESOPHAGEAL REFLUX DISEASE, ESOPHAGITIS PRESENCE NOT SPECIFIED: ICD-10-CM

## 2019-04-18 DIAGNOSIS — Z12.31 SCREENING MAMMOGRAM, ENCOUNTER FOR: ICD-10-CM

## 2019-04-18 LAB
25(OH)D3+25(OH)D2 SERPL-MCNC: 23 NG/ML (ref 30–96)
BASOPHILS # BLD AUTO: 0.02 K/UL (ref 0–0.2)
BASOPHILS NFR BLD: 0.4 % (ref 0–1.9)
DIFFERENTIAL METHOD: ABNORMAL
EOSINOPHIL # BLD AUTO: 0.1 K/UL (ref 0–0.5)
EOSINOPHIL NFR BLD: 2.4 % (ref 0–8)
ERYTHROCYTE [DISTWIDTH] IN BLOOD BY AUTOMATED COUNT: 15.7 % (ref 11.5–14.5)
HCT VFR BLD AUTO: 41.2 % (ref 37–48.5)
HGB BLD-MCNC: 12.7 G/DL (ref 12–16)
LYMPHOCYTES # BLD AUTO: 2.2 K/UL (ref 1–4.8)
LYMPHOCYTES NFR BLD: 46.6 % (ref 18–48)
MCH RBC QN AUTO: 24.9 PG (ref 27–31)
MCHC RBC AUTO-ENTMCNC: 30.8 G/DL (ref 32–36)
MCV RBC AUTO: 81 FL (ref 82–98)
MONOCYTES # BLD AUTO: 0.3 K/UL (ref 0.3–1)
MONOCYTES NFR BLD: 6 % (ref 4–15)
NEUTROPHILS # BLD AUTO: 2.1 K/UL (ref 1.8–7.7)
NEUTROPHILS NFR BLD: 44.4 % (ref 38–73)
PLATELET # BLD AUTO: 249 K/UL (ref 150–350)
PMV BLD AUTO: 10.8 FL (ref 9.2–12.9)
RBC # BLD AUTO: 5.11 M/UL (ref 4–5.4)
WBC # BLD AUTO: 4.66 K/UL (ref 3.9–12.7)

## 2019-04-18 PROCEDURE — 3079F PR MOST RECENT DIASTOLIC BLOOD PRESSURE 80-89 MM HG: ICD-10-PCS | Mod: CPTII,S$GLB,, | Performed by: FAMILY MEDICINE

## 2019-04-18 PROCEDURE — 99214 OFFICE O/P EST MOD 30 MIN: CPT | Mod: S$GLB,,, | Performed by: FAMILY MEDICINE

## 2019-04-18 PROCEDURE — 82306 VITAMIN D 25 HYDROXY: CPT | Mod: PO

## 2019-04-18 PROCEDURE — 3008F BODY MASS INDEX DOCD: CPT | Mod: CPTII,S$GLB,, | Performed by: FAMILY MEDICINE

## 2019-04-18 PROCEDURE — 3079F DIAST BP 80-89 MM HG: CPT | Mod: CPTII,S$GLB,, | Performed by: FAMILY MEDICINE

## 2019-04-18 PROCEDURE — 3074F PR MOST RECENT SYSTOLIC BLOOD PRESSURE < 130 MM HG: ICD-10-PCS | Mod: CPTII,S$GLB,, | Performed by: FAMILY MEDICINE

## 2019-04-18 PROCEDURE — 99214 PR OFFICE/OUTPT VISIT, EST, LEVL IV, 30-39 MIN: ICD-10-PCS | Mod: S$GLB,,, | Performed by: FAMILY MEDICINE

## 2019-04-18 PROCEDURE — 36415 COLL VENOUS BLD VENIPUNCTURE: CPT | Mod: PO

## 2019-04-18 PROCEDURE — 85025 COMPLETE CBC W/AUTO DIFF WBC: CPT | Mod: PO

## 2019-04-18 PROCEDURE — 3074F SYST BP LT 130 MM HG: CPT | Mod: CPTII,S$GLB,, | Performed by: FAMILY MEDICINE

## 2019-04-18 PROCEDURE — 3008F PR BODY MASS INDEX (BMI) DOCUMENTED: ICD-10-PCS | Mod: CPTII,S$GLB,, | Performed by: FAMILY MEDICINE

## 2019-04-18 RX ORDER — METHOCARBAMOL 750 MG/1
750 TABLET, FILM COATED ORAL
COMMUNITY
End: 2019-09-10

## 2019-04-18 RX ORDER — VIT C/E/ZN/COPPR/LUTEIN/ZEAXAN 250MG-90MG
CAPSULE ORAL
COMMUNITY
Start: 2019-04-02 | End: 2019-10-10 | Stop reason: ALTCHOICE

## 2019-04-18 RX ORDER — CELECOXIB 200 MG/1
CAPSULE ORAL
Refills: 5 | COMMUNITY
Start: 2019-03-20 | End: 2019-12-10 | Stop reason: SDUPTHER

## 2019-04-18 RX ORDER — ESOMEPRAZOLE MAGNESIUM 40 MG/1
40 GRANULE, DELAYED RELEASE ORAL
Qty: 1200 MG | Refills: 11 | Status: SHIPPED | OUTPATIENT
Start: 2019-04-18 | End: 2020-03-02 | Stop reason: CLARIF

## 2019-04-22 ENCOUNTER — TELEPHONE (OUTPATIENT)
Dept: NEUROSURGERY | Facility: CLINIC | Age: 59
End: 2019-04-22

## 2019-04-22 NOTE — TELEPHONE ENCOUNTER
----- Message from Ramona Cintron MA sent at 4/22/2019 10:59 AM CDT -----  Contact: self      ----- Message -----  From: Cj Rosales  Sent: 4/22/2019  10:57 AM  To: Corrie STEINER Staff    Type:  Patient Returning Call    Who Called:Pt  Who Left Message for Patient: Herlinda- nurse  Does the patient know what this is regarding?:   Would the patient rather a call back or a response via [facility]? Call back  Best Call Back Number: [phone]  Additional Information: Pt would like Herlinda to give her a call back

## 2019-04-23 ENCOUNTER — PATIENT MESSAGE (OUTPATIENT)
Dept: FAMILY MEDICINE | Facility: CLINIC | Age: 59
End: 2019-04-23

## 2019-05-02 ENCOUNTER — TELEPHONE (OUTPATIENT)
Dept: FAMILY MEDICINE | Facility: CLINIC | Age: 59
End: 2019-05-02

## 2019-05-02 NOTE — TELEPHONE ENCOUNTER
----- Message from Laura Foley sent at 5/2/2019  8:28 AM CDT -----  Contact: [phone]/self  Patient is requesting a call back regarding lab results. thanks

## 2019-05-02 NOTE — TELEPHONE ENCOUNTER
CBC was fine.  No anemia present.  Vitamin D was low.  I recommended an OTC vitamin D supplement daily.

## 2019-05-14 ENCOUNTER — TELEPHONE (OUTPATIENT)
Dept: NEUROSURGERY | Facility: CLINIC | Age: 59
End: 2019-05-14

## 2019-05-14 DIAGNOSIS — M51.36 DEGENERATION OF LUMBAR INTERVERTEBRAL DISC: ICD-10-CM

## 2019-05-14 DIAGNOSIS — M50.90 CERVICAL DISC DISEASE: ICD-10-CM

## 2019-05-14 DIAGNOSIS — M48.02 SPINAL STENOSIS IN CERVICAL REGION: ICD-10-CM

## 2019-05-14 DIAGNOSIS — Z98.890 S/P CERVICAL DISC REPLACEMENT: Primary | ICD-10-CM

## 2019-05-14 NOTE — TELEPHONE ENCOUNTER
----- Message from Ramona Cintron MA sent at 5/14/2019 12:51 PM CDT -----  Contact: Patient @ [phone]      ----- Message -----  From: Paulino Goyal  Sent: 5/14/2019  12:13 PM  To: Corrie STEINER Staff    Patient requesting a return call from [facility] about getting a scrpt for therapy on neck and back, pls call

## 2019-05-14 NOTE — TELEPHONE ENCOUNTER
----- Message from Jin Lee sent at 5/14/2019  3:49 PM CDT -----  Contact: pt @ [phone]  Pt is asking to speak w/ you about getting order sent [facility]

## 2019-05-14 NOTE — TELEPHONE ENCOUNTER
----- Message from Ramona Cintron MA sent at 5/14/2019 12:51 PM CDT -----  Contact: Patient @ [phone]      ----- Message -----  From: Paulino Goyal  Sent: 5/14/2019  12:13 PM  To: Corrie STEINER Staff    Patient requesting a return call from [facility] about getting a scrpt for therapy on neck and back, pls call

## 2019-06-25 ENCOUNTER — OFFICE VISIT (OUTPATIENT)
Dept: OBSTETRICS AND GYNECOLOGY | Facility: CLINIC | Age: 59
End: 2019-06-25
Payer: COMMERCIAL

## 2019-06-25 VITALS
HEIGHT: 63 IN | BODY MASS INDEX: 38.64 KG/M2 | WEIGHT: 218.06 LBS | DIASTOLIC BLOOD PRESSURE: 66 MMHG | SYSTOLIC BLOOD PRESSURE: 144 MMHG

## 2019-06-25 DIAGNOSIS — Z01.419 VISIT FOR GYNECOLOGIC EXAMINATION: Primary | ICD-10-CM

## 2019-06-25 DIAGNOSIS — R23.2 VASOMOTOR FLUSHING: ICD-10-CM

## 2019-06-25 DIAGNOSIS — N95.9 MENOPAUSAL AND PERIMENOPAUSAL DISORDER: ICD-10-CM

## 2019-06-25 PROCEDURE — 3078F PR MOST RECENT DIASTOLIC BLOOD PRESSURE < 80 MM HG: ICD-10-PCS | Mod: CPTII,S$GLB,, | Performed by: OBSTETRICS & GYNECOLOGY

## 2019-06-25 PROCEDURE — 3077F SYST BP >= 140 MM HG: CPT | Mod: CPTII,S$GLB,, | Performed by: OBSTETRICS & GYNECOLOGY

## 2019-06-25 PROCEDURE — 3077F PR MOST RECENT SYSTOLIC BLOOD PRESSURE >= 140 MM HG: ICD-10-PCS | Mod: CPTII,S$GLB,, | Performed by: OBSTETRICS & GYNECOLOGY

## 2019-06-25 PROCEDURE — 99999 PR PBB SHADOW E&M-EST. PATIENT-LVL III: CPT | Mod: PBBFAC,,, | Performed by: OBSTETRICS & GYNECOLOGY

## 2019-06-25 PROCEDURE — 99386 PR PREVENTIVE VISIT,NEW,40-64: ICD-10-PCS | Mod: S$GLB,,, | Performed by: OBSTETRICS & GYNECOLOGY

## 2019-06-25 PROCEDURE — 3078F DIAST BP <80 MM HG: CPT | Mod: CPTII,S$GLB,, | Performed by: OBSTETRICS & GYNECOLOGY

## 2019-06-25 PROCEDURE — 99999 PR PBB SHADOW E&M-EST. PATIENT-LVL III: ICD-10-PCS | Mod: PBBFAC,,, | Performed by: OBSTETRICS & GYNECOLOGY

## 2019-06-25 PROCEDURE — 99386 PREV VISIT NEW AGE 40-64: CPT | Mod: S$GLB,,, | Performed by: OBSTETRICS & GYNECOLOGY

## 2019-06-25 RX ORDER — AZELASTINE 1 MG/ML
SPRAY, METERED NASAL
COMMUNITY
End: 2020-02-18 | Stop reason: SDUPTHER

## 2019-06-25 RX ORDER — PROPRANOLOL HYDROCHLORIDE 160 MG/1
CAPSULE, EXTENDED RELEASE ORAL
Refills: 13 | COMMUNITY
Start: 2019-03-24 | End: 2019-07-09

## 2019-06-25 RX ORDER — LOSARTAN POTASSIUM 50 MG/1
TABLET ORAL
COMMUNITY
Start: 2019-05-09 | End: 2019-09-10

## 2019-06-25 RX ORDER — ESOMEPRAZOLE MAGNESIUM 40 MG/1
GRANULE, DELAYED RELEASE ORAL
COMMUNITY
End: 2019-09-10

## 2019-06-25 RX ORDER — MINERAL OIL
ENEMA (ML) RECTAL
COMMUNITY
End: 2019-09-10

## 2019-06-25 NOTE — PROGRESS NOTES
HISTORY OF PRESENT ILLNESS:    Carmen Son is a 59 y.o. female, No obstetric history on file.  presents today for her routine visit and has no complaints.  NEW PATIENT, ESTAB CARE.  DISCUSSED PAP SMEAR SCREENING PROTOCOL AND HAS MAMMO ORDERED, GENERALLY PREF DIS.  HAS SOME HOT FLUSHES 2/DAY AND MOST NIGHTS - DISCUSSED MENOPAUSAL TRANSITION AND REF TO ACOG LITERATURE.  DISCUSSED MEASURES FOR HOT FLUSHES, WILL TRY PHYTOESTROGENS AND CALL IF NEEDED FOR SOMETHING HORMONAL.  IN PAST DID WELL WITH [facility]?!  IF ERT NECESSARY, WOULD PREFER PILL BECAUSE [place]ARD[place] IN THE HEAT    Past Medical History:   Diagnosis Date    Diabetes mellitus     Diabetes mellitus, type 2     Diet controlled gestational diabetes mellitus in puerperium     History of seasonal allergies     Hypertension        Past Surgical History:   Procedure Laterality Date    DISCECTOMY, SPINE, CERVICAL, ANTERIOR APPROACH, WITH FUSION C3-4, C4-5 N/A 9/17/2018    Performed by Leo Denton [facility] at [facility] OR 2ND FLR    HYSTERECTOMY      2011 [facility], R SALPINGECT [facility]    HYSTERECTOMY, TOTAL, ABDOMINAL N/A 4/23/2013    Performed by Sridhar Ngo MD at [facility] OR    INJECTION-STEROID-EPIDURAL-CAUDAL N/A 5/8/2018    Performed by Lani Toledo [facility] at [facility] PAIN MGT    right knee scope      SALPINGECTOMY Right 4/23/2013    Performed by Sridhar Ngo MD at [facility] OR    SALPINGO-OOPHORECTOMY Left 4/23/2013    Performed by Sridhar Ngo MD at [facility] OR       MEDICATIONS AND ALLERGIES:      Current Outpatient Medications:     aspirin 81 MG Chew, Take 1 tablet (81 mg total) by mouth once daily. May resume in 2 weeks post op, Disp: , Rfl: 0    azelastine (ASTELIN) 137 mcg (0.1 %) nasal spray, azelastine 137 mcg (0.1 %) nasal spray aerosol, Disp: , Rfl:     celecoxib (CELEBREX) 200 MG capsule, , Disp: , Rfl: 5    chlorpheniramine (CHLOR-TRIMETON) 4 mg tablet, Take 4 mg by mouth every 6 (six) hours as needed for Allergies., Disp:  , Rfl:     cholecalciferol, vitamin D3, (VITAMIN D3) 1,000 unit capsule, , Disp: , Rfl:     esomeprazole (NEXIUM PACKET) 40 mg GrPS, Take 40 mg by mouth before breakfast., Disp: 1200 mg, Rfl: 11    esomeprazole (NEXIUM PACKET) 40 mg GrPS, Nexium Packet 40 mg granules delayed release for susp, Disp: , Rfl:     fexofenadine (ALLEGRA) 180 MG tablet, Take 1 tablet (180 mg total) by mouth once daily., Disp: 30 tablet, Rfl: 12    fexofenadine (ALLEGRA) 180 MG tablet, Allergy Relief (fexofenadine) 180 mg tablet, Disp: , Rfl:     furosemide (LASIX) 20 MG tablet, Take 20 mg by mouth daily as needed. , Disp: , Rfl:     linaclotide (LINZESS) 145 mcg Cap capsule, Take by mouth., Disp: , Rfl:     losartan (COZAAR) 50 MG tablet, Take 1 tablet (50 mg total) by mouth once daily., Disp: 90 tablet, Rfl: 3    losartan (COZAAR) 50 MG tablet, Take by mouth., Disp: , Rfl:     methocarbamol (ROBAXIN) 750 MG Tab, Take 750 mg by mouth., Disp: , Rfl:     olopatadine (PATANOL) 0.1 % ophthalmic solution, Place 1 drop into both eyes 2 (two) times daily as needed for Allergies., Disp: 5 mL, Rfl: 12    ondansetron (ZOFRAN-ODT) 4 MG TbDL, Take 1 tablet (4 mg total) by mouth every 8 (eight) hours as needed., Disp: 24 tablet, Rfl: 0    potassium chloride (KLOR-CON) 20 mEq Pack, Take 20 mEq by mouth once., Disp: , Rfl:     PRENATAL VIT 10-IRON-FOLIC-DHA ORAL, Take by mouth., Disp: , Rfl:     propranolol (INDERAL LA) 160 mg 24 hr capsule, , Disp: , Rfl: 13    propranolol (INDERAL) 80 MG tablet, Take 160 mg by mouth once daily. , Disp: , Rfl:     rosuvastatin (CRESTOR) 10 MG tablet, Take 1 tablet (10 mg total) by mouth once daily., Disp: 90 tablet, Rfl: 3    Review of patient's allergies indicates:  No Known Allergies    Family History   Problem Relation Age of Onset    Diabetes type II Sister     Hyperlipidemia Sister     Diabetes type II Other     Allergic rhinitis Neg Hx     Allergies Neg Hx     Angioedema Neg Hx     Asthma  Neg Hx     Atopy Neg Hx     Eczema Neg Hx     Rhinitis Neg Hx     Urticaria Neg Hx     Immunodeficiency Neg Hx        Social History     Socioeconomic History    Marital status:      Spouse name: Not on file    Number of children: Not on file    Years of education: Not on file    Highest education level: Not on file   Occupational History    Not on file   Social Needs    Financial resource strain: Not on file    Food insecurity:     Worry: Not on file     Inability: Not on file    Transportation needs:     Medical: Not on file     Non-medical: Not on file   Tobacco Use    Smoking status: Never Smoker    Smokeless tobacco: Never Used   Substance and Sexual Activity    Alcohol use: No    Drug use: No    Sexual activity: Yes     Partners: Male     Birth control/protection: Surgical   Lifestyle    Physical activity:     Days per week: Not on file     Minutes per session: Not on file    Stress: Not on file   Relationships    Social connections:     Talks on phone: Not on file     Gets together: Not on file     Attends [employer] service: Not on file     Active member of club or organization: Not on file     Attends meetings of clubs or organizations: Not on file     Relationship status: Not on file   Other Topics Concern    Not on file   Social History Narrative    Not on file       COMPREHENSIVE GYN HISTORY:  PAP History: Denies abnormal Paps except as noted above.  Infection History: Denies STDs. Denies PID.  Benign History: Denies uterine fibroids. Denies ovarian cysts. Denies endometriosis. Denies other conditions.  Cancer History: Denies cervical cancer. Denies uterine cancer or hyperplasia. Denies ovarian cancer. Denies vulvar cancer or pre-cancer. Denies vaginal cancer or pre-cancer. Denies breast cancer. Denies colon cancer.  Menstrual History: Denies menses.     ROS:  GENERAL: No weight changes. No swelling. No fatigue. No fever.  CARDIOVASCULAR: No chest pain. No shortness of  "breath. No leg cramps.   NEUROLOGICAL: No headaches. No vision changes.  BREASTS: No pain. No lumps. No discharge.  ABDOMEN: No pain. No nausea. No vomiting. No diarrhea. No constipation.  REPRODUCTIVE: No abnormal bleeding.  VULVA: No pain. No lesions. No itching.  VAGINA: No relaxation. No itching. No odor. No discharge. No lesions.  URINARY: No incontinence. No nocturia. No frequency. No dysuria.    BP (!) 144/66   Ht 5' 3" (1.6 m)   Wt 98.9 kg (218 lb 0.6 oz)   LMP 04/07/2013   BMI 38.62 kg/m²     PE:  APPEARANCE: Well nourished, well developed, in no acute distress.  AFFECT: WNL, alert and oriented x 3.  SKIN: No acne or hirsutism.  NECK: Neck symmetric without masses or thyromegaly.  NODES: No inguinal, cervical, axillary or femoral lymph node enlargement.  CHEST: Good respiratory effort.   ABDOMEN: Soft. No tenderness or masses. No hepatosplenomegaly. No hernias.  BREASTS: Symmetrical, no skin changes or visible lesions. No palpable masses, nipple discharge bilaterally.  PELVIC: ATROPHIC EXTERNAL FEMALE GENITALIA without lesions. Normal hair distribution. Adequate perineal body, normal urethral meatus. VAGINA DRY without lesions or discharge. No significant cystocele or rectocele. Bimanual exam shows CERVIX and UTERUS to be SURGICALLY ABSENT. Adnexa without masses or tenderness.  EXTREMITIES: No edema.    DIAGNOSIS:  1. Visit for gynecologic examination     2. Menopausal and perimenopausal disorder     3. Vasomotor flushing             MEDICATIONS PRESCRIBED:    COUNSELING:  The patient was counseled today on osteoporosis prevention, calcium supplementation, and regular weight bearing exercise. The patient was also counseled today on ACS PAP guidelines, with recommendations for yearly pelvic exams unless their uterus, cervix, and ovaries were removed for benign reasons; in that case, examinations every 3-5 years are recommended.  The patient was also counseled regarding monthly breast self-examination, " routine STD screening for at-risk populations, prophylactic immunizations for transmitted infections such as  HPV, Pertussis, or Influenza as appropriate, and yearly mammograms when indicated by ACS guidelines.  She was advised to see her primary care physician for all other health maintenance.  FOLLOW-UP with me for next routine visit.

## 2019-07-09 ENCOUNTER — OFFICE VISIT (OUTPATIENT)
Dept: NEUROSURGERY | Facility: CLINIC | Age: 59
End: 2019-07-09
Payer: COMMERCIAL

## 2019-07-09 VITALS
TEMPERATURE: 97 F | WEIGHT: 218 LBS | DIASTOLIC BLOOD PRESSURE: 83 MMHG | HEART RATE: 74 BPM | BODY MASS INDEX: 38.62 KG/M2 | HEIGHT: 63 IN | SYSTOLIC BLOOD PRESSURE: 119 MMHG

## 2019-07-09 DIAGNOSIS — M54.16 LUMBAR RADICULOPATHY: ICD-10-CM

## 2019-07-09 DIAGNOSIS — M48.062 SPINAL STENOSIS OF LUMBAR REGION WITH NEUROGENIC CLAUDICATION: Primary | ICD-10-CM

## 2019-07-09 DIAGNOSIS — Z98.1 S/P CERVICAL SPINAL FUSION: ICD-10-CM

## 2019-07-09 PROCEDURE — 3079F DIAST BP 80-89 MM HG: CPT | Mod: CPTII,S$GLB,, | Performed by: NEUROLOGICAL SURGERY

## 2019-07-09 PROCEDURE — 3074F PR MOST RECENT SYSTOLIC BLOOD PRESSURE < 130 MM HG: ICD-10-PCS | Mod: CPTII,S$GLB,, | Performed by: NEUROLOGICAL SURGERY

## 2019-07-09 PROCEDURE — 3008F BODY MASS INDEX DOCD: CPT | Mod: CPTII,S$GLB,, | Performed by: NEUROLOGICAL SURGERY

## 2019-07-09 PROCEDURE — 3074F SYST BP LT 130 MM HG: CPT | Mod: CPTII,S$GLB,, | Performed by: NEUROLOGICAL SURGERY

## 2019-07-09 PROCEDURE — 99214 OFFICE O/P EST MOD 30 MIN: CPT | Mod: S$GLB,,, | Performed by: NEUROLOGICAL SURGERY

## 2019-07-09 PROCEDURE — 99214 PR OFFICE/OUTPT VISIT, EST, LEVL IV, 30-39 MIN: ICD-10-PCS | Mod: S$GLB,,, | Performed by: NEUROLOGICAL SURGERY

## 2019-07-09 PROCEDURE — 99999 PR PBB SHADOW E&M-EST. PATIENT-LVL III: CPT | Mod: PBBFAC,,, | Performed by: NEUROLOGICAL SURGERY

## 2019-07-09 PROCEDURE — 3008F PR BODY MASS INDEX (BMI) DOCUMENTED: ICD-10-PCS | Mod: CPTII,S$GLB,, | Performed by: NEUROLOGICAL SURGERY

## 2019-07-09 PROCEDURE — 3079F PR MOST RECENT DIASTOLIC BLOOD PRESSURE 80-89 MM HG: ICD-10-PCS | Mod: CPTII,S$GLB,, | Performed by: NEUROLOGICAL SURGERY

## 2019-07-09 PROCEDURE — 99999 PR PBB SHADOW E&M-EST. PATIENT-LVL III: ICD-10-PCS | Mod: PBBFAC,,, | Performed by: NEUROLOGICAL SURGERY

## 2019-07-09 RX ORDER — PROPRANOLOL HYDROCHLORIDE 160 MG/1
CAPSULE, EXTENDED RELEASE ORAL
COMMUNITY
End: 2019-09-20 | Stop reason: SDUPTHER

## 2019-07-09 NOTE — PROGRESS NOTES
Ms. Son comes back to see me for follow-up after my last evaluation of her on 11/20/2018.  She is a patient who had history of C3-C4 and C4-C5 ACDF in September 2018 last time we saw are she still had some symptoms reluctant for an epidural injection and physical therapy.  She is supposed to see us back with a follow-up CT scan of the cervical spine.    From the cervical spine prospective she has done much better after last evaluation.  Her neck pain is significantly improved.  And she denies any radiculopathy.  She still has some pain with range of motion but no clinical signs of myelopathy or radiculopathy.  Of note she now complains of significant increasing low back pain with left-sided radiculopathy with pain radiating into the thigh and into the left foot.  She has had a known history lumbar stenosis but opted to address the cervical spine 1st.    On exam she is awake alert and appropriate.  Her cranial nerves are intact.  She has no Stover's and clonus and she has full strength in both upper lower extremities.  She does have a positive straight leg raise than left.  She does have some signs of radiculopathy going down the left leg.    She did not get her updated imaging of the CT scan of the cervical spine.    Assessment plan patient history of 2 level ACDF.  She is doing very well.  I would want to get a final CT scan of cervical spine to evaluate the fusion.  Now that she is more symptomatic from the lower back in lumbar region either with unique an updated MRI scan of lumbar spine CT scan to evaluate whether we need to decompress her lumbar spine as well.

## 2019-07-15 ENCOUNTER — TELEPHONE (OUTPATIENT)
Dept: NEUROSURGERY | Facility: CLINIC | Age: 59
End: 2019-07-15

## 2019-07-15 DIAGNOSIS — M54.16 LUMBAR RADICULOPATHY: Primary | ICD-10-CM

## 2019-07-15 DIAGNOSIS — M48.061 SPINAL STENOSIS AT L4-L5 LEVEL: ICD-10-CM

## 2019-07-15 DIAGNOSIS — M54.16 LUMBAR RADICULOPATHY, CHRONIC: ICD-10-CM

## 2019-07-15 NOTE — TELEPHONE ENCOUNTER
Mailed the Pt next upcoming F/U with  on 11/19/19 @1:00pm scans for MRI L Spine &CT L-Spine also same day starts @10:30am &11:00am   to current address on file

## 2019-07-17 ENCOUNTER — TELEPHONE (OUTPATIENT)
Dept: NEUROSURGERY | Facility: CLINIC | Age: 59
End: 2019-07-17

## 2019-07-17 NOTE — TELEPHONE ENCOUNTER
"Sw the Pt she is requesting something for a her pain she did not state a specific type of pain Rx but is asking she is requesting something for the the pain in the" groin and left thigh area " I explained that I will give the request to nurse Pate for the next directive and will call her back with the status of request  Pt understood the stated   "

## 2019-07-17 NOTE — TELEPHONE ENCOUNTER
----- Message from Amna Morataya sent at 7/17/2019 12:47 PM CDT -----  Contact: self  Pt would like to speak with the nurse regarding her back pain.  She can be reached at [phone]

## 2019-08-06 ENCOUNTER — TELEPHONE (OUTPATIENT)
Dept: ALLERGY | Facility: CLINIC | Age: 59
End: 2019-08-06

## 2019-08-06 NOTE — TELEPHONE ENCOUNTER
Portal message was sent in April  Please read to her  Ms Huy,   Your labs show you are allergic to one weed, trees, mold, grass and cat. How are you doing on current medicines? If not better we can consider restarting allergy shots. Please let me know any questions you have   Dr Moreno

## 2019-08-06 NOTE — TELEPHONE ENCOUNTER
----- Message from Hope Carolina LPN sent at 8/6/2019 10:04 AM CDT -----  Contact: patient  Please advise.     ----- Message -----  From: Rochelle Son  Sent: 8/6/2019   9:40 AM  To: Josh NAVARRO Staff    Please call above patient at [phone] need test results from blood work waiting on a call from the nurse thanks

## 2019-08-06 NOTE — TELEPHONE ENCOUNTER
Left message telling pt:     Portal message was sent in April  Please read to her  Ms Huy,   Your labs show you are allergic to one weed, trees, mold, grass and cat. How are you doing on current medicines? If not better we can consider restarting allergy shots. Please let me know any questions you have   Dr Moreno

## 2019-09-10 ENCOUNTER — OFFICE VISIT (OUTPATIENT)
Dept: FAMILY MEDICINE | Facility: CLINIC | Age: 59
End: 2019-09-10
Payer: COMMERCIAL

## 2019-09-10 VITALS
BODY MASS INDEX: 39.75 KG/M2 | OXYGEN SATURATION: 97 % | DIASTOLIC BLOOD PRESSURE: 70 MMHG | HEART RATE: 85 BPM | WEIGHT: 224.31 LBS | TEMPERATURE: 98 F | SYSTOLIC BLOOD PRESSURE: 110 MMHG | HEIGHT: 63 IN

## 2019-09-10 DIAGNOSIS — I10 ESSENTIAL HYPERTENSION: Primary | ICD-10-CM

## 2019-09-10 DIAGNOSIS — E78.5 HYPERLIPIDEMIA, UNSPECIFIED HYPERLIPIDEMIA TYPE: ICD-10-CM

## 2019-09-10 DIAGNOSIS — J01.00 ACUTE MAXILLARY SINUSITIS, RECURRENCE NOT SPECIFIED: ICD-10-CM

## 2019-09-10 DIAGNOSIS — Z23 NEED FOR TETANUS BOOSTER: ICD-10-CM

## 2019-09-10 PROCEDURE — 3074F PR MOST RECENT SYSTOLIC BLOOD PRESSURE < 130 MM HG: ICD-10-PCS | Mod: CPTII,S$GLB,, | Performed by: FAMILY MEDICINE

## 2019-09-10 PROCEDURE — 99214 PR OFFICE/OUTPT VISIT, EST, LEVL IV, 30-39 MIN: ICD-10-PCS | Mod: 25,S$GLB,, | Performed by: FAMILY MEDICINE

## 2019-09-10 PROCEDURE — 99214 OFFICE O/P EST MOD 30 MIN: CPT | Mod: 25,S$GLB,, | Performed by: FAMILY MEDICINE

## 2019-09-10 PROCEDURE — 90471 IMMUNIZATION ADMIN: CPT | Mod: S$GLB,,, | Performed by: FAMILY MEDICINE

## 2019-09-10 PROCEDURE — 3008F BODY MASS INDEX DOCD: CPT | Mod: CPTII,S$GLB,, | Performed by: FAMILY MEDICINE

## 2019-09-10 PROCEDURE — 3078F DIAST BP <80 MM HG: CPT | Mod: CPTII,S$GLB,, | Performed by: FAMILY MEDICINE

## 2019-09-10 PROCEDURE — 3008F PR BODY MASS INDEX (BMI) DOCUMENTED: ICD-10-PCS | Mod: CPTII,S$GLB,, | Performed by: FAMILY MEDICINE

## 2019-09-10 PROCEDURE — 90471 TDAP VACCINE GREATER THAN OR EQUAL TO 7YO IM: ICD-10-PCS | Mod: S$GLB,,, | Performed by: FAMILY MEDICINE

## 2019-09-10 PROCEDURE — 3074F SYST BP LT 130 MM HG: CPT | Mod: CPTII,S$GLB,, | Performed by: FAMILY MEDICINE

## 2019-09-10 PROCEDURE — 3078F PR MOST RECENT DIASTOLIC BLOOD PRESSURE < 80 MM HG: ICD-10-PCS | Mod: CPTII,S$GLB,, | Performed by: FAMILY MEDICINE

## 2019-09-10 PROCEDURE — 90715 TDAP VACCINE GREATER THAN OR EQUAL TO 7YO IM: ICD-10-PCS | Mod: S$GLB,,, | Performed by: FAMILY MEDICINE

## 2019-09-10 PROCEDURE — 90715 TDAP VACCINE 7 YRS/> IM: CPT | Mod: S$GLB,,, | Performed by: FAMILY MEDICINE

## 2019-09-10 RX ORDER — NYSTATIN AND TRIAMCINOLONE ACETONIDE 100000; 1 [USP'U]/G; MG/G
CREAM TOPICAL
Refills: 3 | COMMUNITY
Start: 2019-08-22 | End: 2020-03-02 | Stop reason: CLARIF

## 2019-09-10 NOTE — PROGRESS NOTES
Subjective:       Patient ID: Carmen Son is a 59 y.o. female.    Chief Complaint:   Chief Complaint   Patient presents with    Sinus Problem         Hypertension   This is a chronic problem. The current episode started more than 1 year ago. The problem is unchanged. Associated symptoms include headaches. Pertinent negatives include no anxiety, chest pain, malaise/fatigue, neck pain, orthopnea, palpitations, peripheral edema, shortness of breath or sweats. There are no associated agents to hypertension. Risk factors for coronary artery disease include obesity. Past treatments include angiotensin blockers, beta blockers and diuretics. The current treatment provides significant improvement. There is no history of angina, kidney disease, CAD/MI, CVA, heart failure, left ventricular hypertrophy, PVD or retinopathy. There is no history of chronic renal disease.   Hyperlipidemia   This is a chronic problem. The problem is uncontrolled. Recent lipid tests were reviewed and are high. Exacerbating diseases include obesity. She has no history of chronic renal disease, diabetes, hypothyroidism, liver disease or nephrotic syndrome. Pertinent negatives include no chest pain, focal sensory loss, focal weakness, leg pain or shortness of breath. Current antihyperlipidemic treatment includes diet change and statins.   Gastroesophageal Reflux   She complains of abdominal pain, belching and coughing. She reports no chest pain, no choking, no dysphagia, no early satiety, no globus sensation, no heartburn, no hoarse voice, no nausea, no sore throat, no stridor, no tooth decay, no water brash or no wheezing. This is a chronic problem. The current episode started more than 1 year ago. The problem occurs frequently. The problem has been waxing and waning. The symptoms are aggravated by certain foods. Pertinent negatives include no anemia, fatigue, melena, muscle weakness, orthopnea or weight loss. There are no known risk  factors. She has tried a PPI (Needs a different PPi that is covered on her insurance. ) for the symptoms. The treatment provided significant relief.   Sinus Problem   This is a new problem. The current episode started 1 to 4 weeks ago. The problem has been gradually improving since onset. There has been no fever. Associated symptoms include congestion, coughing, headaches and sinus pressure. Pertinent negatives include no chills, diaphoresis, ear pain, hoarse voice, neck pain, shortness of breath, sneezing, sore throat or swollen glands. Past treatments include oral decongestants. The treatment provided moderate relief.     Review of Systems   Constitutional: Negative for activity change, appetite change, chills, diaphoresis, fatigue, malaise/fatigue and weight loss.   HENT: Positive for congestion and sinus pressure. Negative for ear discharge, ear pain, hoarse voice, sneezing, sore throat and trouble swallowing.    Eyes: Negative for photophobia, pain, redness, itching and visual disturbance.   Respiratory: Positive for cough. Negative for choking, chest tightness, shortness of breath and wheezing.    Cardiovascular: Negative for chest pain, palpitations, orthopnea and leg swelling.   Gastrointestinal: Positive for abdominal pain. Negative for abdominal distention, blood in stool, dysphagia, heartburn, melena and nausea.   Genitourinary: Negative for pelvic pain, vaginal bleeding, vaginal discharge and vaginal pain.   Musculoskeletal: Negative for arthralgias, back pain, gait problem, muscle weakness and neck pain.   Skin: Negative for color change and pallor.   Neurological: Positive for headaches. Negative for dizziness, tremors, focal weakness, weakness, light-headedness and numbness.   Psychiatric/Behavioral: Negative for agitation, behavioral problems, confusion and sleep disturbance.       Past Medical History:   Diagnosis Date    Diabetes mellitus     Diabetes mellitus, type 2     Diet controlled  "gestational diabetes mellitus in puerperium     History of seasonal allergies     Hypertension      Social History     Socioeconomic History    Marital status:      Spouse name: Not on file    Number of children: Not on file    Years of education: Not on file    Highest education level: Not on file   Occupational History    Not on file   Social Needs    Financial resource strain: Not on file    Food insecurity:     Worry: Not on file     Inability: Not on file    Transportation needs:     Medical: Not on file     Non-medical: Not on file   Tobacco Use    Smoking status: Never Smoker    Smokeless tobacco: Never Used   Substance and Sexual Activity    Alcohol use: No    Drug use: No    Sexual activity: Yes     Partners: Male     Birth control/protection: Surgical   Lifestyle    Physical activity:     Days per week: Not on file     Minutes per session: Not on file    Stress: Not on file   Relationships    Social connections:     Talks on phone: Not on file     Gets together: Not on file     Attends [employer] service: Not on file     Active member of club or organization: Not on file     Attends meetings of clubs or organizations: Not on file     Relationship status: Not on file   Other Topics Concern    Not on file   Social History Narrative    Not on file       Objective:     /70 (BP Location: Right arm, Patient Position: Sitting)   Pulse 85   Temp 98.3 °F (36.8 °C) (Oral)   Ht 5' 3" (1.6 m)   Wt 101.8 kg (224 lb 5.1 oz)   LMP 04/07/2013   SpO2 97%   BMI 39.74 kg/m²     Physical Exam   Constitutional: She appears well-developed and well-nourished.   HENT:   Head: Normocephalic.   Right Ear: Tympanic membrane, external ear and ear canal normal.   Left Ear: Tympanic membrane, external ear and ear canal normal.   Nose: Nose normal.   Mouth/Throat: Posterior oropharyngeal erythema present.   Eyes: Conjunctivae are normal.   Neck: Normal range of motion. Neck supple. "   Cardiovascular: Normal rate, regular rhythm and normal heart sounds.   Pulmonary/Chest: Effort normal and breath sounds normal.   Neurological: She is alert.   Skin: Skin is warm and dry.   Psychiatric: Her behavior is normal.       Assessment:       Essential hypertension    Hyperlipidemia, unspecified hyperlipidemia type  -     CBC auto differential; Future; Expected date: 09/10/2019    Need for tetanus booster  -     (In Office Administered) Tdap Vaccine    Acute maxillary sinusitis, recurrence not specified  -     pseudoephedrine HCl (SUDAFED 24 HOUR) 240 mg Tb24; Take 1 tablet by mouth once daily. for 10 days  Dispense: 5 each; Refill: 0

## 2019-09-11 ENCOUNTER — LAB VISIT (OUTPATIENT)
Dept: LAB | Facility: HOSPITAL | Age: 59
End: 2019-09-11
Attending: FAMILY MEDICINE
Payer: COMMERCIAL

## 2019-09-11 ENCOUNTER — PATIENT MESSAGE (OUTPATIENT)
Dept: FAMILY MEDICINE | Facility: CLINIC | Age: 59
End: 2019-09-11

## 2019-09-11 DIAGNOSIS — R73.03 PREDIABETES: ICD-10-CM

## 2019-09-11 DIAGNOSIS — E78.5 HYPERLIPIDEMIA, UNSPECIFIED HYPERLIPIDEMIA TYPE: ICD-10-CM

## 2019-09-11 DIAGNOSIS — E78.5 HYPERLIPIDEMIA, UNSPECIFIED HYPERLIPIDEMIA TYPE: Primary | ICD-10-CM

## 2019-09-11 LAB
ALBUMIN SERPL BCP-MCNC: 3.7 G/DL (ref 3.5–5.2)
ALP SERPL-CCNC: 101 U/L (ref 38–126)
ALT SERPL W/O P-5'-P-CCNC: 18 U/L (ref 10–44)
ANION GAP SERPL CALC-SCNC: 3 MMOL/L (ref 8–16)
AST SERPL-CCNC: 22 U/L (ref 15–46)
BASOPHILS # BLD AUTO: 0.03 K/UL (ref 0–0.2)
BASOPHILS NFR BLD: 0.9 % (ref 0–1.9)
BILIRUB SERPL-MCNC: 0.6 MG/DL (ref 0.1–1)
BUN SERPL-MCNC: 12 MG/DL (ref 7–17)
CALCIUM SERPL-MCNC: 9.3 MG/DL (ref 8.7–10.5)
CHLORIDE SERPL-SCNC: 104 MMOL/L (ref 95–110)
CHOLEST SERPL-MCNC: 228 MG/DL (ref 120–199)
CHOLEST/HDLC SERPL: 5.2 {RATIO} (ref 2–5)
CO2 SERPL-SCNC: 32 MMOL/L (ref 23–29)
CREAT SERPL-MCNC: 1 MG/DL (ref 0.5–1.4)
DIFFERENTIAL METHOD: ABNORMAL
EOSINOPHIL # BLD AUTO: 0.1 K/UL (ref 0–0.5)
EOSINOPHIL NFR BLD: 2.3 % (ref 0–8)
ERYTHROCYTE [DISTWIDTH] IN BLOOD BY AUTOMATED COUNT: 14.8 % (ref 11.5–14.5)
EST. GFR  (AFRICAN AMERICAN): >60 ML/MIN/1.73 M^2
EST. GFR  (NON AFRICAN AMERICAN): >60 ML/MIN/1.73 M^2
ESTIMATED AVG GLUCOSE: 143 MG/DL (ref 68–131)
GLUCOSE SERPL-MCNC: 118 MG/DL (ref 70–110)
HBA1C MFR BLD HPLC: 6.6 % (ref 4–5.6)
HCT VFR BLD AUTO: 40.5 % (ref 37–48.5)
HDLC SERPL-MCNC: 44 MG/DL (ref 40–75)
HDLC SERPL: 19.3 % (ref 20–50)
HGB BLD-MCNC: 12.4 G/DL (ref 12–16)
LDLC SERPL CALC-MCNC: 149.6 MG/DL (ref 63–159)
LYMPHOCYTES # BLD AUTO: 1.8 K/UL (ref 1–4.8)
LYMPHOCYTES NFR BLD: 52.6 % (ref 18–48)
MCH RBC QN AUTO: 24.6 PG (ref 27–31)
MCHC RBC AUTO-ENTMCNC: 30.6 G/DL (ref 32–36)
MCV RBC AUTO: 80 FL (ref 82–98)
MONOCYTES # BLD AUTO: 0.3 K/UL (ref 0.3–1)
MONOCYTES NFR BLD: 7.3 % (ref 4–15)
NEUTROPHILS # BLD AUTO: 1.3 K/UL (ref 1.8–7.7)
NEUTROPHILS NFR BLD: 36.9 % (ref 38–73)
NONHDLC SERPL-MCNC: 184 MG/DL
PLATELET # BLD AUTO: 245 K/UL (ref 150–350)
PMV BLD AUTO: 11.3 FL (ref 9.2–12.9)
POTASSIUM SERPL-SCNC: 4.1 MMOL/L (ref 3.5–5.1)
PROT SERPL-MCNC: 7.1 G/DL (ref 6–8.4)
RBC # BLD AUTO: 5.04 M/UL (ref 4–5.4)
SODIUM SERPL-SCNC: 139 MMOL/L (ref 136–145)
TRIGL SERPL-MCNC: 172 MG/DL (ref 30–150)
WBC # BLD AUTO: 3.42 K/UL (ref 3.9–12.7)

## 2019-09-11 PROCEDURE — 36415 COLL VENOUS BLD VENIPUNCTURE: CPT | Mod: PO

## 2019-09-11 PROCEDURE — 80061 LIPID PANEL: CPT

## 2019-09-11 PROCEDURE — 80053 COMPREHEN METABOLIC PANEL: CPT | Mod: PO

## 2019-09-11 PROCEDURE — 83036 HEMOGLOBIN GLYCOSYLATED A1C: CPT

## 2019-09-11 PROCEDURE — 85025 COMPLETE CBC W/AUTO DIFF WBC: CPT | Mod: PO

## 2019-09-11 RX ORDER — ATORVASTATIN CALCIUM 10 MG/1
10 TABLET, FILM COATED ORAL DAILY
Qty: 90 TABLET | Refills: 3 | Status: SHIPPED | OUTPATIENT
Start: 2019-09-11 | End: 2020-03-24

## 2019-09-20 RX ORDER — PROPRANOLOL HYDROCHLORIDE 160 MG/1
160 CAPSULE, EXTENDED RELEASE ORAL DAILY
Qty: 90 CAPSULE | Refills: 3 | Status: SHIPPED | OUTPATIENT
Start: 2019-09-20 | End: 2020-03-27 | Stop reason: SDUPTHER

## 2019-09-20 RX ORDER — LOSARTAN POTASSIUM 50 MG/1
50 TABLET ORAL DAILY
Qty: 90 TABLET | Refills: 3 | Status: SHIPPED | OUTPATIENT
Start: 2019-09-20 | End: 2021-05-13 | Stop reason: SDUPTHER

## 2019-09-20 NOTE — TELEPHONE ENCOUNTER
----- Message from Cuauhtemoc Morataya sent at 9/20/2019 11:20 AM CDT -----  Contact: Pt  Pt needs a refill on losartan (COZAAR) 50 MG tablet(1xday-90day supply),and propranolol (INDERAL LA) 160 mg 24 hr capsule(1xday-90day supply) called into [facility] at [phone].  Provider:Amaya    Pt can be reached at [phone]    Thanks

## 2019-10-09 ENCOUNTER — TELEPHONE (OUTPATIENT)
Dept: FAMILY MEDICINE | Facility: CLINIC | Age: 59
End: 2019-10-09

## 2019-10-09 NOTE — TELEPHONE ENCOUNTER
----- Message from Inna West sent at 10/9/2019  1:01 PM CDT -----  Contact: self / [phone]  Can you see her today for a kidney infection? Please advise

## 2019-10-10 ENCOUNTER — OFFICE VISIT (OUTPATIENT)
Dept: FAMILY MEDICINE | Facility: CLINIC | Age: 59
End: 2019-10-10
Payer: COMMERCIAL

## 2019-10-10 VITALS
TEMPERATURE: 99 F | BODY MASS INDEX: 39.33 KG/M2 | RESPIRATION RATE: 12 BRPM | WEIGHT: 222 LBS | OXYGEN SATURATION: 98 % | SYSTOLIC BLOOD PRESSURE: 132 MMHG | HEART RATE: 68 BPM | DIASTOLIC BLOOD PRESSURE: 78 MMHG

## 2019-10-10 DIAGNOSIS — E55.9 VITAMIN D DEFICIENCY: ICD-10-CM

## 2019-10-10 DIAGNOSIS — R30.0 DYSURIA: Primary | ICD-10-CM

## 2019-10-10 DIAGNOSIS — R60.9 EDEMA, UNSPECIFIED TYPE: ICD-10-CM

## 2019-10-10 LAB
BILIRUB SERPL-MCNC: NORMAL MG/DL
BLOOD URINE, POC: NORMAL
COLOR, POC UA: YELLOW
GLUCOSE UR QL STRIP: NORMAL
KETONES UR QL STRIP: NORMAL
LEUKOCYTE ESTERASE URINE, POC: NORMAL
NITRITE, POC UA: NORMAL
PH, POC UA: 5
PROTEIN, POC: NORMAL
SPECIFIC GRAVITY, POC UA: 1.02
UROBILINOGEN, POC UA: NORMAL

## 2019-10-10 PROCEDURE — 3075F SYST BP GE 130 - 139MM HG: CPT | Mod: CPTII,S$GLB,, | Performed by: FAMILY MEDICINE

## 2019-10-10 PROCEDURE — 81002 URINALYSIS NONAUTO W/O SCOPE: CPT | Mod: S$GLB,,, | Performed by: FAMILY MEDICINE

## 2019-10-10 PROCEDURE — 3078F DIAST BP <80 MM HG: CPT | Mod: CPTII,S$GLB,, | Performed by: FAMILY MEDICINE

## 2019-10-10 PROCEDURE — 3008F BODY MASS INDEX DOCD: CPT | Mod: CPTII,S$GLB,, | Performed by: FAMILY MEDICINE

## 2019-10-10 PROCEDURE — 99213 OFFICE O/P EST LOW 20 MIN: CPT | Mod: 25,S$GLB,, | Performed by: FAMILY MEDICINE

## 2019-10-10 PROCEDURE — 81002 POCT URINE DIPSTICK WITHOUT MICROSCOPE: ICD-10-PCS | Mod: S$GLB,,, | Performed by: FAMILY MEDICINE

## 2019-10-10 PROCEDURE — 3008F PR BODY MASS INDEX (BMI) DOCUMENTED: ICD-10-PCS | Mod: CPTII,S$GLB,, | Performed by: FAMILY MEDICINE

## 2019-10-10 PROCEDURE — 3075F PR MOST RECENT SYSTOLIC BLOOD PRESS GE 130-139MM HG: ICD-10-PCS | Mod: CPTII,S$GLB,, | Performed by: FAMILY MEDICINE

## 2019-10-10 PROCEDURE — 99213 PR OFFICE/OUTPT VISIT, EST, LEVL III, 20-29 MIN: ICD-10-PCS | Mod: 25,S$GLB,, | Performed by: FAMILY MEDICINE

## 2019-10-10 PROCEDURE — 3078F PR MOST RECENT DIASTOLIC BLOOD PRESSURE < 80 MM HG: ICD-10-PCS | Mod: CPTII,S$GLB,, | Performed by: FAMILY MEDICINE

## 2019-10-10 RX ORDER — ERGOCALCIFEROL 1.25 MG/1
50000 CAPSULE ORAL
Qty: 12 CAPSULE | Refills: 0 | Status: SHIPPED | OUTPATIENT
Start: 2019-10-10 | End: 2021-01-08 | Stop reason: CLARIF

## 2019-10-10 RX ORDER — POTASSIUM CHLORIDE 1.5 G/1.58G
20 POWDER, FOR SOLUTION ORAL ONCE
Qty: 1 PACKET | Refills: 0 | Status: SHIPPED | OUTPATIENT
Start: 2019-10-10 | End: 2019-10-11 | Stop reason: SDUPTHER

## 2019-10-10 RX ORDER — FUROSEMIDE 20 MG/1
20 TABLET ORAL DAILY PRN
Qty: 30 TABLET | Refills: 3 | Status: SHIPPED | OUTPATIENT
Start: 2019-10-10 | End: 2021-11-12 | Stop reason: SDUPTHER

## 2019-10-10 NOTE — PROGRESS NOTES
Subjective:       Patient ID: Carmen Son is a 59 y.o. female.    Chief Complaint:    infrequent urination and lab review.       Dysuria    This is a new problem. The current episode started acute onset. Episode frequency: Feels like she is not going as much as she used to even with the lasix.  The patient is experiencing no pain. There has been no fever. Pertinent negatives include no behavior changes, chills or bubble bath use.     Review of Systems   Constitutional: Negative for activity change, appetite change and chills.   HENT: Negative for ear discharge and trouble swallowing.    Eyes: Negative for photophobia, pain, redness, itching and visual disturbance.   Respiratory: Negative for chest tightness.    Cardiovascular: Negative for leg swelling.   Gastrointestinal: Negative for abdominal distention and blood in stool.   Genitourinary: Positive for dysuria. Negative for pelvic pain, vaginal bleeding, vaginal discharge and vaginal pain.   Musculoskeletal: Negative for arthralgias, back pain and gait problem.   Skin: Negative for color change and pallor.   Neurological: Negative for dizziness, tremors, weakness, light-headedness and numbness.   Psychiatric/Behavioral: Negative for agitation, behavioral problems, confusion and sleep disturbance.       Past Medical History:   Diagnosis Date    Diabetes mellitus     Diabetes mellitus, type 2     Diet controlled gestational diabetes mellitus in puerperium     History of seasonal allergies     Hypertension      Social History     Socioeconomic History    Marital status:      Spouse name: Not on file    Number of children: Not on file    Years of education: Not on file    Highest education level: Not on file   Occupational History    Not on file   Social Needs    Financial resource strain: Not on file    Food insecurity:     Worry: Not on file     Inability: Not on file    Transportation needs:     Medical: Not on file     Non-medical:  Not on file   Tobacco Use    Smoking status: Never Smoker    Smokeless tobacco: Never Used   Substance and Sexual Activity    Alcohol use: No    Drug use: No    Sexual activity: Yes     Partners: Male     Birth control/protection: Surgical   Lifestyle    Physical activity:     Days per week: Not on file     Minutes per session: Not on file    Stress: Not at all   Relationships    Social connections:     Talks on phone: Not on file     Gets together: Not on file     Attends Religion service: Not on file     Active member of club or organization: Not on file     Attends meetings of clubs or organizations: Not on file     Relationship status: Not on file   Other Topics Concern    Not on file   Social History Narrative    Not on file       Objective:   /78   Pulse 68   Temp 98.8 °F (37.1 °C)   Resp 12   Wt 100.7 kg (222 lb 0.1 oz)   LMP 04/07/2013   SpO2 98%   BMI 39.33 kg/m²     Physical Exam   Constitutional: She appears well-developed and well-nourished.   HENT:   Head: Normocephalic.   Right Ear: Tympanic membrane, external ear and ear canal normal.   Left Ear: Tympanic membrane, external ear and ear canal normal.   Nose: Nose normal.   Mouth/Throat: Posterior oropharyngeal erythema present.   Eyes: Conjunctivae are normal.   Neck: Normal range of motion. Neck supple.   Cardiovascular: Normal rate, regular rhythm and normal heart sounds.   Pulmonary/Chest: Effort normal and breath sounds normal.   Neurological: She is alert.   Skin: Skin is warm and dry.   Psychiatric: Her behavior is normal.       Office Visit on 10/10/2019   Component Date Value Ref Range Status    Color, UA 10/10/2019 yellow   Final    Spec Grav UA 10/10/2019 1.025   Final    pH, UA 10/10/2019 5   Final    WBC, UA 10/10/2019 neg   Final    Nitrite, UA 10/10/2019 neg   Final    Protein 10/10/2019 neg   Final    Glucose, UA 10/10/2019 neg   Final    Ketones, UA 10/10/2019 neg   Final    Urobilinogen, UA 10/10/2019  neg   Final    Bilirubin 10/10/2019 neg   Final    Blood, UA 10/10/2019 neg   Final   Lab Visit on 09/11/2019   Component Date Value Ref Range Status    Hemoglobin A1C 09/11/2019 6.6* 4.0 - 5.6 % Final    Comment: ADA Screening Guidelines:  5.7-6.4%  Consistent with prediabetes  >or=6.5%  Consistent with diabetes  High levels of fetal hemoglobin interfere with the HbA1C  assay. Heterozygous hemoglobin variants (HbS, HgC, etc)do  not significantly interfere with this assay.   However, presence of multiple variants may affect accuracy.      Estimated Avg Glucose 09/11/2019 143* 68 - 131 mg/dL Final    Sodium 09/11/2019 139  136 - 145 mmol/L Final    Potassium 09/11/2019 4.1  3.5 - 5.1 mmol/L Final    Chloride 09/11/2019 104  95 - 110 mmol/L Final    CO2 09/11/2019 32* 23 - 29 mmol/L Final    Glucose 09/11/2019 118* 70 - 110 mg/dL Final    BUN, Bld 09/11/2019 12  7 - 17 mg/dL Final    Creatinine 09/11/2019 1.00  0.50 - 1.40 mg/dL Final    Calcium 09/11/2019 9.3  8.7 - 10.5 mg/dL Final    Total Protein 09/11/2019 7.1  6.0 - 8.4 g/dL Final    Albumin 09/11/2019 3.7  3.5 - 5.2 g/dL Final    Total Bilirubin 09/11/2019 0.6  0.1 - 1.0 mg/dL Final    Comment: For infants and newborns, interpretation of results should be based  on gestational age, weight and in agreement with clinical  observations.  Premature Infant recommended reference ranges:  Up to 24 hours.............<8.0 mg/dL  Up to 48 hours............<12.0 mg/dL  3-5 days..................<15.0 mg/dL  6-29 days.................<15.0 mg/dL      Alkaline Phosphatase 09/11/2019 101  38 - 126 U/L Final    AST 09/11/2019 22  15 - 46 U/L Final    ALT 09/11/2019 18  10 - 44 U/L Final    Anion Gap 09/11/2019 3* 8 - 16 mmol/L Final    eGFR if African American 09/11/2019 >60.0  >60 mL/min/1.73 m^2 Final    eGFR if non African American 09/11/2019 >60.0  >60 mL/min/1.73 m^2 Final    Comment: Calculation used to obtain the estimated glomerular  filtration  rate (eGFR) is the CKD-EPI equation.       Cholesterol 09/11/2019 228* 120 - 199 mg/dL Final    Comment: The National Cholesterol Education Program (NCEP) has set the  following guidelines (reference ranges) for Cholesterol:  Optimal.....................<200 mg/dL  Borderline High.............200-239 mg/dL  High........................> or = 240 mg/dL      Triglycerides 09/11/2019 172* 30 - 150 mg/dL Final    Comment: The National Cholesterol Education Program (NCEP) has set the  following guidelines (reference values) for triglycerides:  Normal......................<150 mg/dL  Borderline High.............150-199 mg/dL  High........................200-499 mg/dL      HDL 09/11/2019 44  40 - 75 mg/dL Final    Comment: The National Cholesterol Education Program (NCEP) has set the  following guidelines (reference values) for HDL Cholesterol:  Low...............<40 mg/dL  Optimal...........>60 mg/dL      LDL Cholesterol 09/11/2019 149.6  63.0 - 159.0 mg/dL Final    Comment: The National Cholesterol Education Program (NCEP) has set the  following guidelines (reference values) for LDL Cholesterol:  Optimal.......................<130 mg/dL  Borderline High...............130-159 mg/dL  High..........................160-189 mg/dL  Very High.....................>190 mg/dL      Hdl/Cholesterol Ratio 09/11/2019 19.3* 20.0 - 50.0 % Final    Total Cholesterol/HDL Ratio 09/11/2019 5.2* 2.0 - 5.0 Final    Non-HDL Cholesterol 09/11/2019 184  mg/dL Final    Comment: Risk category and Non-HDL cholesterol goals:  Coronary heart disease (CHD)or equivalent (10-year risk of CHD >20%):  Non-HDL cholesterol goal     <130 mg/dL  Two or more CHD risk factors and 10-year risk of CHD <= 20%:  Non-HDL cholesterol goal     <160 mg/dL  0 to 1 CHD risk factor:  Non-HDL cholesterol goal     <190 mg/dL      WBC 09/11/2019 3.42* 3.90 - 12.70 K/uL Final    RBC 09/11/2019 5.04  4.00 - 5.40 M/uL Final    Hemoglobin 09/11/2019 12.4   12.0 - 16.0 g/dL Final    Hematocrit 09/11/2019 40.5  37.0 - 48.5 % Final    Mean Corpuscular Volume 09/11/2019 80* 82 - 98 fL Final    Mean Corpuscular Hemoglobin 09/11/2019 24.6* 27.0 - 31.0 pg Final    Mean Corpuscular Hemoglobin Conc 09/11/2019 30.6* 32.0 - 36.0 g/dL Final    RDW 09/11/2019 14.8* 11.5 - 14.5 % Final    Platelets 09/11/2019 245  150 - 350 K/uL Final    MPV 09/11/2019 11.3  9.2 - 12.9 fL Final    Gran # (ANC) 09/11/2019 1.3* 1.8 - 7.7 K/uL Final    Lymph # 09/11/2019 1.8  1.0 - 4.8 K/uL Final    Mono # 09/11/2019 0.3  0.3 - 1.0 K/uL Final    Eos # 09/11/2019 0.1  0.0 - 0.5 K/uL Final    Baso # 09/11/2019 0.03  0.00 - 0.20 K/uL Final    Gran% 09/11/2019 36.9* 38.0 - 73.0 % Final    Lymph% 09/11/2019 52.6* 18.0 - 48.0 % Final    Mono% 09/11/2019 7.3  4.0 - 15.0 % Final    Eosinophil% 09/11/2019 2.3  0.0 - 8.0 % Final    Basophil% 09/11/2019 0.9  0.0 - 1.9 % Final    Differential Method 09/11/2019 Automated   Final       Assessment:       Dysuria  -     POCT urine dipstick without microscope  - Urine is clear.  No evidence of a UTI.      Vitamin D deficiency    Edema, unspecified type    Other orders  -     furosemide (LASIX) 20 MG tablet; Take 1 tablet (20 mg total) by mouth daily as needed.  Dispense: 30 tablet; Refill: 3  -     potassium chloride (KLOR-CON) 20 mEq Pack; Take 20 mEq by mouth once. for 1 dose  Dispense: 1 packet; Refill: 0  -     ergocalciferol (ERGOCALCIFEROL) 50,000 unit Cap; Take 1 capsule (50,000 Units total) by mouth every 7 days.  Dispense: 12 capsule; Refill: 0

## 2019-10-11 RX ORDER — POTASSIUM CHLORIDE 20 MEQ/1
20 TABLET, EXTENDED RELEASE ORAL DAILY
Qty: 30 TABLET | Refills: 3 | Status: SHIPPED | OUTPATIENT
Start: 2019-10-11 | End: 2021-11-12 | Stop reason: SDUPTHER

## 2019-10-11 RX ORDER — POTASSIUM CHLORIDE 1.5 G/1.58G
20 POWDER, FOR SOLUTION ORAL DAILY
Qty: 30 PACKET | Refills: 3 | Status: SHIPPED | OUTPATIENT
Start: 2019-10-11 | End: 2019-10-11

## 2019-10-31 NOTE — TELEPHONE ENCOUNTER
----- Message from Carolee Hendricks sent at 10/4/2018  1:58 PM CDT -----  What is the patient going to ENT for?   Discharge Instructions after  Upper Endoscopy (EGD)    Activity and Diet  You were given medicine for pain. You may be dizzy or sleepy.  For 24 hours:    Do not drive or use heavy equipment.    Do not make important decisions.    Do not drink any alcohol.  ___ You may return to your regular diet.    Discomfort  You may have a sore throat for 2 to 3 days. It may help to:    Avoid hot liquids for 24 hours.    Use sore throat lozenges.    Gargle as needed with salt water up to 4 times a day. Mix 1 cup of warm water  with 1 teaspoon of salt. Do not swallow.  ___ Your esophagus was dilated (opened) or banded during the exam:    Drink only cool liquids for the rest of the day. Eat a soft diet for the next few days.    You may have a sore chest for 2 to 3 days.    You may take Tylenol (acetaminophen) for pain unless your doctor has told you not to.    Do not take aspirin or ibuprofen (Advil, Motrin) or other NSAIDS  (anti-inflammatory drugs) for ___ days.    Follow-up  ___ We took small tissue samples for study. If you do not have a follow-up visit scheduled,  call your provider s office in 2 weeks for the results.    Other instructions________________________________________________________    When to call us:  Problems are rare. Call right away if you have:    Unusual throat pain or trouble swallowing    Unusual pain in belly or chest that is not relieved by belching or passing air    Black stools (tar-like looking bowel movement)    Temperature above 100.6  F. (37.5  C).    If you vomit blood or have severe pain, go to an emergency room.    If you have questions, call:  Monday to Friday, 7 a.m. to 4:30 p.m.: Endoscopy: [phone] (We may have to call you back)    After hours: Hospital: [phone] (Ask for the GI fellow on call)

## 2019-11-19 ENCOUNTER — HOSPITAL ENCOUNTER (OUTPATIENT)
Dept: RADIOLOGY | Facility: HOSPITAL | Age: 59
Discharge: HOME | End: 2019-11-19
Attending: NEUROLOGICAL SURGERY
Payer: COMMERCIAL

## 2019-11-19 ENCOUNTER — OFFICE VISIT (OUTPATIENT)
Dept: NEUROSURGERY | Facility: CLINIC | Age: 59
End: 2019-11-19
Payer: COMMERCIAL

## 2019-11-19 DIAGNOSIS — M48.02 CERVICAL STENOSIS OF SPINE: ICD-10-CM

## 2019-11-19 DIAGNOSIS — M54.16 LUMBAR RADICULOPATHY, CHRONIC: ICD-10-CM

## 2019-11-19 DIAGNOSIS — M48.061 SPINAL STENOSIS OF LUMBAR REGION WITHOUT NEUROGENIC CLAUDICATION: ICD-10-CM

## 2019-11-19 DIAGNOSIS — M54.16 LUMBAR RADICULOPATHY: ICD-10-CM

## 2019-11-19 DIAGNOSIS — M50.90 CERVICAL DISC DISEASE: ICD-10-CM

## 2019-11-19 DIAGNOSIS — M43.22 CERVICAL VERTEBRAL FUSION: Primary | ICD-10-CM

## 2019-11-19 DIAGNOSIS — M51.36 DDD (DEGENERATIVE DISC DISEASE), LUMBAR: ICD-10-CM

## 2019-11-19 DIAGNOSIS — M43.16 SPONDYLOLISTHESIS OF LUMBAR REGION: ICD-10-CM

## 2019-11-19 PROCEDURE — 99999 PR PBB SHADOW E&M-EST. PATIENT-LVL III: CPT | Mod: PBBFAC,,, | Performed by: NEUROLOGICAL SURGERY

## 2019-11-19 PROCEDURE — 72148 MRI LUMBAR SPINE W/O DYE: CPT | Mod: 26,,, | Performed by: RADIOLOGY

## 2019-11-19 PROCEDURE — 72125 CT NECK SPINE W/O DYE: CPT | Mod: TC

## 2019-11-19 PROCEDURE — 72125 CT CERVICAL SPINE WITHOUT CONTRAST: ICD-10-PCS | Mod: 26,,, | Performed by: RADIOLOGY

## 2019-11-19 PROCEDURE — 72148 MRI LUMBAR SPINE W/O DYE: CPT | Mod: TC

## 2019-11-19 PROCEDURE — 99214 OFFICE O/P EST MOD 30 MIN: CPT | Mod: S$GLB,,, | Performed by: NEUROLOGICAL SURGERY

## 2019-11-19 PROCEDURE — 99999 PR PBB SHADOW E&M-EST. PATIENT-LVL III: ICD-10-PCS | Mod: PBBFAC,,, | Performed by: NEUROLOGICAL SURGERY

## 2019-11-19 PROCEDURE — 72148 MRI LUMBAR SPINE WITHOUT CONTRAST: ICD-10-PCS | Mod: 26,,, | Performed by: RADIOLOGY

## 2019-11-19 PROCEDURE — 72125 CT NECK SPINE W/O DYE: CPT | Mod: 26,,, | Performed by: RADIOLOGY

## 2019-11-19 PROCEDURE — 99214 PR OFFICE/OUTPT VISIT, EST, LEVL IV, 30-39 MIN: ICD-10-PCS | Mod: S$GLB,,, | Performed by: NEUROLOGICAL SURGERY

## 2019-11-19 RX ORDER — OMEPRAZOLE 20 MG/1
CAPSULE, DELAYED RELEASE ORAL
COMMUNITY
End: 2021-05-13 | Stop reason: SDUPTHER

## 2019-11-19 RX ORDER — PROPRANOLOL HYDROCHLORIDE 160 MG/1
CAPSULE, EXTENDED RELEASE ORAL
COMMUNITY
End: 2020-03-02 | Stop reason: CLARIF

## 2019-11-29 RX ORDER — NAPROXEN 500 MG/1
500 TABLET ORAL 2 TIMES DAILY WITH MEALS
Qty: 90 TABLET | Refills: 2 | Status: SHIPPED | OUTPATIENT
Start: 2019-11-29 | End: 2019-12-10 | Stop reason: ALTCHOICE

## 2019-11-29 RX ORDER — CYCLOBENZAPRINE HCL 10 MG
10 TABLET ORAL 3 TIMES DAILY PRN
Qty: 60 TABLET | Refills: 1 | Status: SHIPPED | OUTPATIENT
Start: 2019-11-29 | End: 2019-12-29

## 2019-11-29 NOTE — PROGRESS NOTES
History & Physical    SUBJECTIVE:     Chief Complaint:  Lower back pain with left leg pain    History of Present Illness:  This is a patient who I previously treated for cervical spine issues her previous C3-C4-C4-C5 ACDF done in September 2018.  She has done very well from this was factor but is here to see me now for lower back problems she complains of increasing lower back pain over 6 months as radiating in the left leg down to her ankles she has numbness from the knees down to her ankles.  She denies any bowel bladder issues denies any significant weakness.  She states she is doing well from the cervical spine prospective.      Review of patient's allergies indicates:  No Known Allergies    Current Outpatient Medications   Medication Sig Dispense Refill    aspirin 81 MG Chew Take 1 tablet (81 mg total) by mouth once daily. May resume in 2 weeks post op  0    azelastine (ASTELIN) 137 mcg (0.1 %) nasal spray azelastine 137 mcg (0.1 %) nasal spray aerosol      celecoxib (CELEBREX) 200 MG capsule   5    chlorpheniramine (CHLOR-TRIMETON) 4 mg tablet Take 4 mg by mouth every 6 (six) hours as needed for Allergies.      ergocalciferol (ERGOCALCIFEROL) 50,000 unit Cap Take 1 capsule (50,000 Units total) by mouth every 7 days. 12 capsule 0    esomeprazole (NEXIUM PACKET) 40 mg GrPS Take 40 mg by mouth before breakfast. 1200 mg 11    fexofenadine (ALLEGRA) 180 MG tablet Take 1 tablet (180 mg total) by mouth once daily. 30 tablet 12    furosemide (LASIX) 20 MG tablet Take 1 tablet (20 mg total) by mouth daily as needed. 30 tablet 3    linaclotide (LINZESS) 145 mcg Cap capsule Take by mouth.      losartan (COZAAR) 50 MG tablet Take 1 tablet (50 mg total) by mouth once daily. 90 tablet 3    omeprazole (PRILOSEC) 20 MG capsule omeprazole 20 mg capsule,delayed release      ondansetron (ZOFRAN-ODT) 4 MG TbDL Take 1 tablet (4 mg total) by mouth every 8 (eight) hours as needed. 24 tablet 0    potassium chloride SA  (K-DUR,KLOR-CON) 20 MEQ tablet Take 1 tablet (20 mEq total) by mouth once daily. 30 tablet 3    propranolol (INDERAL LA) 160 mg 24 hr capsule Take 1 capsule (160 mg total) by mouth once daily. 90 capsule 3    atorvastatin (LIPITOR) 10 MG tablet Take 1 tablet (10 mg total) by mouth once daily. (Patient not taking: Reported on 11/19/2019) 90 tablet 3    nystatin-triamcinolone (MYCOLOG II) cream APPLY TOPICALLY TO RASH UNDER BREASTS DAILY AFTER SHOWER FOR 4 WEEKS WHEN FLARING  3    propranolol (INDERAL LA) 160 mg 24 hr capsule propranolol  mg capsule,24 hr,extended release       No current facility-administered medications for this visit.        Past Medical History:   Diagnosis Date    Diabetes mellitus     Diabetes mellitus, type 2     Diet controlled gestational diabetes mellitus in puerperium     History of seasonal allergies     Hypertension      Past Surgical History:   Procedure Laterality Date    ANTERIOR CERVICAL DISCECTOMY W/ FUSION N/A 9/17/2018    Procedure: DISCECTOMY, SPINE, CERVICAL, ANTERIOR APPROACH, WITH FUSION C3-4, C4-5;  Surgeon: Leo Denton MD;  Location: [facility] OR [address] [place] [address];  Service: Neurosurgery;  Laterality: N/A;  toronto II, asa 2, regular bed, supine, josefina    HYSTERECTOMY      2011 [facility], [facility]    right knee scope       Family History     Problem Relation (Age of Onset)    Diabetes type II Sister, Other    Hyperlipidemia Sister        Social History     Socioeconomic History    Marital status:      Spouse name: Not on file    Number of children: Not on file    Years of education: Not on file    Highest education level: Not on file   Occupational History    Not on file   Social Needs    Financial resource strain: Not on file    Food insecurity:     Worry: Not on file     Inability: Not on file    Transportation needs:     Medical: Not on file     Non-medical: Not on file   Tobacco Use    Smoking status: Never Smoker    Smokeless tobacco:  Never Used   Substance and Sexual Activity    Alcohol use: No    Drug use: No    Sexual activity: Yes     Partners: Male     Birth control/protection: Surgical   Lifestyle    Physical activity:     Days per week: Not on file     Minutes per session: Not on file    Stress: Not at all   Relationships    Social connections:     Talks on phone: Not on file     Gets together: Not on file     Attends Sabianist service: Not on file     Active member of club or organization: Not on file     Attends meetings of clubs or organizations: Not on file     Relationship status: Not on file   Other Topics Concern    Not on file   Social History Narrative    Not on file       Review of Systems:  Review of Systems   Constitutional: Negative.    HENT: Negative.    Eyes: Negative.    Respiratory: Negative.    Gastrointestinal: Negative.    Endocrine: Negative.    Genitourinary: Negative.    Musculoskeletal: Positive for back pain.   Skin: Negative.    Allergic/Immunologic: Negative.    Neurological: Positive for numbness.   Hematological: Negative.    Psychiatric/Behavioral: Negative.        OBJECTIVE:     Vital Signs     There is no height or weight on file to calculate BMI.      Physical Exam:  Physical Exam:    Constitutional: She appears well-developed and well-nourished.     Eyes: Pupils are equal, round, and reactive to light. Conjunctivae and EOM are normal.     Cardiovascular: Normal rate.     Abdominal: Soft. Bowel sounds are normal.     Musculoskeletal: Gait is abnormal.        Neck: Range of motion is limited. Muscle strength is 5/5.        Back: Range of motion is limited. There is tenderness. Muscle strength is 5/5.        Right Upper Extremities: Muscle strength is 5/5. Tone is normal.        Left Upper Extremities: Muscle strength is 5/5. Tone is normal.       Right Lower Extremities: Muscle strength is 5/5. Tone is normal.        Left Lower Extremities: Muscle strength is 5/5. Tone is normal.     Neurological:         DTRs: DTRs are DTRS NORMAL AND SYMMETRICnormal and symmetric.        Cranial nerves: Cranial nerve(s) II, III, IV, V, VI, VII, VIII, IX, X, XI and XII are intact.         Diagnostic Results:  Patient's CT scan of cervical spine which shows that her 2 level ACDF his fused.  MRI scan of the lumbar spine shows she has a grade 1 spondylolisthesis at L4-L5 causing moderate to severe bilateral foraminal stenosis as well as moderate central stenosis.    ASSESSMENT/PLAN:   The patient who we had previously treated for cervical pathology doing well from this perspective I will see any concerns here.  I would like to workup her lumbar spine issues by getting flexion-extension x-rays and CT scan lumbar spine.  Tried to treat her with anti-inflammatories muscle relaxants will try a back brace as well as an L4-5 epidural injection          Note dictated with voice recognition software, please excuse any grammatical errors.

## 2019-12-02 ENCOUNTER — TELEPHONE (OUTPATIENT)
Dept: NEUROSURGERY | Facility: CLINIC | Age: 59
End: 2019-12-02

## 2019-12-02 NOTE — TELEPHONE ENCOUNTER
----- Message from Jenny Pelayo MA sent at 12/2/2019 10:24 AM CST -----  Contact: pt @ [phone]  Please advise not sure if he sent a referral out for back brace reviewed chart but did not see anything. Thanks   ----- Message -----  From: Leyda Morton  Sent: 12/2/2019   9:30 AM CST  To: Corrie STEINER Staff    Asking to speak with someone in Dr. Denton's office regarding the status of her back brace. Please call.

## 2019-12-05 ENCOUNTER — TELEPHONE (OUTPATIENT)
Dept: NEUROSURGERY | Facility: CLINIC | Age: 59
End: 2019-12-05

## 2019-12-05 DIAGNOSIS — M43.16 SPONDYLOLISTHESIS OF LUMBAR REGION: ICD-10-CM

## 2019-12-05 DIAGNOSIS — M54.16 LUMBAR RADICULOPATHY: Primary | ICD-10-CM

## 2019-12-10 ENCOUNTER — OFFICE VISIT (OUTPATIENT)
Dept: FAMILY MEDICINE | Facility: CLINIC | Age: 59
End: 2019-12-10
Payer: COMMERCIAL

## 2019-12-10 VITALS
HEIGHT: 63 IN | TEMPERATURE: 99 F | SYSTOLIC BLOOD PRESSURE: 130 MMHG | WEIGHT: 224.19 LBS | BODY MASS INDEX: 39.72 KG/M2 | DIASTOLIC BLOOD PRESSURE: 80 MMHG | HEART RATE: 67 BPM | OXYGEN SATURATION: 99 %

## 2019-12-10 DIAGNOSIS — R60.9 EDEMA, UNSPECIFIED TYPE: ICD-10-CM

## 2019-12-10 DIAGNOSIS — R73.03 PREDIABETES: ICD-10-CM

## 2019-12-10 DIAGNOSIS — E55.9 VITAMIN D DEFICIENCY: Primary | ICD-10-CM

## 2019-12-10 DIAGNOSIS — I10 ESSENTIAL HYPERTENSION: ICD-10-CM

## 2019-12-10 DIAGNOSIS — E78.5 HYPERLIPIDEMIA, UNSPECIFIED HYPERLIPIDEMIA TYPE: ICD-10-CM

## 2019-12-10 PROCEDURE — 3075F SYST BP GE 130 - 139MM HG: CPT | Mod: CPTII,S$GLB,, | Performed by: FAMILY MEDICINE

## 2019-12-10 PROCEDURE — 3079F PR MOST RECENT DIASTOLIC BLOOD PRESSURE 80-89 MM HG: ICD-10-PCS | Mod: CPTII,S$GLB,, | Performed by: FAMILY MEDICINE

## 2019-12-10 PROCEDURE — 99214 PR OFFICE/OUTPT VISIT, EST, LEVL IV, 30-39 MIN: ICD-10-PCS | Mod: S$GLB,,, | Performed by: FAMILY MEDICINE

## 2019-12-10 PROCEDURE — 3075F PR MOST RECENT SYSTOLIC BLOOD PRESS GE 130-139MM HG: ICD-10-PCS | Mod: CPTII,S$GLB,, | Performed by: FAMILY MEDICINE

## 2019-12-10 PROCEDURE — 3008F PR BODY MASS INDEX (BMI) DOCUMENTED: ICD-10-PCS | Mod: CPTII,S$GLB,, | Performed by: FAMILY MEDICINE

## 2019-12-10 PROCEDURE — 99214 OFFICE O/P EST MOD 30 MIN: CPT | Mod: S$GLB,,, | Performed by: FAMILY MEDICINE

## 2019-12-10 PROCEDURE — 3079F DIAST BP 80-89 MM HG: CPT | Mod: CPTII,S$GLB,, | Performed by: FAMILY MEDICINE

## 2019-12-10 PROCEDURE — 3008F BODY MASS INDEX DOCD: CPT | Mod: CPTII,S$GLB,, | Performed by: FAMILY MEDICINE

## 2019-12-10 RX ORDER — CELECOXIB 200 MG/1
200 CAPSULE ORAL 2 TIMES DAILY
Qty: 180 CAPSULE | Refills: 3 | Status: SHIPPED | OUTPATIENT
Start: 2019-12-10 | End: 2020-09-01 | Stop reason: ALTCHOICE

## 2019-12-10 NOTE — PROGRESS NOTES
Subjective:       Patient ID: Carmen Son is a 59 y.o. female.    Chief Complaint:   Chief Complaint   Patient presents with    Follow-up     3 month            Has gained weight and is consequentially having more back pain.  Plans to start a low carb diet after the holidays.      Hypertension   This is a chronic problem. The current episode started more than 1 year ago. The problem is unchanged. Pertinent negatives include no anxiety, chest pain, malaise/fatigue, orthopnea, palpitations, peripheral edema or sweats. There are no associated agents to hypertension. Risk factors for coronary artery disease include obesity. Past treatments include angiotensin blockers, beta blockers and diuretics. The current treatment provides significant improvement. There is no history of angina, kidney disease, CAD/MI, CVA, heart failure, left ventricular hypertrophy, PVD or retinopathy. There is no history of chronic renal disease.   Hyperlipidemia   This is a chronic problem. The problem is uncontrolled. Recent lipid tests were reviewed and are high. Exacerbating diseases include obesity. She has no history of chronic renal disease, diabetes, hypothyroidism, liver disease or nephrotic syndrome. Pertinent negatives include no chest pain, focal sensory loss, focal weakness or leg pain. Current antihyperlipidemic treatment includes diet change and statins.   Gastroesophageal Reflux   She complains of abdominal pain and belching. She reports no chest pain, no choking, no dysphagia, no early satiety, no globus sensation, no heartburn, no nausea, no stridor, no tooth decay, no water brash or no wheezing. This is a chronic problem. The current episode started more than 1 year ago. The problem occurs frequently. The problem has been waxing and waning. The symptoms are aggravated by certain foods. Pertinent negatives include no anemia, fatigue, melena, muscle weakness, orthopnea or weight loss. There are no known risk factors.  She has tried a PPI (Needs a different PPi that is covered on her insurance. ) for the symptoms. The treatment provided significant relief.     Review of Systems   Constitutional: Negative for activity change, appetite change, fatigue, malaise/fatigue and weight loss.   HENT: Negative for ear discharge and trouble swallowing.    Eyes: Negative for photophobia, pain, redness, itching and visual disturbance.   Respiratory: Negative for choking, chest tightness and wheezing.    Cardiovascular: Negative for chest pain, palpitations, orthopnea and leg swelling.   Gastrointestinal: Positive for abdominal pain. Negative for abdominal distention, blood in stool, dysphagia, heartburn, melena and nausea.   Genitourinary: Negative for pelvic pain, vaginal bleeding, vaginal discharge and vaginal pain.   Musculoskeletal: Negative for arthralgias, back pain, gait problem and muscle weakness.   Skin: Negative for color change and pallor.   Neurological: Negative for dizziness, tremors, focal weakness, weakness, light-headedness and numbness.   Psychiatric/Behavioral: Negative for agitation, behavioral problems, confusion and sleep disturbance.       Past Medical History:   Diagnosis Date    Diabetes mellitus     Diabetes mellitus, type 2     Diet controlled gestational diabetes mellitus in puerperium     History of seasonal allergies     Hypertension      Social History     Socioeconomic History    Marital status:      Spouse name: Not on file    Number of children: Not on file    Years of education: Not on file    Highest education level: Not on file   Occupational History    Not on file   Social Needs    Financial resource strain: Not on file    Food insecurity:     Worry: Not on file     Inability: Not on file    Transportation needs:     Medical: Not on file     Non-medical: Not on file   Tobacco Use    Smoking status: Never Smoker    Smokeless tobacco: Never Used   Substance and Sexual Activity     "Alcohol use: No    Drug use: No    Sexual activity: Yes     Partners: Male     Birth control/protection: Surgical   Lifestyle    Physical activity:     Days per week: Not on file     Minutes per session: Not on file    Stress: Not at all   Relationships    Social connections:     Talks on phone: Not on file     Gets together: Not on file     Attends Hinduism service: Not on file     Active member of club or organization: Not on file     Attends meetings of clubs or organizations: Not on file     Relationship status: Not on file   Other Topics Concern    Not on file   Social History Narrative    Not on file       Objective:     /80 (BP Location: Right arm, Patient Position: Sitting)   Pulse 67   Temp 99.2 °F (37.3 °C) (Oral)   Ht 5' 3" (1.6 m)   Wt 101.7 kg (224 lb 3.3 oz)   LMP 04/07/2013   SpO2 99%   BMI 39.72 kg/m²     Physical Exam   Constitutional: She appears well-developed and well-nourished.   HENT:   Head: Normocephalic.   Eyes: Conjunctivae are normal.   Neck: Normal range of motion. Neck supple.   Cardiovascular: Normal rate, regular rhythm and normal heart sounds.   Pulmonary/Chest: Effort normal and breath sounds normal.   Neurological: She is alert.   Skin: Skin is warm and dry.   Psychiatric: Her behavior is normal.       Assessment:       Vitamin D deficiency  -     Vitamin D; Future; Expected date: 03/10/2020    Edema, unspecified type    Hyperlipidemia, unspecified hyperlipidemia type  -     CBC auto differential; Future; Expected date: 03/10/2020  -     Lipid panel; Future; Expected date: 03/10/2020  -     Comprehensive metabolic panel; Future; Expected date: 03/10/2020    Essential hypertension    Prediabetes  -     Microalbumin/creatinine urine ratio; Future; Expected date: 03/10/2020  -     Hemoglobin A1c; Future; Expected date: 03/10/2020    Other orders  -     celecoxib (CELEBREX) 200 MG capsule; Take 1 capsule (200 mg total) by mouth 2 (two) times daily.  Dispense: 180 " capsule; Refill: 3

## 2019-12-30 ENCOUNTER — HOSPITAL ENCOUNTER (OUTPATIENT)
Dept: INTERVENTIONAL RADIOLOGY/VASCULAR | Facility: HOSPITAL | Age: 59
Discharge: HOME | End: 2019-12-30
Attending: NEUROLOGICAL SURGERY
Payer: COMMERCIAL

## 2019-12-30 VITALS
DIASTOLIC BLOOD PRESSURE: 101 MMHG | RESPIRATION RATE: 14 BRPM | SYSTOLIC BLOOD PRESSURE: 176 MMHG | OXYGEN SATURATION: 100 % | HEART RATE: 61 BPM

## 2019-12-30 DIAGNOSIS — M43.16 SPONDYLOLISTHESIS OF LUMBAR REGION: ICD-10-CM

## 2019-12-30 DIAGNOSIS — M48.061 SPINAL STENOSIS OF LUMBAR REGION WITHOUT NEUROGENIC CLAUDICATION: ICD-10-CM

## 2019-12-30 DIAGNOSIS — M54.16 LUMBAR RADICULOPATHY: ICD-10-CM

## 2019-12-30 DIAGNOSIS — M43.22 CERVICAL VERTEBRAL FUSION: ICD-10-CM

## 2019-12-30 PROCEDURE — A4550 SURGICAL TRAYS: HCPCS

## 2019-12-30 PROCEDURE — 64483 IR ESI LUMBAR W/O IMG: ICD-10-PCS | Mod: 50,,, | Performed by: RADIOLOGY

## 2019-12-30 PROCEDURE — 25500020 PHARM REV CODE 255: Performed by: NEUROLOGICAL SURGERY

## 2019-12-30 PROCEDURE — 64483 NJX AA&/STRD TFRM EPI L/S 1: CPT | Mod: 50,,, | Performed by: RADIOLOGY

## 2019-12-30 RX ADMIN — IOHEXOL 7 ML: 180 INJECTION INTRAVENOUS at 11:12

## 2019-12-30 NOTE — PROCEDURES
Radiology Post-Procedure Note    Pre Op Diagnosis: LBP    Post Op Diagnosis: Same    Procedure: Lumbar Transforaminal POORNIMA    Procedure performed by: Kathie SIMS, Aydee Craft    Written Informed Consent Obtained: Yes    Specimen Removed: NO    Estimated Blood Loss: Minimal    Findings: successful    Level injected: L4-L5 bilateral  Needle used: 22 gauge  Dose:  40 mg Depo-methylprednisolone on each side   1.5 mL Lidocaine 1% MPF on each side    Patient tolerated procedure well.    Venancio Vázquez MD  Staff Radiologist  Department of Radiology  Pager: [phone]

## 2019-12-30 NOTE — DISCHARGE SUMMARY
Radiology Discharge Summary      Hospital Course: No complications    Admit Date: 12/30/2019  Discharge Date: 12/30/2019     Instructions Given to Patient: Yes  Diet: Resume prior diet  Activity: activity as tolerated and no driving for today    Description of Condition on Discharge: Stable  Vital Signs (Most Recent): Pulse: 61 (12/30/19 1129)  Resp: 14 (12/30/19 1129)  BP: (!) 176/101 (12/30/19 1129)  SpO2: 100 % (12/30/19 1129)    Discharge Disposition: Home    Discharge Diagnosis: LBP s/p  bilat TF POORNIMA at L4-5     Follow-up: with primary MD Venancio Vázquez, MD  Staff Radiologist  Department of Radiology  Pager: [phone]

## 2019-12-30 NOTE — H&P
Radiology History & Physical      SUBJECTIVE:     Chief Complaint: lower back pain     History of Present Illness:  Carmen Son is a 59 y.o. female who presents for lower back pain treatment @ L4 -5 radiating tot he knee with numbness. Left worse than right.       Past Medical History:   Diagnosis Date    Diabetes mellitus     Diabetes mellitus, type 2     Diet controlled gestational diabetes mellitus in puerperium     History of seasonal allergies     Hypertension      Past Surgical History:   Procedure Laterality Date    ANTERIOR CERVICAL DISCECTOMY W/ FUSION N/A 9/17/2018    Procedure: DISCECTOMY, SPINE, CERVICAL, ANTERIOR APPROACH, WITH FUSION C3-4, C4-5;  Surgeon: Leo Denton MD;  Location: [facility] OR [address] [place] [address];  Service: Neurosurgery;  Laterality: N/A;  toronto II, asa 2, regular bed, supine, josefina    HYSTERECTOMY      2011 [facility], R SAL[facility]ECT [facility]    right knee scope         Home Meds:   Prior to Admission medications    Medication Sig Start Date End Date Taking? Authorizing Provider   aspirin 81 MG Chew Take 1 tablet (81 mg total) by mouth once daily. May resume in 2 weeks post op 9/17/18   Wali Mcarthur MD   atorvastatin (LIPITOR) 10 MG tablet Take 1 tablet (10 mg total) by mouth once daily. 9/11/19 9/10/20  Deanna Conde,    azelastine (ASTELIN) 137 mcg (0.1 %) nasal spray azelastine 137 mcg (0.1 %) nasal spray aerosol    Historical Provider, MD   celecoxib (CELEBREX) 200 MG capsule Take 1 capsule (200 mg total) by mouth 2 (two) times daily. 12/10/19   Deanna Conde,    chlorpheniramine (CHLOR-TRIMETON) 4 mg tablet Take 4 mg by mouth every 6 (six) hours as needed for Allergies.    Historical Provider, MD   cyclobenzaprine (FLEXERIL) 10 MG tablet Take 1 tablet (10 mg total) by mouth 3 (three) times daily as needed for Muscle spasms. 11/29/19 12/29/19  Leo Denton MD   ergocalciferol (ERGOCALCIFEROL) 50,000 unit Cap Take 1 capsule (50,000 Units total) by mouth  every 7 days. 10/10/19   Deanna Conde DO   esomeprazole (NEXIUM PACKET) 40 mg GrPS Take 40 mg by mouth before breakfast. 4/18/19 4/17/20  Deanna Conde DO   fexofenadine (ALLEGRA) 180 MG tablet Take 1 tablet (180 mg total) by mouth once daily. 3/22/19 3/21/20  Ceci Bonds MD   furosemide (LASIX) 20 MG tablet Take 1 tablet (20 mg total) by mouth daily as needed.  Patient not taking: Reported on 12/10/2019 10/10/19   Deanna Conde DO   linaclotide (LINZESS) 145 mcg Cap capsule Take by mouth. 12/16/15   Historical Provider, MD   losartan (COZAAR) 50 MG tablet Take 1 tablet (50 mg total) by mouth once daily. 9/20/19   Deanna Conde DO   nystatin-triamcinolone (MYCOLOG II) cream APPLY TOPICALLY TO RASH UNDER BREASTS DAILY AFTER SHOWER FOR 4 WEEKS WHEN FLARING 8/22/19   Historical Provider, MD   omeprazole (PRILOSEC) 20 MG capsule omeprazole 20 mg capsule,delayed release    Historical Provider, MD   ondansetron (ZOFRAN-ODT) 4 MG TbDL Take 1 tablet (4 mg total) by mouth every 8 (eight) hours as needed.  Patient not taking: Reported on 12/10/2019 10/10/18   Deanna Conde DO   potassium chloride SA (K-DUR,KLOR-CON) 20 MEQ tablet Take 1 tablet (20 mEq total) by mouth once daily.  Patient not taking: Reported on 12/10/2019 10/11/19   Deanna Conde DO   propranolol (INDERAL LA) 160 mg 24 hr capsule Take 1 capsule (160 mg total) by mouth once daily. 9/20/19   Deanna Conde DO   propranolol (INDERAL LA) 160 mg 24 hr capsule propranolol  mg capsule,24 hr,extended release    Historical Provider, MD     Anticoagulants/Antiplatelets: no anticoagulation    Allergies: Review of patient's allergies indicates:  No Known Allergies  Sedation History:  no adverse reactions    Review of Systems:   Hematological: no known coagulopathies  Respiratory: no shortness of breath  Cardiovascular: no chest pain  Gastrointestinal: no abdominal pain  Genito-Urinary: no  dysuria  Musculoskeletal: negative  Neurological: no TIA or stroke symptoms         OBJECTIVE:     Vital Signs (Most Recent)       Physical Exam:  ASA: 2  Mallampati: 3    General: no acute distress  Mental Status: alert and oriented to person, place and time  HEENT: normocephalic, atraumatic  Chest: unlabored breathing  Heart: regular heart rate  Abdomen: nondistended  Extremity: moves all extremities    Laboratory  Lab Results   Component Value Date    INR 0.9 09/12/2018       Lab Results   Component Value Date    WBC 3.42 (L) 09/11/2019    HGB 12.4 09/11/2019    HCT 40.5 09/11/2019    MCV 80 (L) 09/11/2019     09/11/2019      Lab Results   Component Value Date     (H) 09/11/2019     09/11/2019    K 4.1 09/11/2019     09/11/2019    CO2 32 (H) 09/11/2019    BUN 12 09/11/2019    CREATININE 1.00 09/11/2019    CALCIUM 9.3 09/11/2019    ALT 18 09/11/2019    AST 22 09/11/2019    ALBUMIN 3.7 09/11/2019    BILITOT 0.6 09/11/2019       ASSESSMENT/PLAN:     Sedation Plan: local anesthesia   Patient will undergo : Bilateral L4 - 5 TF POORNIMA.    Sylvain Bajwa MD  NeuroIR fellow.

## 2019-12-30 NOTE — NURSING
Pt received verbal and physical discharge instructions. VSS. Procedure site c/d/i and care instructions provided to the patient. Questions encouraged and answered. Pt steady on feet and discharging home with a caregiver.

## 2019-12-30 NOTE — DISCHARGE INSTRUCTIONS
Please call with any questions or concerns.      Monday thru Friday 8:00 am - 4:30 pm    Interventional Radiology   [phone]    After Hours    Ask for the Radiology Intern on call  [phone]

## 2020-01-15 ENCOUNTER — TELEPHONE (OUTPATIENT)
Dept: NEUROSURGERY | Facility: CLINIC | Age: 60
End: 2020-01-15

## 2020-01-15 DIAGNOSIS — M43.16 SPONDYLOLISTHESIS OF LUMBAR REGION: ICD-10-CM

## 2020-01-15 DIAGNOSIS — M54.16 LUMBAR RADICULOPATHY: Primary | ICD-10-CM

## 2020-01-15 NOTE — TELEPHONE ENCOUNTER
----- Message from Jenny Pelayo MA sent at 1/15/2020 12:39 PM CST -----  Contact: self @ [phone]      ----- Message -----  From: Catalina Lazaro  Sent: 1/15/2020  12:28 PM CST  To: Corrie STEINER Staff    Pt is an order to continue physical therapy for her back.  Pls fax to [facility] at     [facility] was disconnect before she gave the fax number.

## 2020-01-21 ENCOUNTER — HOSPITAL ENCOUNTER (OUTPATIENT)
Dept: RADIOLOGY | Facility: HOSPITAL | Age: 60
Discharge: HOME | End: 2020-01-21
Attending: NEUROLOGICAL SURGERY
Payer: COMMERCIAL

## 2020-01-21 DIAGNOSIS — M43.22 CERVICAL VERTEBRAL FUSION: ICD-10-CM

## 2020-01-21 DIAGNOSIS — M54.16 LUMBAR RADICULOPATHY: ICD-10-CM

## 2020-01-21 DIAGNOSIS — M43.16 SPONDYLOLISTHESIS OF LUMBAR REGION: ICD-10-CM

## 2020-01-21 DIAGNOSIS — M48.061 SPINAL STENOSIS OF LUMBAR REGION WITHOUT NEUROGENIC CLAUDICATION: ICD-10-CM

## 2020-01-21 PROCEDURE — 72110 XR LUMBAR SPINE 5 VIEW WITH FLEX AND EXT: ICD-10-PCS | Mod: 26,,, | Performed by: RADIOLOGY

## 2020-01-21 PROCEDURE — 72131 CT LUMBAR SPINE WITHOUT CONTRAST: ICD-10-PCS | Mod: 26,,, | Performed by: RADIOLOGY

## 2020-01-21 PROCEDURE — 72131 CT LUMBAR SPINE W/O DYE: CPT | Mod: TC

## 2020-01-21 PROCEDURE — 72110 X-RAY EXAM L-2 SPINE 4/>VWS: CPT | Mod: TC

## 2020-01-21 PROCEDURE — 72131 CT LUMBAR SPINE W/O DYE: CPT | Mod: 26,,, | Performed by: RADIOLOGY

## 2020-01-21 PROCEDURE — 72110 X-RAY EXAM L-2 SPINE 4/>VWS: CPT | Mod: 26,,, | Performed by: RADIOLOGY

## 2020-01-29 ENCOUNTER — TELEPHONE (OUTPATIENT)
Dept: NEUROSURGERY | Facility: CLINIC | Age: 60
End: 2020-01-29

## 2020-01-29 NOTE — TELEPHONE ENCOUNTER
Per DR Denton to see her in a few weeks . Scheduled appt for 02/11/2019. Ms rojo confirmed date and time

## 2020-01-29 NOTE — TELEPHONE ENCOUNTER
----- Message from Jenny Pelayo MA sent at 1/29/2020 10:27 AM CST -----  Contact: pt @ [phone]      ----- Message -----  From: Jin Lee  Sent: 1/29/2020  10:22 AM CST  To: Corrie STEINER Staff    Pt is asking to speak w/ Herlinda about getting results of XRAY

## 2020-02-11 ENCOUNTER — OFFICE VISIT (OUTPATIENT)
Dept: NEUROSURGERY | Facility: CLINIC | Age: 60
End: 2020-02-11
Payer: COMMERCIAL

## 2020-02-11 DIAGNOSIS — M43.16 SPONDYLOLISTHESIS OF LUMBAR REGION: Primary | ICD-10-CM

## 2020-02-11 DIAGNOSIS — M54.16 LUMBAR RADICULOPATHY: ICD-10-CM

## 2020-02-11 PROCEDURE — 99215 PR OFFICE/OUTPT VISIT, EST, LEVL V, 40-54 MIN: ICD-10-PCS | Mod: S$GLB,,, | Performed by: NEUROLOGICAL SURGERY

## 2020-02-11 PROCEDURE — 99999 PR PBB SHADOW E&M-EST. PATIENT-LVL III: ICD-10-PCS | Mod: PBBFAC,,, | Performed by: NEUROLOGICAL SURGERY

## 2020-02-11 PROCEDURE — 99215 OFFICE O/P EST HI 40 MIN: CPT | Mod: S$GLB,,, | Performed by: NEUROLOGICAL SURGERY

## 2020-02-11 PROCEDURE — 99999 PR PBB SHADOW E&M-EST. PATIENT-LVL III: CPT | Mod: PBBFAC,,, | Performed by: NEUROLOGICAL SURGERY

## 2020-02-11 NOTE — PROGRESS NOTES
Established Pateint    SUBJECTIVE:     History of Present Illness:  Patient back to see me for follow-up after last evaluation for her on 11/19/2019.  Is a patient who we had previously followed for a 2 level ACDF done in September 2018 and she has done very well from cervical spine perspective but has over year history of progressing back pain with left-sided radiculopathy.  She was having significant pain and numbness in the left L5 distribution.  She responded well to Seferino epidural injection but the symptoms started to return.  She has about equal amount of back pain and leg pain.  MRI scan had showed a grade 1 spondylolisthesis that has progressed from MRI scan done in 2018.  We worked her up more in depth with CT scan as well as flexion-extension.    Review of patient's allergies indicates:  No Known Allergies    Current Outpatient Medications   Medication Sig Dispense Refill    azelastine (ASTELIN) 137 mcg (0.1 %) nasal spray azelastine 137 mcg (0.1 %) nasal spray aerosol      celecoxib (CELEBREX) 200 MG capsule Take 1 capsule (200 mg total) by mouth 2 (two) times daily. 180 capsule 3    chlorpheniramine (CHLOR-TRIMETON) 4 mg tablet Take 4 mg by mouth every 6 (six) hours as needed for Allergies.      fexofenadine (ALLEGRA) 180 MG tablet Take 1 tablet (180 mg total) by mouth once daily. 30 tablet 12    furosemide (LASIX) 20 MG tablet Take 1 tablet (20 mg total) by mouth daily as needed. 30 tablet 3    linaclotide (LINZESS) 145 mcg Cap capsule Take by mouth.      losartan (COZAAR) 50 MG tablet Take 1 tablet (50 mg total) by mouth once daily. 90 tablet 3    nystatin-triamcinolone (MYCOLOG II) cream APPLY TOPICALLY TO RASH UNDER BREASTS DAILY AFTER SHOWER FOR 4 WEEKS WHEN FLARING  3    omeprazole (PRILOSEC) 20 MG capsule omeprazole 20 mg capsule,delayed release      ondansetron (ZOFRAN-ODT) 4 MG TbDL Take 1 tablet (4 mg total) by mouth every 8 (eight) hours as needed. 24 tablet 0    potassium  chloride SA (K-DUR,KLOR-CON) 20 MEQ tablet Take 1 tablet (20 mEq total) by mouth once daily. 30 tablet 3    propranolol (INDERAL LA) 160 mg 24 hr capsule Take 1 capsule (160 mg total) by mouth once daily. 90 capsule 3    aspirin 81 MG Chew Take 1 tablet (81 mg total) by mouth once daily. May resume in 2 weeks post op (Patient not taking: Reported on 2/11/2020)  0    atorvastatin (LIPITOR) 10 MG tablet Take 1 tablet (10 mg total) by mouth once daily. (Patient not taking: Reported on 2/11/2020) 90 tablet 3    ergocalciferol (ERGOCALCIFEROL) 50,000 unit Cap Take 1 capsule (50,000 Units total) by mouth every 7 days. (Patient not taking: Reported on 2/11/2020) 12 capsule 0    esomeprazole (NEXIUM PACKET) 40 mg GrPS Take 40 mg by mouth before breakfast. (Patient not taking: Reported on 2/11/2020) 1200 mg 11    propranolol (INDERAL LA) 160 mg 24 hr capsule propranolol  mg capsule,24 hr,extended release       No current facility-administered medications for this visit.        Past Medical History:   Diagnosis Date    Diabetes mellitus     Diabetes mellitus, type 2     Diet controlled gestational diabetes mellitus in puerperium     History of seasonal allergies     Hypertension      Past Surgical History:   Procedure Laterality Date    ANTERIOR CERVICAL DISCECTOMY W/ FUSION N/A 9/17/2018    Procedure: DISCECTOMY, SPINE, CERVICAL, ANTERIOR APPROACH, WITH FUSION C3-4, C4-5;  Surgeon: Leo Denton MD;  Location: [facility] OR [address] [place] [address];  Service: Neurosurgery;  Laterality: N/A;  toronto II, asa 2, regular bed, supine, josefina    HYSTERECTOMY      2011 [facility], R SALPINGECT [facility]    right knee scope       Family History     Problem Relation (Age of Onset)    Diabetes type II Sister, Other    Hyperlipidemia Sister        Social History     Socioeconomic History    Marital status:      Spouse name: Not on file    Number of children: Not on file    Years of education: Not on file    Highest education  level: Not on file   Occupational History    Not on file   Social Needs    Financial resource strain: Not on file    Food insecurity:     Worry: Not on file     Inability: Not on file    Transportation needs:     Medical: Not on file     Non-medical: Not on file   Tobacco Use    Smoking status: Never Smoker    Smokeless tobacco: Never Used   Substance and Sexual Activity    Alcohol use: No    Drug use: No    Sexual activity: Yes     Partners: Male     Birth control/protection: Surgical   Lifestyle    Physical activity:     Days per week: Not on file     Minutes per session: Not on file    Stress: Not at all   Relationships    Social connections:     Talks on phone: Not on file     Gets together: Not on file     Attends Yazidism service: Not on file     Active member of club or organization: Not on file     Attends meetings of clubs or organizations: Not on file     Relationship status: Not on file   Other Topics Concern    Not on file   Social History Narrative    Not on file       Review of Systems:  Review of Systems   Constitutional: Positive for activity change.   HENT: Negative.    Eyes: Negative.    Respiratory: Negative.    Cardiovascular: Negative.    Gastrointestinal: Negative.    Endocrine: Negative.    Genitourinary: Negative.    Musculoskeletal: Positive for arthralgias and back pain.   Skin: Negative.    Allergic/Immunologic: Negative.    Neurological: Negative.    Hematological: Negative.    Psychiatric/Behavioral: Negative.        OBJECTIVE:     Vital Signs     There is no height or weight on file to calculate BMI.    Physical Exam:  Physical Exam:    Constitutional: She appears well-developed and well-nourished.     Eyes: Pupils are equal, round, and reactive to light. Conjunctivae and EOM are normal.     Psych/Behavior: She is alert. She is oriented to person, place, and time. She has a normal mood and affect.     Musculoskeletal: Gait is normal.        Right Upper Extremities: Muscle  strength is 5/5.        Left Upper Extremities: Muscle strength is 5/5.       Right Lower Extremities: Muscle strength is 5/5.        Left Lower Extremities: Muscle strength is 5/5.     Neurological:        DTRs: DTRs are DTRS NORMAL AND SYMMETRICnormal and symmetric.        Cranial nerves: Cranial nerve(s) II, III, IV, V, VI, VII, VIII, IX, X, XI and XII are intact.     She has a positive straight leg raise on the left.  Negative on the right.      Diagnostic Results:  CT scan of the lumbar spine confirmed a grade 1 spondylolisthesis at L4-L5 with significant facet disease at L4-L5 as well as air within the disc space at L4-L5.  The pars appeared to be severely degenerative and thin.  Flexion-extension shows instability at L4-L5 with worsening of the slippage on flexion.  When I compared the MRI scan from [id number] lumbar spine clearly see this been significant progression of the slippage and the disc herniation.    ASSESSMENT/PLAN:   This is a patient with unstable grade 1 spondylolisthesis at L4-L5 with clear progression over a year's time and evidence of instability on flexion-extension.  She has been symptomatic for close year now has failed conservative management she only had temporary relief from epidural injection.  I think she would benefit from a minimally invasive left-sided L4-5 TLIF.  This stage she is amenable he would like to proceed    I have discussed the risks/benefits, indications, and alternatives for the proposed procedure in detail. I have answered all of their questions and patient wish to proceed with surgery. We will schedule patient.           Note dictated with voice recognition software, please excuse any grammatical errors.

## 2020-02-12 ENCOUNTER — TELEPHONE (OUTPATIENT)
Dept: NEUROSURGERY | Facility: CLINIC | Age: 60
End: 2020-02-12

## 2020-02-12 DIAGNOSIS — M54.10 RADICULOPATHY, UNSPECIFIED SPINAL REGION: Primary | ICD-10-CM

## 2020-02-12 DIAGNOSIS — M43.10 DEGENERATIVE SPONDYLOLISTHESIS: ICD-10-CM

## 2020-02-12 DIAGNOSIS — Q76.2 CONGENITAL SPONDYLOLISTHESIS OF LUMBOSACRAL REGION: ICD-10-CM

## 2020-02-13 ENCOUNTER — TELEPHONE (OUTPATIENT)
Dept: FAMILY MEDICINE | Facility: CLINIC | Age: 60
End: 2020-02-13

## 2020-02-13 DIAGNOSIS — Z01.818 PREOPERATIVE TESTING: Primary | ICD-10-CM

## 2020-02-13 NOTE — TELEPHONE ENCOUNTER
----- Message from Deanna Conde DO sent at 2/13/2020  3:46 PM CST -----  Please call patient and schedule this appointment.  Thanks.   ----- Message -----  From: Jennie Sotelo RN  Sent: 2/13/2020   3:22 PM CST  To: Jennie Sotelo RN, Deanna Conde DO, #    Patient is scheduled for lumbar spine fusion, TLIF, minimally invasive L4-5 on 3/9 with Dr. Denton (approximately 240 minutes of general anesthesia). She will need medical clearance for this surgery Please schedule a prep clearance appt.  Thanks!

## 2020-02-13 NOTE — TELEPHONE ENCOUNTER
----- Message from Jennie Sotelo RN sent at 2/13/2020  3:22 PM CST -----  Patient is scheduled for lumbar spine fusion, TLIF, minimally invasive L4-5 on 3/9 with Dr. Denton (approximately 240 minutes of general anesthesia). She will need medical clearance for this surgery Please schedule a prep clearance appt.  Thanks!

## 2020-02-13 NOTE — TELEPHONE ENCOUNTER
Pt has been scheduled for a preop clearance on 2/18/2020. She has also been scheduled for preop labs and EKG on 2/17/2020.

## 2020-02-17 ENCOUNTER — HOSPITAL ENCOUNTER (OUTPATIENT)
Dept: CARDIOLOGY | Facility: HOSPITAL | Age: 60
Discharge: HOME | End: 2020-02-17
Payer: COMMERCIAL

## 2020-02-17 ENCOUNTER — TELEPHONE (OUTPATIENT)
Dept: PREADMISSION TESTING | Facility: HOSPITAL | Age: 60
End: 2020-02-17

## 2020-02-17 DIAGNOSIS — Z01.818 PREOPERATIVE TESTING: ICD-10-CM

## 2020-02-17 PROCEDURE — 93010 ELECTROCARDIOGRAM REPORT: CPT | Mod: ,,, | Performed by: INTERNAL MEDICINE

## 2020-02-17 PROCEDURE — 93010 EKG 12-LEAD: ICD-10-PCS | Mod: ,,, | Performed by: INTERNAL MEDICINE

## 2020-02-17 PROCEDURE — 93005 ELECTROCARDIOGRAM TRACING: CPT | Mod: PO

## 2020-02-17 NOTE — TELEPHONE ENCOUNTER
----- Message from Jennie Sotelo RN sent at 2/13/2020  3:21 PM CST -----  Surgery 3/9  Please schedule poc appt, labs, ua, and ekg.  Thanks!

## 2020-02-18 ENCOUNTER — OFFICE VISIT (OUTPATIENT)
Dept: FAMILY MEDICINE | Facility: CLINIC | Age: 60
End: 2020-02-18
Payer: COMMERCIAL

## 2020-02-18 VITALS
OXYGEN SATURATION: 99 % | SYSTOLIC BLOOD PRESSURE: 122 MMHG | BODY MASS INDEX: 39.95 KG/M2 | HEIGHT: 63 IN | HEART RATE: 79 BPM | TEMPERATURE: 98 F | WEIGHT: 225.44 LBS | DIASTOLIC BLOOD PRESSURE: 86 MMHG

## 2020-02-18 DIAGNOSIS — I10 ESSENTIAL HYPERTENSION: ICD-10-CM

## 2020-02-18 DIAGNOSIS — E11.9 CONTROLLED TYPE 2 DIABETES MELLITUS WITHOUT COMPLICATION, WITHOUT LONG-TERM CURRENT USE OF INSULIN: Primary | ICD-10-CM

## 2020-02-18 DIAGNOSIS — M54.16 LUMBAR RADICULOPATHY: ICD-10-CM

## 2020-02-18 DIAGNOSIS — Z01.818 OTHER SPECIFIED PRE-OPERATIVE EXAMINATION: Primary | ICD-10-CM

## 2020-02-18 DIAGNOSIS — E55.9 VITAMIN D DEFICIENCY: ICD-10-CM

## 2020-02-18 DIAGNOSIS — E78.5 HYPERLIPIDEMIA, UNSPECIFIED HYPERLIPIDEMIA TYPE: ICD-10-CM

## 2020-02-18 PROCEDURE — 3044F PR MOST RECENT HEMOGLOBIN A1C LEVEL <7.0%: ICD-10-PCS | Mod: CPTII,S$GLB,, | Performed by: FAMILY MEDICINE

## 2020-02-18 PROCEDURE — 3079F DIAST BP 80-89 MM HG: CPT | Mod: CPTII,S$GLB,, | Performed by: FAMILY MEDICINE

## 2020-02-18 PROCEDURE — 3074F PR MOST RECENT SYSTOLIC BLOOD PRESSURE < 130 MM HG: ICD-10-PCS | Mod: CPTII,S$GLB,, | Performed by: FAMILY MEDICINE

## 2020-02-18 PROCEDURE — 3074F SYST BP LT 130 MM HG: CPT | Mod: CPTII,S$GLB,, | Performed by: FAMILY MEDICINE

## 2020-02-18 PROCEDURE — 3008F PR BODY MASS INDEX (BMI) DOCUMENTED: ICD-10-PCS | Mod: CPTII,S$GLB,, | Performed by: FAMILY MEDICINE

## 2020-02-18 PROCEDURE — 99214 PR OFFICE/OUTPT VISIT, EST, LEVL IV, 30-39 MIN: ICD-10-PCS | Mod: S$GLB,,, | Performed by: FAMILY MEDICINE

## 2020-02-18 PROCEDURE — 99214 OFFICE O/P EST MOD 30 MIN: CPT | Mod: S$GLB,,, | Performed by: FAMILY MEDICINE

## 2020-02-18 PROCEDURE — 3079F PR MOST RECENT DIASTOLIC BLOOD PRESSURE 80-89 MM HG: ICD-10-PCS | Mod: CPTII,S$GLB,, | Performed by: FAMILY MEDICINE

## 2020-02-18 PROCEDURE — 3044F HG A1C LEVEL LT 7.0%: CPT | Mod: CPTII,S$GLB,, | Performed by: FAMILY MEDICINE

## 2020-02-18 PROCEDURE — 3008F BODY MASS INDEX DOCD: CPT | Mod: CPTII,S$GLB,, | Performed by: FAMILY MEDICINE

## 2020-02-18 RX ORDER — METFORMIN HYDROCHLORIDE 500 MG/1
500 TABLET ORAL
Qty: 90 TABLET | Refills: 3 | Status: SHIPPED | OUTPATIENT
Start: 2020-02-18 | End: 2021-02-25 | Stop reason: SDUPTHER

## 2020-02-18 RX ORDER — AZELASTINE 1 MG/ML
1 SPRAY, METERED NASAL 2 TIMES DAILY
Qty: 137 ML | Refills: 3 | Status: SHIPPED | OUTPATIENT
Start: 2020-02-18 | End: 2023-03-28

## 2020-02-18 NOTE — LETTER
February 18, 2020        Leo eDnton MD  [address]  [place] [address]             [place] [id number] [address] [address]  Phone: [phone]  Fax: [phone]   Patient: Carmen Son   MR Number: [id number]   YOB: 1960   Date of Visit: 2/18/2020     Dear Dr. Denton,     I saw Carmen Son in my clinic on 2/18/2020 for a pre-op physical.  She is scheduled with you for a lumbar fusion on March 9.      As you know she has a history of hypertension, hyperlipidemia and vitamin D deficiency.  These are well controlled with her current medications.  In addition, she has been diagnosed as diabetic. I have started her on metformin for this.  Her sugars are well controlled.   She has no history of CAD, MI or pulmonary disease.     Carmen is an acceptable candidate for the proposed surgery.     Thank you for your excellent care of this patient.     Sincerely,      Deanna Conde,             CC  No Recipients    Enclosure

## 2020-02-18 NOTE — PROGRESS NOTES
Subjective:       Patient ID: Carmen Son is a 59 y.o. female.    Chief Complaint:   Chief Complaint   Patient presents with    Pre-op Exam     back sx           Patient presents for a pre-op physical.  She is scheduled. For a lumbar fusion on March 9 with Dr. Denton.    Ms Son has a history of hypertension, vitamin D deficiency and hyperlipidemia.  These are currently well controlled with her medications.  She was recently diagnosed as diabetic as well.  Her sugars are controlled with her A1C at 6.6.  No history of CAD, MI or pulmonary disease.      Hypertension   This is a chronic problem. The current episode started more than 1 year ago. The problem is unchanged. Pertinent negatives include no anxiety, blurred vision, chest pain, headaches, malaise/fatigue, orthopnea, palpitations, peripheral edema or sweats. There are no associated agents to hypertension. Risk factors for coronary artery disease include obesity. Past treatments include angiotensin blockers, beta blockers and diuretics. The current treatment provides significant improvement. There is no history of angina, kidney disease, CAD/MI, CVA, heart failure, left ventricular hypertrophy, PVD or retinopathy. There is no history of chronic renal disease.   Hyperlipidemia   This is a chronic problem. The problem is uncontrolled. Recent lipid tests were reviewed and are high. Exacerbating diseases include obesity. She has no history of chronic renal disease, diabetes, hypothyroidism, liver disease or nephrotic syndrome. Pertinent negatives include no chest pain, focal sensory loss, focal weakness or leg pain. Current antihyperlipidemic treatment includes diet change and statins.   Gastroesophageal Reflux   She complains of abdominal pain and belching. She reports no chest pain, no choking, no dysphagia, no early satiety, no globus sensation, no heartburn, no nausea, no stridor, no tooth decay, no water brash or no wheezing. This is a chronic  problem. The current episode started more than 1 year ago. The problem occurs frequently. The problem has been waxing and waning. The symptoms are aggravated by certain foods. Pertinent negatives include no anemia, fatigue, melena, muscle weakness, orthopnea or weight loss. There are no known risk factors. She has tried a PPI (Needs a different PPi that is covered on her insurance. ) for the symptoms. The treatment provided significant relief.   Diabetes   She presents for her initial (This is a new diagnosis for Ms Son.  Previously she has been pre-diabetic. ) diabetic visit. She has type 2 diabetes mellitus. Her disease course has been stable. There are no hypoglycemic associated symptoms. Pertinent negatives for hypoglycemia include no confusion, dizziness, headaches, hunger, mood changes, nervousness/anxiousness, pallor, sweats or tremors. Pertinent negatives for diabetes include no blurred vision, no chest pain, no fatigue, no foot paresthesias, no foot ulcerations, no polydipsia, no polyphagia, no polyuria, no visual change, no weakness and no weight loss. There are no hypoglycemic complications. Symptoms are stable. There are no diabetic complications. Pertinent negatives for diabetic complications include no CVA, PVD or retinopathy. Risk factors for coronary artery disease include dyslipidemia, diabetes mellitus, hypertension, obesity, post-menopausal and sedentary lifestyle. When asked about current treatments, none were reported. She never participates in exercise. An ACE inhibitor/angiotensin II receptor blocker is being taken. She does not see a podiatrist.Eye exam is current.     Review of Systems   Constitutional: Negative for activity change, appetite change, fatigue, malaise/fatigue and weight loss.   HENT: Negative for ear discharge and trouble swallowing.    Eyes: Negative for blurred vision, photophobia, pain, redness, itching and visual disturbance.   Respiratory: Negative for choking, chest  tightness and wheezing.    Cardiovascular: Negative for chest pain, palpitations, orthopnea and leg swelling.   Gastrointestinal: Positive for abdominal pain. Negative for abdominal distention, blood in stool, dysphagia, heartburn, melena and nausea.   Endocrine: Negative for polydipsia, polyphagia and polyuria.   Genitourinary: Negative for pelvic pain, vaginal bleeding, vaginal discharge and vaginal pain.   Musculoskeletal: Negative for arthralgias, back pain, gait problem and muscle weakness.   Skin: Negative for color change and pallor.   Neurological: Negative for dizziness, tremors, focal weakness, weakness, light-headedness, numbness and headaches.   Psychiatric/Behavioral: Negative for agitation, behavioral problems, confusion and sleep disturbance. The patient is not nervous/anxious.        Past Medical History:   Diagnosis Date    Diabetes mellitus     Diabetes mellitus, type 2     Diet controlled gestational diabetes mellitus in puerperium     History of seasonal allergies     Hypertension      Social History     Socioeconomic History    Marital status:      Spouse name: Not on file    Number of children: Not on file    Years of education: Not on file    Highest education level: Not on file   Occupational History    Not on file   Social Needs    Financial resource strain: Not on file    Food insecurity:     Worry: Not on file     Inability: Not on file    Transportation needs:     Medical: Not on file     Non-medical: Not on file   Tobacco Use    Smoking status: Never Smoker    Smokeless tobacco: Never Used   Substance and Sexual Activity    Alcohol use: No    Drug use: No    Sexual activity: Yes     Partners: Male     Birth control/protection: Surgical   Lifestyle    Physical activity:     Days per week: Not on file     Minutes per session: Not on file    Stress: Not at all   Relationships    Social connections:     Talks on phone: Not on file     Gets together: Not on file     " Attends Shinto service: Not on file     Active member of club or organization: Not on file     Attends meetings of clubs or organizations: Not on file     Relationship status: Not on file   Other Topics Concern    Not on file   Social History Narrative    Not on file       Objective:     /86 (BP Location: Left arm, Patient Position: Sitting)   Pulse 79   Temp 98.4 °F (36.9 °C) (Oral)   Ht 5' 3" (1.6 m)   Wt 102.3 kg (225 lb 6.7 oz)   LMP 04/07/2013   SpO2 99%   BMI 39.93 kg/m²     Physical Exam   Constitutional: She appears well-developed and well-nourished.   HENT:   Head: Normocephalic.   Eyes: Conjunctivae are normal.   Neck: Normal range of motion. Neck supple.   Cardiovascular: Normal rate, regular rhythm and normal heart sounds.   Pulmonary/Chest: Effort normal and breath sounds normal.   Neurological: She is alert.   Skin: Skin is warm and dry.   Psychiatric: Her behavior is normal.       Assessment:       Controlled type 2 diabetes mellitus without complication, without long-term current use of insulin  -     Ambulatory referral/consult to Diabetes Education; Future; Expected date: 02/25/2020  -     CBC auto differential; Future; Expected date: 05/18/2020  -     Lipid panel; Future; Expected date: 05/18/2020  -     Comprehensive metabolic panel; Future; Expected date: 05/18/2020  -     Hemoglobin A1c; Future; Expected date: 05/18/2020    Lumbar radiculopathy    Hyperlipidemia, unspecified hyperlipidemia type    Essential hypertension    Vitamin D deficiency  -     Vitamin D; Future; Expected date: 05/18/2020    Other orders  -     metFORMIN (GLUCOPHAGE) 500 MG tablet; Take 1 tablet (500 mg total) by mouth daily with breakfast.  Dispense: 90 tablet; Refill: 3        Patient is an acceptable candidate for the proposed surgery.     "

## 2020-02-19 ENCOUNTER — TELEPHONE (OUTPATIENT)
Dept: NEUROSURGERY | Facility: CLINIC | Age: 60
End: 2020-02-19

## 2020-02-19 DIAGNOSIS — Z01.818 PREOPERATIVE TESTING: Primary | ICD-10-CM

## 2020-02-19 NOTE — TELEPHONE ENCOUNTER
----- Message from Aminah Ramirez sent at 2/19/2020 11:25 AM CST -----  Contact: pt called   Pt is requesting that the Nurse give her a call.

## 2020-02-20 ENCOUNTER — HOSPITAL ENCOUNTER (OUTPATIENT)
Dept: RADIOLOGY | Facility: HOSPITAL | Age: 60
Discharge: HOME | End: 2020-02-20
Attending: FAMILY MEDICINE
Payer: COMMERCIAL

## 2020-02-20 ENCOUNTER — PATIENT MESSAGE (OUTPATIENT)
Dept: FAMILY MEDICINE | Facility: CLINIC | Age: 60
End: 2020-02-20

## 2020-02-20 DIAGNOSIS — Z01.818 OTHER SPECIFIED PRE-OPERATIVE EXAMINATION: ICD-10-CM

## 2020-02-20 PROCEDURE — 71046 X-RAY EXAM CHEST 2 VIEWS: CPT | Mod: TC,FY,PO

## 2020-03-02 ENCOUNTER — HOSPITAL ENCOUNTER (OUTPATIENT)
Dept: PREADMISSION TESTING | Facility: HOSPITAL | Age: 60
Discharge: HOME | End: 2020-03-02
Attending: ANESTHESIOLOGY
Payer: COMMERCIAL

## 2020-03-02 ENCOUNTER — TELEPHONE (OUTPATIENT)
Dept: PREADMISSION TESTING | Facility: HOSPITAL | Age: 60
End: 2020-03-02

## 2020-03-02 ENCOUNTER — TELEPHONE (OUTPATIENT)
Dept: NEUROSURGERY | Facility: CLINIC | Age: 60
End: 2020-03-02

## 2020-03-02 VITALS
TEMPERATURE: 99 F | HEIGHT: 63 IN | HEART RATE: 71 BPM | BODY MASS INDEX: 38.8 KG/M2 | WEIGHT: 219 LBS | SYSTOLIC BLOOD PRESSURE: 130 MMHG | RESPIRATION RATE: 16 BRPM | DIASTOLIC BLOOD PRESSURE: 89 MMHG | OXYGEN SATURATION: 97 %

## 2020-03-02 NOTE — TELEPHONE ENCOUNTER
----- [facility] sent at 3/2/2020  2:12 PM CST -----   JOE pt's surgery may be delayed if antibiotics are given by dentist.  ----- Message -----  From: Jennie Sotelo RN  Sent: 3/2/2020   2:07 PM CST  To: Leo Denton MD, Jennie Sotelo, NIKKI, #    FYI: Patient informed me of toothache and is seeing her dentist on Thursday 3/5. Instructed patient to call you if it is an infection, requiring antibiotics.   Thanks!

## 2020-03-02 NOTE — TELEPHONE ENCOUNTER
Taurus spoke with this patient. She is coming today 3/2 1 p.m. Her LAB, EKG and UA was done in [facility]. I put in for the T&S to be done on today. Patient has info.

## 2020-03-09 ENCOUNTER — TELEPHONE (OUTPATIENT)
Dept: NEUROSURGERY | Facility: CLINIC | Age: 60
End: 2020-03-09

## 2020-03-09 DIAGNOSIS — Z98.1 S/P LUMBAR FUSION: Primary | ICD-10-CM

## 2020-03-24 ENCOUNTER — OFFICE VISIT (OUTPATIENT)
Dept: FAMILY MEDICINE | Facility: CLINIC | Age: 60
End: 2020-03-24
Payer: COMMERCIAL

## 2020-03-24 VITALS
TEMPERATURE: 97 F | HEART RATE: 60 BPM | SYSTOLIC BLOOD PRESSURE: 100 MMHG | OXYGEN SATURATION: 97 % | DIASTOLIC BLOOD PRESSURE: 80 MMHG | HEIGHT: 63 IN | WEIGHT: 213.38 LBS | BODY MASS INDEX: 37.81 KG/M2

## 2020-03-24 DIAGNOSIS — I10 ESSENTIAL HYPERTENSION: ICD-10-CM

## 2020-03-24 DIAGNOSIS — J30.1 SEASONAL ALLERGIC RHINITIS DUE TO POLLEN: ICD-10-CM

## 2020-03-24 DIAGNOSIS — E66.01 MORBID OBESITY: ICD-10-CM

## 2020-03-24 DIAGNOSIS — E78.00 PURE HYPERCHOLESTEROLEMIA: ICD-10-CM

## 2020-03-24 DIAGNOSIS — E11.9 CONTROLLED TYPE 2 DIABETES MELLITUS WITHOUT COMPLICATION, WITHOUT LONG-TERM CURRENT USE OF INSULIN: Primary | ICD-10-CM

## 2020-03-24 DIAGNOSIS — G25.0 ESSENTIAL TREMOR: ICD-10-CM

## 2020-03-24 LAB — GLUCOSE SERPL-MCNC: 114 MG/DL (ref 70–110)

## 2020-03-24 PROCEDURE — 99215 PR OFFICE/OUTPT VISIT, EST, LEVL V, 40-54 MIN: ICD-10-PCS | Mod: S$GLB,,, | Performed by: INTERNAL MEDICINE

## 2020-03-24 PROCEDURE — 3044F PR MOST RECENT HEMOGLOBIN A1C LEVEL <7.0%: ICD-10-PCS | Mod: CPTII,S$GLB,, | Performed by: INTERNAL MEDICINE

## 2020-03-24 PROCEDURE — 3074F SYST BP LT 130 MM HG: CPT | Mod: CPTII,S$GLB,, | Performed by: INTERNAL MEDICINE

## 2020-03-24 PROCEDURE — 82962 GLUCOSE BLOOD TEST: CPT | Mod: ,,, | Performed by: INTERNAL MEDICINE

## 2020-03-24 PROCEDURE — 3079F PR MOST RECENT DIASTOLIC BLOOD PRESSURE 80-89 MM HG: ICD-10-PCS | Mod: CPTII,S$GLB,, | Performed by: INTERNAL MEDICINE

## 2020-03-24 PROCEDURE — 3074F PR MOST RECENT SYSTOLIC BLOOD PRESSURE < 130 MM HG: ICD-10-PCS | Mod: CPTII,S$GLB,, | Performed by: INTERNAL MEDICINE

## 2020-03-24 PROCEDURE — 3008F BODY MASS INDEX DOCD: CPT | Mod: CPTII,S$GLB,, | Performed by: INTERNAL MEDICINE

## 2020-03-24 PROCEDURE — 82962 POCT GLUCOSE, HAND-HELD DEVICE: ICD-10-PCS | Mod: ,,, | Performed by: INTERNAL MEDICINE

## 2020-03-24 PROCEDURE — 99215 OFFICE O/P EST HI 40 MIN: CPT | Mod: S$GLB,,, | Performed by: INTERNAL MEDICINE

## 2020-03-24 PROCEDURE — 3044F HG A1C LEVEL LT 7.0%: CPT | Mod: CPTII,S$GLB,, | Performed by: INTERNAL MEDICINE

## 2020-03-24 PROCEDURE — 3079F DIAST BP 80-89 MM HG: CPT | Mod: CPTII,S$GLB,, | Performed by: INTERNAL MEDICINE

## 2020-03-24 PROCEDURE — 3008F PR BODY MASS INDEX (BMI) DOCUMENTED: ICD-10-PCS | Mod: CPTII,S$GLB,, | Performed by: INTERNAL MEDICINE

## 2020-03-24 RX ORDER — FLUTICASONE PROPIONATE 50 MCG
1 SPRAY, SUSPENSION (ML) NASAL DAILY
Qty: 18 G | Refills: 3 | Status: SHIPPED | OUTPATIENT
Start: 2020-03-24 | End: 2020-09-01 | Stop reason: ALTCHOICE

## 2020-03-24 RX ORDER — LANCETS
1 EACH MISCELLANEOUS DAILY
Qty: 90 EACH | Refills: 4 | Status: SHIPPED | OUTPATIENT
Start: 2020-03-24 | End: 2023-03-28

## 2020-03-24 RX ORDER — ATORVASTATIN CALCIUM 20 MG/1
20 TABLET, FILM COATED ORAL DAILY
Qty: 90 TABLET | Refills: 3 | Status: SHIPPED | OUTPATIENT
Start: 2020-03-24 | End: 2020-09-01 | Stop reason: ALTCHOICE

## 2020-03-24 NOTE — PROGRESS NOTES
Subjective:       Patient ID: Carmen Son is a 59 y.o. female.    Chief Complaint: Cough; Fatigue (sleeping a lot); and Chest Pain    HPI  Pt c/o 4 days of nasal congestion, coryza, nonprod cough with CP but no SOB, no F/C.  No resp to OTC meds.  Weight down 12 lbs/ 1 mo with dieting.  Not checking sugars. Stopped lipitor.  Denies any tremors. Back surgery postponed due to pandemic.  Review of Systems   All other systems reviewed and are negative.      Objective:      Physical Exam   Constitutional: She appears well-developed.   obese   HENT:   Head: Normocephalic.   Mouth/Throat: Oropharynx is clear and moist.   Nasal mucosa patulous   Eyes: EOM are normal.   Neck: Normal range of motion.   Cardiovascular: Normal rate, regular rhythm, normal heart sounds and intact distal pulses.   Pulmonary/Chest: Effort normal and breath sounds normal.   Abdominal: Bowel sounds are normal.   Musculoskeletal: Normal range of motion. She exhibits no edema.   Lymphadenopathy:     She has no cervical adenopathy.   Neurological: She is alert. No cranial nerve deficit. She exhibits normal muscle tone. Coordination normal.   No tremor   Skin: Skin is warm and dry.   Psychiatric: She has a normal mood and affect. Her behavior is normal.   Vitals reviewed.      Assessment:       1. Controlled type 2 diabetes mellitus without complication, without long-term current use of insulin    2. Essential hypertension    3. Morbid obesity    4. Seasonal allergic rhinitis due to pollen    5. Essential tremor    6. Pure hypercholesterolemia        Plan:       Carmen was seen today for cough, fatigue and chest pain.    Diagnoses and all orders for this visit:    Controlled type 2 diabetes mellitus without complication, without long-term current use of insulin  -     POCT Glucose, Hand-Held Device   diabetic teaching    Essential hypertension   Well-cont    Morbid obesity   Cont weight loss    Seasonal allergic rhinitis due to pollen  -      fluticasone propionate (FLONASE) 50 mcg/actuation nasal spray; 1 spray (50 mcg total) by Each Nostril route once daily.   D/C OTC meds    Follow up if symptoms worsen or fail to improve.

## 2020-03-25 ENCOUNTER — TELEPHONE (OUTPATIENT)
Dept: FAMILY MEDICINE | Facility: CLINIC | Age: 60
End: 2020-03-25

## 2020-03-25 DIAGNOSIS — E11.9 CONTROLLED TYPE 2 DIABETES MELLITUS WITHOUT COMPLICATION, WITHOUT LONG-TERM CURRENT USE OF INSULIN: Primary | ICD-10-CM

## 2020-03-27 ENCOUNTER — TELEPHONE (OUTPATIENT)
Dept: FAMILY MEDICINE | Facility: CLINIC | Age: 60
End: 2020-03-27

## 2020-03-27 ENCOUNTER — OFFICE VISIT (OUTPATIENT)
Dept: FAMILY MEDICINE | Facility: CLINIC | Age: 60
End: 2020-03-27
Payer: COMMERCIAL

## 2020-03-27 VITALS
SYSTOLIC BLOOD PRESSURE: 70 MMHG | BODY MASS INDEX: 37.09 KG/M2 | HEIGHT: 63 IN | DIASTOLIC BLOOD PRESSURE: 62 MMHG | OXYGEN SATURATION: 95 % | WEIGHT: 209.31 LBS | TEMPERATURE: 98 F | HEART RATE: 98 BPM

## 2020-03-27 DIAGNOSIS — I95.9 HYPOTENSION, UNSPECIFIED HYPOTENSION TYPE: ICD-10-CM

## 2020-03-27 DIAGNOSIS — R53.83 FATIGUE, UNSPECIFIED TYPE: Primary | ICD-10-CM

## 2020-03-27 LAB
BILIRUB SERPL-MCNC: ABNORMAL MG/DL
BLOOD URINE, POC: ABNORMAL
COLOR, POC UA: YELLOW
GLUCOSE SERPL-MCNC: 149 MG/DL (ref 70–110)
GLUCOSE UR QL STRIP: ABNORMAL
KETONES UR QL STRIP: ABNORMAL
LEUKOCYTE ESTERASE URINE, POC: ABNORMAL
NITRITE, POC UA: ABNORMAL
PH, POC UA: 5
PROTEIN, POC: ABNORMAL
SPECIFIC GRAVITY, POC UA: 1.02
UROBILINOGEN, POC UA: ABNORMAL

## 2020-03-27 PROCEDURE — 81002 POCT URINE DIPSTICK WITHOUT MICROSCOPE: ICD-10-PCS | Mod: S$GLB,,, | Performed by: FAMILY MEDICINE

## 2020-03-27 PROCEDURE — 82962 POCT GLUCOSE, HAND-HELD DEVICE: ICD-10-PCS | Mod: ,,, | Performed by: FAMILY MEDICINE

## 2020-03-27 PROCEDURE — 3008F BODY MASS INDEX DOCD: CPT | Mod: CPTII,S$GLB,, | Performed by: FAMILY MEDICINE

## 2020-03-27 PROCEDURE — 99214 PR OFFICE/OUTPT VISIT, EST, LEVL IV, 30-39 MIN: ICD-10-PCS | Mod: 25,S$GLB,, | Performed by: FAMILY MEDICINE

## 2020-03-27 PROCEDURE — 3008F PR BODY MASS INDEX (BMI) DOCUMENTED: ICD-10-PCS | Mod: CPTII,S$GLB,, | Performed by: FAMILY MEDICINE

## 2020-03-27 PROCEDURE — 99214 OFFICE O/P EST MOD 30 MIN: CPT | Mod: 25,S$GLB,, | Performed by: FAMILY MEDICINE

## 2020-03-27 PROCEDURE — 3078F PR MOST RECENT DIASTOLIC BLOOD PRESSURE < 80 MM HG: ICD-10-PCS | Mod: CPTII,S$GLB,, | Performed by: FAMILY MEDICINE

## 2020-03-27 PROCEDURE — 3078F DIAST BP <80 MM HG: CPT | Mod: CPTII,S$GLB,, | Performed by: FAMILY MEDICINE

## 2020-03-27 PROCEDURE — 81002 URINALYSIS NONAUTO W/O SCOPE: CPT | Mod: S$GLB,,, | Performed by: FAMILY MEDICINE

## 2020-03-27 PROCEDURE — 82962 GLUCOSE BLOOD TEST: CPT | Mod: ,,, | Performed by: FAMILY MEDICINE

## 2020-03-27 PROCEDURE — 3074F PR MOST RECENT SYSTOLIC BLOOD PRESSURE < 130 MM HG: ICD-10-PCS | Mod: CPTII,S$GLB,, | Performed by: FAMILY MEDICINE

## 2020-03-27 PROCEDURE — 3074F SYST BP LT 130 MM HG: CPT | Mod: CPTII,S$GLB,, | Performed by: FAMILY MEDICINE

## 2020-03-27 RX ORDER — PROPRANOLOL HYDROCHLORIDE 80 MG/1
80 CAPSULE, EXTENDED RELEASE ORAL DAILY
Qty: 30 CAPSULE | Refills: 0 | Status: SHIPPED | OUTPATIENT
Start: 2020-03-27 | End: 2021-05-13 | Stop reason: SDUPTHER

## 2020-03-27 NOTE — PROGRESS NOTES
Subjective:       Patient ID: Carmen Son is a 59 y.o. female.    Chief Complaint: Urinary Tract Infection and Dizziness    Fatigue   This is a new problem. The current episode started 1 to 4 weeks ago (Has been doing a low carb diet and has lost about 13 lbs over 2 weeks. ). The problem occurs constantly. The problem has been unchanged. Associated symptoms include fatigue, myalgias, vertigo and weakness. Pertinent negatives include no abdominal pain, anorexia, arthralgias, change in bowel habit, chest pain, chills, congestion, coughing, diaphoresis, fever, headaches, joint swelling, nausea, neck pain, numbness, rash, sore throat, swollen glands, urinary symptoms, visual change or vomiting. Exacerbated by: dizzy when stands up.  She has tried nothing for the symptoms.     Review of Systems   Constitutional: Positive for fatigue. Negative for chills, diaphoresis and fever.   HENT: Negative for congestion, ear pain, postnasal drip, rhinorrhea, sinus pressure, sore throat and voice change.    Eyes: Negative for discharge.   Respiratory: Negative for cough, chest tightness, shortness of breath and wheezing.    Cardiovascular: Negative for chest pain.   Gastrointestinal: Negative for abdominal pain, anorexia, change in bowel habit, diarrhea, nausea and vomiting.   Musculoskeletal: Positive for myalgias. Negative for arthralgias, joint swelling and neck pain.   Skin: Negative for rash.   Neurological: Positive for vertigo and weakness. Negative for numbness and headaches.       Past Medical History:   Diagnosis Date    Diabetes mellitus     Diabetes mellitus, type 2     Diet controlled gestational diabetes mellitus in puerperium     History of seasonal allergies     Hypertension      Social History     Socioeconomic History    Marital status:      Spouse name: Not on file    Number of children: Not on file    Years of education: Not on file    Highest education level: Not on file   Occupational  "History    Not on file   Social Needs    Financial resource strain: Not on file    Food insecurity:     Worry: Not on file     Inability: Not on file    Transportation needs:     Medical: Not on file     Non-medical: Not on file   Tobacco Use    Smoking status: Never Smoker    Smokeless tobacco: Never Used   Substance and Sexual Activity    Alcohol use: No    Drug use: No    Sexual activity: Yes     Partners: Male     Birth control/protection: Surgical   Lifestyle    Physical activity:     Days per week: Not on file     Minutes per session: Not on file    Stress: Not at all   Relationships    Social connections:     Talks on phone: Not on file     Gets together: Not on file     Attends Yazidi service: Not on file     Active member of club or organization: Not on file     Attends meetings of clubs or organizations: Not on file     Relationship status: Not on file   Other Topics Concern    Not on file   Social History Narrative    Not on file       Objective:   BP (!) 70/62   Pulse 98   Temp 98.2 °F (36.8 °C) (Oral)   Ht 5' 3" (1.6 m)   Wt 95 kg (209 lb 5.2 oz)   LMP 04/07/2013   SpO2 95%   BMI 37.08 kg/m²     Physical Exam   Constitutional: She is oriented to person, place, and time. She appears well-developed and well-nourished.   Falling asleep during the visit, but arouses easily.    HENT:   Head: Normocephalic.   Eyes: Conjunctivae are normal.   Neck: Normal range of motion. Neck supple.   Cardiovascular: Normal rate, regular rhythm and normal heart sounds.   Pulmonary/Chest: Effort normal and breath sounds normal.   Neurological: She is alert and oriented to person, place, and time. No cranial nerve deficit.   Skin: Skin is warm and dry.   Psychiatric: Her behavior is normal.       Office Visit on 03/27/2020   Component Date Value Ref Range Status    Color, UA 03/27/2020 Yellow   Final    Spec Grav UA 03/27/2020 1.025   Final    pH, UA 03/27/2020 5   Final    WBC, UA 03/27/2020 neg   " Final    Nitrite, UA 03/27/2020 neg   Final    Protein 03/27/2020 ++   Final    Glucose, UA 03/27/2020 Neg   Final    Ketones, UA 03/27/2020 ++   Final    Urobilinogen, UA 03/27/2020 Norm   Final    Bilirubin 03/27/2020 ++   Final    Blood, UA 03/27/2020 Neg   Final    POC Glucose 03/27/2020 149* 70 - 110 MG/DL Final   Office Visit on 03/24/2020   Component Date Value Ref Range Status    POC Glucose 03/24/2020 114* 70 - 110 MG/DL Final   Lab Visit on 03/02/2020   Component Date Value Ref Range Status    Group & Rh 03/02/2020 A POS   Final    Indirect Michael 03/02/2020 NEG   Final       Assessment:       Fatigue, unspecified type  -     POCT URINE DIPSTICK WITHOUT MICROSCOPE  -     POCT Glucose, Hand-Held Device    Hypotension, unspecified hypotension type  -     propranoloL (INDERAL LA) 80 MG 24 hr capsule; Take 1 capsule (80 mg total) by mouth once daily.  Dispense: 30 capsule; Refill: 0  - I think with the weight loss her blood pressure is now too low and this may be causing her symptoms.  Instructed to hold her lasix losartin and propranolol for the weekend then send me a message to let me know how she is feeling.

## 2020-03-27 NOTE — TELEPHONE ENCOUNTER
----- Message from Laura Foley sent at 3/27/2020  9:01 AM CDT -----  Contact: [phone]/self  Type:  Needs Medical Advice    Who Called:  self  Symptoms (please be specific):  No energy, sluggish, cough, not urinating a lot  How long has patient had these symptoms:   About a week  Pharmacy name and phone #:     Would the patient rather a call back or a response via [facility]? call  Best Call Back Number:  [phone]/self  Additional Information:

## 2020-03-28 ENCOUNTER — HOSPITAL ENCOUNTER (EMERGENCY)
Facility: HOSPITAL | Age: 60
Discharge: HOME | End: 2020-03-28
Attending: FAMILY MEDICINE
Payer: COMMERCIAL

## 2020-03-28 VITALS
HEART RATE: 106 BPM | WEIGHT: 209 LBS | BODY MASS INDEX: 37.03 KG/M2 | SYSTOLIC BLOOD PRESSURE: 121 MMHG | TEMPERATURE: 99 F | OXYGEN SATURATION: 97 % | HEIGHT: 63 IN | DIASTOLIC BLOOD PRESSURE: 78 MMHG | RESPIRATION RATE: 20 BRPM

## 2020-03-28 DIAGNOSIS — Z20.822 SUSPECTED 2019 NOVEL CORONAVIRUS INFECTION: ICD-10-CM

## 2020-03-28 DIAGNOSIS — J22 LRTI (LOWER RESPIRATORY TRACT INFECTION): Primary | ICD-10-CM

## 2020-03-28 PROCEDURE — 99284 EMERGENCY DEPT VISIT MOD MDM: CPT | Mod: 25,ER

## 2020-03-28 RX ORDER — BENZONATATE 100 MG/1
200 CAPSULE ORAL
Status: DISCONTINUED | OUTPATIENT
Start: 2020-03-28 | End: 2020-03-28

## 2020-03-28 RX ORDER — AZITHROMYCIN 250 MG/1
250 TABLET, FILM COATED ORAL DAILY
Qty: 4 TABLET | Refills: 0 | Status: SHIPPED | OUTPATIENT
Start: 2020-03-28 | End: 2020-09-01

## 2020-03-28 RX ORDER — AZITHROMYCIN 250 MG/1
500 TABLET, FILM COATED ORAL
Status: DISCONTINUED | OUTPATIENT
Start: 2020-03-28 | End: 2020-03-28

## 2020-03-28 RX ORDER — ONDANSETRON 4 MG/1
4 TABLET, FILM COATED ORAL EVERY 8 HOURS PRN
Qty: 10 TABLET | Refills: 0 | Status: SHIPPED | OUTPATIENT
Start: 2020-03-28 | End: 2021-05-27 | Stop reason: SDUPTHER

## 2020-03-28 RX ORDER — ALBUTEROL SULFATE 90 UG/1
1-2 AEROSOL, METERED RESPIRATORY (INHALATION) EVERY 4 HOURS PRN
Qty: 18 G | Refills: 0 | Status: SHIPPED | OUTPATIENT
Start: 2020-03-28 | End: 2020-09-01 | Stop reason: ALTCHOICE

## 2020-03-28 RX ORDER — BENZONATATE 200 MG/1
200 CAPSULE ORAL 3 TIMES DAILY PRN
Qty: 30 CAPSULE | Refills: 0 | Status: SHIPPED | OUTPATIENT
Start: 2020-03-28 | End: 2020-04-07

## 2020-03-28 NOTE — ED PROVIDER NOTES
Encounter Date: 3/28/2020       History     Chief Complaint   Patient presents with    Shortness of Breath     Patient complains of sob and cough x 1 week    Cough     59-year-old female complains of cough for last 1 week.  She has been having shortness of breath since last few days.  No fever.  Mucus production.  No chest pain.    The history is provided by the patient.     Review of patient's allergies indicates:  No Known Allergies  Past Medical History:   Diagnosis Date    Diabetes mellitus     Diabetes mellitus, type 2     Diet controlled gestational diabetes mellitus in puerperium     History of seasonal allergies     Hypertension      Past Surgical History:   Procedure Laterality Date    ANTERIOR CERVICAL DISCECTOMY W/ FUSION N/A 9/17/2018    Procedure: DISCECTOMY, SPINE, CERVICAL, ANTERIOR APPROACH, WITH FUSION C3-4, C4-5;  Surgeon: Leo Denton MD;  Location: [facility] OR [address] [place] [address];  Service: Neurosurgery;  Laterality: N/A;  toronto II, asa 2, regular bed, supine, josefina    HYSTERECTOMY      2011 MED[facility]AT LSO, R [facility]    right knee scope       Family History   Problem Relation Age of Onset    Diabetes type II Sister     Hyperlipidemia Sister     Diabetes type II Other     Allergic rhinitis Neg Hx     Allergies Neg Hx     Angioedema Neg Hx     Asthma Neg Hx     Atopy Neg Hx     Eczema Neg Hx     Rhinitis Neg Hx     Urticaria Neg Hx     Immunodeficiency Neg Hx      Social History     Tobacco Use    Smoking status: Never Smoker    Smokeless tobacco: Never Used   Substance Use Topics    Alcohol use: No    Drug use: No     Review of Systems   Constitutional: Negative for chills and fever.   HENT: Negative for congestion, drooling, ear pain, mouth sores, nosebleeds, sinus pressure and sore throat.    Eyes: Negative for pain, discharge and visual disturbance.   Respiratory: Positive for cough and shortness of breath. Negative for choking and wheezing.    Cardiovascular: Negative for  chest pain.   Gastrointestinal: Negative for abdominal pain, blood in stool, constipation, nausea and vomiting.   Genitourinary: Negative for dysuria.   Musculoskeletal: Negative for back pain and neck pain.   Skin: Negative for color change, pallor and rash.        No rash, no erythema,no bruises, no pallor,    Neurological: Negative for seizures, light-headedness and headaches.   All other systems reviewed and are negative.      Physical Exam     Initial Vitals [03/28/20 1546]   BP Pulse Resp Temp SpO2   121/78 106 20 98.5 °F (36.9 °C) 97 %      MAP       --         Physical Exam    Nursing note and vitals reviewed.  Constitutional: Vital signs are normal. She appears well-developed and well-nourished. She is active.   HENT:   Head: Normocephalic and atraumatic.   Right Ear: Tympanic membrane normal.   Left Ear: Tympanic membrane normal.   Nose: Nose normal.   Mouth/Throat: Oropharynx is clear and moist.   Eyes: Conjunctivae and lids are normal.   Neck: Trachea normal, normal range of motion and full passive range of motion without pain. Neck supple. Normal range of motion present. No neck rigidity.   Cardiovascular: Normal rate, regular rhythm, S1 normal, S2 normal, normal heart sounds, intact distal pulses and normal pulses.   Pulmonary/Chest: She has rhonchi. She has rales.   Abdominal: Soft. Normal appearance and bowel sounds are normal. She exhibits no distension. There is no tenderness.   Musculoskeletal: Normal range of motion.   Lymphadenopathy:     She has no cervical adenopathy.   Neurological: She is alert and oriented to person, place, and time. She has normal strength and normal reflexes. No cranial nerve deficit or sensory deficit. GCS score is 15. GCS eye subscore is 4. GCS verbal subscore is 5. GCS motor subscore is 6.   Skin: Skin is warm and intact. Capillary refill takes less than 2 seconds. No abrasion, no bruising and no rash noted.   Psychiatric: She has a normal mood and affect. Her speech  is normal and behavior is normal. Judgment and thought content normal. She is not actively hallucinating. Cognition and memory are normal. She is attentive.         ED Course   Procedures  Labs Reviewed - No data to display       Imaging Results          X-Ray Chest AP Portable (Final result)  Result time 03/28/20 16:38:42    Final result by JOSE F Camarena Sr., MD (03/28/20 16:38:42)                 Impression:      1. The lungs are clear.  2. There is a minimal amount of dextroconvex curvature of the thoracic spine.  .      Electronically signed by: Chester Camarena MD  Date:    03/28/2020  Time:    16:38             Narrative:    EXAMINATION:  XR CHEST AP PORTABLE    CLINICAL HISTORY:  sob;    COMPARISON:  02/20/2020    FINDINGS:  The size of the heart is normal. The lungs are clear. There is no pneumothorax.  The costophrenic angles are sharp.  There is a minimal amount of dextroconvex curvature of the thoracic spine.                                 Medical Decision Making:   Initial Assessment:   Patient has been has COVID 19 symptoms.  He is also under nino score on time.  Patient complains of cough for last 1 week and feeling shortness of breath for last few days with mucus production.  Differential Diagnosis:   Pneumonia, COVID-19, upper respiratory infection.  Clinical Tests:   Lab Tests: Ordered and Reviewed  Radiological Study: Ordered and Reviewed  ED Management:  Bilateral rhonchi noted on physical examination.  Possibility of early pneumonia.  Patient is given COVID-19 discharge instructions with self-isolation at home.  Advised to follow up ER immediately with any shortness of breath.  Patient is treated with Zithromax, albuterol inhaler, Tessalon Perles.                                 Clinical Impression:       ICD-10-CM ICD-9-CM   1. LRTI (lower respiratory tract infection) J22 519.8   2. Suspected 2019 Novel Coronavirus Infection R68.89          Disposition:   Disposition: Discharged  Condition:  Stable                        Dell Hudson MD  03/28/20 0271

## 2020-03-31 ENCOUNTER — PATIENT OUTREACH (OUTPATIENT)
Dept: ADMINISTRATIVE | Facility: OTHER | Age: 60
End: 2020-03-31

## 2020-04-01 ENCOUNTER — TELEPHONE (OUTPATIENT)
Dept: DIABETES | Facility: CLINIC | Age: 60
End: 2020-04-01

## 2020-04-14 ENCOUNTER — TELEPHONE (OUTPATIENT)
Dept: FAMILY MEDICINE | Facility: CLINIC | Age: 60
End: 2020-04-14

## 2020-04-14 DIAGNOSIS — Z01.818 PREOPERATIVE TESTING: Primary | ICD-10-CM

## 2020-04-14 NOTE — TELEPHONE ENCOUNTER
----- Message from Jennie Sotelo RN sent at 4/14/2020  9:57 AM CDT -----  Patient is rescheduled for lumbar spine fusion, TLIF, minimally invasive L4-5 on 5/7 with Dr. Denton (approximately 240 minutes of general anesthesia). She was cleared by you on 2/18 for surgery on 3/26 that was postponed due to COVID-19 and will need an updated medical clearance for this surgery.   Thanks!

## 2020-04-16 ENCOUNTER — TELEPHONE (OUTPATIENT)
Dept: DIABETES | Facility: CLINIC | Age: 60
End: 2020-04-16

## 2020-04-16 NOTE — TELEPHONE ENCOUNTER
Spoke with pt. Appt has been scheduled for pt to be evaluated for surgery clearance. Pt notified and verbalized understanding of appt details. Itinerary mailed to pt's address on file.

## 2020-04-27 ENCOUNTER — TELEPHONE (OUTPATIENT)
Dept: DIABETES | Facility: CLINIC | Age: 60
End: 2020-04-27

## 2020-04-27 ENCOUNTER — PATIENT OUTREACH (OUTPATIENT)
Dept: ADMINISTRATIVE | Facility: OTHER | Age: 60
End: 2020-04-27

## 2020-04-28 ENCOUNTER — TELEPHONE (OUTPATIENT)
Dept: DIABETES | Facility: CLINIC | Age: 60
End: 2020-04-28

## 2020-04-29 ENCOUNTER — TELEPHONE (OUTPATIENT)
Dept: NEUROSURGERY | Facility: CLINIC | Age: 60
End: 2020-04-29

## 2020-04-29 NOTE — TELEPHONE ENCOUNTER
----- Message from Herlinda Castillo RN sent at 4/29/2020  3:00 PM CDT -----  Contact: Sandra (Dr. Bal office)  Call back and clarify what they are saying   ----- Message -----  From: Emma Sandoval MA  Sent: 4/29/2020   2:31 PM CDT  To: Herlinda Castillo RN    Please advise on how to proceed with message below    ----- Message -----  From: Yuri Oconnor  Sent: 4/29/2020   9:50 AM CDT  To: Corrie STEINER Staff    Name of Who is Calling: Sandra (Dr. Bal office)      What is the request in detail: Would like to inform staff that patient would do her xray next week, in order for medical clearance to be sent. Please advise      Can the clinic reply by [facility]: no      What Number to [facility] Back [facility]:  [phone] option 1

## 2020-04-29 NOTE — TELEPHONE ENCOUNTER
Spoke with pt as I was unable to reach providers office. Pt states she will have Chest X ray next week as part of medical clearance at [facility]. She will have them forward the results to us.  Explained this is probably why providers office was calling us.

## 2020-05-07 ENCOUNTER — TELEPHONE (OUTPATIENT)
Dept: FAMILY MEDICINE | Facility: CLINIC | Age: 60
End: 2020-05-07

## 2020-05-07 NOTE — TELEPHONE ENCOUNTER
----- Message from Deanna Conde DO sent at 5/7/2020 10:47 AM CDT -----  It's probably best if she makes an appointment for clearance.   Thanks.     ----- Message -----  From: Jennie Sotelo RN  Sent: 5/7/2020   9:54 AM CDT  To: Jennie Sotelo RN, Deanna Conde DO, #    Patient is rescheduled for lumbar spine fusion, TLIF, minimally invasive L4-5 on 5/18 with Dr. Denton (approximately 240 minutes of general anesthesia). She was cleared by you on 2/18 for surgery on 3/26 that was postponed due to COVID-19. She will need an updated medical clearance for this surgery.   Thanks!

## 2020-05-07 NOTE — TELEPHONE ENCOUNTER
----- Message from Jennie Sotelo RN sent at 5/7/2020  9:54 AM CDT -----  Patient is rescheduled for lumbar spine fusion, TLIF, minimally invasive L4-5 on 5/18 with Dr. Denton (approximately 240 minutes of general anesthesia). She was cleared by you on 2/18 for surgery on 3/26 that was postponed due to COVID-19. She will need an updated medical clearance for this surgery.   Thanks!

## 2020-05-13 ENCOUNTER — TELEPHONE (OUTPATIENT)
Dept: NEUROSURGERY | Facility: CLINIC | Age: 60
End: 2020-05-13

## 2020-05-13 NOTE — TELEPHONE ENCOUNTER
----- Message from Rochelle Son sent at 5/13/2020  3:19 PM CDT -----  Contact: patient   Please call above patient at [phone] need to speak with the nurse thanks.

## 2020-06-10 DIAGNOSIS — Z01.818 PREOPERATIVE TESTING: Primary | ICD-10-CM

## 2020-06-30 DIAGNOSIS — Z01.818 PREOP TESTING: ICD-10-CM

## 2020-09-01 ENCOUNTER — OFFICE VISIT (OUTPATIENT)
Dept: FAMILY MEDICINE | Facility: CLINIC | Age: 60
End: 2020-09-01
Payer: COMMERCIAL

## 2020-09-01 VITALS
WEIGHT: 215.19 LBS | DIASTOLIC BLOOD PRESSURE: 76 MMHG | OXYGEN SATURATION: 99 % | BODY MASS INDEX: 38.13 KG/M2 | HEART RATE: 88 BPM | HEIGHT: 63 IN | TEMPERATURE: 97 F | SYSTOLIC BLOOD PRESSURE: 112 MMHG

## 2020-09-01 DIAGNOSIS — E11.9 CONTROLLED TYPE 2 DIABETES MELLITUS WITHOUT COMPLICATION, WITHOUT LONG-TERM CURRENT USE OF INSULIN: ICD-10-CM

## 2020-09-01 DIAGNOSIS — J30.1 ALLERGIC RHINITIS DUE TO POLLEN, UNSPECIFIED SEASONALITY: Primary | ICD-10-CM

## 2020-09-01 DIAGNOSIS — I10 ESSENTIAL HYPERTENSION: ICD-10-CM

## 2020-09-01 PROCEDURE — 3078F DIAST BP <80 MM HG: CPT | Mod: CPTII,S$GLB,, | Performed by: FAMILY MEDICINE

## 2020-09-01 PROCEDURE — 96372 PR INJECTION,THERAP/PROPH/DIAG2ST, IM OR SUBCUT: ICD-10-PCS | Mod: S$GLB,,, | Performed by: FAMILY MEDICINE

## 2020-09-01 PROCEDURE — 96372 THER/PROPH/DIAG INJ SC/IM: CPT | Mod: S$GLB,,, | Performed by: FAMILY MEDICINE

## 2020-09-01 PROCEDURE — 3044F PR MOST RECENT HEMOGLOBIN A1C LEVEL <7.0%: ICD-10-PCS | Mod: CPTII,S$GLB,, | Performed by: FAMILY MEDICINE

## 2020-09-01 PROCEDURE — 99214 OFFICE O/P EST MOD 30 MIN: CPT | Mod: 25,S$GLB,, | Performed by: FAMILY MEDICINE

## 2020-09-01 PROCEDURE — 3008F BODY MASS INDEX DOCD: CPT | Mod: CPTII,S$GLB,, | Performed by: FAMILY MEDICINE

## 2020-09-01 PROCEDURE — 3008F PR BODY MASS INDEX (BMI) DOCUMENTED: ICD-10-PCS | Mod: CPTII,S$GLB,, | Performed by: FAMILY MEDICINE

## 2020-09-01 PROCEDURE — 3074F PR MOST RECENT SYSTOLIC BLOOD PRESSURE < 130 MM HG: ICD-10-PCS | Mod: CPTII,S$GLB,, | Performed by: FAMILY MEDICINE

## 2020-09-01 PROCEDURE — 3078F PR MOST RECENT DIASTOLIC BLOOD PRESSURE < 80 MM HG: ICD-10-PCS | Mod: CPTII,S$GLB,, | Performed by: FAMILY MEDICINE

## 2020-09-01 PROCEDURE — 3044F HG A1C LEVEL LT 7.0%: CPT | Mod: CPTII,S$GLB,, | Performed by: FAMILY MEDICINE

## 2020-09-01 PROCEDURE — 99214 PR OFFICE/OUTPT VISIT, EST, LEVL IV, 30-39 MIN: ICD-10-PCS | Mod: 25,S$GLB,, | Performed by: FAMILY MEDICINE

## 2020-09-01 PROCEDURE — 3074F SYST BP LT 130 MM HG: CPT | Mod: CPTII,S$GLB,, | Performed by: FAMILY MEDICINE

## 2020-09-01 RX ORDER — BETAMETHASONE SODIUM PHOSPHATE AND BETAMETHASONE ACETATE 3; 3 MG/ML; MG/ML
6 INJECTION, SUSPENSION INTRA-ARTICULAR; INTRALESIONAL; INTRAMUSCULAR; SOFT TISSUE ONCE
Status: COMPLETED | OUTPATIENT
Start: 2020-09-01 | End: 2020-09-01

## 2020-09-01 RX ADMIN — BETAMETHASONE SODIUM PHOSPHATE AND BETAMETHASONE ACETATE 6 MG: 3; 3 INJECTION, SUSPENSION INTRA-ARTICULAR; INTRALESIONAL; INTRAMUSCULAR; SOFT TISSUE at 03:09

## 2020-09-01 NOTE — PROGRESS NOTES
Subjective:       Patient ID: Carmen Son is a 60 y.o. female.    Chief Complaint: Sinus Problem    Sinus Problem  This is a recurrent problem. The current episode started in the past 7 days. The problem has been gradually worsening since onset. There has been no fever. Associated symptoms include congestion, coughing and sinus pressure. Pertinent negatives include no chills, diaphoresis, ear pain, headaches, hoarse voice, neck pain, shortness of breath, sneezing, sore throat or swollen glands. Past treatments include oral decongestants and spray decongestants. The treatment provided mild relief.   Diabetes  She presents for her follow-up diabetic visit. She has type 2 diabetes mellitus. Her disease course has been stable. There are no hypoglycemic associated symptoms. Pertinent negatives for hypoglycemia include no confusion, dizziness, headaches, pallor, sweats or tremors. There are no diabetic associated symptoms. Pertinent negatives for diabetes include no blurred vision, no chest pain, no fatigue and no weakness. There are no hypoglycemic complications. Symptoms are stable. There are no diabetic complications. Risk factors for coronary artery disease include obesity, dyslipidemia and sedentary lifestyle. Current diabetic treatment includes oral agent (monotherapy). She is compliant with treatment some of the time. An ACE inhibitor/angiotensin II receptor blocker is being taken.   Hypertension  This is a chronic problem. The current episode started more than 1 year ago. The problem is unchanged. The problem is controlled. Pertinent negatives include no anxiety, blurred vision, chest pain, headaches, malaise/fatigue, neck pain, orthopnea, palpitations, peripheral edema, PND, shortness of breath or sweats. There are no associated agents to hypertension. Risk factors for coronary artery disease include obesity, sedentary lifestyle and diabetes mellitus. Past treatments include beta blockers. The current  treatment provides significant improvement.     Review of Systems   Constitutional: Negative for activity change, appetite change, chills, diaphoresis, fatigue and malaise/fatigue.   HENT: Positive for nasal congestion and sinus pressure/congestion. Negative for ear discharge, ear pain, hoarse voice, rhinorrhea, sneezing, sore throat and trouble swallowing.    Eyes: Negative for blurred vision, photophobia, pain, redness, itching and visual disturbance.   Respiratory: Positive for cough. Negative for chest tightness, shortness of breath and wheezing.    Cardiovascular: Negative for chest pain, palpitations, orthopnea, leg swelling and PND.   Gastrointestinal: Negative for abdominal distention, abdominal pain, blood in stool, diarrhea, nausea and vomiting.   Genitourinary: Negative for dysuria, pelvic pain, vaginal bleeding, vaginal discharge and vaginal pain.   Musculoskeletal: Negative for arthralgias, back pain, gait problem and neck pain.   Integumentary:  Negative for color change, pallor and rash.   Neurological: Negative for dizziness, tremors, weakness, light-headedness, numbness and headaches.   Psychiatric/Behavioral: Negative for agitation, behavioral problems, confusion and sleep disturbance.         Past Medical History:   Diagnosis Date    Diabetes mellitus     Diabetes mellitus, type 2     Diet controlled gestational diabetes mellitus in puerperium     History of seasonal allergies     Hypertension      Social History     Socioeconomic History    Marital status:      Spouse name: Not on file    Number of children: Not on file    Years of education: Not on file    Highest education level: Not on file   Occupational History    Not on file   Social Needs    Financial resource strain: Not on file    Food insecurity     Worry: Not on file     Inability: Not on file    Transportation needs     Medical: Not on file     Non-medical: Not on file   Tobacco Use    Smoking status: Never Smoker  "   Smokeless tobacco: Never Used   Substance and Sexual Activity    Alcohol use: No    Drug use: No    Sexual activity: Yes     Partners: Male     Birth control/protection: Surgical   Lifestyle    Physical activity     Days per week: Not on file     Minutes per session: Not on file    Stress: Not at all   Relationships    Social connections     Talks on phone: Not on file     Gets together: Not on file     Attends Congregational service: Not on file     Active member of club or organization: Not on file     Attends meetings of clubs or organizations: Not on file     Relationship status: Not on file   Other Topics Concern    Not on file   Social History Narrative    Not on file       Objective:     /76 (BP Location: Left arm, Patient Position: Sitting, BP Method: Large (Manual))   Pulse 88   Temp 97.1 °F (36.2 °C) (Oral)   Ht 5' 3" (1.6 m)   Wt 97.6 kg (215 lb 2.7 oz)   LMP 04/07/2013   SpO2 99%   BMI 38.12 kg/m²     Physical Exam  Constitutional:       General: She is not in acute distress.     Appearance: She is well-developed.   HENT:      Head: Normocephalic.      Right Ear: Tympanic membrane, ear canal and external ear normal.      Left Ear: Tympanic membrane, ear canal and external ear normal.      Nose: Rhinorrhea present.      Mouth/Throat:      Pharynx: Posterior oropharyngeal erythema present.   Eyes:      Conjunctiva/sclera: Conjunctivae normal.   Neck:      Musculoskeletal: Normal range of motion and neck supple.   Cardiovascular:      Rate and Rhythm: Normal rate and regular rhythm.      Heart sounds: Normal heart sounds.   Pulmonary:      Effort: Pulmonary effort is normal.      Breath sounds: Normal breath sounds. No wheezing.   Lymphadenopathy:      Cervical: No cervical adenopathy.   Skin:     General: Skin is warm and dry.      Findings: No rash.   Neurological:      Mental Status: She is alert.   Psychiatric:         Behavior: Behavior normal.         Assessment:       Allergic " rhinitis due to pollen, unspecified seasonality  -     betamethasone acetate-betamethasone sodium phosphate injection 6 mg    Controlled type 2 diabetes mellitus without complication, without long-term current use of insulin    Essential hypertension       - Scheduled her for her lab tests.

## 2020-09-25 ENCOUNTER — PATIENT MESSAGE (OUTPATIENT)
Dept: OTHER | Facility: OTHER | Age: 60
End: 2020-09-25

## 2020-10-05 ENCOUNTER — PATIENT MESSAGE (OUTPATIENT)
Dept: INTERNAL MEDICINE | Facility: CLINIC | Age: 60
End: 2020-10-05

## 2020-10-08 ENCOUNTER — TELEPHONE (OUTPATIENT)
Dept: NEUROSURGERY | Facility: CLINIC | Age: 60
End: 2020-10-08

## 2020-10-08 NOTE — TELEPHONE ENCOUNTER
----- Message from Jez Lujan sent at 10/8/2020 10:10 AM CDT -----  Contact: self  Pt is asking for a call back in regards to a tingling sensation she has across her mouth and forehead.  She stated that she would like to speak with someone in the office before scheduling     Contact info- [phone]

## 2020-10-23 ENCOUNTER — PATIENT OUTREACH (OUTPATIENT)
Dept: ADMINISTRATIVE | Facility: HOSPITAL | Age: 60
End: 2020-10-23

## 2020-11-06 ENCOUNTER — LAB VISIT (OUTPATIENT)
Dept: LAB | Facility: HOSPITAL | Age: 60
End: 2020-11-06
Attending: FAMILY MEDICINE
Payer: COMMERCIAL

## 2020-11-06 DIAGNOSIS — E11.9 CONTROLLED TYPE 2 DIABETES MELLITUS WITHOUT COMPLICATION, WITHOUT LONG-TERM CURRENT USE OF INSULIN: ICD-10-CM

## 2020-11-06 DIAGNOSIS — E55.9 VITAMIN D DEFICIENCY: ICD-10-CM

## 2020-11-06 LAB
25(OH)D3+25(OH)D2 SERPL-MCNC: 64 NG/ML (ref 30–96)
ALBUMIN SERPL BCP-MCNC: 4.1 G/DL (ref 3.5–5.2)
ALP SERPL-CCNC: 108 U/L (ref 38–126)
ALT SERPL W/O P-5'-P-CCNC: 48 U/L (ref 10–44)
ANION GAP SERPL CALC-SCNC: 6 MMOL/L (ref 8–16)
AST SERPL-CCNC: 39 U/L (ref 15–46)
BASOPHILS # BLD AUTO: 0.02 K/UL (ref 0–0.2)
BASOPHILS NFR BLD: 0.5 % (ref 0–1.9)
BILIRUB SERPL-MCNC: 1 MG/DL (ref 0.1–1)
BUN SERPL-MCNC: 13 MG/DL (ref 7–17)
CALCIUM SERPL-MCNC: 9.4 MG/DL (ref 8.7–10.5)
CHLORIDE SERPL-SCNC: 104 MMOL/L (ref 95–110)
CHOLEST SERPL-MCNC: 244 MG/DL (ref 120–199)
CHOLEST/HDLC SERPL: 5 {RATIO} (ref 2–5)
CO2 SERPL-SCNC: 29 MMOL/L (ref 23–29)
CREAT SERPL-MCNC: 0.96 MG/DL (ref 0.5–1.4)
DIFFERENTIAL METHOD: ABNORMAL
EOSINOPHIL # BLD AUTO: 0.1 K/UL (ref 0–0.5)
EOSINOPHIL NFR BLD: 3.1 % (ref 0–8)
ERYTHROCYTE [DISTWIDTH] IN BLOOD BY AUTOMATED COUNT: 14.9 % (ref 11.5–14.5)
EST. GFR  (AFRICAN AMERICAN): >60 ML/MIN/1.73 M^2
EST. GFR  (NON AFRICAN AMERICAN): >60 ML/MIN/1.73 M^2
ESTIMATED AVG GLUCOSE: 134 MG/DL (ref 68–131)
GLUCOSE SERPL-MCNC: 125 MG/DL (ref 70–110)
HBA1C MFR BLD HPLC: 6.3 % (ref 4–5.6)
HCT VFR BLD AUTO: 42.8 % (ref 37–48.5)
HDLC SERPL-MCNC: 49 MG/DL (ref 40–75)
HDLC SERPL: 20.1 % (ref 20–50)
HGB BLD-MCNC: 12.8 G/DL (ref 12–16)
IMM GRANULOCYTES # BLD AUTO: 0.01 K/UL (ref 0–0.04)
IMM GRANULOCYTES NFR BLD AUTO: 0.2 % (ref 0–0.5)
LDLC SERPL CALC-MCNC: 160 MG/DL (ref 63–159)
LYMPHOCYTES # BLD AUTO: 2.4 K/UL (ref 1–4.8)
LYMPHOCYTES NFR BLD: 56.5 % (ref 18–48)
MCH RBC QN AUTO: 24.4 PG (ref 27–31)
MCHC RBC AUTO-ENTMCNC: 29.9 G/DL (ref 32–36)
MCV RBC AUTO: 82 FL (ref 82–98)
MONOCYTES # BLD AUTO: 0.3 K/UL (ref 0.3–1)
MONOCYTES NFR BLD: 6 % (ref 4–15)
NEUTROPHILS # BLD AUTO: 1.4 K/UL (ref 1.8–7.7)
NEUTROPHILS NFR BLD: 33.7 % (ref 38–73)
NONHDLC SERPL-MCNC: 195 MG/DL
NRBC BLD-RTO: 0 /100 WBC
PLATELET # BLD AUTO: 276 K/UL (ref 150–350)
PMV BLD AUTO: 10.8 FL (ref 9.2–12.9)
POTASSIUM SERPL-SCNC: 4.1 MMOL/L (ref 3.5–5.1)
PROT SERPL-MCNC: 7.8 G/DL (ref 6–8.4)
RBC # BLD AUTO: 5.24 M/UL (ref 4–5.4)
SODIUM SERPL-SCNC: 139 MMOL/L (ref 136–145)
TRIGL SERPL-MCNC: 175 MG/DL (ref 30–150)
WBC # BLD AUTO: 4.16 K/UL (ref 3.9–12.7)

## 2020-11-06 PROCEDURE — 85025 COMPLETE CBC W/AUTO DIFF WBC: CPT | Mod: PO

## 2020-11-06 PROCEDURE — 80061 LIPID PANEL: CPT

## 2020-11-06 PROCEDURE — 36415 COLL VENOUS BLD VENIPUNCTURE: CPT | Mod: PO

## 2020-11-06 PROCEDURE — 82306 VITAMIN D 25 HYDROXY: CPT | Mod: PO

## 2020-11-06 PROCEDURE — 83036 HEMOGLOBIN GLYCOSYLATED A1C: CPT

## 2020-11-06 PROCEDURE — 80053 COMPREHEN METABOLIC PANEL: CPT | Mod: PO

## 2020-11-07 ENCOUNTER — PATIENT MESSAGE (OUTPATIENT)
Dept: FAMILY MEDICINE | Facility: CLINIC | Age: 60
End: 2020-11-07

## 2020-11-07 RX ORDER — ROSUVASTATIN CALCIUM 20 MG/1
20 TABLET, COATED ORAL DAILY
Qty: 90 TABLET | Refills: 3 | Status: SHIPPED | OUTPATIENT
Start: 2020-11-07 | End: 2021-02-25

## 2020-11-17 ENCOUNTER — TELEPHONE (OUTPATIENT)
Dept: FAMILY MEDICINE | Facility: CLINIC | Age: 60
End: 2020-11-17

## 2020-11-17 DIAGNOSIS — Z12.39 ENCOUNTER FOR SCREENING FOR MALIGNANT NEOPLASM OF BREAST, UNSPECIFIED SCREENING MODALITY: Primary | ICD-10-CM

## 2020-11-17 DIAGNOSIS — M43.22 CERVICAL VERTEBRAL FUSION: Primary | ICD-10-CM

## 2020-11-17 NOTE — TELEPHONE ENCOUNTER
----- Message from Laura Foley sent at 11/17/2020  3:49 PM CST -----  Regarding: results  Contact: [phone]/self  Type:  Test Results    Who Called:  self  Name of Test (Lab/Mammo/Etc):  Vitamin D  Date of Test:    Ordering Provider:    Where the test was performed:    Would the patient rather a call back or a response via [facility]?  call  Best Call Back Number:  [phone]/self  Additional Information:

## 2020-11-17 NOTE — TELEPHONE ENCOUNTER
----- Message from Velvet Dowd sent at 11/17/2020  9:05 AM CST -----  Contact: CARMEN BUCHANAN [[id number]]  Type:  Test Results    Who Called:  Carmen   Name of Test (Lab/Mammo/Etc):  fasting lab   Date of Test:  11/06  Ordering Provider:    Where the test was performed:  [facility]   Would the patient rather a call back or a response via [facility]?  Call back   Best Call Back Number:  [phone]  Additional Information:   none

## 2020-11-17 NOTE — TELEPHONE ENCOUNTER
----- Message from Inna West sent at 11/17/2020  3:28 PM CST -----  Formerly [facility] Team,      Pt needs a call back on test results.        Thank you,    Inna

## 2020-11-17 NOTE — TELEPHONE ENCOUNTER
She just did labs on November 6.  We don't need anything additional at this time.  I will place the mammogram order.

## 2020-11-30 ENCOUNTER — PATIENT OUTREACH (OUTPATIENT)
Dept: ADMINISTRATIVE | Facility: OTHER | Age: 60
End: 2020-11-30

## 2020-11-30 DIAGNOSIS — E11.9 TYPE 2 DIABETES MELLITUS WITHOUT COMPLICATION, UNSPECIFIED WHETHER LONG TERM INSULIN USE: Primary | ICD-10-CM

## 2020-11-30 NOTE — PROGRESS NOTES
Care Everywhere: updated  Immunization: updated, links delay   Health Maintenance: updated  Media Review:   Legacy Review:   Order placed: eye screening photo   Upcoming appts:  efax sent to Dr. Torres office to obtain eye exam report

## 2020-11-30 NOTE — LETTER
AUTHORIZATION FOR RELEASE OF   CONFIDENTIAL INFORMATION    Dear Asher Torres OD,    We are seeing Carmen Son, date of birth 1960, in the clinic at [facility]. Deanna Conde DO is the patient's PCP. Carmen Son has an outstanding lab/procedure at the time we reviewed her chart. In order to help keep her health information updated, she has authorized us to request the following medical record(s):        (  )  MAMMOGRAM                                      (  )  COLONOSCOPY      (  )  PAP SMEAR                                          (  )  OUTSIDE LAB RESULTS     (  )  DEXA SCAN                                          ( x )  EYE EXAM for              (  )  FOOT EXAM                                          (  )  ENTIRE RECORD     (  )  OUTSIDE IMMUNIZATIONS                 (  )  _______________         Please fax records to [facility], Becky F Hollibaugh, DO, [phone]     If you have any questions, please contact [facility] at [phone].           Patient Name: Carmen Son  : 1960  Patient Phone #: [phone]

## 2020-12-01 ENCOUNTER — OFFICE VISIT (OUTPATIENT)
Dept: NEUROSURGERY | Facility: CLINIC | Age: 60
End: 2020-12-01
Payer: COMMERCIAL

## 2020-12-01 ENCOUNTER — HOSPITAL ENCOUNTER (OUTPATIENT)
Dept: RADIOLOGY | Facility: HOSPITAL | Age: 60
Discharge: HOME | End: 2020-12-01
Attending: NEUROLOGICAL SURGERY
Payer: COMMERCIAL

## 2020-12-01 VITALS
BODY MASS INDEX: 38.13 KG/M2 | WEIGHT: 215.19 LBS | SYSTOLIC BLOOD PRESSURE: 135 MMHG | DIASTOLIC BLOOD PRESSURE: 80 MMHG | HEIGHT: 63 IN | TEMPERATURE: 98 F | HEART RATE: 67 BPM

## 2020-12-01 DIAGNOSIS — M54.16 LUMBAR RADICULOPATHY, CHRONIC: ICD-10-CM

## 2020-12-01 DIAGNOSIS — M47.812 CERVICAL SPONDYLOSIS: ICD-10-CM

## 2020-12-01 DIAGNOSIS — M43.22 CERVICAL VERTEBRAL FUSION: ICD-10-CM

## 2020-12-01 DIAGNOSIS — M48.061 SPINAL STENOSIS AT L4-L5 LEVEL: ICD-10-CM

## 2020-12-01 DIAGNOSIS — M43.16 SPONDYLOLISTHESIS OF LUMBAR REGION: Primary | ICD-10-CM

## 2020-12-01 DIAGNOSIS — M54.9 DORSALGIA, UNSPECIFIED: ICD-10-CM

## 2020-12-01 PROCEDURE — 72125 CT NECK SPINE W/O DYE: CPT | Mod: TC

## 2020-12-01 PROCEDURE — 3075F SYST BP GE 130 - 139MM HG: CPT | Mod: CPTII,S$GLB,, | Performed by: NEUROLOGICAL SURGERY

## 2020-12-01 PROCEDURE — 72125 CT NECK SPINE W/O DYE: CPT | Mod: 26,,, | Performed by: RADIOLOGY

## 2020-12-01 PROCEDURE — 1125F PR PAIN SEVERITY QUANTIFIED, PAIN PRESENT: ICD-10-PCS | Mod: S$GLB,,, | Performed by: NEUROLOGICAL SURGERY

## 2020-12-01 PROCEDURE — 3079F DIAST BP 80-89 MM HG: CPT | Mod: CPTII,S$GLB,, | Performed by: NEUROLOGICAL SURGERY

## 2020-12-01 PROCEDURE — 1125F AMNT PAIN NOTED PAIN PRSNT: CPT | Mod: S$GLB,,, | Performed by: NEUROLOGICAL SURGERY

## 2020-12-01 PROCEDURE — 99214 OFFICE O/P EST MOD 30 MIN: CPT | Mod: S$GLB,,, | Performed by: NEUROLOGICAL SURGERY

## 2020-12-01 PROCEDURE — 99999 PR PBB SHADOW E&M-EST. PATIENT-LVL V: CPT | Mod: PBBFAC,,, | Performed by: NEUROLOGICAL SURGERY

## 2020-12-01 PROCEDURE — 3075F PR MOST RECENT SYSTOLIC BLOOD PRESS GE 130-139MM HG: ICD-10-PCS | Mod: CPTII,S$GLB,, | Performed by: NEUROLOGICAL SURGERY

## 2020-12-01 PROCEDURE — 72125 CT CERVICAL SPINE WITHOUT CONTRAST: ICD-10-PCS | Mod: 26,,, | Performed by: RADIOLOGY

## 2020-12-01 PROCEDURE — 99214 PR OFFICE/OUTPT VISIT, EST, LEVL IV, 30-39 MIN: ICD-10-PCS | Mod: S$GLB,,, | Performed by: NEUROLOGICAL SURGERY

## 2020-12-01 PROCEDURE — 3008F PR BODY MASS INDEX (BMI) DOCUMENTED: ICD-10-PCS | Mod: CPTII,S$GLB,, | Performed by: NEUROLOGICAL SURGERY

## 2020-12-01 PROCEDURE — 3079F PR MOST RECENT DIASTOLIC BLOOD PRESSURE 80-89 MM HG: ICD-10-PCS | Mod: CPTII,S$GLB,, | Performed by: NEUROLOGICAL SURGERY

## 2020-12-01 PROCEDURE — 99999 PR PBB SHADOW E&M-EST. PATIENT-LVL V: ICD-10-PCS | Mod: PBBFAC,,, | Performed by: NEUROLOGICAL SURGERY

## 2020-12-01 PROCEDURE — 3008F BODY MASS INDEX DOCD: CPT | Mod: CPTII,S$GLB,, | Performed by: NEUROLOGICAL SURGERY

## 2020-12-01 RX ORDER — NAPROXEN 500 MG/1
500 TABLET ORAL 2 TIMES DAILY WITH MEALS
Qty: 90 TABLET | Refills: 2 | Status: SHIPPED | OUTPATIENT
Start: 2020-12-01 | End: 2020-12-31

## 2020-12-01 RX ORDER — DIAZEPAM 5 MG/1
5 TABLET ORAL EVERY 12 HOURS PRN
Qty: 60 TABLET | Refills: 0 | Status: SHIPPED | OUTPATIENT
Start: 2020-12-01 | End: 2021-01-08 | Stop reason: CLARIF

## 2020-12-01 NOTE — PROGRESS NOTES
Neurosurgery  Established Patient    SUBJECTIVE:     History of Present Illness:  The patient is here to see me for follow-up.  Patient was last seen back in February 2020.  The patient that we have been following a both cervical and lumbar spine issues.  She has a history of 2 level ACDF done September 2018 she still continues to have neck pain neck discomfort.  We worked her up not find the surgical image 20 treat this conservatively.  She has ongoing lower back pain with left-sided radiculopathy worked up found have an L4-L5 grade 1 spondylolisthesis with movement on flexion-extension she had failed conservative management we had set her up for a L4-5 minimally invasive lumbar interbody fusion but due to the COVID surge surgery got canceled patient has been reluctant to go back to surgery until now where she thinks her symptoms got progressively worse and she is interested in getting back on the schedule.  She continued to complain of neck pain but no evidence of myelopathy or radiculopathy.  She denies any bowel bladder issues.  Review of patient's allergies indicates:  No Known Allergies    Current Outpatient Medications   Medication Sig Dispense Refill    aspirin 81 MG Chew Take 1 tablet (81 mg total) by mouth once daily. May resume in 2 weeks post op  0    azelastine (ASTELIN) 137 mcg (0.1 %) nasal spray 1 spray (137 mcg total) by Nasal route 2 (two) times daily. 137 mL 3    ergocalciferol, vitamin D2, (VITAMIN D ORAL) Vitamin D      fexofenadine (ALLEGRA) 180 MG tablet Take 1 tablet (180 mg total) by mouth once daily. 30 tablet 12    linaCLOtide (LINZESS) 145 mcg Cap capsule Take 1 capsule (145 mcg total) by mouth once daily. 90 capsule 3    losartan (COZAAR) 50 MG tablet Take 1 tablet (50 mg total) by mouth once daily. 90 tablet 3    metFORMIN (GLUCOPHAGE) 500 MG tablet Take 1 tablet (500 mg total) by mouth daily with breakfast. 90 tablet 3    omeprazole (PRILOSEC) 20 MG capsule omeprazole 20 mg  capsule,delayed release      prenatal vit calc,iron,folic (PRENATAL VITAMIN ORAL) Take by mouth.      propranoloL (INDERAL LA) 80 MG 24 hr capsule Take 1 capsule (80 mg total) by mouth once daily. 30 capsule 0    rosuvastatin (CRESTOR) 20 MG tablet Take 1 tablet (20 mg total) by mouth once daily. 90 tablet 3    ascorbic acid, vitamin C, (VITAMIN C) 500 MG tablet Take 500 mg by mouth once daily.      blood sugar diagnostic Strp 1 strip by Misc.(Non-Drug; Combo Route) route once daily. (Patient not taking: Reported on 12/1/2020) 90 strip 3    blood sugar diagnostic Strp 1 each by Misc.(Non-Drug; Combo Route) route once daily. (Patient not taking: Reported on 12/1/2020) 1 each 0    ergocalciferol (ERGOCALCIFEROL) 50,000 unit Cap Take 1 capsule (50,000 Units total) by mouth every 7 days. (Patient not taking: Reported on 12/1/2020) 12 capsule 0    furosemide (LASIX) 20 MG tablet Take 1 tablet (20 mg total) by mouth daily as needed. (Patient not taking: Reported on 12/1/2020) 30 tablet 3    lancets (ACCU-CHEK SOFTCLIX LANCETS) Misc 1 lancet by Misc.(Non-Drug; Combo Route) route once daily. (Patient not taking: Reported on 12/1/2020) 90 each 4    ondansetron (ZOFRAN) 4 MG tablet Take 1 tablet (4 mg total) by mouth every 8 (eight) hours as needed for Nausea. (Patient not taking: Reported on 12/1/2020) 10 tablet 0    potassium chloride SA (K-DUR,KLOR-CON) 20 MEQ tablet Take 1 tablet (20 mEq total) by mouth once daily. (Patient not taking: Reported on 12/1/2020) 30 tablet 3     No current facility-administered medications for this visit.        Past Medical History:   Diagnosis Date    Diabetes mellitus     Diabetes mellitus, type 2     Diet controlled gestational diabetes mellitus in puerperium     History of seasonal allergies     Hypertension      Past Surgical History:   Procedure Laterality Date    ANTERIOR CERVICAL DISCECTOMY W/ FUSION N/A 9/17/2018    Procedure: DISCECTOMY, SPINE, CERVICAL, ANTERIOR  "APPROACH, WITH FUSION C3-4, C4-5;  Surgeon: Leo Denton MD;  Location: [facility] OR [address] [place] [address];  Service: Neurosurgery;  Laterality: N/A;  toronto II, asa 2, regular bed, supine, josefina    HYSTERECTOMY      2011 MEDHAT LSO, R SALPINGECT CED [facility]    right knee scope       Family History     Problem Relation (Age of Onset)    Diabetes type II Sister, Other    Hyperlipidemia Sister        Social History     Socioeconomic History    Marital status:      Spouse name: Not on file    Number of children: Not on file    Years of education: Not on file    Highest education level: Not on file   Occupational History    Not on file   Social Needs    Financial resource strain: Not on file    Food insecurity     Worry: Not on file     Inability: Not on file    Transportation needs     Medical: Not on file     Non-medical: Not on file   Tobacco Use    Smoking status: Never Smoker    Smokeless tobacco: Never Used   Substance and Sexual Activity    Alcohol use: No    Drug use: No    Sexual activity: Yes     Partners: Male     Birth control/protection: Surgical   Lifestyle    Physical activity     Days per week: Not on file     Minutes per session: Not on file    Stress: Not at all   Relationships    Social connections     Talks on phone: Not on file     Gets together: Not on file     Attends Evangelical service: Not on file     Active member of club or organization: Not on file     Attends meetings of clubs or organizations: Not on file     Relationship status: Not on file   Other Topics Concern    Not on file   Social History Narrative    Not on file       Review of Systems    OBJECTIVE:     Vital Signs  Temp: 97.6 °F (36.4 °C)  Pulse: 67  BP: 135/80  Pain Score:   6  Height: 5' 3" (160 cm)  Weight: 97.6 kg (215 lb 2.7 oz)  Body mass index is 38.12 kg/m².    Neurosurgery Physical Exam    Patient is awake alert appropriate.  Skin restricted cervical range of motion of her neck wound is well-healed.  Cranial nerves are " intact.  She has full strength in both upper and lower extremities.  She has no Stover's or clonus.  She has a positive left leg raise.  Reflex equal symmetric.    Diagnostic Results:  Updated CT scan of cervical spine shows she is fused at the previous level ACDF no evidence of adjacent level disease.  She has not had any updated MRI scan of lumbar spine since November of 2019 but I reviewed this and does not clear we show a grade 1 spondylolisthesis significant disc herniation as well as lumbar stenosis and foraminal stenosis.  There is evidence of instability on flexion-extension.    ASSESSMENT/PLAN:     Patient with history of L4-5 spondylolisthesis the with intractable back pain and radiculopathy who failed conservative management.  We rescheduled her from February due to COVID now patient feels like she wants to proceed.  I think is reasonable since symptoms got progressively worse.  We will need to get updated imaging to make sure that were operating on the latest radiographic findings.  As for her neck I think this is mainly musculoskeletal will encourage anti-inflammatories muscle relaxants and physical therapy.    I have discussed the risks/benefits, indications, and alternatives for the proposed procedure in detail. I have answered all of their questions and patient wish to proceed with surgery. We will schedule patient.                 Note dictated with voice recognition software, please excuse any grammatical errors.

## 2020-12-03 ENCOUNTER — TELEPHONE (OUTPATIENT)
Dept: FAMILY MEDICINE | Facility: CLINIC | Age: 60
End: 2020-12-03

## 2020-12-03 NOTE — TELEPHONE ENCOUNTER
----- Message from Duarte Stewart sent at 12/3/2020 10:38 AM CST -----  Type:  Needs Medical Advice    Who Called: Carmen  Symptoms (please be specific): pt is requesting a call back in regards to needing the machine to test her sugar levels. She received the strips but not the machine   How long has patient had these symptoms:  unknown  Pharmacy name and phone #:  n/a  Would the patient rather a call back or a response via [facility]? Call back  Best Call Back Number:  [phone]  Additional Information: none

## 2020-12-08 ENCOUNTER — CLINICAL SUPPORT (OUTPATIENT)
Dept: REHABILITATION | Facility: HOSPITAL | Age: 60
End: 2020-12-08
Attending: NEUROLOGICAL SURGERY
Payer: COMMERCIAL

## 2020-12-08 DIAGNOSIS — M47.812 CERVICAL SPONDYLOSIS: ICD-10-CM

## 2020-12-08 DIAGNOSIS — M54.2 NECK PAIN: Primary | ICD-10-CM

## 2020-12-08 PROCEDURE — 97161 PT EVAL LOW COMPLEX 20 MIN: CPT | Mod: PO

## 2020-12-08 NOTE — PLAN OF CARE
[facility]  Physical Therapy Initial Evaluation    Date: 12/8/2020   Name: Carmen Son  Clinic Number: [id number]    Therapy Diagnosis:   Encounter Diagnoses   Name Primary?    Cervical spondylosis     Neck pain Yes     Physician: Leo Denton MD    Physician Orders: PT Eval and Treat   Medical Diagnosis from Referral: M47.812 (ICD-10-CM) - Cervical spondylosis  Evaluation Date: 12/8/2020  Authorization Period Expiration: 12/31/2020  Plan of Care Expiration: 02/8/2021  Visit # / Visits authorized: 1/ 10    Time In: 3:00 pm  Time Out: 3:30 pm  Total Appointment Time (timed & untimed codes): 30 minutes    Precautions: Standard and Diabetes    Subjective   Date of onset: chronic  History of current condition - Carmen reports: history of neck pain and underwent anterior cervical fusion in September of 2018.  Pt reporst having muscle spasms in neck when she laughs and yawns.  She was attending physical therapy earlier this year with some relief.  Pt  gets some relief with stretching and ROM exercises.     Medical History:   Past Medical History:   Diagnosis Date    Diabetes mellitus     Diabetes mellitus, type 2     Diet controlled gestational diabetes mellitus in puerperium     History of seasonal allergies     Hypertension        Surgical History:   Carmen Son  has a past surgical history that includes right knee scope; Anterior cervical discectomy w/ fusion (N/A, 9/17/2018); and Hysterectomy.    Medications:   Carmen has a current medication list which includes the following prescription(s): ascorbic acid (vitamin c), aspirin, azelastine, blood sugar diagnostic, blood sugar diagnostic, diazepam, ergocalciferol, ergocalciferol (vitamin d2), fexofenadine, furosemide, lancets, linaclotide, losartan, metformin, naproxen, omeprazole, ondansetron, potassium chloride sa, prenatal vit calc,iron,folic, propranolol, and rosuvastatin.    Allergies:   Review of patient's  allergies indicates:  No Known Allergies     Imaging, CT scan films: 11/17/2020  Impression:     Stable postoperative change of anterior osseous fusion C3-C5 with interbody disc spacers at C3-C4 and C4-C5.     Minimal cervical spondylosis contributing to mild right and moderate left neural foraminal narrowing at C4-C5.    Prior Therapy: yes  Social History:  lives with their spouse  Occupation: not working  Prior Level of Function: independent  Current Level of Function: independent    Pain:  Current 4/10, worst 7/10, best 0/10   Location:  Either side of neck  Description: Grabbing, Tight and Tingling  Aggravating Factors: yawning and laughing  Easing Factors: stretching    Patients goals: decrease spasms in neck    Objective     Observation: Pt is a 60 year old female that presents to therapy in no apparent distress    Posture: forward head    Cervical Range of Motion:    Degrees Pain   Flexion 40 no     Extension 45 no     Right Rotation 65 no     Left Rotation 65 no     Right Side Bending 30 tightness   Left Side Bending 40 tightness      Shoulder Range of Motion:   Shoulder Left Right   Flexion 170 170   Abduction 170 170   ER Reach T2 Reach T2   IR Reach T8 Reach T8     Strength:  Cervical MMT   Flexion 4/5   Extension 4+/5   Right Side Bend 4/5   Left Side Bend 4/5     Upper Extremity Strength  (R) UE  (L) UE    Shoulder flexion: 4-/5 Shoulder flexion: 4/5   Shoulder Abduction: 4-/5 Shoulder abduction: 4-/5   Shoulder ER 4-/5 Shoulder ER 4/5   Shoulder IR 4/5 Shoulder IR 4+/5   Elbow flexion: 4+/5 Elbow flexion: 4+/5   Elbow extension: 4+/5 Elbow extension: 4+/5   Wrist flexion: 5/5 Wrist flexion: 5/5   Wrist extension: 5/5 Wrist extension: 5/5   Lower Trap 3+/5 Lower Trap 3+/5   Middle Trap 4-/5 Middle Trap 4-/5   Rhomboids 4-/5 Rhomboids 4-/5       Palpation: tenderness to palpation noted in bilateral upper trapezius, scalenes, SCM, cervical erector spinae      Sensation: intact to light  touch    Flexibility: tightness in pec minor        Limitation/Restriction for FOTO Neck Survey    Therapist reviewed FOTO scores for Carmen Son on 12/8/2020.   FOTO documents entered into [employer] - see Media section.    Limitation Score: 34%         TREATMENT   Treatment Time In: 0  Treatment Time Out: 0  Total Treatment time (time-based codes) separate from Evaluation: 0 minutes    Carmen received therapeutic exercises to develop strength, flexibility and posture for 0 minutes including:    Date  12/8/2020   VISIT 1/10       POC EXP. DATE 02/8/2021   FACE-TO-FACE 01/8/2021   FOTO 1/5       TABLE:    pec stretch (towel roll) --   Snow angels --   Chin tuck with lift --                   SEATED:    Upper trap stretch --   Scalene stretch --       STANDING:    Doorway stretch --   Posture exercises:  ER  Horizontal abduction  diagonals   --  --  --                       Initials MA         Carmen received the following manual therapy techniques: Soft tissue Mobilization were applied to the: cervical musculature for 0 minutes, including:  - STM to bilateral upper trapezius, scalenes, SCM    Home Exercises and Patient Education Provided    Education provided:   - role of PT and goals for therapy  - HEP    Written Home Exercises Provided: yes.  Exercises were reviewed and Carmen was able to demonstrate them prior to the end of the session.  Carmen demonstrated good  understanding of the education provided.     See EMR under Patient Instructions for exercises provided 12/8/2020.    Assessment   Carmen is a 60 y.o. female referred to outpatient Physical Therapy with a medical diagnosis of cervical spondylosis.  Pt presents to therapy with signs and symptoms consistent with the diagnosis.  She is noted to have tenderness and increased tissue tension noted in bilateral upper trapezius, scalenes, SCM, and pec minor, poor posture, decreased scapular stabilization strength, and decreased strength in cervical musculature.   Pt would benefit from manual therapy, posture exercises, strengthening of deep cervical flexors, and scapular stabilization exercises to improve posture and decrease pain.     Patient prognosis is Good.   Patientt will benefit from skilled outpatient Physical Therapy to address the deficits stated above and in the chart below, provide patient /family education, and to maximize patientt's level of independence.     Plan of care discussed with patient: Yes  Patient's spiritual, cultural and educational needs considered and patient is agreeable to the plan of care and goals as stated below:     Anticipated Barriers for therapy: none    Medical Necessity is demonstrated by the following  History  Co-morbidities and personal factors that may impact the plan of care Co-morbidities:   diabetes, high BMI, HTN and prior cervical surgery    Personal Factors:   coping style     low   Examination  Body Structures and Functions, activity limitations and participation restrictions that may impact the plan of care Body Regions:   neck    Body Systems:    strength    Participation Restrictions:   none    Activity limitations:   Learning and applying knowledge  no deficits    General Tasks and Commands  no deficits    Communication  no deficits    Mobility  no deficits    Self care  no deficits    Domestic Life  no deficits    Interactions/Relationships  no deficits    Life Areas  no deficits    Community and Social Life  recreation and leisure         low   Clinical Presentation stable and uncomplicated low   Decision Making/ Complexity Score: low     Goals:  Short Term Goals:  4 weeks  1. Patient will be compliant with HEP to promote the independent management of current diagnosis.  2. Patient will improve scapular stabilization strength to 4/5 to improve tolerance to normal house work activities.  3. Patient will report a decrease in complaints of neck pain to 4/10 during performance of ADL's for independence of self care  activities.      Long Term Goals:  8 weeks  1. Patient will increase scapular stabilization strength to 4+/5 to improve tolerance to repetitive lifting during normal house chores for independence of self care activities.   2. Patient will report a decrease in complaints of neck pain to 1/10 during performance of ADL's for independence of self care activities.  3. Patient will improve FOTO limitation status from 34% to 30% indicating increased functional mobility.      Plan   Plan of care Certification: 12/8/2020 to 01/8/2021.    Outpatient Physical Therapy 2 times weekly for 4 weeks to include the following interventions: Manual Therapy, Moist Heat/ Ice, Neuromuscular Re-ed, Patient Education, Therapeutic Activites, Therapeutic Exercise and IASTM, vacuum cupping, dry needling, and kinesiotaping.     Roman Gonzalez, PT

## 2020-12-10 ENCOUNTER — PATIENT MESSAGE (OUTPATIENT)
Dept: NEUROSURGERY | Facility: CLINIC | Age: 60
End: 2020-12-10

## 2020-12-10 ENCOUNTER — CLINICAL SUPPORT (OUTPATIENT)
Dept: REHABILITATION | Facility: HOSPITAL | Age: 60
End: 2020-12-10
Attending: NEUROLOGICAL SURGERY
Payer: COMMERCIAL

## 2020-12-10 DIAGNOSIS — M48.02 CERVICAL STENOSIS OF SPINE: Primary | ICD-10-CM

## 2020-12-10 DIAGNOSIS — M43.17 SPONDYLOLISTHESIS OF LUMBOSACRAL REGION: Primary | ICD-10-CM

## 2020-12-10 DIAGNOSIS — M43.16 SPONDYLOLISTHESIS OF LUMBAR REGION: ICD-10-CM

## 2020-12-10 DIAGNOSIS — Z98.1 S/P LUMBAR FUSION: Primary | ICD-10-CM

## 2020-12-10 DIAGNOSIS — Z01.818 PREOP TESTING: ICD-10-CM

## 2020-12-10 DIAGNOSIS — M54.2 NECK PAIN: ICD-10-CM

## 2020-12-10 DIAGNOSIS — M47.817 SPONDYLOSIS OF LUMBOSACRAL REGION, UNSPECIFIED SPINAL OSTEOARTHRITIS COMPLICATION STATUS: ICD-10-CM

## 2020-12-10 PROBLEM — M48.061 SPINAL STENOSIS AT L4-L5 LEVEL: Status: RESOLVED | Noted: 2018-08-29 | Resolved: 2020-12-10

## 2020-12-10 PROBLEM — M54.50 LOW BACK PAIN: Status: RESOLVED | Noted: 2018-08-29 | Resolved: 2020-12-10

## 2020-12-10 PROCEDURE — 97110 THERAPEUTIC EXERCISES: CPT | Mod: PO

## 2020-12-10 NOTE — PROGRESS NOTES
"  Physical Therapy Treatment Note     Name: Carmen Burnette Huy  Clinic Number: [id number]    Therapy Diagnosis:   Encounter Diagnoses   Name Primary?    Cervical stenosis of spine Yes    Neck pain      Physician: Leo Denton MD    Visit Date: 12/10/2020    Physician Orders: PT Eval and Treat   Medical Diagnosis from Referral: M47.812 (ICD-10-CM) - Cervical spondylosis  Evaluation Date: 12/8/2020  Authorization Period Expiration: 12/31/2020  Plan of Care Expiration: 02/8/2021  Visit # / Visits authorized: 2/ 10    Time In: 1:00 pm  Time Out: 1:32 pm   Total Billable Time: 32 minutes    Precautions: Standard and Diabetes    Subjective     Pt reports: neck is doing the same.  She was compliant with home exercise program.  Response to previous treatment: no increase in symptoms  Functional change: none    Pain: 0/10  Location: bilateral neck      Objective     Carmen received therapeutic exercises to develop strength, flexibility and posture for 32 minutes including:    Date  12/10/2020 12/8/2020   VISIT 2/10 1/10        POC EXP. DATE 02/08/2021 02/8/2021   FACE-TO-FACE 1/08/2021 01/8/2021   FOTO 2/5 1/5        TABLE:    pec stretch (towel roll) Towel roll  1' --   Snow angels Towel roll  1 x 10 --   Chin tuck with lift 1 x 10 --                       SEATED:    Upper trap stretch 3 x 20" --   Scalene stretch 3 x 20" --    SCM stretch 3 x 20"   STANDING:    Doorway stretch 3 x 20" --   Posture exercises:  ER  Horizontal abduction  diagonals   1 x 10 RTB  1 x 10 RTB  1 x 10 RTB                            Initials DG MA       Home Exercises Provided and Patient Education Provided     Education provided:   - compliance with HEP    Written Home Exercises Provided: yes.  Exercises were reviewed and Carmen was able to demonstrate them prior to the end of the session.  Carmen demonstrated good  understanding of the education provided.     See EMR under Patient Instructions for exercises provided 12/10/2020.    Assessment "     Carmen was able to begin making progress towards her goals as she was able to perform all of today's activities with no increase in symptoms. She required occasional verbal cues to avoid excessive neck flexion with her doorway stretch and to keep her snow angels to a range she can do without R shoulder pain.   Carmen is progressing well towards her goals.   Pt prognosis is Good.     Pt will continue to benefit from skilled outpatient physical therapy to address the deficits listed in the problem list box on initial evaluation, provide pt/family education and to maximize pt's level of independence in the home and community environment.     Pt's spiritual, cultural and educational needs considered and pt agreeable to plan of care and goals.     Anticipated barriers to physical therapy: none    Goals:   Short Term Goals:  4 weeks  1. Patient will be compliant with HEP to promote the independent management of current diagnosis. In progress, not met  2. Patient will improve scapular stabilization strength to 4/5 to improve tolerance to normal house work activities. In progress, not met  3. Patient will report a decrease in complaints of neck pain to 4/10 during performance of ADL's for independence of self care activities. In progress, not met        Long Term Goals:  8 weeks  1. Patient will increase scapular stabilization strength to 4+/5 to improve tolerance to repetitive lifting during normal house chores for independence of self care activities. In progress, not met  2. Patient will report a decrease in complaints of neck pain to 1/10 during performance of ADL's for independence of self care activities. In progress, not met  3. Patient will improve FOTO limitation status from 34% to 30% indicating increased functional mobility. In progress, not met    Plan     Increase reps with all exercises next visit.     Yakelin Dyson, PT

## 2020-12-10 NOTE — PATIENT INSTRUCTIONS
Sternocleidomastoid (SCM) Stretch    In sitting, place one hand over the front of your collarbone, then extend your head backwards and to each side. Hold for a count of 20 do 3 times per session. Do 2 sessions per day.    Scapula Adduction With Pectorals, Mid-Range         doorframe with palms against frame and arms at 90°. Step forward and squeeze shoulder blades. Hold 20 seconds.  Repeat 3 times per session. Do 2 sessions per day.    Copyright © [employer]. All rights reserved.

## 2021-01-04 ENCOUNTER — PATIENT MESSAGE (OUTPATIENT)
Dept: ADMINISTRATIVE | Facility: HOSPITAL | Age: 61
End: 2021-01-04

## 2021-01-08 ENCOUNTER — TELEPHONE (OUTPATIENT)
Dept: FAMILY MEDICINE | Facility: CLINIC | Age: 61
End: 2021-01-08

## 2021-01-08 DIAGNOSIS — Z01.818 PREOPERATIVE TESTING: Primary | ICD-10-CM

## 2021-01-11 ENCOUNTER — HOSPITAL ENCOUNTER (OUTPATIENT)
Dept: RADIOLOGY | Facility: HOSPITAL | Age: 61
Discharge: HOME | End: 2021-01-11
Attending: NEUROLOGICAL SURGERY
Payer: COMMERCIAL

## 2021-01-11 ENCOUNTER — TELEPHONE (OUTPATIENT)
Dept: PREADMISSION TESTING | Facility: HOSPITAL | Age: 61
End: 2021-01-11

## 2021-01-11 DIAGNOSIS — M54.9 DORSALGIA, UNSPECIFIED: ICD-10-CM

## 2021-01-11 PROCEDURE — 72148 MRI LUMBAR SPINE WITHOUT CONTRAST: ICD-10-PCS | Mod: 26,,, | Performed by: RADIOLOGY

## 2021-01-11 PROCEDURE — 72110 X-RAY EXAM L-2 SPINE 4/>VWS: CPT | Mod: 26,,, | Performed by: RADIOLOGY

## 2021-01-11 PROCEDURE — 72110 XR LUMBAR SPINE 5 VIEW WITH FLEX AND EXT: ICD-10-PCS | Mod: 26,,, | Performed by: RADIOLOGY

## 2021-01-11 PROCEDURE — 72148 MRI LUMBAR SPINE W/O DYE: CPT | Mod: TC

## 2021-01-11 PROCEDURE — 72131 CT LUMBAR SPINE W/O DYE: CPT | Mod: 26,,, | Performed by: RADIOLOGY

## 2021-01-11 PROCEDURE — 72148 MRI LUMBAR SPINE W/O DYE: CPT | Mod: 26,,, | Performed by: RADIOLOGY

## 2021-01-11 PROCEDURE — 72131 CT LUMBAR SPINE WITHOUT CONTRAST: ICD-10-PCS | Mod: 26,,, | Performed by: RADIOLOGY

## 2021-01-11 PROCEDURE — 72131 CT LUMBAR SPINE W/O DYE: CPT | Mod: TC

## 2021-01-11 PROCEDURE — 72110 X-RAY EXAM L-2 SPINE 4/>VWS: CPT | Mod: TC

## 2021-01-12 ENCOUNTER — TELEPHONE (OUTPATIENT)
Dept: NEUROSURGERY | Facility: CLINIC | Age: 61
End: 2021-01-12

## 2021-01-13 ENCOUNTER — TELEPHONE (OUTPATIENT)
Dept: FAMILY MEDICINE | Facility: CLINIC | Age: 61
End: 2021-01-13

## 2021-01-13 ENCOUNTER — TELEPHONE (OUTPATIENT)
Dept: PREADMISSION TESTING | Facility: HOSPITAL | Age: 61
End: 2021-01-13

## 2021-01-21 ENCOUNTER — OFFICE VISIT (OUTPATIENT)
Dept: FAMILY MEDICINE | Facility: CLINIC | Age: 61
End: 2021-01-21
Payer: COMMERCIAL

## 2021-01-21 VITALS
SYSTOLIC BLOOD PRESSURE: 128 MMHG | HEART RATE: 74 BPM | TEMPERATURE: 98 F | DIASTOLIC BLOOD PRESSURE: 66 MMHG | OXYGEN SATURATION: 99 % | BODY MASS INDEX: 36.42 KG/M2 | HEIGHT: 63 IN | WEIGHT: 205.56 LBS

## 2021-01-21 DIAGNOSIS — E11.9 CONTROLLED TYPE 2 DIABETES MELLITUS WITHOUT COMPLICATION, WITHOUT LONG-TERM CURRENT USE OF INSULIN: Primary | ICD-10-CM

## 2021-01-21 DIAGNOSIS — E55.9 VITAMIN D DEFICIENCY: ICD-10-CM

## 2021-01-21 DIAGNOSIS — M54.16 LUMBAR RADICULOPATHY: ICD-10-CM

## 2021-01-21 DIAGNOSIS — Z23 NEED FOR PNEUMOCOCCAL VACCINATION: ICD-10-CM

## 2021-01-21 PROCEDURE — 3044F PR MOST RECENT HEMOGLOBIN A1C LEVEL <7.0%: ICD-10-PCS | Mod: CPTII,S$GLB,, | Performed by: FAMILY MEDICINE

## 2021-01-21 PROCEDURE — 3078F DIAST BP <80 MM HG: CPT | Mod: CPTII,S$GLB,, | Performed by: FAMILY MEDICINE

## 2021-01-21 PROCEDURE — 1125F AMNT PAIN NOTED PAIN PRSNT: CPT | Mod: S$GLB,,, | Performed by: FAMILY MEDICINE

## 2021-01-21 PROCEDURE — 90732 PNEUMOCOCCAL POLYSACCHARIDE VACCINE 23-VALENT =>2YO SQ IM: ICD-10-PCS | Mod: S$GLB,,, | Performed by: FAMILY MEDICINE

## 2021-01-21 PROCEDURE — 99214 PR OFFICE/OUTPT VISIT, EST, LEVL IV, 30-39 MIN: ICD-10-PCS | Mod: 25,S$GLB,, | Performed by: FAMILY MEDICINE

## 2021-01-21 PROCEDURE — 3044F HG A1C LEVEL LT 7.0%: CPT | Mod: CPTII,S$GLB,, | Performed by: FAMILY MEDICINE

## 2021-01-21 PROCEDURE — 3078F PR MOST RECENT DIASTOLIC BLOOD PRESSURE < 80 MM HG: ICD-10-PCS | Mod: CPTII,S$GLB,, | Performed by: FAMILY MEDICINE

## 2021-01-21 PROCEDURE — 3074F PR MOST RECENT SYSTOLIC BLOOD PRESSURE < 130 MM HG: ICD-10-PCS | Mod: CPTII,S$GLB,, | Performed by: FAMILY MEDICINE

## 2021-01-21 PROCEDURE — 99214 OFFICE O/P EST MOD 30 MIN: CPT | Mod: 25,S$GLB,, | Performed by: FAMILY MEDICINE

## 2021-01-21 PROCEDURE — 90471 PNEUMOCOCCAL POLYSACCHARIDE VACCINE 23-VALENT =>2YO SQ IM: ICD-10-PCS | Mod: S$GLB,,, | Performed by: FAMILY MEDICINE

## 2021-01-21 PROCEDURE — 1125F PR PAIN SEVERITY QUANTIFIED, PAIN PRESENT: ICD-10-PCS | Mod: S$GLB,,, | Performed by: FAMILY MEDICINE

## 2021-01-21 PROCEDURE — 3074F SYST BP LT 130 MM HG: CPT | Mod: CPTII,S$GLB,, | Performed by: FAMILY MEDICINE

## 2021-01-21 PROCEDURE — 3008F BODY MASS INDEX DOCD: CPT | Mod: CPTII,S$GLB,, | Performed by: FAMILY MEDICINE

## 2021-01-21 PROCEDURE — 90471 IMMUNIZATION ADMIN: CPT | Mod: S$GLB,,, | Performed by: FAMILY MEDICINE

## 2021-01-21 PROCEDURE — 3008F PR BODY MASS INDEX (BMI) DOCUMENTED: ICD-10-PCS | Mod: CPTII,S$GLB,, | Performed by: FAMILY MEDICINE

## 2021-01-21 PROCEDURE — 90732 PPSV23 VACC 2 YRS+ SUBQ/IM: CPT | Mod: S$GLB,,, | Performed by: FAMILY MEDICINE

## 2021-01-21 RX ORDER — INSULIN PUMP SYRINGE, 3 ML
EACH MISCELLANEOUS
Qty: 1 EACH | Refills: 0 | Status: SHIPPED | OUTPATIENT
Start: 2021-01-21 | End: 2023-03-28

## 2021-01-22 ENCOUNTER — LAB VISIT (OUTPATIENT)
Dept: LAB | Facility: HOSPITAL | Age: 61
End: 2021-01-22
Attending: ANESTHESIOLOGY
Payer: COMMERCIAL

## 2021-01-22 ENCOUNTER — TELEPHONE (OUTPATIENT)
Dept: NEUROSURGERY | Facility: CLINIC | Age: 61
End: 2021-01-22

## 2021-01-22 ENCOUNTER — LAB VISIT (OUTPATIENT)
Dept: INTERNAL MEDICINE | Facility: CLINIC | Age: 61
End: 2021-01-22
Payer: COMMERCIAL

## 2021-01-22 ENCOUNTER — HOSPITAL ENCOUNTER (OUTPATIENT)
Dept: CARDIOLOGY | Facility: CLINIC | Age: 61
Discharge: HOME | End: 2021-01-22
Attending: ANESTHESIOLOGY
Payer: COMMERCIAL

## 2021-01-22 DIAGNOSIS — Z01.818 PREOPERATIVE TESTING: ICD-10-CM

## 2021-01-22 DIAGNOSIS — Z01.818 PREOP TESTING: ICD-10-CM

## 2021-01-22 LAB
ABO + RH BLD: NORMAL
APTT BLDCRRT: 23.3 SEC (ref 21–32)
BLD GP AB SCN CELLS X3 SERPL QL: NORMAL
INR PPP: 0.9 (ref 0.8–1.2)
PROTHROMBIN TIME: 9.9 SEC (ref 9–12.5)

## 2021-01-22 PROCEDURE — 93010 EKG 12-LEAD: ICD-10-PCS | Mod: S$GLB,,, | Performed by: INTERNAL MEDICINE

## 2021-01-22 PROCEDURE — U0003 INFECTIOUS AGENT DETECTION BY NUCLEIC ACID (DNA OR RNA); SEVERE ACUTE RESPIRATORY SYNDROME CORONAVIRUS 2 (SARS-COV-2) (CORONAVIRUS DISEASE [COVID-19]), AMPLIFIED PROBE TECHNIQUE, MAKING USE OF HIGH THROUGHPUT TECHNOLOGIES AS DESCRIBED BY CMS-2020-01-R: HCPCS

## 2021-01-22 PROCEDURE — 86900 BLOOD TYPING SEROLOGIC ABO: CPT

## 2021-01-22 PROCEDURE — 85610 PROTHROMBIN TIME: CPT

## 2021-01-22 PROCEDURE — 36415 COLL VENOUS BLD VENIPUNCTURE: CPT

## 2021-01-22 PROCEDURE — 93005 EKG 12-LEAD: ICD-10-PCS | Mod: S$GLB,,, | Performed by: ANESTHESIOLOGY

## 2021-01-22 PROCEDURE — 93010 ELECTROCARDIOGRAM REPORT: CPT | Mod: S$GLB,,, | Performed by: INTERNAL MEDICINE

## 2021-01-22 PROCEDURE — 93005 ELECTROCARDIOGRAM TRACING: CPT | Mod: S$GLB,,, | Performed by: ANESTHESIOLOGY

## 2021-01-22 PROCEDURE — 85730 THROMBOPLASTIN TIME PARTIAL: CPT

## 2021-01-23 LAB — SARS-COV-2 RNA RESP QL NAA+PROBE: NOT DETECTED

## 2021-01-24 ENCOUNTER — ANESTHESIA EVENT (OUTPATIENT)
Dept: SURGERY | Facility: HOSPITAL | Age: 61
End: 2021-01-24
Payer: COMMERCIAL

## 2021-01-25 ENCOUNTER — ANESTHESIA (OUTPATIENT)
Dept: SURGERY | Facility: HOSPITAL | Age: 61
End: 2021-01-25
Payer: COMMERCIAL

## 2021-01-25 ENCOUNTER — HOSPITAL ENCOUNTER (OUTPATIENT)
Facility: HOSPITAL | Age: 61
LOS: 1 days | Discharge: HOME | End: 2021-01-27
Attending: NEUROLOGICAL SURGERY | Admitting: NEUROLOGICAL SURGERY
Payer: COMMERCIAL

## 2021-01-25 DIAGNOSIS — M43.16 SPONDYLOLISTHESIS AT L4-L5 LEVEL: Primary | ICD-10-CM

## 2021-01-25 LAB
POCT GLUCOSE: 134 MG/DL (ref 70–110)
POCT GLUCOSE: 148 MG/DL (ref 70–110)

## 2021-01-25 PROCEDURE — 63600175 PHARM REV CODE 636 W HCPCS: Performed by: STUDENT IN AN ORGANIZED HEALTH CARE EDUCATION/TRAINING PROGRAM

## 2021-01-25 PROCEDURE — 27201037 HC PRESSURE MONITORING SET UP

## 2021-01-25 PROCEDURE — D9220A PRA ANESTHESIA: Mod: ,,, | Performed by: ANESTHESIOLOGY

## 2021-01-25 PROCEDURE — 82962 GLUCOSE BLOOD TEST: CPT | Performed by: NEUROLOGICAL SURGERY

## 2021-01-25 PROCEDURE — 20930 SP BONE ALGRFT MORSEL ADD-ON: CPT | Mod: ,,, | Performed by: NEUROLOGICAL SURGERY

## 2021-01-25 PROCEDURE — 25000003 PHARM REV CODE 250: Performed by: STUDENT IN AN ORGANIZED HEALTH CARE EDUCATION/TRAINING PROGRAM

## 2021-01-25 PROCEDURE — 37000008 HC ANESTHESIA 1ST 15 MINUTES: Performed by: NEUROLOGICAL SURGERY

## 2021-01-25 PROCEDURE — 27800903 OPTIME MED/SURG SUP & DEVICES OTHER IMPLANTS: Performed by: NEUROLOGICAL SURGERY

## 2021-01-25 PROCEDURE — 37000009 HC ANESTHESIA EA ADD 15 MINS: Performed by: NEUROLOGICAL SURGERY

## 2021-01-25 PROCEDURE — C1769 GUIDE WIRE: HCPCS | Performed by: NEUROLOGICAL SURGERY

## 2021-01-25 PROCEDURE — 25000003 PHARM REV CODE 250: Performed by: NEUROLOGICAL SURGERY

## 2021-01-25 PROCEDURE — 71000015 HC POSTOP RECOV 1ST HR: Performed by: NEUROLOGICAL SURGERY

## 2021-01-25 PROCEDURE — D9220A PRA ANESTHESIA: ICD-10-PCS | Mod: ,,, | Performed by: ANESTHESIOLOGY

## 2021-01-25 PROCEDURE — 22840 INSERT SPINE FIXATION DEVICE: CPT | Mod: ,,, | Performed by: NEUROLOGICAL SURGERY

## 2021-01-25 PROCEDURE — 22633 PR ARTHRODESIS, COMBINED TECHN, SNGL INTERSPACE, LUMBAR: ICD-10-PCS | Mod: 22,,, | Performed by: NEUROLOGICAL SURGERY

## 2021-01-25 PROCEDURE — 36000710: Performed by: NEUROLOGICAL SURGERY

## 2021-01-25 PROCEDURE — 22853 INSJ BIOMECHANICAL DEVICE: CPT | Mod: ,,, | Performed by: NEUROLOGICAL SURGERY

## 2021-01-25 PROCEDURE — 20930 PR ALLOGRAFT FOR SPINE SURGERY ONLY MORSELIZED: ICD-10-PCS | Mod: ,,, | Performed by: NEUROLOGICAL SURGERY

## 2021-01-25 PROCEDURE — 22853 PR INSERT BIOMECH DEV W/INTERBODY ARTHRODESIS, EA CONTIGUOUS DEFECT: ICD-10-PCS | Mod: ,,, | Performed by: NEUROLOGICAL SURGERY

## 2021-01-25 PROCEDURE — 22840 PR POSTERIOR NON-SEGMENTAL INSTRUMENTATION: ICD-10-PCS | Mod: ,,, | Performed by: NEUROLOGICAL SURGERY

## 2021-01-25 PROCEDURE — C1713 ANCHOR/SCREW BN/BN,TIS/BN: HCPCS | Performed by: NEUROLOGICAL SURGERY

## 2021-01-25 PROCEDURE — 63600175 PHARM REV CODE 636 W HCPCS: Performed by: NEUROLOGICAL SURGERY

## 2021-01-25 PROCEDURE — 71000016 HC POSTOP RECOV ADDL HR: Performed by: NEUROLOGICAL SURGERY

## 2021-01-25 PROCEDURE — 63600175 PHARM REV CODE 636 W HCPCS: Performed by: ANESTHESIOLOGY

## 2021-01-25 PROCEDURE — 94761 N-INVAS EAR/PLS OXIMETRY MLT: CPT

## 2021-01-25 PROCEDURE — 22633 ARTHRD CMBN 1NTRSPC LUMBAR: CPT | Mod: 22,,, | Performed by: NEUROLOGICAL SURGERY

## 2021-01-25 PROCEDURE — 22552 ARTHRD ANT NTRBD CERVICAL EA: CPT | Mod: ,,, | Performed by: NEUROLOGICAL SURGERY

## 2021-01-25 PROCEDURE — 27201423 OPTIME MED/SURG SUP & DEVICES STERILE SUPPLY: Performed by: NEUROLOGICAL SURGERY

## 2021-01-25 PROCEDURE — 36000711: Performed by: NEUROLOGICAL SURGERY

## 2021-01-25 PROCEDURE — 71000033 HC RECOVERY, INTIAL HOUR: Performed by: NEUROLOGICAL SURGERY

## 2021-01-25 PROCEDURE — 22552 PR ARTHRODESIS ANT INTERBODY INC DISCECTOMY, CERVICAL BELOW C2 EACH ADDL: ICD-10-PCS | Mod: ,,, | Performed by: NEUROLOGICAL SURGERY

## 2021-01-25 DEVICE — SET SCREW SPINAL LOCKING: Type: IMPLANTABLE DEVICE | Site: SPINE LUMBAR | Status: FUNCTIONAL

## 2021-01-25 DEVICE — ROD SPINAL LORDOTIC 5.5X50MM: Type: IMPLANTABLE DEVICE | Site: SPINE LUMBAR | Status: FUNCTIONAL

## 2021-01-25 DEVICE — PUTTY ACTIFUSE ABX 10ML: Type: IMPLANTABLE DEVICE | Site: SPINE LUMBAR | Status: FUNCTIONAL

## 2021-01-25 DEVICE — ROD SPINAL PERC 45MM LORDOTIC: Type: IMPLANTABLE DEVICE | Site: SPINE LUMBAR | Status: FUNCTIONAL

## 2021-01-25 DEVICE — SCREW SNIPER 6.5MM X 40MM: Type: IMPLANTABLE DEVICE | Site: SPINE LUMBAR | Status: FUNCTIONAL

## 2021-01-25 DEVICE — CAGE LEVA 10MM 25X10X10: Type: IMPLANTABLE DEVICE | Site: SPINE LUMBAR | Status: FUNCTIONAL

## 2021-01-25 RX ORDER — HYDROCODONE BITARTRATE AND ACETAMINOPHEN 10; 325 MG/1; MG/1
1 TABLET ORAL EVERY 4 HOURS PRN
Status: DISCONTINUED | OUTPATIENT
Start: 2021-01-25 | End: 2021-01-27 | Stop reason: HOSPADM

## 2021-01-25 RX ORDER — ONDANSETRON 2 MG/ML
INJECTION INTRAMUSCULAR; INTRAVENOUS
Status: DISCONTINUED | OUTPATIENT
Start: 2021-01-25 | End: 2021-01-25

## 2021-01-25 RX ORDER — BUPIVACAINE HYDROCHLORIDE AND EPINEPHRINE 5; 5 MG/ML; UG/ML
INJECTION, SOLUTION EPIDURAL; INTRACAUDAL; PERINEURAL
Status: DISCONTINUED | OUTPATIENT
Start: 2021-01-25 | End: 2021-01-25 | Stop reason: HOSPADM

## 2021-01-25 RX ORDER — CEFAZOLIN SODIUM 1 G/3ML
2 INJECTION, POWDER, FOR SOLUTION INTRAMUSCULAR; INTRAVENOUS
Status: COMPLETED | OUTPATIENT
Start: 2021-01-25 | End: 2021-01-25

## 2021-01-25 RX ORDER — DEXAMETHASONE SODIUM PHOSPHATE 4 MG/ML
INJECTION, SOLUTION INTRA-ARTICULAR; INTRALESIONAL; INTRAMUSCULAR; INTRAVENOUS; SOFT TISSUE
Status: DISCONTINUED | OUTPATIENT
Start: 2021-01-25 | End: 2021-01-25

## 2021-01-25 RX ORDER — MUPIROCIN 20 MG/G
OINTMENT TOPICAL
Status: DISCONTINUED | OUTPATIENT
Start: 2021-01-25 | End: 2021-01-25 | Stop reason: HOSPADM

## 2021-01-25 RX ORDER — ROCURONIUM BROMIDE 10 MG/ML
INJECTION, SOLUTION INTRAVENOUS
Status: DISCONTINUED | OUTPATIENT
Start: 2021-01-25 | End: 2021-01-25

## 2021-01-25 RX ORDER — MUPIROCIN 20 MG/G
1 OINTMENT TOPICAL 2 TIMES DAILY
Status: DISCONTINUED | OUTPATIENT
Start: 2021-01-25 | End: 2021-01-25 | Stop reason: HOSPADM

## 2021-01-25 RX ORDER — PROPOFOL 10 MG/ML
VIAL (ML) INTRAVENOUS
Status: DISCONTINUED | OUTPATIENT
Start: 2021-01-25 | End: 2021-01-25

## 2021-01-25 RX ORDER — ONDANSETRON 8 MG/1
8 TABLET, ORALLY DISINTEGRATING ORAL EVERY 6 HOURS PRN
Status: DISCONTINUED | OUTPATIENT
Start: 2021-01-25 | End: 2021-01-27 | Stop reason: HOSPADM

## 2021-01-25 RX ORDER — EPHEDRINE SULFATE 50 MG/ML
INJECTION, SOLUTION INTRAVENOUS
Status: DISCONTINUED | OUTPATIENT
Start: 2021-01-25 | End: 2021-01-25

## 2021-01-25 RX ORDER — BACITRACIN ZINC 500 UNIT/G
OINTMENT (GRAM) TOPICAL
Status: DISCONTINUED | OUTPATIENT
Start: 2021-01-25 | End: 2021-01-25 | Stop reason: HOSPADM

## 2021-01-25 RX ORDER — SUCCINYLCHOLINE CHLORIDE 20 MG/ML
INJECTION INTRAMUSCULAR; INTRAVENOUS
Status: DISCONTINUED | OUTPATIENT
Start: 2021-01-25 | End: 2021-01-25

## 2021-01-25 RX ORDER — MORPHINE SULFATE 2 MG/ML
2 INJECTION, SOLUTION INTRAMUSCULAR; INTRAVENOUS
Status: DISCONTINUED | OUTPATIENT
Start: 2021-01-25 | End: 2021-01-26

## 2021-01-25 RX ORDER — LIDOCAINE HYDROCHLORIDE AND EPINEPHRINE 20; 10 MG/ML; UG/ML
INJECTION, SOLUTION INFILTRATION; PERINEURAL
Status: DISCONTINUED | OUTPATIENT
Start: 2021-01-25 | End: 2021-01-25 | Stop reason: HOSPADM

## 2021-01-25 RX ORDER — AMOXICILLIN 250 MG
2 CAPSULE ORAL NIGHTLY PRN
Status: DISCONTINUED | OUTPATIENT
Start: 2021-01-25 | End: 2021-01-26

## 2021-01-25 RX ORDER — CEFAZOLIN SODIUM 1 G/3ML
2 INJECTION, POWDER, FOR SOLUTION INTRAMUSCULAR; INTRAVENOUS
Status: COMPLETED | OUTPATIENT
Start: 2021-01-25 | End: 2021-01-26

## 2021-01-25 RX ORDER — FENTANYL CITRATE 50 UG/ML
INJECTION, SOLUTION INTRAMUSCULAR; INTRAVENOUS
Status: DISCONTINUED | OUTPATIENT
Start: 2021-01-25 | End: 2021-01-25

## 2021-01-25 RX ORDER — PROPOFOL 10 MG/ML
VIAL (ML) INTRAVENOUS CONTINUOUS PRN
Status: DISCONTINUED | OUTPATIENT
Start: 2021-01-25 | End: 2021-01-25

## 2021-01-25 RX ORDER — PHENYLEPHRINE HYDROCHLORIDE 10 MG/ML
INJECTION INTRAVENOUS
Status: DISCONTINUED | OUTPATIENT
Start: 2021-01-25 | End: 2021-01-25

## 2021-01-25 RX ORDER — LIDOCAINE HCL/PF 100 MG/5ML
SYRINGE (ML) INTRAVENOUS
Status: DISCONTINUED | OUTPATIENT
Start: 2021-01-25 | End: 2021-01-25

## 2021-01-25 RX ORDER — ACETAMINOPHEN 10 MG/ML
INJECTION, SOLUTION INTRAVENOUS
Status: DISCONTINUED | OUTPATIENT
Start: 2021-01-25 | End: 2021-01-25

## 2021-01-25 RX ORDER — MIDAZOLAM HYDROCHLORIDE 1 MG/ML
INJECTION INTRAMUSCULAR; INTRAVENOUS
Status: DISCONTINUED | OUTPATIENT
Start: 2021-01-25 | End: 2021-01-25

## 2021-01-25 RX ORDER — METHYLPREDNISOLONE ACETATE 40 MG/ML
INJECTION, SUSPENSION INTRA-ARTICULAR; INTRALESIONAL; INTRAMUSCULAR; SOFT TISSUE
Status: DISCONTINUED | OUTPATIENT
Start: 2021-01-25 | End: 2021-01-25 | Stop reason: HOSPADM

## 2021-01-25 RX ORDER — HYDROMORPHONE HYDROCHLORIDE 1 MG/ML
0.2 INJECTION, SOLUTION INTRAMUSCULAR; INTRAVENOUS; SUBCUTANEOUS EVERY 5 MIN PRN
Status: COMPLETED | OUTPATIENT
Start: 2021-01-25 | End: 2021-01-25

## 2021-01-25 RX ORDER — MAG HYDROX/ALUMINUM HYD/SIMETH 200-200-20
30 SUSPENSION, ORAL (FINAL DOSE FORM) ORAL EVERY 4 HOURS PRN
Status: DISCONTINUED | OUTPATIENT
Start: 2021-01-25 | End: 2021-01-27 | Stop reason: HOSPADM

## 2021-01-25 RX ORDER — VANCOMYCIN HYDROCHLORIDE 1 G/20ML
INJECTION, POWDER, LYOPHILIZED, FOR SOLUTION INTRAVENOUS
Status: DISCONTINUED | OUTPATIENT
Start: 2021-01-25 | End: 2021-01-25 | Stop reason: HOSPADM

## 2021-01-25 RX ORDER — SODIUM CHLORIDE 0.9 % (FLUSH) 0.9 %
10 SYRINGE (ML) INJECTION
Status: DISCONTINUED | OUTPATIENT
Start: 2021-01-25 | End: 2021-01-25 | Stop reason: HOSPADM

## 2021-01-25 RX ORDER — MUPIROCIN 20 MG/G
OINTMENT TOPICAL 2 TIMES DAILY
Status: DISCONTINUED | OUTPATIENT
Start: 2021-01-25 | End: 2021-01-27 | Stop reason: HOSPADM

## 2021-01-25 RX ORDER — KETAMINE HCL IN 0.9 % NACL 50 MG/5 ML
SYRINGE (ML) INTRAVENOUS
Status: DISCONTINUED | OUTPATIENT
Start: 2021-01-25 | End: 2021-01-25

## 2021-01-25 RX ADMIN — CEFAZOLIN 2 G: 330 INJECTION, POWDER, FOR SOLUTION INTRAMUSCULAR; INTRAVENOUS at 03:01

## 2021-01-25 RX ADMIN — EPHEDRINE SULFATE 5 MG: 50 INJECTION INTRAVENOUS at 07:01

## 2021-01-25 RX ADMIN — ROCURONIUM BROMIDE 10 MG: 10 INJECTION, SOLUTION INTRAVENOUS at 07:01

## 2021-01-25 RX ADMIN — Medication 10 MG: at 12:01

## 2021-01-25 RX ADMIN — SODIUM CHLORIDE: 0.9 INJECTION, SOLUTION INTRAVENOUS at 07:01

## 2021-01-25 RX ADMIN — REMIFENTANIL HYDROCHLORIDE 0.1 MCG/KG/MIN: 1 INJECTION, POWDER, LYOPHILIZED, FOR SOLUTION INTRAVENOUS at 07:01

## 2021-01-25 RX ADMIN — LIDOCAINE HYDROCHLORIDE 80 MG: 20 INJECTION, SOLUTION INTRAVENOUS at 07:01

## 2021-01-25 RX ADMIN — CEFTRIAXONE SODIUM 1 G: 1 INJECTION, SOLUTION INTRAVENOUS at 12:01

## 2021-01-25 RX ADMIN — PHENYLEPHRINE HYDROCHLORIDE 100 MCG: 10 INJECTION INTRAVENOUS at 07:01

## 2021-01-25 RX ADMIN — PROPOFOL 30 MG: 10 INJECTION, EMULSION INTRAVENOUS at 01:01

## 2021-01-25 RX ADMIN — SODIUM CHLORIDE 0.2 MCG/KG/MIN: 9 INJECTION, SOLUTION INTRAVENOUS at 07:01

## 2021-01-25 RX ADMIN — HYDROMORPHONE HYDROCHLORIDE 0.2 MG: 1 INJECTION, SOLUTION INTRAMUSCULAR; INTRAVENOUS; SUBCUTANEOUS at 02:01

## 2021-01-25 RX ADMIN — MIDAZOLAM HYDROCHLORIDE 2 MG: 1 INJECTION, SOLUTION INTRAMUSCULAR; INTRAVENOUS at 07:01

## 2021-01-25 RX ADMIN — ONDANSETRON 4 MG: 2 INJECTION, SOLUTION INTRAMUSCULAR; INTRAVENOUS at 12:01

## 2021-01-25 RX ADMIN — Medication 20 MG: at 07:01

## 2021-01-25 RX ADMIN — MORPHINE SULFATE 2 MG: 2 INJECTION, SOLUTION INTRAMUSCULAR; INTRAVENOUS at 06:01

## 2021-01-25 RX ADMIN — HYDROMORPHONE HYDROCHLORIDE 0.2 MG: 1 INJECTION, SOLUTION INTRAMUSCULAR; INTRAVENOUS; SUBCUTANEOUS at 01:01

## 2021-01-25 RX ADMIN — Medication 10 MG: at 10:01

## 2021-01-25 RX ADMIN — ACETAMINOPHEN 1000 MG: 10 INJECTION, SOLUTION INTRAVENOUS at 12:01

## 2021-01-25 RX ADMIN — HYDROCODONE BITARTRATE AND ACETAMINOPHEN 1 TABLET: 10; 325 TABLET ORAL at 08:01

## 2021-01-25 RX ADMIN — HYDROCODONE BITARTRATE AND ACETAMINOPHEN 1 TABLET: 10; 325 TABLET ORAL at 02:01

## 2021-01-25 RX ADMIN — EPHEDRINE SULFATE 5 MG: 50 INJECTION INTRAVENOUS at 08:01

## 2021-01-25 RX ADMIN — FENTANYL CITRATE 100 MCG: 50 INJECTION, SOLUTION INTRAMUSCULAR; INTRAVENOUS at 07:01

## 2021-01-25 RX ADMIN — MUPIROCIN: 20 OINTMENT TOPICAL at 08:01

## 2021-01-25 RX ADMIN — CEFAZOLIN 2 G: 330 INJECTION, POWDER, FOR SOLUTION INTRAMUSCULAR; INTRAVENOUS at 07:01

## 2021-01-25 RX ADMIN — DEXAMETHASONE SODIUM PHOSPHATE 4 MG: 4 INJECTION INTRA-ARTICULAR; INTRALESIONAL; INTRAMUSCULAR; INTRAVENOUS; SOFT TISSUE at 07:01

## 2021-01-25 RX ADMIN — SODIUM CHLORIDE, SODIUM GLUCONATE, SODIUM ACETATE, POTASSIUM CHLORIDE, MAGNESIUM CHLORIDE, SODIUM PHOSPHATE, DIBASIC, AND POTASSIUM PHOSPHATE: .53; .5; .37; .037; .03; .012; .00082 INJECTION, SOLUTION INTRAVENOUS at 08:01

## 2021-01-25 RX ADMIN — MUPIROCIN: 20 OINTMENT TOPICAL at 06:01

## 2021-01-25 RX ADMIN — PROPOFOL 150 MG: 10 INJECTION, EMULSION INTRAVENOUS at 07:01

## 2021-01-25 RX ADMIN — PROPOFOL 150 MCG/KG/MIN: 10 INJECTION, EMULSION INTRAVENOUS at 07:01

## 2021-01-25 RX ADMIN — SUCCINYLCHOLINE CHLORIDE 120 MG: 20 INJECTION, SOLUTION INTRAMUSCULAR; INTRAVENOUS at 07:01

## 2021-01-25 RX ADMIN — Medication 10 MG: at 09:01

## 2021-01-25 RX ADMIN — ONDANSETRON 8 MG: 8 TABLET, ORALLY DISINTEGRATING ORAL at 07:01

## 2021-01-26 LAB
POCT GLUCOSE: 120 MG/DL (ref 70–110)
POCT GLUCOSE: 146 MG/DL (ref 70–110)

## 2021-01-26 PROCEDURE — 63600175 PHARM REV CODE 636 W HCPCS: Performed by: STUDENT IN AN ORGANIZED HEALTH CARE EDUCATION/TRAINING PROGRAM

## 2021-01-26 PROCEDURE — 97530 THERAPEUTIC ACTIVITIES: CPT

## 2021-01-26 PROCEDURE — 25000003 PHARM REV CODE 250: Performed by: STUDENT IN AN ORGANIZED HEALTH CARE EDUCATION/TRAINING PROGRAM

## 2021-01-26 PROCEDURE — 97165 OT EVAL LOW COMPLEX 30 MIN: CPT

## 2021-01-26 PROCEDURE — 97112 NEUROMUSCULAR REEDUCATION: CPT

## 2021-01-26 PROCEDURE — 25000003 PHARM REV CODE 250: Performed by: PHYSICIAN ASSISTANT

## 2021-01-26 PROCEDURE — 97161 PT EVAL LOW COMPLEX 20 MIN: CPT

## 2021-01-26 RX ORDER — PROPRANOLOL HYDROCHLORIDE 80 MG/1
80 CAPSULE, EXTENDED RELEASE ORAL DAILY
Status: DISCONTINUED | OUTPATIENT
Start: 2021-01-26 | End: 2021-01-27 | Stop reason: HOSPADM

## 2021-01-26 RX ORDER — IBUPROFEN 200 MG
24 TABLET ORAL
Status: DISCONTINUED | OUTPATIENT
Start: 2021-01-26 | End: 2021-01-27 | Stop reason: HOSPADM

## 2021-01-26 RX ORDER — LOSARTAN POTASSIUM 50 MG/1
50 TABLET ORAL DAILY
Status: DISCONTINUED | OUTPATIENT
Start: 2021-01-26 | End: 2021-01-27 | Stop reason: HOSPADM

## 2021-01-26 RX ORDER — INSULIN ASPART 100 [IU]/ML
0-5 INJECTION, SOLUTION INTRAVENOUS; SUBCUTANEOUS
Status: DISCONTINUED | OUTPATIENT
Start: 2021-01-26 | End: 2021-01-27 | Stop reason: HOSPADM

## 2021-01-26 RX ORDER — AMOXICILLIN 250 MG
1 CAPSULE ORAL 2 TIMES DAILY
Status: DISCONTINUED | OUTPATIENT
Start: 2021-01-26 | End: 2021-01-27 | Stop reason: HOSPADM

## 2021-01-26 RX ORDER — SUCRALFATE 1 G/1
1 TABLET ORAL
Status: DISCONTINUED | OUTPATIENT
Start: 2021-01-26 | End: 2021-01-27 | Stop reason: HOSPADM

## 2021-01-26 RX ORDER — IBUPROFEN 200 MG
16 TABLET ORAL
Status: DISCONTINUED | OUTPATIENT
Start: 2021-01-26 | End: 2021-01-27 | Stop reason: HOSPADM

## 2021-01-26 RX ORDER — MORPHINE SULFATE 2 MG/ML
1 INJECTION, SOLUTION INTRAMUSCULAR; INTRAVENOUS EVERY 4 HOURS PRN
Status: DISCONTINUED | OUTPATIENT
Start: 2021-01-26 | End: 2021-01-27 | Stop reason: HOSPADM

## 2021-01-26 RX ORDER — CETIRIZINE HYDROCHLORIDE 5 MG/1
5 TABLET ORAL DAILY PRN
Status: DISCONTINUED | OUTPATIENT
Start: 2021-01-26 | End: 2021-01-27 | Stop reason: HOSPADM

## 2021-01-26 RX ORDER — FUROSEMIDE 20 MG/1
20 TABLET ORAL DAILY PRN
Status: DISCONTINUED | OUTPATIENT
Start: 2021-01-26 | End: 2021-01-27 | Stop reason: HOSPADM

## 2021-01-26 RX ORDER — GLUCAGON 1 MG
1 KIT INJECTION
Status: DISCONTINUED | OUTPATIENT
Start: 2021-01-26 | End: 2021-01-27 | Stop reason: HOSPADM

## 2021-01-26 RX ADMIN — HYDROCODONE BITARTRATE AND ACETAMINOPHEN 1 TABLET: 10; 325 TABLET ORAL at 07:01

## 2021-01-26 RX ADMIN — HYDROCODONE BITARTRATE AND ACETAMINOPHEN 1 TABLET: 10; 325 TABLET ORAL at 12:01

## 2021-01-26 RX ADMIN — HYDROCODONE BITARTRATE AND ACETAMINOPHEN 1 TABLET: 10; 325 TABLET ORAL at 05:01

## 2021-01-26 RX ADMIN — SUCRALFATE 1 G: 1 TABLET ORAL at 04:01

## 2021-01-26 RX ADMIN — MORPHINE SULFATE 2 MG: 2 INJECTION, SOLUTION INTRAMUSCULAR; INTRAVENOUS at 08:01

## 2021-01-26 RX ADMIN — ONDANSETRON 8 MG: 8 TABLET, ORALLY DISINTEGRATING ORAL at 08:01

## 2021-01-26 RX ADMIN — PROPRANOLOL HYDROCHLORIDE 80 MG: 80 CAPSULE, EXTENDED RELEASE ORAL at 11:01

## 2021-01-26 RX ADMIN — CEFAZOLIN 2 G: 330 INJECTION, POWDER, FOR SOLUTION INTRAMUSCULAR; INTRAVENOUS at 12:01

## 2021-01-26 RX ADMIN — LOSARTAN POTASSIUM 50 MG: 50 TABLET, FILM COATED ORAL at 11:01

## 2021-01-26 RX ADMIN — DOCUSATE SODIUM 50MG AND SENNOSIDES 8.6MG 1 TABLET: 8.6; 5 TABLET, FILM COATED ORAL at 09:01

## 2021-01-26 RX ADMIN — SUCRALFATE 1 G: 1 TABLET ORAL at 11:01

## 2021-01-26 RX ADMIN — MUPIROCIN: 20 OINTMENT TOPICAL at 09:01

## 2021-01-26 RX ADMIN — DOCUSATE SODIUM 50MG AND SENNOSIDES 8.6MG 1 TABLET: 8.6; 5 TABLET, FILM COATED ORAL at 11:01

## 2021-01-26 RX ADMIN — HYDROCODONE BITARTRATE AND ACETAMINOPHEN 1 TABLET: 10; 325 TABLET ORAL at 11:01

## 2021-01-26 RX ADMIN — HYDROCODONE BITARTRATE AND ACETAMINOPHEN 1 TABLET: 10; 325 TABLET ORAL at 03:01

## 2021-01-26 RX ADMIN — MUPIROCIN: 20 OINTMENT TOPICAL at 12:01

## 2021-01-27 VITALS
BODY MASS INDEX: 36.32 KG/M2 | DIASTOLIC BLOOD PRESSURE: 68 MMHG | WEIGHT: 205 LBS | TEMPERATURE: 98 F | HEART RATE: 84 BPM | HEIGHT: 63 IN | OXYGEN SATURATION: 100 % | RESPIRATION RATE: 18 BRPM | SYSTOLIC BLOOD PRESSURE: 103 MMHG

## 2021-01-27 LAB
POCT GLUCOSE: 119 MG/DL (ref 70–110)
POCT GLUCOSE: 139 MG/DL (ref 70–110)

## 2021-01-27 PROCEDURE — 25000003 PHARM REV CODE 250: Performed by: PHYSICIAN ASSISTANT

## 2021-01-27 PROCEDURE — 25000003 PHARM REV CODE 250: Performed by: STUDENT IN AN ORGANIZED HEALTH CARE EDUCATION/TRAINING PROGRAM

## 2021-01-27 PROCEDURE — 63600175 PHARM REV CODE 636 W HCPCS: Performed by: STUDENT IN AN ORGANIZED HEALTH CARE EDUCATION/TRAINING PROGRAM

## 2021-01-27 RX ORDER — HYDROCODONE BITARTRATE AND ACETAMINOPHEN 10; 325 MG/1; MG/1
1 TABLET ORAL EVERY 4 HOURS PRN
Qty: 30 TABLET | Refills: 0 | Status: SHIPPED | OUTPATIENT
Start: 2021-01-27 | End: 2021-02-01 | Stop reason: SDUPTHER

## 2021-01-27 RX ORDER — AMOXICILLIN 250 MG
1 CAPSULE ORAL 2 TIMES DAILY
Qty: 28 TABLET | Refills: 0 | Status: SHIPPED | OUTPATIENT
Start: 2021-01-27 | End: 2021-02-10

## 2021-01-27 RX ORDER — METHOCARBAMOL 750 MG/1
750 TABLET, FILM COATED ORAL 4 TIMES DAILY
Qty: 40 TABLET | Refills: 0 | Status: SHIPPED | OUTPATIENT
Start: 2021-01-27 | End: 2021-02-01 | Stop reason: SDUPTHER

## 2021-01-27 RX ORDER — HEPARIN SODIUM 5000 [USP'U]/ML
5000 INJECTION, SOLUTION INTRAVENOUS; SUBCUTANEOUS EVERY 8 HOURS
Status: DISCONTINUED | OUTPATIENT
Start: 2021-01-27 | End: 2021-01-27 | Stop reason: HOSPADM

## 2021-01-27 RX ORDER — ONDANSETRON 8 MG/1
8 TABLET, ORALLY DISINTEGRATING ORAL EVERY 6 HOURS PRN
Qty: 15 TABLET | Refills: 0 | Status: SHIPPED | OUTPATIENT
Start: 2021-01-27 | End: 2021-05-13 | Stop reason: SDUPTHER

## 2021-01-27 RX ADMIN — SUCRALFATE 1 G: 1 TABLET ORAL at 11:01

## 2021-01-27 RX ADMIN — ONDANSETRON 8 MG: 8 TABLET, ORALLY DISINTEGRATING ORAL at 06:01

## 2021-01-27 RX ADMIN — PROPRANOLOL HYDROCHLORIDE 80 MG: 80 CAPSULE, EXTENDED RELEASE ORAL at 08:01

## 2021-01-27 RX ADMIN — HEPARIN SODIUM 5000 UNITS: 5000 INJECTION INTRAVENOUS; SUBCUTANEOUS at 08:01

## 2021-01-27 RX ADMIN — LOSARTAN POTASSIUM 50 MG: 50 TABLET, FILM COATED ORAL at 08:01

## 2021-01-27 RX ADMIN — MUPIROCIN: 20 OINTMENT TOPICAL at 08:01

## 2021-01-27 RX ADMIN — SUCRALFATE 1 G: 1 TABLET ORAL at 08:01

## 2021-01-27 RX ADMIN — HYDROCODONE BITARTRATE AND ACETAMINOPHEN 1 TABLET: 10; 325 TABLET ORAL at 06:01

## 2021-01-27 RX ADMIN — DOCUSATE SODIUM 50MG AND SENNOSIDES 8.6MG 1 TABLET: 8.6; 5 TABLET, FILM COATED ORAL at 08:01

## 2021-01-27 RX ADMIN — SUCRALFATE 1 G: 1 TABLET ORAL at 05:01

## 2021-01-27 RX ADMIN — HYDROCODONE BITARTRATE AND ACETAMINOPHEN 1 TABLET: 10; 325 TABLET ORAL at 02:01

## 2021-01-27 RX ADMIN — LINACLOTIDE 145 MCG: 145 CAPSULE, GELATIN COATED ORAL at 08:01

## 2021-01-28 ENCOUNTER — TELEPHONE (OUTPATIENT)
Dept: NEUROSURGERY | Facility: CLINIC | Age: 61
End: 2021-01-28

## 2021-01-28 PROCEDURE — G0180 PR HOME HEALTH MD CERTIFICATION: ICD-10-PCS | Mod: ,,, | Performed by: NEUROLOGICAL SURGERY

## 2021-01-28 PROCEDURE — G0180 MD CERTIFICATION HHA PATIENT: HCPCS | Mod: ,,, | Performed by: NEUROLOGICAL SURGERY

## 2021-02-01 ENCOUNTER — CLINICAL SUPPORT (OUTPATIENT)
Dept: NEUROSURGERY | Facility: CLINIC | Age: 61
End: 2021-02-01
Payer: COMMERCIAL

## 2021-02-01 VITALS — TEMPERATURE: 96 F

## 2021-02-01 RX ORDER — HYDROCODONE BITARTRATE AND ACETAMINOPHEN 10; 325 MG/1; MG/1
1 TABLET ORAL EVERY 4 HOURS PRN
Qty: 30 TABLET | Refills: 0 | Status: SHIPPED | OUTPATIENT
Start: 2021-02-01 | End: 2021-02-08

## 2021-02-01 RX ORDER — METHOCARBAMOL 750 MG/1
750 TABLET, FILM COATED ORAL 4 TIMES DAILY
Qty: 40 TABLET | Refills: 0 | Status: SHIPPED | OUTPATIENT
Start: 2021-02-01 | End: 2021-02-11

## 2021-02-02 ENCOUNTER — EXTERNAL HOME HEALTH (OUTPATIENT)
Dept: HOME HEALTH SERVICES | Facility: HOSPITAL | Age: 61
End: 2021-02-02
Payer: COMMERCIAL

## 2021-02-09 ENCOUNTER — TELEPHONE (OUTPATIENT)
Dept: FAMILY MEDICINE | Facility: CLINIC | Age: 61
End: 2021-02-09

## 2021-02-10 ENCOUNTER — APPOINTMENT (OUTPATIENT)
Dept: LAB | Facility: HOSPITAL | Age: 61
End: 2021-02-10
Attending: FAMILY MEDICINE
Payer: COMMERCIAL

## 2021-02-10 DIAGNOSIS — N39.0 URINARY TRACT INFECTION, SITE NOT SPECIFIED: Primary | ICD-10-CM

## 2021-02-10 PROCEDURE — 87086 URINE CULTURE/COLONY COUNT: CPT | Mod: PO

## 2021-02-11 ENCOUNTER — CLINICAL SUPPORT (OUTPATIENT)
Dept: NEUROSURGERY | Facility: CLINIC | Age: 61
End: 2021-02-11
Payer: COMMERCIAL

## 2021-02-11 DIAGNOSIS — Z98.1 S/P LUMBAR FUSION: Primary | ICD-10-CM

## 2021-02-12 ENCOUNTER — DOCUMENT SCAN (OUTPATIENT)
Dept: HOME HEALTH SERVICES | Facility: HOSPITAL | Age: 61
End: 2021-02-12
Payer: COMMERCIAL

## 2021-02-12 LAB
BACTERIA UR CULT: NORMAL
BACTERIA UR CULT: NORMAL

## 2021-02-25 ENCOUNTER — OFFICE VISIT (OUTPATIENT)
Dept: FAMILY MEDICINE | Facility: CLINIC | Age: 61
End: 2021-02-25
Payer: COMMERCIAL

## 2021-02-25 ENCOUNTER — TELEPHONE (OUTPATIENT)
Dept: FAMILY MEDICINE | Facility: CLINIC | Age: 61
End: 2021-02-25

## 2021-02-25 VITALS
SYSTOLIC BLOOD PRESSURE: 118 MMHG | WEIGHT: 207.69 LBS | TEMPERATURE: 99 F | HEIGHT: 62 IN | HEART RATE: 84 BPM | DIASTOLIC BLOOD PRESSURE: 74 MMHG | BODY MASS INDEX: 38.22 KG/M2 | OXYGEN SATURATION: 97 %

## 2021-02-25 DIAGNOSIS — J01.00 ACUTE NON-RECURRENT MAXILLARY SINUSITIS: ICD-10-CM

## 2021-02-25 DIAGNOSIS — E11.9 CONTROLLED TYPE 2 DIABETES MELLITUS WITHOUT COMPLICATION, WITHOUT LONG-TERM CURRENT USE OF INSULIN: Primary | ICD-10-CM

## 2021-02-25 DIAGNOSIS — E55.9 VITAMIN D DEFICIENCY: ICD-10-CM

## 2021-02-25 PROCEDURE — 3078F DIAST BP <80 MM HG: CPT | Mod: CPTII,S$GLB,, | Performed by: FAMILY MEDICINE

## 2021-02-25 PROCEDURE — 96372 PR INJECTION,THERAP/PROPH/DIAG2ST, IM OR SUBCUT: ICD-10-PCS | Mod: S$GLB,,, | Performed by: FAMILY MEDICINE

## 2021-02-25 PROCEDURE — 3044F HG A1C LEVEL LT 7.0%: CPT | Mod: CPTII,S$GLB,, | Performed by: FAMILY MEDICINE

## 2021-02-25 PROCEDURE — 3008F BODY MASS INDEX DOCD: CPT | Mod: CPTII,S$GLB,, | Performed by: FAMILY MEDICINE

## 2021-02-25 PROCEDURE — 96372 THER/PROPH/DIAG INJ SC/IM: CPT | Mod: S$GLB,,, | Performed by: FAMILY MEDICINE

## 2021-02-25 PROCEDURE — 1125F AMNT PAIN NOTED PAIN PRSNT: CPT | Mod: S$GLB,,, | Performed by: FAMILY MEDICINE

## 2021-02-25 PROCEDURE — 99214 PR OFFICE/OUTPT VISIT, EST, LEVL IV, 30-39 MIN: ICD-10-PCS | Mod: 25,S$GLB,, | Performed by: FAMILY MEDICINE

## 2021-02-25 PROCEDURE — 3008F PR BODY MASS INDEX (BMI) DOCUMENTED: ICD-10-PCS | Mod: CPTII,S$GLB,, | Performed by: FAMILY MEDICINE

## 2021-02-25 PROCEDURE — 3044F PR MOST RECENT HEMOGLOBIN A1C LEVEL <7.0%: ICD-10-PCS | Mod: CPTII,S$GLB,, | Performed by: FAMILY MEDICINE

## 2021-02-25 PROCEDURE — 3074F SYST BP LT 130 MM HG: CPT | Mod: CPTII,S$GLB,, | Performed by: FAMILY MEDICINE

## 2021-02-25 PROCEDURE — 1125F PR PAIN SEVERITY QUANTIFIED, PAIN PRESENT: ICD-10-PCS | Mod: S$GLB,,, | Performed by: FAMILY MEDICINE

## 2021-02-25 PROCEDURE — 3074F PR MOST RECENT SYSTOLIC BLOOD PRESSURE < 130 MM HG: ICD-10-PCS | Mod: CPTII,S$GLB,, | Performed by: FAMILY MEDICINE

## 2021-02-25 PROCEDURE — 99214 OFFICE O/P EST MOD 30 MIN: CPT | Mod: 25,S$GLB,, | Performed by: FAMILY MEDICINE

## 2021-02-25 PROCEDURE — 3078F PR MOST RECENT DIASTOLIC BLOOD PRESSURE < 80 MM HG: ICD-10-PCS | Mod: CPTII,S$GLB,, | Performed by: FAMILY MEDICINE

## 2021-02-25 RX ORDER — BETAMETHASONE SODIUM PHOSPHATE AND BETAMETHASONE ACETATE 3; 3 MG/ML; MG/ML
6 INJECTION, SUSPENSION INTRA-ARTICULAR; INTRALESIONAL; INTRAMUSCULAR; SOFT TISSUE ONCE
Status: COMPLETED | OUTPATIENT
Start: 2021-02-25 | End: 2021-02-25

## 2021-02-25 RX ORDER — METFORMIN HYDROCHLORIDE 500 MG/1
500 TABLET ORAL
Qty: 90 TABLET | Refills: 3 | Status: SHIPPED | OUTPATIENT
Start: 2021-02-25 | End: 2023-03-28

## 2021-02-25 RX ADMIN — BETAMETHASONE SODIUM PHOSPHATE AND BETAMETHASONE ACETATE 6 MG: 3; 3 INJECTION, SUSPENSION INTRA-ARTICULAR; INTRALESIONAL; INTRAMUSCULAR; SOFT TISSUE at 12:02

## 2021-02-26 RX ORDER — DOXYCYCLINE 100 MG/1
100 CAPSULE ORAL EVERY 12 HOURS
Qty: 14 CAPSULE | Refills: 0 | Status: SHIPPED | OUTPATIENT
Start: 2021-02-26 | End: 2021-05-27

## 2021-03-16 ENCOUNTER — HOSPITAL ENCOUNTER (OUTPATIENT)
Dept: RADIOLOGY | Facility: HOSPITAL | Age: 61
Discharge: HOME | End: 2021-03-16
Attending: NEUROLOGICAL SURGERY
Payer: COMMERCIAL

## 2021-03-16 ENCOUNTER — OFFICE VISIT (OUTPATIENT)
Dept: NEUROSURGERY | Facility: CLINIC | Age: 61
End: 2021-03-16
Payer: COMMERCIAL

## 2021-03-16 VITALS — HEART RATE: 98 BPM | SYSTOLIC BLOOD PRESSURE: 122 MMHG | DIASTOLIC BLOOD PRESSURE: 86 MMHG

## 2021-03-16 DIAGNOSIS — Z98.1 S/P LUMBAR FUSION: ICD-10-CM

## 2021-03-16 DIAGNOSIS — Z98.1 S/P LUMBAR FUSION: Primary | ICD-10-CM

## 2021-03-16 PROCEDURE — 72100 X-RAY EXAM L-S SPINE 2/3 VWS: CPT | Mod: TC

## 2021-03-16 PROCEDURE — 99024 PR POST-OP FOLLOW-UP VISIT: ICD-10-PCS | Mod: S$GLB,,, | Performed by: NEUROLOGICAL SURGERY

## 2021-03-16 PROCEDURE — 72100 XR LUMBAR SPINE AP AND LATERAL: ICD-10-PCS | Mod: 26,,, | Performed by: RADIOLOGY

## 2021-03-16 PROCEDURE — 99999 PR PBB SHADOW E&M-EST. PATIENT-LVL III: ICD-10-PCS | Mod: PBBFAC,,, | Performed by: NEUROLOGICAL SURGERY

## 2021-03-16 PROCEDURE — 1125F AMNT PAIN NOTED PAIN PRSNT: CPT | Mod: S$GLB,,, | Performed by: NEUROLOGICAL SURGERY

## 2021-03-16 PROCEDURE — 1125F PR PAIN SEVERITY QUANTIFIED, PAIN PRESENT: ICD-10-PCS | Mod: S$GLB,,, | Performed by: NEUROLOGICAL SURGERY

## 2021-03-16 PROCEDURE — 99999 PR PBB SHADOW E&M-EST. PATIENT-LVL III: CPT | Mod: PBBFAC,,, | Performed by: NEUROLOGICAL SURGERY

## 2021-03-16 PROCEDURE — 99024 POSTOP FOLLOW-UP VISIT: CPT | Mod: S$GLB,,, | Performed by: NEUROLOGICAL SURGERY

## 2021-03-16 PROCEDURE — 72100 X-RAY EXAM L-S SPINE 2/3 VWS: CPT | Mod: 26,,, | Performed by: RADIOLOGY

## 2021-04-05 ENCOUNTER — PATIENT MESSAGE (OUTPATIENT)
Dept: ADMINISTRATIVE | Facility: HOSPITAL | Age: 61
End: 2021-04-05

## 2021-04-07 ENCOUNTER — TELEPHONE (OUTPATIENT)
Dept: NEUROSURGERY | Facility: CLINIC | Age: 61
End: 2021-04-07

## 2021-05-11 ENCOUNTER — TELEPHONE (OUTPATIENT)
Dept: NEUROSURGERY | Facility: CLINIC | Age: 61
End: 2021-05-11

## 2021-05-13 DIAGNOSIS — I95.9 HYPOTENSION, UNSPECIFIED HYPOTENSION TYPE: ICD-10-CM

## 2021-05-13 RX ORDER — PROPRANOLOL HYDROCHLORIDE 80 MG/1
80 CAPSULE, EXTENDED RELEASE ORAL DAILY
Qty: 90 CAPSULE | Refills: 3 | Status: SHIPPED | OUTPATIENT
Start: 2021-05-13 | End: 2022-05-23 | Stop reason: SDUPTHER

## 2021-05-13 RX ORDER — OMEPRAZOLE 20 MG/1
CAPSULE, DELAYED RELEASE ORAL
Qty: 90 CAPSULE | Refills: 3 | Status: SHIPPED | OUTPATIENT
Start: 2021-05-13 | End: 2022-05-23

## 2021-05-13 RX ORDER — ONDANSETRON 8 MG/1
8 TABLET, ORALLY DISINTEGRATING ORAL EVERY 6 HOURS PRN
Qty: 15 TABLET | Refills: 0 | Status: SHIPPED | OUTPATIENT
Start: 2021-05-13 | End: 2021-11-12 | Stop reason: SDUPTHER

## 2021-05-13 RX ORDER — LOSARTAN POTASSIUM 50 MG/1
50 TABLET ORAL DAILY
Qty: 90 TABLET | Refills: 3 | Status: SHIPPED | OUTPATIENT
Start: 2021-05-13 | End: 2022-05-23

## 2021-05-17 ENCOUNTER — TELEPHONE (OUTPATIENT)
Dept: FAMILY MEDICINE | Facility: CLINIC | Age: 61
End: 2021-05-17

## 2021-05-21 ENCOUNTER — TELEPHONE (OUTPATIENT)
Dept: NEUROSURGERY | Facility: CLINIC | Age: 61
End: 2021-05-21

## 2021-05-24 ENCOUNTER — LAB VISIT (OUTPATIENT)
Dept: LAB | Facility: HOSPITAL | Age: 61
End: 2021-05-24
Attending: FAMILY MEDICINE
Payer: COMMERCIAL

## 2021-05-24 DIAGNOSIS — E55.9 VITAMIN D DEFICIENCY: ICD-10-CM

## 2021-05-24 DIAGNOSIS — E11.9 CONTROLLED TYPE 2 DIABETES MELLITUS WITHOUT COMPLICATION, WITHOUT LONG-TERM CURRENT USE OF INSULIN: ICD-10-CM

## 2021-05-24 LAB
25(OH)D3+25(OH)D2 SERPL-MCNC: 54 NG/ML (ref 30–96)
ALBUMIN SERPL BCP-MCNC: 4.1 G/DL (ref 3.5–5.2)
ALP SERPL-CCNC: 107 U/L (ref 38–126)
ALT SERPL W/O P-5'-P-CCNC: 16 U/L (ref 10–44)
ANION GAP SERPL CALC-SCNC: 7 MMOL/L (ref 8–16)
AST SERPL-CCNC: 23 U/L (ref 15–46)
BASOPHILS # BLD AUTO: 0.02 K/UL (ref 0–0.2)
BASOPHILS NFR BLD: 0.6 % (ref 0–1.9)
BILIRUB SERPL-MCNC: 0.9 MG/DL (ref 0.1–1)
CALCIUM SERPL-MCNC: 9.9 MG/DL (ref 8.7–10.5)
CHLORIDE SERPL-SCNC: 103 MMOL/L (ref 95–110)
CHOLEST SERPL-MCNC: 264 MG/DL (ref 120–199)
CHOLEST/HDLC SERPL: 4.9 {RATIO} (ref 2–5)
CO2 SERPL-SCNC: 29 MMOL/L (ref 23–29)
CREAT SERPL-MCNC: 0.91 MG/DL (ref 0.5–1.4)
DIFFERENTIAL METHOD: ABNORMAL
EOSINOPHIL # BLD AUTO: 0.1 K/UL (ref 0–0.5)
EOSINOPHIL NFR BLD: 2 % (ref 0–8)
ERYTHROCYTE [DISTWIDTH] IN BLOOD BY AUTOMATED COUNT: 14.4 % (ref 11.5–14.5)
EST. GFR  (AFRICAN AMERICAN): >60 ML/MIN/1.73 M^2
EST. GFR  (NON AFRICAN AMERICAN): >60 ML/MIN/1.73 M^2
ESTIMATED AVG GLUCOSE: 131 MG/DL (ref 68–131)
GLUCOSE SERPL-MCNC: 124 MG/DL (ref 70–110)
HBA1C MFR BLD: 6.2 % (ref 4–5.6)
HCT VFR BLD AUTO: 40 % (ref 37–48.5)
HDLC SERPL-MCNC: 54 MG/DL (ref 40–75)
HDLC SERPL: 20.5 % (ref 20–50)
HGB BLD-MCNC: 12.3 G/DL (ref 12–16)
IMM GRANULOCYTES # BLD AUTO: 0 K/UL (ref 0–0.04)
IMM GRANULOCYTES NFR BLD AUTO: 0 % (ref 0–0.5)
LDLC SERPL CALC-MCNC: 179 MG/DL (ref 63–159)
LYMPHOCYTES # BLD AUTO: 1.9 K/UL (ref 1–4.8)
LYMPHOCYTES NFR BLD: 53.8 % (ref 18–48)
MCH RBC QN AUTO: 24.8 PG (ref 27–31)
MCHC RBC AUTO-ENTMCNC: 30.8 G/DL (ref 32–36)
MCV RBC AUTO: 81 FL (ref 82–98)
MONOCYTES # BLD AUTO: 0.2 K/UL (ref 0.3–1)
MONOCYTES NFR BLD: 5.6 % (ref 4–15)
NEUTROPHILS # BLD AUTO: 1.4 K/UL (ref 1.8–7.7)
NEUTROPHILS NFR BLD: 38 % (ref 38–73)
NONHDLC SERPL-MCNC: 210 MG/DL
NRBC BLD-RTO: 0 /100 WBC
PLATELET # BLD AUTO: 253 K/UL (ref 150–450)
PMV BLD AUTO: 10.6 FL (ref 9.2–12.9)
POTASSIUM SERPL-SCNC: 4.3 MMOL/L (ref 3.5–5.1)
PROT SERPL-MCNC: 7.9 G/DL (ref 6–8.4)
RBC # BLD AUTO: 4.96 M/UL (ref 4–5.4)
SODIUM SERPL-SCNC: 139 MMOL/L (ref 136–145)
TRIGL SERPL-MCNC: 155 MG/DL (ref 30–150)
UUN UR-MCNC: 14 MG/DL (ref 7–17)
WBC # BLD AUTO: 3.57 K/UL (ref 3.9–12.7)

## 2021-05-24 PROCEDURE — 80061 LIPID PANEL: CPT | Performed by: FAMILY MEDICINE

## 2021-05-24 PROCEDURE — 80053 COMPREHEN METABOLIC PANEL: CPT | Mod: PO | Performed by: FAMILY MEDICINE

## 2021-05-24 PROCEDURE — 82306 VITAMIN D 25 HYDROXY: CPT | Mod: PO | Performed by: FAMILY MEDICINE

## 2021-05-24 PROCEDURE — 36415 COLL VENOUS BLD VENIPUNCTURE: CPT | Mod: PO | Performed by: FAMILY MEDICINE

## 2021-05-24 PROCEDURE — 83036 HEMOGLOBIN GLYCOSYLATED A1C: CPT | Performed by: FAMILY MEDICINE

## 2021-05-24 PROCEDURE — 85025 COMPLETE CBC W/AUTO DIFF WBC: CPT | Mod: PO | Performed by: FAMILY MEDICINE

## 2021-05-27 ENCOUNTER — OFFICE VISIT (OUTPATIENT)
Dept: FAMILY MEDICINE | Facility: CLINIC | Age: 61
End: 2021-05-27
Payer: COMMERCIAL

## 2021-05-27 VITALS
TEMPERATURE: 98 F | WEIGHT: 205.25 LBS | SYSTOLIC BLOOD PRESSURE: 120 MMHG | HEART RATE: 79 BPM | DIASTOLIC BLOOD PRESSURE: 82 MMHG | OXYGEN SATURATION: 98 % | HEIGHT: 63 IN | BODY MASS INDEX: 36.37 KG/M2

## 2021-05-27 DIAGNOSIS — I10 ESSENTIAL HYPERTENSION: ICD-10-CM

## 2021-05-27 DIAGNOSIS — M54.16 LUMBAR RADICULOPATHY: ICD-10-CM

## 2021-05-27 DIAGNOSIS — E66.09 CLASS 2 OBESITY DUE TO EXCESS CALORIES WITHOUT SERIOUS COMORBIDITY WITH BODY MASS INDEX (BMI) OF 36.0 TO 36.9 IN ADULT: ICD-10-CM

## 2021-05-27 DIAGNOSIS — E78.00 PURE HYPERCHOLESTEROLEMIA: ICD-10-CM

## 2021-05-27 DIAGNOSIS — E11.9 CONTROLLED TYPE 2 DIABETES MELLITUS WITHOUT COMPLICATION, WITHOUT LONG-TERM CURRENT USE OF INSULIN: Primary | ICD-10-CM

## 2021-05-27 PROCEDURE — 3074F SYST BP LT 130 MM HG: CPT | Mod: CPTII,S$GLB,, | Performed by: FAMILY MEDICINE

## 2021-05-27 PROCEDURE — 1125F PR PAIN SEVERITY QUANTIFIED, PAIN PRESENT: ICD-10-PCS | Mod: S$GLB,,, | Performed by: FAMILY MEDICINE

## 2021-05-27 PROCEDURE — 3044F HG A1C LEVEL LT 7.0%: CPT | Mod: CPTII,S$GLB,, | Performed by: FAMILY MEDICINE

## 2021-05-27 PROCEDURE — 3008F PR BODY MASS INDEX (BMI) DOCUMENTED: ICD-10-PCS | Mod: CPTII,S$GLB,, | Performed by: FAMILY MEDICINE

## 2021-05-27 PROCEDURE — 1125F AMNT PAIN NOTED PAIN PRSNT: CPT | Mod: S$GLB,,, | Performed by: FAMILY MEDICINE

## 2021-05-27 PROCEDURE — 3079F DIAST BP 80-89 MM HG: CPT | Mod: CPTII,S$GLB,, | Performed by: FAMILY MEDICINE

## 2021-05-27 PROCEDURE — 99214 PR OFFICE/OUTPT VISIT, EST, LEVL IV, 30-39 MIN: ICD-10-PCS | Mod: S$GLB,,, | Performed by: FAMILY MEDICINE

## 2021-05-27 PROCEDURE — 3044F PR MOST RECENT HEMOGLOBIN A1C LEVEL <7.0%: ICD-10-PCS | Mod: CPTII,S$GLB,, | Performed by: FAMILY MEDICINE

## 2021-05-27 PROCEDURE — 3008F BODY MASS INDEX DOCD: CPT | Mod: CPTII,S$GLB,, | Performed by: FAMILY MEDICINE

## 2021-05-27 PROCEDURE — 3074F PR MOST RECENT SYSTOLIC BLOOD PRESSURE < 130 MM HG: ICD-10-PCS | Mod: CPTII,S$GLB,, | Performed by: FAMILY MEDICINE

## 2021-05-27 PROCEDURE — 3079F PR MOST RECENT DIASTOLIC BLOOD PRESSURE 80-89 MM HG: ICD-10-PCS | Mod: CPTII,S$GLB,, | Performed by: FAMILY MEDICINE

## 2021-05-27 PROCEDURE — 99214 OFFICE O/P EST MOD 30 MIN: CPT | Mod: S$GLB,,, | Performed by: FAMILY MEDICINE

## 2021-05-27 RX ORDER — PHENTERMINE HYDROCHLORIDE 37.5 MG/1
37.5 TABLET ORAL
Qty: 30 TABLET | Refills: 0 | Status: SHIPPED | OUTPATIENT
Start: 2021-05-27 | End: 2021-06-26

## 2021-05-27 RX ORDER — FENOFIBRATE 160 MG/1
160 TABLET ORAL DAILY
Qty: 90 TABLET | Refills: 3 | Status: SHIPPED | OUTPATIENT
Start: 2021-05-27 | End: 2022-09-12 | Stop reason: SDUPTHER

## 2021-06-01 ENCOUNTER — TELEPHONE (OUTPATIENT)
Dept: NEUROSURGERY | Facility: CLINIC | Age: 61
End: 2021-06-01

## 2021-06-01 DIAGNOSIS — Z98.1 S/P LUMBAR FUSION: Primary | ICD-10-CM

## 2021-06-01 DIAGNOSIS — M43.16 SPONDYLOLISTHESIS OF LUMBAR REGION: ICD-10-CM

## 2021-06-01 DIAGNOSIS — M48.061 SPINAL STENOSIS AT L4-L5 LEVEL: ICD-10-CM

## 2021-06-09 DIAGNOSIS — E11.9 TYPE 2 DIABETES MELLITUS WITHOUT COMPLICATION: ICD-10-CM

## 2021-07-07 ENCOUNTER — PATIENT MESSAGE (OUTPATIENT)
Dept: ADMINISTRATIVE | Facility: HOSPITAL | Age: 61
End: 2021-07-07

## 2021-07-28 ENCOUNTER — HOSPITAL ENCOUNTER (EMERGENCY)
Facility: HOSPITAL | Age: 61
Discharge: HOME | End: 2021-07-28
Attending: EMERGENCY MEDICINE
Payer: COMMERCIAL

## 2021-07-28 VITALS
DIASTOLIC BLOOD PRESSURE: 88 MMHG | HEART RATE: 70 BPM | WEIGHT: 204 LBS | HEIGHT: 63 IN | SYSTOLIC BLOOD PRESSURE: 139 MMHG | RESPIRATION RATE: 18 BRPM | OXYGEN SATURATION: 100 % | BODY MASS INDEX: 36.14 KG/M2 | TEMPERATURE: 98 F

## 2021-07-28 DIAGNOSIS — M79.645 PAIN OF LEFT THUMB: Primary | ICD-10-CM

## 2021-07-28 PROCEDURE — 99283 EMERGENCY DEPT VISIT LOW MDM: CPT | Mod: ER

## 2021-08-02 ENCOUNTER — TELEPHONE (OUTPATIENT)
Dept: NEUROSURGERY | Facility: CLINIC | Age: 61
End: 2021-08-02

## 2021-08-26 ENCOUNTER — TELEPHONE (OUTPATIENT)
Dept: NEUROSURGERY | Facility: CLINIC | Age: 61
End: 2021-08-26

## 2021-09-03 ENCOUNTER — PATIENT MESSAGE (OUTPATIENT)
Dept: NEUROSURGERY | Facility: CLINIC | Age: 61
End: 2021-09-03

## 2021-09-07 ENCOUNTER — TELEPHONE (OUTPATIENT)
Dept: FAMILY MEDICINE | Facility: CLINIC | Age: 61
End: 2021-09-07

## 2021-09-22 ENCOUNTER — OFFICE VISIT (OUTPATIENT)
Dept: NEUROSURGERY | Facility: CLINIC | Age: 61
End: 2021-09-22
Payer: COMMERCIAL

## 2021-09-22 ENCOUNTER — HOSPITAL ENCOUNTER (OUTPATIENT)
Dept: RADIOLOGY | Facility: HOSPITAL | Age: 61
Discharge: HOME | End: 2021-09-22
Attending: NEUROLOGICAL SURGERY
Payer: COMMERCIAL

## 2021-09-22 VITALS
DIASTOLIC BLOOD PRESSURE: 82 MMHG | SYSTOLIC BLOOD PRESSURE: 120 MMHG | TEMPERATURE: 98 F | WEIGHT: 204 LBS | HEART RATE: 67 BPM | BODY MASS INDEX: 36.14 KG/M2 | HEIGHT: 63 IN

## 2021-09-22 DIAGNOSIS — M54.9 DORSALGIA, UNSPECIFIED: ICD-10-CM

## 2021-09-22 DIAGNOSIS — Z98.1 S/P LUMBAR FUSION: Primary | ICD-10-CM

## 2021-09-22 DIAGNOSIS — Z98.1 S/P LUMBAR FUSION: ICD-10-CM

## 2021-09-22 PROCEDURE — 72131 CT LUMBAR SPINE W/O DYE: CPT | Mod: TC

## 2021-09-22 PROCEDURE — 99999 PR PBB SHADOW E&M-EST. PATIENT-LVL III: CPT | Mod: PBBFAC,,, | Performed by: PHYSICIAN ASSISTANT

## 2021-09-22 PROCEDURE — 4010F ACE/ARB THERAPY RXD/TAKEN: CPT | Mod: CPTII,S$GLB,, | Performed by: PHYSICIAN ASSISTANT

## 2021-09-22 PROCEDURE — 3079F PR MOST RECENT DIASTOLIC BLOOD PRESSURE 80-89 MM HG: ICD-10-PCS | Mod: CPTII,S$GLB,, | Performed by: PHYSICIAN ASSISTANT

## 2021-09-22 PROCEDURE — 3074F PR MOST RECENT SYSTOLIC BLOOD PRESSURE < 130 MM HG: ICD-10-PCS | Mod: CPTII,S$GLB,, | Performed by: PHYSICIAN ASSISTANT

## 2021-09-22 PROCEDURE — 3044F PR MOST RECENT HEMOGLOBIN A1C LEVEL <7.0%: ICD-10-PCS | Mod: CPTII,S$GLB,, | Performed by: PHYSICIAN ASSISTANT

## 2021-09-22 PROCEDURE — 4010F PR ACE/ARB THEARPY RXD/TAKEN: ICD-10-PCS | Mod: CPTII,S$GLB,, | Performed by: PHYSICIAN ASSISTANT

## 2021-09-22 PROCEDURE — 1159F MED LIST DOCD IN RCRD: CPT | Mod: CPTII,S$GLB,, | Performed by: PHYSICIAN ASSISTANT

## 2021-09-22 PROCEDURE — 3008F PR BODY MASS INDEX (BMI) DOCUMENTED: ICD-10-PCS | Mod: CPTII,S$GLB,, | Performed by: PHYSICIAN ASSISTANT

## 2021-09-22 PROCEDURE — 3079F DIAST BP 80-89 MM HG: CPT | Mod: CPTII,S$GLB,, | Performed by: PHYSICIAN ASSISTANT

## 2021-09-22 PROCEDURE — 3008F BODY MASS INDEX DOCD: CPT | Mod: CPTII,S$GLB,, | Performed by: PHYSICIAN ASSISTANT

## 2021-09-22 PROCEDURE — 99214 PR OFFICE/OUTPT VISIT, EST, LEVL IV, 30-39 MIN: ICD-10-PCS | Mod: S$GLB,,, | Performed by: PHYSICIAN ASSISTANT

## 2021-09-22 PROCEDURE — 3074F SYST BP LT 130 MM HG: CPT | Mod: CPTII,S$GLB,, | Performed by: PHYSICIAN ASSISTANT

## 2021-09-22 PROCEDURE — 99999 PR PBB SHADOW E&M-EST. PATIENT-LVL III: ICD-10-PCS | Mod: PBBFAC,,, | Performed by: PHYSICIAN ASSISTANT

## 2021-09-22 PROCEDURE — 72131 CT LUMBAR SPINE WITHOUT CONTRAST: ICD-10-PCS | Mod: 26,,, | Performed by: RADIOLOGY

## 2021-09-22 PROCEDURE — 3044F HG A1C LEVEL LT 7.0%: CPT | Mod: CPTII,S$GLB,, | Performed by: PHYSICIAN ASSISTANT

## 2021-09-22 PROCEDURE — 72131 CT LUMBAR SPINE W/O DYE: CPT | Mod: 26,,, | Performed by: RADIOLOGY

## 2021-09-22 PROCEDURE — 99214 OFFICE O/P EST MOD 30 MIN: CPT | Mod: S$GLB,,, | Performed by: PHYSICIAN ASSISTANT

## 2021-09-22 PROCEDURE — 1159F PR MEDICATION LIST DOCUMENTED IN MEDICAL RECORD: ICD-10-PCS | Mod: CPTII,S$GLB,, | Performed by: PHYSICIAN ASSISTANT

## 2021-09-22 RX ORDER — IVERMECTIN 10 MG/G
CREAM TOPICAL
COMMUNITY
Start: 2021-08-18 | End: 2024-03-05

## 2021-09-22 RX ORDER — DAPSONE 75 MG/G
GEL TOPICAL
COMMUNITY
Start: 2021-08-18 | End: 2024-03-05

## 2021-09-22 RX ORDER — METHOCARBAMOL 500 MG/1
500 TABLET, FILM COATED ORAL 3 TIMES DAILY
Qty: 120 TABLET | Refills: 1 | Status: SHIPPED | OUTPATIENT
Start: 2021-09-22 | End: 2021-09-22

## 2021-09-22 RX ORDER — FLUTICASONE PROPIONATE 50 MCG
2 SPRAY, SUSPENSION (ML) NASAL DAILY
COMMUNITY
Start: 2021-08-24 | End: 2024-03-05

## 2021-09-22 RX ORDER — CLINDAMYCIN PHOSPHATE 10 MG/ML
1 SOLUTION TOPICAL 2 TIMES DAILY
COMMUNITY
Start: 2021-08-17 | End: 2022-12-30

## 2021-09-22 RX ORDER — NAPROXEN 500 MG/1
500 TABLET ORAL 2 TIMES DAILY
COMMUNITY
Start: 2021-05-25 | End: 2024-03-05

## 2021-09-22 RX ORDER — METHOCARBAMOL 500 MG/1
500 TABLET, FILM COATED ORAL 3 TIMES DAILY
Qty: 120 TABLET | Refills: 1 | Status: SHIPPED | OUTPATIENT
Start: 2021-09-22 | End: 2021-10-22

## 2021-09-22 RX ORDER — PHENTERMINE HYDROCHLORIDE 37.5 MG/1
TABLET ORAL
COMMUNITY
End: 2022-05-23 | Stop reason: SINTOL

## 2021-09-22 RX ORDER — IPRATROPIUM BROMIDE 42 UG/1
2 SPRAY, METERED NASAL 3 TIMES DAILY
COMMUNITY
Start: 2021-08-05 | End: 2024-03-05

## 2021-10-04 ENCOUNTER — PATIENT MESSAGE (OUTPATIENT)
Dept: FAMILY MEDICINE | Facility: CLINIC | Age: 61
End: 2021-10-04

## 2021-10-14 ENCOUNTER — TELEPHONE (OUTPATIENT)
Dept: FAMILY MEDICINE | Facility: CLINIC | Age: 61
End: 2021-10-14

## 2021-10-14 DIAGNOSIS — Z12.39 ENCOUNTER FOR SCREENING FOR MALIGNANT NEOPLASM OF BREAST, UNSPECIFIED SCREENING MODALITY: Primary | ICD-10-CM

## 2021-10-15 DIAGNOSIS — Z98.1 S/P CERVICAL SPINAL FUSION: Primary | ICD-10-CM

## 2021-10-15 DIAGNOSIS — Z98.1 S/P LUMBAR FUSION: ICD-10-CM

## 2021-10-15 DIAGNOSIS — M48.02 CERVICAL STENOSIS OF SPINE: ICD-10-CM

## 2021-10-20 ENCOUNTER — TELEPHONE (OUTPATIENT)
Dept: FAMILY MEDICINE | Facility: CLINIC | Age: 61
End: 2021-10-20

## 2021-10-20 DIAGNOSIS — Z12.39 ENCOUNTER FOR SCREENING FOR MALIGNANT NEOPLASM OF BREAST, UNSPECIFIED SCREENING MODALITY: Primary | ICD-10-CM

## 2021-10-26 ENCOUNTER — TELEPHONE (OUTPATIENT)
Dept: FAMILY MEDICINE | Facility: CLINIC | Age: 61
End: 2021-10-26
Payer: COMMERCIAL

## 2021-10-26 DIAGNOSIS — Z12.39 ENCOUNTER FOR SCREENING FOR MALIGNANT NEOPLASM OF BREAST, UNSPECIFIED SCREENING MODALITY: Primary | ICD-10-CM

## 2021-11-09 ENCOUNTER — HOSPITAL ENCOUNTER (EMERGENCY)
Facility: HOSPITAL | Age: 61
Discharge: HOME | End: 2021-11-09
Attending: EMERGENCY MEDICINE
Payer: COMMERCIAL

## 2021-11-09 VITALS
RESPIRATION RATE: 18 BRPM | BODY MASS INDEX: 36.86 KG/M2 | OXYGEN SATURATION: 99 % | WEIGHT: 208 LBS | HEIGHT: 63 IN | SYSTOLIC BLOOD PRESSURE: 147 MMHG | HEART RATE: 69 BPM | TEMPERATURE: 98 F | DIASTOLIC BLOOD PRESSURE: 89 MMHG
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
## 2021-11-09 DIAGNOSIS — S39.012A STRAIN OF LUMBAR REGION, INITIAL ENCOUNTER: Primary | ICD-10-CM

## 2021-11-09 PROCEDURE — 99284 PR EMERGENCY DEPT VISIT,LEVEL IV: ICD-10-PCS | Mod: ,,, | Performed by: EMERGENCY MEDICINE

## 2021-11-09 PROCEDURE — 25000003 PHARM REV CODE 250: Performed by: EMERGENCY MEDICINE

## 2021-11-09 PROCEDURE — 99284 EMERGENCY DEPT VISIT MOD MDM: CPT | Mod: ,,, | Performed by: EMERGENCY MEDICINE

## 2021-11-09 PROCEDURE — 99283 EMERGENCY DEPT VISIT LOW MDM: CPT | Mod: 25

## 2021-11-09 RX ORDER — LIDOCAINE 50 MG/G
1 PATCH TOPICAL DAILY PRN
Qty: 5 PATCH | Refills: 0 | Status: SHIPPED | OUTPATIENT
Start: 2021-11-09

## 2021-11-09 RX ORDER — LIDOCAINE 50 MG/G
1 PATCH TOPICAL
Status: DISCONTINUED | OUTPATIENT
Start: 2021-11-09 | End: 2021-11-09 | Stop reason: HOSPADM

## 2021-11-09 RX ADMIN — LIDOCAINE 1 PATCH: 50 PATCH CUTANEOUS at 01:11

## 2021-11-12 ENCOUNTER — OFFICE VISIT (OUTPATIENT)
Dept: FAMILY MEDICINE | Facility: CLINIC | Age: 61
End: 2021-11-12
Payer: COMMERCIAL

## 2021-11-12 ENCOUNTER — LAB VISIT (OUTPATIENT)
Dept: LAB | Facility: HOSPITAL | Age: 61
End: 2021-11-12
Attending: FAMILY MEDICINE
Payer: COMMERCIAL

## 2021-11-12 VITALS
SYSTOLIC BLOOD PRESSURE: 120 MMHG | WEIGHT: 206 LBS | HEART RATE: 69 BPM | HEIGHT: 63 IN | OXYGEN SATURATION: 100 % | BODY MASS INDEX: 36.5 KG/M2 | DIASTOLIC BLOOD PRESSURE: 82 MMHG

## 2021-11-12 DIAGNOSIS — R60.9 EDEMA, UNSPECIFIED TYPE: ICD-10-CM

## 2021-11-12 DIAGNOSIS — E11.9 CONTROLLED TYPE 2 DIABETES MELLITUS WITHOUT COMPLICATION, WITHOUT LONG-TERM CURRENT USE OF INSULIN: ICD-10-CM

## 2021-11-12 DIAGNOSIS — E11.9 CONTROLLED TYPE 2 DIABETES MELLITUS WITHOUT COMPLICATION, WITHOUT LONG-TERM CURRENT USE OF INSULIN: Primary | ICD-10-CM

## 2021-11-12 DIAGNOSIS — M54.9 BACK PAIN, UNSPECIFIED BACK LOCATION, UNSPECIFIED BACK PAIN LATERALITY, UNSPECIFIED CHRONICITY: ICD-10-CM

## 2021-11-12 DIAGNOSIS — R11.0 NAUSEA: ICD-10-CM

## 2021-11-12 DIAGNOSIS — I10 ESSENTIAL HYPERTENSION: ICD-10-CM

## 2021-11-12 DIAGNOSIS — E55.9 VITAMIN D DEFICIENCY: ICD-10-CM

## 2021-11-12 LAB
ALBUMIN SERPL BCP-MCNC: 4.1 G/DL (ref 3.5–5.2)
ALP SERPL-CCNC: 102 U/L (ref 38–126)
ALT SERPL W/O P-5'-P-CCNC: 14 U/L (ref 10–44)
ANION GAP SERPL CALC-SCNC: 8 MMOL/L (ref 8–16)
AST SERPL-CCNC: 22 U/L (ref 15–46)
BILIRUB SERPL-MCNC: 0.7 MG/DL (ref 0.1–1)
CALCIUM SERPL-MCNC: 9.3 MG/DL (ref 8.7–10.5)
CHLORIDE SERPL-SCNC: 105 MMOL/L (ref 95–110)
CHOLEST SERPL-MCNC: 232 MG/DL (ref 120–199)
CHOLEST/HDLC SERPL: 4.2 {RATIO} (ref 2–5)
CO2 SERPL-SCNC: 30 MMOL/L (ref 23–29)
CREAT SERPL-MCNC: 0.91 MG/DL (ref 0.5–1.4)
EST. GFR  (AFRICAN AMERICAN): >60 ML/MIN/1.73 M^2
EST. GFR  (NON AFRICAN AMERICAN): >60 ML/MIN/1.73 M^2
ESTIMATED AVG GLUCOSE: 134 MG/DL (ref 68–131)
GLUCOSE SERPL-MCNC: 114 MG/DL (ref 70–110)
HBA1C MFR BLD: 6.3 % (ref 4–5.6)
HDLC SERPL-MCNC: 55 MG/DL (ref 40–75)
HDLC SERPL: 23.7 % (ref 20–50)
LDLC SERPL CALC-MCNC: 152.8 MG/DL (ref 63–159)
NONHDLC SERPL-MCNC: 177 MG/DL
POTASSIUM SERPL-SCNC: 4.6 MMOL/L (ref 3.5–5.1)
PROT SERPL-MCNC: 7.7 G/DL (ref 6–8.4)
SODIUM SERPL-SCNC: 143 MMOL/L (ref 136–145)
TRIGL SERPL-MCNC: 121 MG/DL (ref 30–150)
UUN UR-MCNC: 15 MG/DL (ref 7–17)

## 2021-11-12 PROCEDURE — 3074F PR MOST RECENT SYSTOLIC BLOOD PRESSURE < 130 MM HG: ICD-10-PCS | Mod: CPTII,S$GLB,, | Performed by: FAMILY MEDICINE

## 2021-11-12 PROCEDURE — 3008F BODY MASS INDEX DOCD: CPT | Mod: CPTII,S$GLB,, | Performed by: FAMILY MEDICINE

## 2021-11-12 PROCEDURE — 1159F MED LIST DOCD IN RCRD: CPT | Mod: CPTII,S$GLB,, | Performed by: FAMILY MEDICINE

## 2021-11-12 PROCEDURE — 3044F PR MOST RECENT HEMOGLOBIN A1C LEVEL <7.0%: ICD-10-PCS | Mod: CPTII,S$GLB,, | Performed by: FAMILY MEDICINE

## 2021-11-12 PROCEDURE — 83036 HEMOGLOBIN GLYCOSYLATED A1C: CPT | Performed by: FAMILY MEDICINE

## 2021-11-12 PROCEDURE — 99214 PR OFFICE/OUTPT VISIT, EST, LEVL IV, 30-39 MIN: ICD-10-PCS | Mod: S$GLB,,, | Performed by: FAMILY MEDICINE

## 2021-11-12 PROCEDURE — 99214 OFFICE O/P EST MOD 30 MIN: CPT | Mod: S$GLB,,, | Performed by: FAMILY MEDICINE

## 2021-11-12 PROCEDURE — 3079F PR MOST RECENT DIASTOLIC BLOOD PRESSURE 80-89 MM HG: ICD-10-PCS | Mod: CPTII,S$GLB,, | Performed by: FAMILY MEDICINE

## 2021-11-12 PROCEDURE — 80061 LIPID PANEL: CPT | Performed by: FAMILY MEDICINE

## 2021-11-12 PROCEDURE — 4010F ACE/ARB THERAPY RXD/TAKEN: CPT | Mod: CPTII,S$GLB,, | Performed by: FAMILY MEDICINE

## 2021-11-12 PROCEDURE — 80053 COMPREHEN METABOLIC PANEL: CPT | Mod: PO | Performed by: FAMILY MEDICINE

## 2021-11-12 PROCEDURE — 1160F PR REVIEW ALL MEDS BY PRESCRIBER/CLIN PHARMACIST DOCUMENTED: ICD-10-PCS | Mod: CPTII,S$GLB,, | Performed by: FAMILY MEDICINE

## 2021-11-12 PROCEDURE — 3074F SYST BP LT 130 MM HG: CPT | Mod: CPTII,S$GLB,, | Performed by: FAMILY MEDICINE

## 2021-11-12 PROCEDURE — 36415 COLL VENOUS BLD VENIPUNCTURE: CPT | Mod: PO | Performed by: FAMILY MEDICINE

## 2021-11-12 PROCEDURE — 1159F PR MEDICATION LIST DOCUMENTED IN MEDICAL RECORD: ICD-10-PCS | Mod: CPTII,S$GLB,, | Performed by: FAMILY MEDICINE

## 2021-11-12 PROCEDURE — 4010F PR ACE/ARB THEARPY RXD/TAKEN: ICD-10-PCS | Mod: CPTII,S$GLB,, | Performed by: FAMILY MEDICINE

## 2021-11-12 PROCEDURE — 3079F DIAST BP 80-89 MM HG: CPT | Mod: CPTII,S$GLB,, | Performed by: FAMILY MEDICINE

## 2021-11-12 PROCEDURE — 3008F PR BODY MASS INDEX (BMI) DOCUMENTED: ICD-10-PCS | Mod: CPTII,S$GLB,, | Performed by: FAMILY MEDICINE

## 2021-11-12 PROCEDURE — 1160F RVW MEDS BY RX/DR IN RCRD: CPT | Mod: CPTII,S$GLB,, | Performed by: FAMILY MEDICINE

## 2021-11-12 PROCEDURE — 3044F HG A1C LEVEL LT 7.0%: CPT | Mod: CPTII,S$GLB,, | Performed by: FAMILY MEDICINE

## 2021-11-12 RX ORDER — POTASSIUM CHLORIDE 20 MEQ/1
20 TABLET, EXTENDED RELEASE ORAL DAILY
Qty: 30 TABLET | Refills: 3 | Status: SHIPPED | OUTPATIENT
Start: 2021-11-12 | End: 2023-08-07

## 2021-11-12 RX ORDER — ONDANSETRON 8 MG/1
8 TABLET, ORALLY DISINTEGRATING ORAL EVERY 6 HOURS PRN
Qty: 15 TABLET | Refills: 0 | Status: SHIPPED | OUTPATIENT
Start: 2021-11-12 | End: 2022-05-23

## 2021-11-12 RX ORDER — FUROSEMIDE 20 MG/1
20 TABLET ORAL DAILY PRN
Qty: 30 TABLET | Refills: 3 | Status: SHIPPED | OUTPATIENT
Start: 2021-11-12 | End: 2022-09-20 | Stop reason: SDUPTHER

## 2021-11-17 ENCOUNTER — PATIENT MESSAGE (OUTPATIENT)
Dept: FAMILY MEDICINE | Facility: CLINIC | Age: 61
End: 2021-11-17
Payer: COMMERCIAL

## 2021-11-23 ENCOUNTER — PATIENT OUTREACH (OUTPATIENT)
Dept: ADMINISTRATIVE | Facility: HOSPITAL | Age: 61
End: 2021-11-23
Payer: COMMERCIAL

## 2021-11-30 ENCOUNTER — TELEPHONE (OUTPATIENT)
Dept: FAMILY MEDICINE | Facility: CLINIC | Age: 61
End: 2021-11-30
Payer: COMMERCIAL

## 2021-12-17 LAB
LEFT EYE DM RETINOPATHY: NEGATIVE
RIGHT EYE DM RETINOPATHY: NEGATIVE

## 2021-12-28 ENCOUNTER — IMMUNIZATION (OUTPATIENT)
Dept: PRIMARY CARE CLINIC | Facility: CLINIC | Age: 61
End: 2021-12-28
Payer: COMMERCIAL

## 2021-12-28 DIAGNOSIS — Z23 NEED FOR VACCINATION: Primary | ICD-10-CM

## 2021-12-28 PROCEDURE — 0064A COVID-19, MRNA, LNP-S, PF, 100 MCG/0.25 ML DOSE VACCINE (MODERNA BOOSTER): CPT | Mod: CV19,PBBFAC | Performed by: INTERNAL MEDICINE

## 2022-01-20 DIAGNOSIS — E11.9 TYPE 2 DIABETES MELLITUS WITHOUT COMPLICATION, UNSPECIFIED WHETHER LONG TERM INSULIN USE: ICD-10-CM

## 2022-01-25 DIAGNOSIS — Z98.1 S/P LUMBAR FUSION: Primary | ICD-10-CM

## 2022-02-02 ENCOUNTER — HOSPITAL ENCOUNTER (OUTPATIENT)
Dept: RADIOLOGY | Facility: HOSPITAL | Age: 62
Discharge: HOME | End: 2022-02-02
Attending: PHYSICIAN ASSISTANT
Payer: COMMERCIAL

## 2022-02-02 ENCOUNTER — TELEPHONE (OUTPATIENT)
Dept: FAMILY MEDICINE | Facility: CLINIC | Age: 62
End: 2022-02-02
Payer: COMMERCIAL

## 2022-02-02 ENCOUNTER — TELEPHONE (OUTPATIENT)
Dept: FAMILY MEDICINE | Facility: CLINIC | Age: 62
End: 2022-02-02

## 2022-02-02 DIAGNOSIS — E11.9 CONTROLLED TYPE 2 DIABETES MELLITUS WITHOUT COMPLICATION, WITHOUT LONG-TERM CURRENT USE OF INSULIN: Primary | ICD-10-CM

## 2022-02-02 DIAGNOSIS — Z98.1 S/P LUMBAR FUSION: ICD-10-CM

## 2022-02-02 PROCEDURE — 72131 CT LUMBAR SPINE W/O DYE: CPT | Mod: TC,PO

## 2022-02-02 NOTE — TELEPHONE ENCOUNTER
----- Message from Sil Yeager sent at 2/2/2022 10:34 AM CST -----  Regarding: lab orders  Pt is here for testing/ pt has upcoming appt and pt is request to do labs while she is here. Can we get orders in Epic. Thanks

## 2022-02-02 NOTE — TELEPHONE ENCOUNTER
----- Message from Jo Gallardo sent at 2/2/2022 11:03 AM CST -----  Type:  Needs Medical Advice    Who Called:  pt  Symptoms (please be specific):  would like to get  labs order for 3 month follow up visit  Would the patient rather a call back or a response via [facility]?  Call   Best Call Back Number:  [phone]  Additional Information:

## 2022-02-03 NOTE — TELEPHONE ENCOUNTER
Spoke with pt and informed her that lab orders placed pt stated that she will go to lab at her convenience

## 2022-02-08 ENCOUNTER — OFFICE VISIT (OUTPATIENT)
Dept: NEUROSURGERY | Facility: CLINIC | Age: 62
End: 2022-02-08
Payer: COMMERCIAL

## 2022-02-08 VITALS
WEIGHT: 206 LBS | BODY MASS INDEX: 36.5 KG/M2 | DIASTOLIC BLOOD PRESSURE: 84 MMHG | HEART RATE: 69 BPM | SYSTOLIC BLOOD PRESSURE: 121 MMHG | HEIGHT: 63 IN

## 2022-02-08 DIAGNOSIS — Z98.1 S/P LUMBAR FUSION: Primary | ICD-10-CM

## 2022-02-08 PROCEDURE — 3074F SYST BP LT 130 MM HG: CPT | Mod: CPTII,S$GLB,, | Performed by: PHYSICIAN ASSISTANT

## 2022-02-08 PROCEDURE — 3079F PR MOST RECENT DIASTOLIC BLOOD PRESSURE 80-89 MM HG: ICD-10-PCS | Mod: CPTII,S$GLB,, | Performed by: PHYSICIAN ASSISTANT

## 2022-02-08 PROCEDURE — 99999 PR PBB SHADOW E&M-EST. PATIENT-LVL V: ICD-10-PCS | Mod: PBBFAC,,, | Performed by: PHYSICIAN ASSISTANT

## 2022-02-08 PROCEDURE — 1160F PR REVIEW ALL MEDS BY PRESCRIBER/CLIN PHARMACIST DOCUMENTED: ICD-10-PCS | Mod: CPTII,S$GLB,, | Performed by: PHYSICIAN ASSISTANT

## 2022-02-08 PROCEDURE — 3079F DIAST BP 80-89 MM HG: CPT | Mod: CPTII,S$GLB,, | Performed by: PHYSICIAN ASSISTANT

## 2022-02-08 PROCEDURE — 3074F PR MOST RECENT SYSTOLIC BLOOD PRESSURE < 130 MM HG: ICD-10-PCS | Mod: CPTII,S$GLB,, | Performed by: PHYSICIAN ASSISTANT

## 2022-02-08 PROCEDURE — 3008F BODY MASS INDEX DOCD: CPT | Mod: CPTII,S$GLB,, | Performed by: PHYSICIAN ASSISTANT

## 2022-02-08 PROCEDURE — 1160F RVW MEDS BY RX/DR IN RCRD: CPT | Mod: CPTII,S$GLB,, | Performed by: PHYSICIAN ASSISTANT

## 2022-02-08 PROCEDURE — 99999 PR PBB SHADOW E&M-EST. PATIENT-LVL V: CPT | Mod: PBBFAC,,, | Performed by: PHYSICIAN ASSISTANT

## 2022-02-08 PROCEDURE — 3008F PR BODY MASS INDEX (BMI) DOCUMENTED: ICD-10-PCS | Mod: CPTII,S$GLB,, | Performed by: PHYSICIAN ASSISTANT

## 2022-02-08 PROCEDURE — 1159F PR MEDICATION LIST DOCUMENTED IN MEDICAL RECORD: ICD-10-PCS | Mod: CPTII,S$GLB,, | Performed by: PHYSICIAN ASSISTANT

## 2022-02-08 PROCEDURE — 1159F MED LIST DOCD IN RCRD: CPT | Mod: CPTII,S$GLB,, | Performed by: PHYSICIAN ASSISTANT

## 2022-02-08 PROCEDURE — 99214 PR OFFICE/OUTPT VISIT, EST, LEVL IV, 30-39 MIN: ICD-10-PCS | Mod: S$GLB,,, | Performed by: PHYSICIAN ASSISTANT

## 2022-02-08 PROCEDURE — 99214 OFFICE O/P EST MOD 30 MIN: CPT | Mod: S$GLB,,, | Performed by: PHYSICIAN ASSISTANT

## 2022-02-08 RX ORDER — METHOCARBAMOL 750 MG/1
750 TABLET, FILM COATED ORAL 4 TIMES DAILY PRN
Qty: 40 TABLET | Refills: 0 | Status: SHIPPED | OUTPATIENT
Start: 2022-02-08 | End: 2022-06-28 | Stop reason: SDUPTHER

## 2022-02-14 NOTE — PROGRESS NOTES
Neurosurgery  Established Patient    SUBJECTIVE:     History of Present Illness:  62 yo F with h/o fusion today for postop follow-up.  Overall she states the surgery she has this.  She reports continued resolution extremity symptoms.  She denies.  He does report some continued back pain does not feel to baseline is.  She denies bladder.  She denies any new or worsening symptoms since visit    Review of patient's allergies indicates:  No Known Allergies    Current Outpatient Medications   Medication Sig Dispense Refill    ascorbic acid, vitamin C, (VITAMIN C) 500 MG tablet Take 500 mg by mouth once daily.      azelastine (ASTELIN) 137 mcg (0.1 %) nasal spray 1 spray (137 mcg total) by Nasal route 2 (two) times daily. 137 mL 3    blood sugar diagnostic Strp 1 strip by Misc.(Non-Drug; Combo Route) route once daily. 90 strip 3    blood sugar diagnostic Strp 1 each by Misc.(Non-Drug; Combo Route) route once daily. 1 each 0    blood-glucose meter kit Check blood sugar once dialy 1 each 0    clindamycin (CLEOCIN T) 1 % Swab Apply 1 each topically 2 (two) times daily.      dapsone (ACZONE) 7.5 % GlwP Apply topically.      fexofenadine (ALLEGRA) 180 MG tablet Take 1 tablet (180 mg total) by mouth once daily. 30 tablet 12    fluticasone propionate (FLONASE) 50 mcg/actuation nasal spray 2 sprays by Each Nostril route once daily.      ipratropium (ATROVENT) 42 mcg (0.06 %) nasal spray 2 sprays 3 (three) times daily.      ivermectin (SOOLANTRA) 1 % Crea Apply topically.      lancets (ACCU-CHEK SOFTCLIX LANCETS) Misc 1 lancet by Misc.(Non-Drug; Combo Route) route once daily. 90 each 4    losartan (COZAAR) 50 MG tablet Take 1 tablet (50 mg total) by mouth once daily. 90 tablet 3    naproxen (NAPROSYN) 500 MG tablet Take 500 mg by mouth 2 (two) times daily.      omeprazole (PRILOSEC) 20 MG capsule omeprazole 20 mg capsule,delayed release 90 capsule 3    ondansetron (ZOFRAN-ODT) 8 MG TbDL Dissolve 1 tablet (8 mg  total) by mouth every 6 (six) hours as needed. 15 tablet 0    propranoloL (INDERAL LA) 80 MG 24 hr capsule Take 1 capsule (80 mg total) by mouth once daily. 90 capsule 3    fenofibrate 160 MG Tab Take 1 tablet (160 mg total) by mouth once daily. (Patient not taking: Reported on 2/8/2022) 90 tablet 3    furosemide (LASIX) 20 MG tablet Take 1 tablet (20 mg total) by mouth daily as needed. (Patient not taking: Reported on 2/8/2022) 30 tablet 3    LIDOcaine (LIDODERM) 5 % Place 1 patch onto the skin daily as needed (pain). Remove & Discard patch within 12 hours or as directed by MD (Patient not taking: Reported on 2/8/2022) 5 patch 0    linaCLOtide (LINZESS) 145 mcg Cap capsule Take 1 capsule (145 mcg total) by mouth once daily. (Patient not taking: Reported on 2/8/2022) 90 capsule 3    metFORMIN (GLUCOPHAGE) 500 MG tablet Take 1 tablet (500 mg total) by mouth daily with breakfast. (Patient not taking: Reported on 2/8/2022) 90 tablet 3    methocarbamoL (ROBAXIN) 750 MG Tab Take 1 tablet (750 mg total) by mouth 4 (four) times daily as needed (muscle spams). 40 tablet 0    phentermine (ADIPEX-P) 37.5 mg tablet phentermine 37.5 mg tablet   TAKE 1 TABLET BY MOUTH BEFORE BREAKFAST      potassium chloride SA (K-DUR,KLOR-CON) 20 MEQ tablet Take 1 tablet (20 mEq total) by mouth once daily. (Patient not taking: Reported on 2/8/2022) 30 tablet 3    prenatal vit calc,iron,folic (PRENATAL VITAMIN ORAL) Take by mouth.       No current facility-administered medications for this visit.       Past Medical History:   Diagnosis Date    Acid reflux     Diabetes mellitus     Diabetes mellitus, type 2     Diet controlled gestational diabetes mellitus in puerperium     History of seasonal allergies     Hypertension      Past Surgical History:   Procedure Laterality Date    ANTERIOR CERVICAL DISCECTOMY W/ FUSION N/A 9/17/2018    Procedure: DISCECTOMY, SPINE, CERVICAL, ANTERIOR APPROACH, WITH FUSION C3-4, C4-5;  Surgeon: Leo  "AUDRA Denton MD;  [facility];  Service: Neurosurgery;  Laterality: N/A;  toronto II, asa 2, regular bed, supine, josefina    HYSTERECTOMY      2011 [facility] L[facility], R SALPINGECT [facility]ED [facility]    MINIMALLY INVASIVE TRANSFORAMINAL LUMBAR INTERBODY FUSION (TLIF) N/A 1/25/2021    Procedure: FUSION, SPINE, LUMBAR, TLIF, MINIMALLY INVASIVE L4-5;  Surgeon: Leo Denton MD;  [facility];  Service: Neurosurgery;  Laterality: N/A;  [place] table 4 poster, prone, neuromonitoring, josefina, kriss Mendoza    right knee scope       Family History     Problem Relation (Age of Onset)    Diabetes type II Sister, Other    Hyperlipidemia Sister        Social History     Socioeconomic History    Marital status:    Tobacco Use    Smoking status: Never Smoker    Smokeless tobacco: Never Used   Substance and Sexual Activity    Alcohol use: No    Drug use: No    Sexual activity: Yes     Partners: Male     Birth control/protection: Surgical       Review of Systems  Constitutional: no fever or chills  Eyes: no visual changes  ENT: no nasal congestion or sore throat  Respiratory: no cough or shortness of breath  Cardiovascular: no chest pain or palpitations  Gastrointestinal: no nausea or vomiting  Genitourinary: no hematuria or dysuria  Integument/Breast: no rash or pruritis  Hematologic/Lymphatic: no easy bruising or lymphadenopathy  Musculoskeletal: no arthralgias or myalgias  Neurological: no seizures or tremors    OBJECTIVE:     Vital Signs  Pulse: 69  BP: 121/84  Pain Score:   2  Height: 5' 3" (160 cm)  Weight: 93.4 kg (206 lb)  Body mass index is 36.49 kg/m².    Neurosurgery Physical Exam  General: no distress  Neurologic: Alert and oriented. Thought content appropriate.  Head: normocephalic  GCS: Motor: 6/Verbal: 5/Eyes: 4 GCS Total: 15  Cranial nerves: face symmetric, tongue midline, pupils equal, round, reactive to light with accomodation, extraocular muscles intact  Sensory: response to light touch " throughout  Motor Strength:full strength upper and lower extremities  Pronator Drift: no drift noted  Finger to nose normal  No focal numbness or weakness  Lungs:  normal respiratory effort  Abdomen: soft, non-tender   Extremities: no cyanosis or edema, or clubbing      Diagnostic Results: personally reviewed  EXAMINATION:  CT LUMBAR SPINE WITHOUT CONTRAST     CLINICAL HISTORY:  Arthrodesis statuss/p lumbar fusion;     TECHNIQUE:  Standard lumbar spine CT protocol was performed without IV contrast.  Coronal and sagittal reformats were obtained.     COMPARISON:  09/22/2021     FINDINGS:  There are 4 lumbar-type vertebral bodies.  There is posterior spinal fusion hardware between L3 and L4.  There is no evidence of loosening.  There is grade 1 anterolisthesis of L3 on L4.  There is no fracture or scoliosis. There is normal lumbar lordosis.  There is a mild amount of atherosclerosis.     Impression:     1. There is grade 1 anterolisthesis of L3 on L4.  2. There is posterior spinal fusion hardware between L3 and L4.  There is no evidence of loosening.  All CT scans at this facility use dose modulation, iterative reconstruction, and/or weight base dosing when appropriate to reduce radiation dose when appropriate to reduce radiation dose to as low as reasonably achievable.        Electronically signed by: Chester Camarena MD  Date:                                            02/02/2022  Time:                                           11:35    ASSESSMENT/PLAN:     62 yo F with L4/5 MIS TLIF on 1/25/2021.  Patient neurologically stable in improvement lower extremity symptoms.  CT shows expected postoperative findings degree fusion.  Patient with overall back pain still something feels like she is back baseline.  Plan on following up at 18 month niraj to make sure she continues to move right direction.  She is currently doing therapy.  See her back she has or concerns she was encouraged to give us call.     Dash KEYS  Jr. EVA Servin  [facility]  Department of Neurosurgery         Note dictated with voice recognition software, please excuse any grammatical errors.

## 2022-03-23 ENCOUNTER — TELEPHONE (OUTPATIENT)
Dept: NEUROSURGERY | Facility: CLINIC | Age: 62
End: 2022-03-23
Payer: COMMERCIAL

## 2022-03-24 ENCOUNTER — TELEPHONE (OUTPATIENT)
Dept: NEUROSURGERY | Facility: CLINIC | Age: 62
End: 2022-03-24
Payer: COMMERCIAL

## 2022-03-24 ENCOUNTER — TELEPHONE (OUTPATIENT)
Dept: FAMILY MEDICINE | Facility: CLINIC | Age: 62
End: 2022-03-24
Payer: COMMERCIAL

## 2022-03-24 NOTE — TELEPHONE ENCOUNTER
----- Message from Jo Gallardo sent at 3/24/2022  1:16 PM CDT -----  Type:  Needs Medical Advice    Who Called:  pt  Symptoms (please be specific): would like to get a call back to discuss getting a letter to be removed from Jury duty on 3/28/2022   She was told she would need a letter from doctor   Pt also stated her  is scheduled for a major surgery 04/04/2022 and if picked by Jury she won't be able to attend surgery with him    Would the patient rather a call back or a response via [facility]?  Call   Best Call Back Number: [phone]   Additional Information:  fax for excuse [phone] attn# Rochelle Leyva - Jury

## 2022-03-24 NOTE — TELEPHONE ENCOUNTER
Pt is requesting a letter removing her from jury duty on 3/28. Her  will also be having a major surgery on 4/2 and she stated she's not able to participate in jury duty.

## 2022-03-24 NOTE — TELEPHONE ENCOUNTER
----- Message from Katia Orellana sent at 3/24/2022 10:27 AM CDT -----  Regarding: pt  Pt is calling to speak with the nurse pt said its personal and will further discuss when the nurse calls her back. Can you please call pt at [phone].    GUY

## 2022-03-25 NOTE — TELEPHONE ENCOUNTER
Spoke with pt and informed her letter written t requested to have letter fax to jessica browne fax number [phone] letter faxed

## 2022-04-26 ENCOUNTER — LAB VISIT (OUTPATIENT)
Dept: LAB | Facility: HOSPITAL | Age: 62
End: 2022-04-26
Attending: FAMILY MEDICINE
Payer: COMMERCIAL

## 2022-04-26 DIAGNOSIS — E11.9 CONTROLLED TYPE 2 DIABETES MELLITUS WITHOUT COMPLICATION, WITHOUT LONG-TERM CURRENT USE OF INSULIN: ICD-10-CM

## 2022-04-26 LAB
ALBUMIN SERPL BCP-MCNC: 4.2 G/DL (ref 3.5–5.2)
ALP SERPL-CCNC: 90 U/L (ref 38–126)
ALT SERPL W/O P-5'-P-CCNC: 20 U/L (ref 10–44)
ANION GAP SERPL CALC-SCNC: 8 MMOL/L (ref 8–16)
AST SERPL-CCNC: 26 U/L (ref 15–46)
BASOPHILS # BLD AUTO: 0.02 K/UL (ref 0–0.2)
BASOPHILS NFR BLD: 0.5 % (ref 0–1.9)
BILIRUB SERPL-MCNC: 0.7 MG/DL (ref 0.1–1)
CALCIUM SERPL-MCNC: 9.5 MG/DL (ref 8.7–10.5)
CHLORIDE SERPL-SCNC: 103 MMOL/L (ref 95–110)
CHOLEST SERPL-MCNC: 262 MG/DL (ref 120–199)
CHOLEST/HDLC SERPL: 4.9 {RATIO} (ref 2–5)
CO2 SERPL-SCNC: 28 MMOL/L (ref 23–29)
CREAT SERPL-MCNC: 1.01 MG/DL (ref 0.5–1.4)
DIFFERENTIAL METHOD: ABNORMAL
EOSINOPHIL # BLD AUTO: 0.1 K/UL (ref 0–0.5)
EOSINOPHIL NFR BLD: 1.2 % (ref 0–8)
ERYTHROCYTE [DISTWIDTH] IN BLOOD BY AUTOMATED COUNT: 15.1 % (ref 11.5–14.5)
EST. GFR  (AFRICAN AMERICAN): >60 ML/MIN/1.73 M^2
EST. GFR  (NON AFRICAN AMERICAN): >60 ML/MIN/1.73 M^2
ESTIMATED AVG GLUCOSE: 134 MG/DL (ref 68–131)
GLUCOSE SERPL-MCNC: 116 MG/DL (ref 70–110)
HBA1C MFR BLD: 6.3 % (ref 4–5.6)
HCT VFR BLD AUTO: 40.8 % (ref 37–48.5)
HDLC SERPL-MCNC: 54 MG/DL (ref 40–75)
HDLC SERPL: 20.6 % (ref 20–50)
HGB BLD-MCNC: 12.5 G/DL (ref 12–16)
IMM GRANULOCYTES # BLD AUTO: 0.01 K/UL (ref 0–0.04)
IMM GRANULOCYTES NFR BLD AUTO: 0.2 % (ref 0–0.5)
LDLC SERPL CALC-MCNC: 181.8 MG/DL (ref 63–159)
LYMPHOCYTES # BLD AUTO: 2 K/UL (ref 1–4.8)
LYMPHOCYTES NFR BLD: 47.1 % (ref 18–48)
MCH RBC QN AUTO: 24.9 PG (ref 27–31)
MCHC RBC AUTO-ENTMCNC: 30.6 G/DL (ref 32–36)
MCV RBC AUTO: 81 FL (ref 82–98)
MONOCYTES # BLD AUTO: 0.3 K/UL (ref 0.3–1)
MONOCYTES NFR BLD: 6.8 % (ref 4–15)
NEUTROPHILS # BLD AUTO: 1.8 K/UL (ref 1.8–7.7)
NEUTROPHILS NFR BLD: 44.2 % (ref 38–73)
NONHDLC SERPL-MCNC: 208 MG/DL
NRBC BLD-RTO: 0 /100 WBC
PLATELET # BLD AUTO: 234 K/UL (ref 150–450)
PMV BLD AUTO: 10.6 FL (ref 9.2–12.9)
POTASSIUM SERPL-SCNC: 4.7 MMOL/L (ref 3.5–5.1)
PROT SERPL-MCNC: 7.8 G/DL (ref 6–8.4)
RBC # BLD AUTO: 5.02 M/UL (ref 4–5.4)
SODIUM SERPL-SCNC: 139 MMOL/L (ref 136–145)
TRIGL SERPL-MCNC: 131 MG/DL (ref 30–150)
UUN UR-MCNC: 21 MG/DL (ref 7–17)
WBC # BLD AUTO: 4.14 K/UL (ref 3.9–12.7)

## 2022-04-26 PROCEDURE — 83036 HEMOGLOBIN GLYCOSYLATED A1C: CPT | Performed by: FAMILY MEDICINE

## 2022-04-26 PROCEDURE — 85025 COMPLETE CBC W/AUTO DIFF WBC: CPT | Mod: PO | Performed by: FAMILY MEDICINE

## 2022-04-26 PROCEDURE — 80061 LIPID PANEL: CPT | Performed by: FAMILY MEDICINE

## 2022-04-26 PROCEDURE — 80053 COMPREHEN METABOLIC PANEL: CPT | Mod: PO | Performed by: FAMILY MEDICINE

## 2022-04-26 PROCEDURE — 36415 COLL VENOUS BLD VENIPUNCTURE: CPT | Mod: PO | Performed by: FAMILY MEDICINE

## 2022-05-09 ENCOUNTER — PATIENT OUTREACH (OUTPATIENT)
Dept: ADMINISTRATIVE | Facility: HOSPITAL | Age: 62
End: 2022-05-09
Payer: COMMERCIAL

## 2022-05-09 NOTE — LETTER
AUTHORIZATION FOR RELEASE OF   CONFIDENTIAL INFORMATION    [facility].    We are seeing Carmen Son, date of birth 1960, in the clinic at [facility]. Deanna Conde DO is the patient's PCP. Carmen Son has an outstanding lab/procedure at the time we reviewed her chart. In order to help keep her health information updated, she has authorized us to request the following medical record(s):                                     ( X )  EYE EXAM                  Please fax records to [facility], Becky F Hollibaugh, DO, [phone]    If you have any questions, please contact Zaina Zapien at [phone].           Patient Name: Carmen Son  : 1960  Patient Phone #: [phone]

## 2022-05-09 NOTE — PROGRESS NOTES
2022 Care Everywhere updates requested and reviewed.  Immunizations reconciled. Media reports reviewed.  Duplicate HM overrides and  orders removed.  Overdue HM topic chart audit and/or requested.  Overdue lab testing linked to upcoming lab appointments if applies.    Requested eye exam records from [facility].    Health Maintenance Due   Topic Date Due    COVID-19 Vaccine (1) 1967    Shingles Vaccine (1 of 2) Never done    Eye Exam  2021    Foot Exam  2022

## 2022-05-22 NOTE — TELEPHONE ENCOUNTER
No new care gaps identified.  [facility] Embedded Care Gaps. Reference number: [id number]. 5/22/2022   5:07:29 PM CDT

## 2022-05-23 ENCOUNTER — OFFICE VISIT (OUTPATIENT)
Dept: FAMILY MEDICINE | Facility: CLINIC | Age: 62
End: 2022-05-23
Payer: COMMERCIAL

## 2022-05-23 VITALS
OXYGEN SATURATION: 96 % | WEIGHT: 207.13 LBS | BODY MASS INDEX: 36.7 KG/M2 | SYSTOLIC BLOOD PRESSURE: 134 MMHG | TEMPERATURE: 98 F | DIASTOLIC BLOOD PRESSURE: 64 MMHG | HEIGHT: 63 IN | HEART RATE: 73 BPM

## 2022-05-23 DIAGNOSIS — E78.00 PURE HYPERCHOLESTEROLEMIA: ICD-10-CM

## 2022-05-23 DIAGNOSIS — E53.8 VITAMIN B12 DEFICIENCY: ICD-10-CM

## 2022-05-23 DIAGNOSIS — I10 ESSENTIAL HYPERTENSION: ICD-10-CM

## 2022-05-23 DIAGNOSIS — G25.0 ESSENTIAL TREMOR: ICD-10-CM

## 2022-05-23 DIAGNOSIS — I95.9 HYPOTENSION, UNSPECIFIED HYPOTENSION TYPE: ICD-10-CM

## 2022-05-23 DIAGNOSIS — E55.9 VITAMIN D DEFICIENCY: ICD-10-CM

## 2022-05-23 DIAGNOSIS — K21.9 GASTROESOPHAGEAL REFLUX DISEASE, UNSPECIFIED WHETHER ESOPHAGITIS PRESENT: ICD-10-CM

## 2022-05-23 DIAGNOSIS — Z23 NEED FOR SHINGLES VACCINE: ICD-10-CM

## 2022-05-23 DIAGNOSIS — E11.9 CONTROLLED TYPE 2 DIABETES MELLITUS WITHOUT COMPLICATION, WITHOUT LONG-TERM CURRENT USE OF INSULIN: Primary | ICD-10-CM

## 2022-05-23 DIAGNOSIS — R11.0 NAUSEA: ICD-10-CM

## 2022-05-23 PROCEDURE — 3075F SYST BP GE 130 - 139MM HG: CPT | Mod: CPTII,S$GLB,, | Performed by: STUDENT IN AN ORGANIZED HEALTH CARE EDUCATION/TRAINING PROGRAM

## 2022-05-23 PROCEDURE — 1159F MED LIST DOCD IN RCRD: CPT | Mod: CPTII,S$GLB,, | Performed by: STUDENT IN AN ORGANIZED HEALTH CARE EDUCATION/TRAINING PROGRAM

## 2022-05-23 PROCEDURE — 90471 ZOSTER RECOMBINANT VACCINE: ICD-10-PCS | Mod: S$GLB,,, | Performed by: STUDENT IN AN ORGANIZED HEALTH CARE EDUCATION/TRAINING PROGRAM

## 2022-05-23 PROCEDURE — 3075F PR MOST RECENT SYSTOLIC BLOOD PRESS GE 130-139MM HG: ICD-10-PCS | Mod: CPTII,S$GLB,, | Performed by: STUDENT IN AN ORGANIZED HEALTH CARE EDUCATION/TRAINING PROGRAM

## 2022-05-23 PROCEDURE — 1160F PR REVIEW ALL MEDS BY PRESCRIBER/CLIN PHARMACIST DOCUMENTED: ICD-10-PCS | Mod: CPTII,S$GLB,, | Performed by: STUDENT IN AN ORGANIZED HEALTH CARE EDUCATION/TRAINING PROGRAM

## 2022-05-23 PROCEDURE — 90750 ZOSTER RECOMBINANT VACCINE: ICD-10-PCS | Mod: S$GLB,,, | Performed by: STUDENT IN AN ORGANIZED HEALTH CARE EDUCATION/TRAINING PROGRAM

## 2022-05-23 PROCEDURE — 3078F PR MOST RECENT DIASTOLIC BLOOD PRESSURE < 80 MM HG: ICD-10-PCS | Mod: CPTII,S$GLB,, | Performed by: STUDENT IN AN ORGANIZED HEALTH CARE EDUCATION/TRAINING PROGRAM

## 2022-05-23 PROCEDURE — 99214 PR OFFICE/OUTPT VISIT, EST, LEVL IV, 30-39 MIN: ICD-10-PCS | Mod: 25,S$GLB,, | Performed by: STUDENT IN AN ORGANIZED HEALTH CARE EDUCATION/TRAINING PROGRAM

## 2022-05-23 PROCEDURE — 3078F DIAST BP <80 MM HG: CPT | Mod: CPTII,S$GLB,, | Performed by: STUDENT IN AN ORGANIZED HEALTH CARE EDUCATION/TRAINING PROGRAM

## 2022-05-23 PROCEDURE — 90471 IMMUNIZATION ADMIN: CPT | Mod: S$GLB,,, | Performed by: STUDENT IN AN ORGANIZED HEALTH CARE EDUCATION/TRAINING PROGRAM

## 2022-05-23 PROCEDURE — 3008F PR BODY MASS INDEX (BMI) DOCUMENTED: ICD-10-PCS | Mod: CPTII,S$GLB,, | Performed by: STUDENT IN AN ORGANIZED HEALTH CARE EDUCATION/TRAINING PROGRAM

## 2022-05-23 PROCEDURE — 1159F PR MEDICATION LIST DOCUMENTED IN MEDICAL RECORD: ICD-10-PCS | Mod: CPTII,S$GLB,, | Performed by: STUDENT IN AN ORGANIZED HEALTH CARE EDUCATION/TRAINING PROGRAM

## 2022-05-23 PROCEDURE — 3044F HG A1C LEVEL LT 7.0%: CPT | Mod: CPTII,S$GLB,, | Performed by: STUDENT IN AN ORGANIZED HEALTH CARE EDUCATION/TRAINING PROGRAM

## 2022-05-23 PROCEDURE — 99214 OFFICE O/P EST MOD 30 MIN: CPT | Mod: 25,S$GLB,, | Performed by: STUDENT IN AN ORGANIZED HEALTH CARE EDUCATION/TRAINING PROGRAM

## 2022-05-23 PROCEDURE — 1160F RVW MEDS BY RX/DR IN RCRD: CPT | Mod: CPTII,S$GLB,, | Performed by: STUDENT IN AN ORGANIZED HEALTH CARE EDUCATION/TRAINING PROGRAM

## 2022-05-23 PROCEDURE — 3008F BODY MASS INDEX DOCD: CPT | Mod: CPTII,S$GLB,, | Performed by: STUDENT IN AN ORGANIZED HEALTH CARE EDUCATION/TRAINING PROGRAM

## 2022-05-23 PROCEDURE — 90750 HZV VACC RECOMBINANT IM: CPT | Mod: S$GLB,,, | Performed by: STUDENT IN AN ORGANIZED HEALTH CARE EDUCATION/TRAINING PROGRAM

## 2022-05-23 PROCEDURE — 4010F PR ACE/ARB THEARPY RXD/TAKEN: ICD-10-PCS | Mod: CPTII,S$GLB,, | Performed by: STUDENT IN AN ORGANIZED HEALTH CARE EDUCATION/TRAINING PROGRAM

## 2022-05-23 PROCEDURE — 4010F ACE/ARB THERAPY RXD/TAKEN: CPT | Mod: CPTII,S$GLB,, | Performed by: STUDENT IN AN ORGANIZED HEALTH CARE EDUCATION/TRAINING PROGRAM

## 2022-05-23 PROCEDURE — 3044F PR MOST RECENT HEMOGLOBIN A1C LEVEL <7.0%: ICD-10-PCS | Mod: CPTII,S$GLB,, | Performed by: STUDENT IN AN ORGANIZED HEALTH CARE EDUCATION/TRAINING PROGRAM

## 2022-05-23 RX ORDER — LOSARTAN POTASSIUM 50 MG/1
50 TABLET ORAL DAILY
Qty: 90 TABLET | Refills: 3 | Status: SHIPPED | OUTPATIENT
Start: 2022-05-23 | End: 2022-09-20 | Stop reason: SDUPTHER

## 2022-05-23 RX ORDER — ONDANSETRON 4 MG/1
4 TABLET, FILM COATED ORAL EVERY 8 HOURS PRN
Qty: 45 TABLET | Refills: 1 | Status: SHIPPED | OUTPATIENT
Start: 2022-05-23 | End: 2023-08-07

## 2022-05-23 RX ORDER — HYLAN G-F 20 16MG/2ML
6 SYRINGE (ML) INTRAARTICULAR
COMMUNITY

## 2022-05-23 RX ORDER — LANOLIN ALCOHOL/MO/W.PET/CERES
CREAM (GRAM) TOPICAL
COMMUNITY

## 2022-05-23 RX ORDER — PROPRANOLOL HYDROCHLORIDE 80 MG/1
80 CAPSULE, EXTENDED RELEASE ORAL DAILY
Qty: 90 CAPSULE | Refills: 3 | Status: SHIPPED | OUTPATIENT
Start: 2022-05-23 | End: 2022-09-20 | Stop reason: SDUPTHER

## 2022-05-23 RX ORDER — OMEPRAZOLE 40 MG/1
40 CAPSULE, DELAYED RELEASE ORAL DAILY
Qty: 90 CAPSULE | Refills: 3 | Status: SHIPPED | OUTPATIENT
Start: 2022-05-23 | End: 2022-09-20 | Stop reason: SDUPTHER

## 2022-05-23 RX ORDER — LOSARTAN POTASSIUM 50 MG/1
TABLET ORAL
Qty: 90 TABLET | Refills: 0 | Status: SHIPPED | OUTPATIENT
Start: 2022-05-23 | End: 2022-05-23 | Stop reason: SDUPTHER

## 2022-05-23 NOTE — TELEPHONE ENCOUNTER
Refill Authorization Note   Carmen Son  is requesting a refill authorization.  Brief Assessment and Rationale for Refill:  Approve     Medication Therapy Plan:       Medication Reconciliation Completed: No   Comments:     No Care Gaps recommended.     Note composed:11:09 AM 05/23/2022

## 2022-05-23 NOTE — PROGRESS NOTES
Patient ID: Carmen Son is a 61 y.o. female.     Chief Complaint: Annual Exam    Gastroesophageal Reflux  She complains of abdominal pain and heartburn. She reports no chest pain, no coughing, no nausea, no tooth decay, no water brash or no wheezing. This is a chronic problem. The current episode started more than 1 month ago. The problem occurs frequently. The problem has been unchanged. The heartburn limits her activity. The heartburn changes with position. The symptoms are aggravated by certain foods. Pertinent negatives include no anemia, fatigue, melena or muscle weakness. Risk factors include obesity. She has tried a PPI for the symptoms. The treatment provided moderate relief.   patient reports taking her 's 40 mg PPI. She had significant relief with this.        Review of Systems  Review of Systems   Constitutional: Negative for fatigue and fever.   HENT: Negative for ear pain and sinus pain.    Eyes: Negative for discharge.   Respiratory: Negative for cough, shortness of breath and wheezing.    Cardiovascular: Negative for chest pain and leg swelling.   Gastrointestinal: Positive for abdominal pain and heartburn. Negative for diarrhea, melena, nausea and vomiting.   Genitourinary: Negative for urgency.   Musculoskeletal: Negative for myalgias and muscle weakness.   Skin: Negative for rash.   Neurological: Negative for weakness and headaches.   Psychiatric/Behavioral: Negative for depression.   All other systems reviewed and are negative.      Currently Medications  Current Outpatient Medications on File Prior to Visit   Medication Sig Dispense Refill    ascorbic acid, vitamin C, (VITAMIN C) 500 MG tablet Take 500 mg by mouth once daily.      azelastine (ASTELIN) 137 mcg (0.1 %) nasal spray 1 spray (137 mcg total) by Nasal route 2 (two) times daily. 137 mL 3    blood sugar diagnostic Strp 1 each by Misc.(Non-Drug; Combo Route) route once daily. 1 each 0    blood-glucose meter kit Check  blood sugar once dialy 1 each 0    cholecalciferol, vitamin D3, (VITAMIN D3 ORAL) Vitamin D3      clindamycin (CLEOCIN T) 1 % Swab Apply 1 each topically 2 (two) times daily.      cyanocobalamin (VITAMIN B-12) 1000 MCG tablet B12      dapsone (ACZONE) 7.5 % GlwP Apply topically.      fexofenadine (ALLEGRA) 180 MG tablet Take 1 tablet (180 mg total) by mouth once daily. 30 tablet 12    fluticasone propionate (FLONASE) 50 mcg/actuation nasal spray 2 sprays by Each Nostril route once daily.      furosemide (LASIX) 20 MG tablet Take 1 tablet (20 mg total) by mouth daily as needed. 30 tablet 3    hylan g-f 20 (SYNVISC-ONE) 48 mg/6 mL Syrg 6 mLs.      ipratropium (ATROVENT) 42 mcg (0.06 %) nasal spray 2 sprays 3 (three) times daily.      ivermectin (SOOLANTRA) 1 % Crea Apply topically.      lancets (ACCU-CHEK SOFTCLIX LANCETS) Misc 1 lancet by Misc.(Non-Drug; Combo Route) route once daily. 90 each 4    MULTIVITAMIN ORAL multivitamin      potassium chloride SA (K-DUR,KLOR-CON) 20 MEQ tablet Take 1 tablet (20 mEq total) by mouth once daily. 30 tablet 3    [DISCONTINUED] losartan (COZAAR) 50 MG tablet Take 1 tablet by mouth once daily 90 tablet 0    [DISCONTINUED] omeprazole (PRILOSEC) 20 MG capsule omeprazole 20 mg capsule,delayed release 90 capsule 3    [DISCONTINUED] ondansetron (ZOFRAN-ODT) 8 MG TbDL Dissolve 1 tablet (8 mg total) by mouth every 6 (six) hours as needed. 15 tablet 0    [DISCONTINUED] propranoloL (INDERAL LA) 80 MG 24 hr capsule Take 1 capsule (80 mg total) by mouth once daily. 90 capsule 3    blood sugar diagnostic Strp 1 strip by Misc.(Non-Drug; Combo Route) route once daily. (Patient not taking: Reported on 5/23/2022) 90 strip 3    fenofibrate 160 MG Tab Take 1 tablet (160 mg total) by mouth once daily. (Patient not taking: No sig reported) 90 tablet 3    LIDOcaine (LIDODERM) 5 % Place 1 patch onto the skin daily as needed (pain). Remove & Discard patch within 12 hours or as directed  "by MD (Patient not taking: No sig reported) 5 patch 0    linaCLOtide (LINZESS) 145 mcg Cap capsule Take 1 capsule (145 mcg total) by mouth once daily. (Patient not taking: No sig reported) 90 capsule 3    metFORMIN (GLUCOPHAGE) 500 MG tablet Take 1 tablet (500 mg total) by mouth daily with breakfast. (Patient not taking: Reported on 2/8/2022) 90 tablet 3    methocarbamoL (ROBAXIN) 750 MG Tab Take 1 tablet (750 mg total) by mouth 4 (four) times daily as needed (muscle spams). (Patient not taking: Reported on 5/23/2022) 40 tablet 0    naproxen (NAPROSYN) 500 MG tablet Take 500 mg by mouth 2 (two) times daily.      prenatal vit calc,iron,folic (PRENATAL VITAMIN ORAL) Take by mouth.      [DISCONTINUED] losartan (COZAAR) 50 MG tablet Take 1 tablet (50 mg total) by mouth once daily. 90 tablet 3    [DISCONTINUED] phentermine (ADIPEX-P) 37.5 mg tablet phentermine 37.5 mg tablet   TAKE 1 TABLET BY MOUTH BEFORE BREAKFAST       No current facility-administered medications on file prior to visit.       Physical  Exam  Vitals:    05/23/22 1115   BP: 134/64   BP Location: Right arm   Patient Position: Sitting   Pulse: 73   Temp: 98.3 °F (36.8 °C)   TempSrc: Oral   SpO2: 96%   Weight: 93.9 kg (207 lb 2 oz)   Height: 5' 3" (1.6 m)      Body mass index is 36.69 kg/m².    Physical Exam  Vitals and nursing note reviewed.   Constitutional:       General: She is not in acute distress.     Appearance: She is not ill-appearing.   HENT:      Head: Normocephalic and atraumatic.      Right Ear: External ear normal.      Left Ear: External ear normal.      Nose: Nose normal.      Mouth/Throat:      Mouth: Mucous membranes are moist.   Eyes:      Extraocular Movements: Extraocular movements intact.      Conjunctiva/sclera: Conjunctivae normal.   Cardiovascular:      Rate and Rhythm: Normal rate and regular rhythm.      Pulses: Normal pulses.      Heart sounds: No murmur heard.  Pulmonary:      Effort: Pulmonary effort is normal. No " respiratory distress.      Breath sounds: No wheezing.   Abdominal:      General: There is no distension.      Palpations: Abdomen is soft. There is no mass.      Tenderness: There is no abdominal tenderness.   Musculoskeletal:         General: No swelling.      Cervical back: Normal range of motion.   Skin:     Coloration: Skin is not jaundiced.      Findings: No rash.   Neurological:      General: No focal deficit present.      Mental Status: She is alert and oriented to person, place, and time.   Psychiatric:         Mood and Affect: Mood normal.         Thought Content: Thought content normal.         Labs:    Complete Blood Count  Lab Results   Component Value Date    RBC 5.02 04/26/2022    HGB 12.5 04/26/2022    HCT 40.8 04/26/2022    MCV 81 (L) 04/26/2022    MCH 24.9 (L) 04/26/2022    MCHC 30.6 (L) 04/26/2022    RDW 15.1 (H) 04/26/2022     04/26/2022    MPV 10.6 04/26/2022    GRAN 1.8 04/26/2022    GRAN 44.2 04/26/2022    LYMPH 2.0 04/26/2022    LYMPH 47.1 04/26/2022    MONO 0.3 04/26/2022    MONO 6.8 04/26/2022    EOS 0.1 04/26/2022    BASO 0.02 04/26/2022    EOSINOPHIL 1.2 04/26/2022    BASOPHIL 0.5 04/26/2022    DIFFMETHOD Automated 04/26/2022       Comprehensive Metabolic Panel  Lab Results   Component Value Date     (H) 04/26/2022    BUN 21 (H) 04/26/2022    CREATININE 1.01 04/26/2022     04/26/2022    K 4.7 04/26/2022     04/26/2022    PROT 7.8 04/26/2022    ALBUMIN 4.2 04/26/2022    BILITOT 0.7 04/26/2022    AST 26 04/26/2022    ALKPHOS 90 04/26/2022    CO2 28 04/26/2022    ALT 20 04/26/2022    ANIONGAP 8 04/26/2022    EGFRNONAA >60.0 04/26/2022    ESTGFRAFRICA >60.0 04/26/2022       TSH  No results found for: TSH    Imaging:  CT Lumbar Spine Without Contrast  Narrative: EXAMINATION:  CT LUMBAR SPINE WITHOUT CONTRAST    CLINICAL HISTORY:  Arthrodesis statuss/p lumbar fusion;    TECHNIQUE:  Standard lumbar spine CT protocol was performed without IV contrast.  Coronal and  sagittal reformats were obtained.    COMPARISON:  09/22/2021    FINDINGS:  There are 4 lumbar-type vertebral bodies.  There is posterior spinal fusion hardware between L3 and L4.  There is no evidence of loosening.  There is grade 1 anterolisthesis of L3 on L4.  There is no fracture or scoliosis. There is normal lumbar lordosis.  There is a mild amount of atherosclerosis.  Impression: 1. There is grade 1 anterolisthesis of L3 on L4.  2. There is posterior spinal fusion hardware between L3 and L4.  There is no evidence of loosening.  All CT scans at this facility use dose modulation, iterative reconstruction, and/or weight base dosing when appropriate to reduce radiation dose when appropriate to reduce radiation dose to as low as reasonably achievable.    Electronically signed by: Chester Camarena MD  Date:    02/02/2022  Time:    11:35      Assessment/Plan:    Problem List Items Addressed This Visit        Neuro    Essential tremor       Cardiac/Vascular    Hyperlipidemia    Relevant Orders    LIPID PANEL    Essential hypertension    Relevant Medications    losartan (COZAAR) 50 MG tablet       Endocrine    Controlled type 2 diabetes mellitus without complication, without long-term current use of insulin - Primary    Relevant Medications    semaglutide (OZEMPIC) 0.25 mg or 0.5 mg(2 mg/1.5 mL) pen injector    Other Relevant Orders    CBC Auto Differential    Comprehensive metabolic panel    HEMOGLOBIN A1C      Other Visit Diagnoses     Hypotension, unspecified hypotension type        Relevant Medications    propranoloL (INDERAL LA) 80 MG 24 hr capsule    Vitamin B12 deficiency        Relevant Medications    cyanocobalamin (VITAMIN B-12) 1000 MCG tablet    Vitamin D deficiency        Relevant Medications    cholecalciferol, vitamin D3, (VITAMIN D3 ORAL)    Need for shingles vaccine        Relevant Orders    (In Office Administered) Zoster Recombinant Vaccine (Completed)    Gastroesophageal reflux disease, unspecified  whether esophagitis present        Relevant Medications    omeprazole (PRILOSEC) 40 MG capsule    Nausea        Relevant Medications    ondansetron (ZOFRAN) 4 MG tablet           Discussed how to stay healthy including: diet, exercise, refraining from smoking and discussed screening exams / tests needed for age, sex and family Hx.    RTC 6 mo    Anson López MD

## 2022-05-24 ENCOUNTER — PATIENT OUTREACH (OUTPATIENT)
Dept: ADMINISTRATIVE | Facility: HOSPITAL | Age: 62
End: 2022-05-24
Payer: COMMERCIAL

## 2022-05-24 NOTE — LETTER
AUTHORIZATION FOR RELEASE OF   CONFIDENTIAL INFORMATION    [facility],    We are seeing Carmen Son, date of birth 1960, in the clinic at [facility]. Anson López MD is the patient's PCP. Carmen Son has an outstanding lab/procedure at the time we reviewed her chart. In order to help keep her health information updated, she has authorized us to request the following medical record(s):                                 ( X )  EYE EXAM  - 2nd REQUEST              Please fax records to [facility], Addy N Reine, MD,[phone]    If you have any questions, please contact Zaina Zapien at [phone].              Patient Name: Carmen Son  : 1960  Patient Phone #: [phone]

## 2022-05-25 ENCOUNTER — TELEPHONE (OUTPATIENT)
Dept: NEUROSURGERY | Facility: CLINIC | Age: 62
End: 2022-05-25
Payer: COMMERCIAL

## 2022-05-25 DIAGNOSIS — Z98.1 HISTORY OF FUSION OF CERVICAL SPINE: Primary | ICD-10-CM

## 2022-05-25 NOTE — TELEPHONE ENCOUNTER
Spoke with pt and confirmed times and date for imaging and appt.    ----- Message from Santosh Roa MA sent at 5/24/2022  4:49 PM CDT -----  Regarding: FW: Call Back    ----- Message -----  From: Rey Morton  Sent: 5/24/2022  12:46 PM CDT  To: Corrie STEINER Staff  Subject: Call Back                                        Who Called: pt          What is the reason for the call: pt is calling in to schedule appointment for tingling around neck and upper back. No availability. Please contact to assist.          Can patient be contacted on [facility]: Yes         Call back number: [phone]

## 2022-05-26 ENCOUNTER — PATIENT OUTREACH (OUTPATIENT)
Dept: ADMINISTRATIVE | Facility: HOSPITAL | Age: 62
End: 2022-05-26
Payer: COMMERCIAL

## 2022-05-27 ENCOUNTER — TELEPHONE (OUTPATIENT)
Dept: FAMILY MEDICINE | Facility: CLINIC | Age: 62
End: 2022-05-27
Payer: COMMERCIAL

## 2022-05-27 DIAGNOSIS — E11.9 CONTROLLED TYPE 2 DIABETES MELLITUS WITHOUT COMPLICATION, WITHOUT LONG-TERM CURRENT USE OF INSULIN: ICD-10-CM

## 2022-05-27 NOTE — TELEPHONE ENCOUNTER
----- Message from Katia Ayon sent at 5/27/2022 10:56 AM CDT -----  Needs advice from nurse:      Who Called:[facility]  Regarding:verify quantity on Ozempic  Would the patient rather a call back or VIA [facility]?  Best Call Back number:  Additional Info:

## 2022-05-31 ENCOUNTER — TELEPHONE (OUTPATIENT)
Dept: NEUROLOGY | Facility: CLINIC | Age: 62
End: 2022-05-31
Payer: COMMERCIAL

## 2022-05-31 ENCOUNTER — OFFICE VISIT (OUTPATIENT)
Dept: NEUROSURGERY | Facility: CLINIC | Age: 62
End: 2022-05-31
Payer: COMMERCIAL

## 2022-05-31 ENCOUNTER — HOSPITAL ENCOUNTER (OUTPATIENT)
Dept: RADIOLOGY | Facility: HOSPITAL | Age: 62
Discharge: HOME | End: 2022-05-31
Attending: PHYSICIAN ASSISTANT
Payer: COMMERCIAL

## 2022-05-31 VITALS
HEIGHT: 63 IN | DIASTOLIC BLOOD PRESSURE: 84 MMHG | HEART RATE: 61 BPM | WEIGHT: 207 LBS | SYSTOLIC BLOOD PRESSURE: 123 MMHG | BODY MASS INDEX: 36.68 KG/M2

## 2022-05-31 DIAGNOSIS — Z98.1 HISTORY OF FUSION OF CERVICAL SPINE: ICD-10-CM

## 2022-05-31 DIAGNOSIS — R51.9 FACIAL PAIN: ICD-10-CM

## 2022-05-31 DIAGNOSIS — Z98.1 S/P LUMBAR FUSION: Primary | ICD-10-CM

## 2022-05-31 DIAGNOSIS — M47.812 CERVICAL SPONDYLOSIS: ICD-10-CM

## 2022-05-31 DIAGNOSIS — M47.22 CERVICAL SPONDYLOSIS WITH RADICULOPATHY: ICD-10-CM

## 2022-05-31 PROCEDURE — 99214 PR OFFICE/OUTPT VISIT, EST, LEVL IV, 30-39 MIN: ICD-10-PCS | Mod: S$GLB,,, | Performed by: PHYSICIAN ASSISTANT

## 2022-05-31 PROCEDURE — 99999 PR PBB SHADOW E&M-EST. PATIENT-LVL V: CPT | Mod: PBBFAC,,, | Performed by: PHYSICIAN ASSISTANT

## 2022-05-31 PROCEDURE — 3079F DIAST BP 80-89 MM HG: CPT | Mod: CPTII,S$GLB,, | Performed by: PHYSICIAN ASSISTANT

## 2022-05-31 PROCEDURE — 1159F PR MEDICATION LIST DOCUMENTED IN MEDICAL RECORD: ICD-10-PCS | Mod: CPTII,S$GLB,, | Performed by: PHYSICIAN ASSISTANT

## 2022-05-31 PROCEDURE — 3008F PR BODY MASS INDEX (BMI) DOCUMENTED: ICD-10-PCS | Mod: CPTII,S$GLB,, | Performed by: PHYSICIAN ASSISTANT

## 2022-05-31 PROCEDURE — 1160F PR REVIEW ALL MEDS BY PRESCRIBER/CLIN PHARMACIST DOCUMENTED: ICD-10-PCS | Mod: CPTII,S$GLB,, | Performed by: PHYSICIAN ASSISTANT

## 2022-05-31 PROCEDURE — 99214 OFFICE O/P EST MOD 30 MIN: CPT | Mod: S$GLB,,, | Performed by: PHYSICIAN ASSISTANT

## 2022-05-31 PROCEDURE — 3079F PR MOST RECENT DIASTOLIC BLOOD PRESSURE 80-89 MM HG: ICD-10-PCS | Mod: CPTII,S$GLB,, | Performed by: PHYSICIAN ASSISTANT

## 2022-05-31 PROCEDURE — 4010F PR ACE/ARB THEARPY RXD/TAKEN: ICD-10-PCS | Mod: CPTII,S$GLB,, | Performed by: PHYSICIAN ASSISTANT

## 2022-05-31 PROCEDURE — 3074F PR MOST RECENT SYSTOLIC BLOOD PRESSURE < 130 MM HG: ICD-10-PCS | Mod: CPTII,S$GLB,, | Performed by: PHYSICIAN ASSISTANT

## 2022-05-31 PROCEDURE — 3044F HG A1C LEVEL LT 7.0%: CPT | Mod: CPTII,S$GLB,, | Performed by: PHYSICIAN ASSISTANT

## 2022-05-31 PROCEDURE — 1159F MED LIST DOCD IN RCRD: CPT | Mod: CPTII,S$GLB,, | Performed by: PHYSICIAN ASSISTANT

## 2022-05-31 PROCEDURE — 72050 XR CERVICAL SPINE AP LAT WITH FLEX EXTEN: ICD-10-PCS | Mod: 26,,, | Performed by: RADIOLOGY

## 2022-05-31 PROCEDURE — 72050 X-RAY EXAM NECK SPINE 4/5VWS: CPT | Mod: 26,,, | Performed by: RADIOLOGY

## 2022-05-31 PROCEDURE — 1160F RVW MEDS BY RX/DR IN RCRD: CPT | Mod: CPTII,S$GLB,, | Performed by: PHYSICIAN ASSISTANT

## 2022-05-31 PROCEDURE — 99999 PR PBB SHADOW E&M-EST. PATIENT-LVL V: ICD-10-PCS | Mod: PBBFAC,,, | Performed by: PHYSICIAN ASSISTANT

## 2022-05-31 PROCEDURE — 3074F SYST BP LT 130 MM HG: CPT | Mod: CPTII,S$GLB,, | Performed by: PHYSICIAN ASSISTANT

## 2022-05-31 PROCEDURE — 3044F PR MOST RECENT HEMOGLOBIN A1C LEVEL <7.0%: ICD-10-PCS | Mod: CPTII,S$GLB,, | Performed by: PHYSICIAN ASSISTANT

## 2022-05-31 PROCEDURE — 3008F BODY MASS INDEX DOCD: CPT | Mod: CPTII,S$GLB,, | Performed by: PHYSICIAN ASSISTANT

## 2022-05-31 PROCEDURE — 72050 X-RAY EXAM NECK SPINE 4/5VWS: CPT | Mod: TC

## 2022-05-31 PROCEDURE — 4010F ACE/ARB THERAPY RXD/TAKEN: CPT | Mod: CPTII,S$GLB,, | Performed by: PHYSICIAN ASSISTANT

## 2022-05-31 NOTE — PROGRESS NOTES
Neurosurgery  Established Patient    SUBJECTIVE:     History of Present Illness:  62 yo F with L4/5 MIS TLIF on 1/25/2021, and prior ACDF C3-4, C4-5,  I last saw her on 2/8/22, at the time she was neurologically stable in improvement lower extremity symptoms.  CT shows expected postoperative findings degree fusion.  She reports continued improvement in her low pain, she has been working with physical therapy.  Denies any pain paresthesias weakness in lower extremities.  He has had issues with neck pain and radicular pain in upper extremity drilling down the radial aspect of her arm into her top 2 digits.  He states that this is been going on for about 6 months and seems to be getting worse.  He denies any weakness, changes in hand dexterity, or balance difficulty.  She states that she has gotten an POORNIMA in the past, that was about 5 years ago that did did give her some relief.  She had also prior to that tried gabapentin and Lyrica which she was unable to tolerate secondary to side effects.  Additionally patient complains of facial pain and numbness.  She was previously worked up several years ago for TMJ and trigeminal neuralgia but was never diagnosed.  She states that this has been bothering her more.     Review of patient's allergies indicates:  No Known Allergies    Current Outpatient Medications   Medication Sig Dispense Refill    ascorbic acid, vitamin C, (VITAMIN C) 500 MG tablet Take 500 mg by mouth once daily.      azelastine (ASTELIN) 137 mcg (0.1 %) nasal spray 1 spray (137 mcg total) by Nasal route 2 (two) times daily. 137 mL 3    blood sugar diagnostic Strp 1 strip by Misc.(Non-Drug; Combo Route) route once daily. 90 strip 3    blood sugar diagnostic Strp 1 each by Misc.(Non-Drug; Combo Route) route once daily. 1 each 0    blood-glucose meter kit Check blood sugar once dialy 1 each 0    cholecalciferol, vitamin D3, (VITAMIN D3 ORAL) Vitamin D3      clindamycin (CLEOCIN T) 1 % Swab Apply 1 each  topically 2 (two) times daily.      cyanocobalamin (VITAMIN B-12) 1000 MCG tablet B12      dapsone (ACZONE) 7.5 % GlwP Apply topically.      fexofenadine (ALLEGRA) 180 MG tablet Take 1 tablet (180 mg total) by mouth once daily. 30 tablet 12    fluticasone propionate (FLONASE) 50 mcg/actuation nasal spray 2 sprays by Each Nostril route once daily.      furosemide (LASIX) 20 MG tablet Take 1 tablet (20 mg total) by mouth daily as needed. 30 tablet 3    hylan g-f 20 (SYNVISC-ONE) 48 mg/6 mL Syrg 6 mLs.      ivermectin (SOOLANTRA) 1 % Crea Apply topically.      lancets (ACCU-CHEK SOFTCLIX LANCETS) Misc 1 lancet by Misc.(Non-Drug; Combo Route) route once daily. 90 each 4    losartan (COZAAR) 50 MG tablet Take 1 tablet (50 mg total) by mouth once daily. 90 tablet 3    methocarbamoL (ROBAXIN) 750 MG Tab Take 1 tablet (750 mg total) by mouth 4 (four) times daily as needed (muscle spams). 40 tablet 0    MULTIVITAMIN ORAL multivitamin      omeprazole (PRILOSEC) 40 MG capsule Take 1 capsule (40 mg total) by mouth once daily. 90 capsule 3    ondansetron (ZOFRAN) 4 MG tablet Take 1 tablet (4 mg total) by mouth every 8 (eight) hours as needed for Nausea. 45 tablet 1    potassium chloride SA (K-DUR,KLOR-CON) 20 MEQ tablet Take 1 tablet (20 mEq total) by mouth once daily. 30 tablet 3    prenatal vit calc,iron,folic (PRENATAL VITAMIN ORAL) Take by mouth.      propranoloL (INDERAL LA) 80 MG 24 hr capsule Take 1 capsule (80 mg total) by mouth once daily. 90 capsule 3    fenofibrate 160 MG Tab Take 1 tablet (160 mg total) by mouth once daily. (Patient not taking: No sig reported) 90 tablet 3    ipratropium (ATROVENT) 42 mcg (0.06 %) nasal spray 2 sprays 3 (three) times daily.      LIDOcaine (LIDODERM) 5 % Place 1 patch onto the skin daily as needed (pain). Remove & Discard patch within 12 hours or as directed by MD (Patient not taking: Reported on 5/31/2022) 5 patch 0    linaCLOtide (LINZESS) 145 mcg Cap capsule  Take 1 capsule (145 mcg total) by mouth once daily. (Patient not taking: No sig reported) 90 capsule 3    metFORMIN (GLUCOPHAGE) 500 MG tablet Take 1 tablet (500 mg total) by mouth daily with breakfast. (Patient not taking: Reported on 2/8/2022) 90 tablet 3    naproxen (NAPROSYN) 500 MG tablet Take 500 mg by mouth 2 (two) times daily.      semaglutide (OZEMPIC) 0.25 mg or 0.5 mg(2 mg/1.5 mL) pen injector Inject 0.25 mg into the skin every 7 days. (Patient not taking: Reported on 5/31/2022) 1 pen 3     No current facility-administered medications for this visit.       Past Medical History:   Diagnosis Date    Acid reflux     Diabetes mellitus     Diabetes mellitus, type 2     Diet controlled gestational diabetes mellitus in puerperium     History of seasonal allergies     Hypertension      Past Surgical History:   Procedure Laterality Date    ANTERIOR CERVICAL DISCECTOMY W/ FUSION N/A 9/17/2018    Procedure: DISCECTOMY, SPINE, CERVICAL, ANTERIOR APPROACH, WITH FUSION C3-4, C4-5;  Surgeon: Leo Denton MD;  Location: [facility] OR [address] [place] [address];  Service: Neurosurgery;  Laterality: N/A;  toronto II, asa 2, regular bed, supine, josefina    HYSTERECTOMY      2011 [facility], R SAL[facility]    MINIMALLY INVASIVE TRANSFORAMINAL LUMBAR INTERBODY FUSION (TLIF) N/A 1/25/2021    Procedure: FUSION, SPINE, LUMBAR, TLIF, MINIMALLY INVASIVE L4-5;  Surgeon: Leo Denton MD;  Location: [facility] OR [address] [place] [address];  Service: Neurosurgery;  Laterality: N/A;  [place] table 4 poster, prone, neuromonitoring, josefina, kriss Mendoza    right knee scope       Family History     Problem Relation (Age of Onset)    Diabetes type II Sister, Other    Hyperlipidemia Sister        Social History     Socioeconomic History    Marital status:    Tobacco Use    Smoking status: Never Smoker    Smokeless tobacco: Never Used   Substance and Sexual Activity    Alcohol use: No    Drug use: No    Sexual activity: Yes     Partners: Male     Birth  "control/protection: Surgical       Review of Systems  Constitutional: no fever or chills  Eyes: no visual changes  ENT: no nasal congestion or sore throat  Respiratory: no cough or shortness of breath  Cardiovascular: no chest pain or palpitations  Gastrointestinal: no nausea or vomiting  Genitourinary: no hematuria or dysuria  Integument/Breast: no rash or pruritis  Hematologic/Lymphatic: no easy bruising or lymphadenopathy  Musculoskeletal: no arthralgias or myalgias  Neurological: no seizures or tremors    OBJECTIVE:     Vital Signs  Pulse: 61  BP: 123/84  Pain Score:   2  Height: 5' 3" (160 cm)  Weight: 93.9 kg (207 lb)  Body mass index is 36.67 kg/m².    Neurosurgery Physical Exam  General: no distress  Neurologic: Alert and oriented. Thought content appropriate.  Head: normocephalic. Facial sensation intact.  GCS: Motor: 6/Verbal: 5/Eyes: 4 GCS Total: 15  Cranial nerves: face symmetric, tongue midline, pupils equal, round, reactive to light with accomodation, extraocular muscles intact  Sensory: response to light touch throughout  Motor Strength:full strength upper and lower extremities  Negative florian's  Negative ankle clonus  Ambulates in room without difficulty.  No difficulty with tandem gait.   Pronator Drift: no drift noted        Diagnostic Results: personally reviewed  Narrative & Impression  EXAMINATION:  XR CERVICAL SPINE AP LAT WITH FLEX EXTEN     CLINICAL HISTORY:  Arthrodesis status     TECHNIQUE:  Three views of the cervical spine plus flexion and extension views were performed.     COMPARISON:  N 11/20/2018 one     FINDINGS:  Postoperative changes of disc implantation at C3/C4 and C4/C5 level identified.  The position alignment is satisfactory and unchanged as compared to the previous study.  DJD.  The disc spaces are narrowed between C5 and C7 vertebral segments.  Bridging osteophytosis noted.  No fracture or dislocation.  No bone destruction identified     Impression:     See " above        Electronically signed by: Berto Hong MD  Date:                                            05/31/2022  Time:                                           09:15    ASSESSMENT/PLAN:     60 yo F with L4/5 MIS TLIF on 1/25/2021, and prior ACDF C3-4, C4-5.  Patient doing well from a low back standpoint, she has had 6 month history of cervical radiculopathy in the C5-6 distribution.  She is neurologically intact without any focal deficits however radicular pain is giving her a lot trouble.  X-ray show stable alignment surgical hardware, there is some adjacent level disc disease at C5-6 and C6-7 which does correlate with her symptoms.  Given this in the duration of her symptoms get updated MRI of the cervical spine.  She is unable to tolerate tolerate gabapentin and Lyrica in the past, can potentially get POORNIMA pending MRI results.  Neurology referral placed for ongoing facial pain and tingling.  Will see her back after MRI is done, She was encouraged to call the clinic with any questions or concerns.    Dash Servin Jr. EVA  [facility]  Department of Neurosurgery        Note dictated with voice recognition software, please excuse any grammatical errors.

## 2022-05-31 NOTE — TELEPHONE ENCOUNTER
----- Message from Eren Hunt MA sent at 5/31/2022 11:53 AM CDT -----  Regarding: NP appt  Good morning Dash Ulloa has entered a referral to Neurology for Trigeminal Neuralgia. Could you contact  her to schedule with the appropriate provider when you get a moment. Thanks, EJ

## 2022-06-01 ENCOUNTER — TELEPHONE (OUTPATIENT)
Dept: FAMILY MEDICINE | Facility: CLINIC | Age: 62
End: 2022-06-01
Payer: COMMERCIAL

## 2022-06-01 NOTE — TELEPHONE ENCOUNTER
----- Message from Jay Lopez sent at 6/1/2022 12:41 PM CDT -----  Contact: Cover My Meds  .Type:  Needs Medical Advice    Who Called: Cover my meds  Would the patient rather a call back or a response via [facility]? Call back  Best Call Back Number: [phone] Ref. Key  [id number]  Additional Information: Pt. Needs a prior authorization on her semaglutide (OZEMPIC) 0.25 mg or 0.5 mg(2 mg/1.5 mL) pen injector  please call cover my meds.

## 2022-06-01 NOTE — TELEPHONE ENCOUNTER
----- Message from Leann Ryees sent at 6/1/2022 11:39 AM CDT -----  Contact: [phone]/ Self  Type: Requesting to speak with nurse    Who Called: Pt  Regarding: requesting a refill on linaCLOtide (LINZESS) 145 mcg Cap capsule   Would the patient rather a call back or a response via [facility]? Call back  Best Call Back Number: [phone]  Additional Information: [facility] [place].. Pt was told by pharmacist semaglutide (OZEMPIC) 0.25 mg or 0.5 mg(2 mg/1.5 mL) pen injector needed authorization from MD.

## 2022-06-01 NOTE — TELEPHONE ENCOUNTER
PA submitted via Cover My Meds. Awaiting a response.    NEHEMIAH BUCHANAN (Key: [id number])  Ozempic (0.25 or 0.5 MG/DOSE) 2MG/1.5ML pen-injectors     Form  [employer] Electronic PA Form (2017 NCPDP)  Created  14 minutes ago  Sent to Plan  1 minute ago  Plan Response  1 minute ago  Submit Clinical Questions  less than a minute ago  Determination  Favorable  less than a minute ago  Message from Plan  CaseId:[id number];Status:Approved;Review Type:Prior Auth;Coverage Start Date:06/01/2022;Coverage End Date:06/01/2023;

## 2022-06-03 ENCOUNTER — TELEPHONE (OUTPATIENT)
Dept: FAMILY MEDICINE | Facility: CLINIC | Age: 62
End: 2022-06-03
Payer: COMMERCIAL

## 2022-06-03 NOTE — TELEPHONE ENCOUNTER
----- Message from Nga Walker sent at 6/3/2022  9:46 AM CDT -----  Type:  Needs Medical Advice    Who Called: pt  Symptoms (please be specific):   How long has patient had these symptoms:    Pharmacy name and phone #:          Would the patient rather a call back or a response via [facility]? call  Best Call Back Number:       Additional Information: medication was $220.21 - wants to know if you have samples or any other medication similar.          semaglutide (OZEMPIC) 0.25 mg or 0.5 mg(2 mg/1.5 mL) pen injector 1 pen 3 5/27/2022  No  Sig - Route: Inject 0.25 mg into the skin every 7 days. - Subcutaneous  Patient not taking: Reported on 5/31/2022       Sent to pharmacy as: semaglutide (OZEMPIC) 0.25 mg or 0.5 mg(2 mg/1.5 mL) pen injector  Class: Normal  Order: [id number]  Date/Time Signed: 5/27/2022 11:26      E-Prescribing Status: Receipt confirmed by pharmacy (5/27/2022 11:26 AM CDT)

## 2022-06-03 NOTE — TELEPHONE ENCOUNTER
Pt is stating that Ozempic is too expensive. It's approximately $220. She's wanting something that is more cost efficient.    Pharmacy: [facility]

## 2022-06-04 NOTE — TELEPHONE ENCOUNTER
unfortunately there is nothing similar that would be covered by insurance. We do not have samples in our clinic.

## 2022-06-09 ENCOUNTER — TELEPHONE (OUTPATIENT)
Dept: NEUROLOGY | Facility: CLINIC | Age: 62
End: 2022-06-09
Payer: COMMERCIAL

## 2022-06-09 NOTE — TELEPHONE ENCOUNTER
"Referral from [facility] for facial pain/trigeminal neuralgia. Patient has been experiencing abnormal L facial sensation for the last 2-3 mths. [facility] hx of TLIF and ACDF, followed by ELIA Servin. MRI c-spine pending for June 15th. Tried gabapentin for facial pain but felt "off" when she took it.    Appt offered/accepted for 6/16/22 @ 9:00am with ELIA Zaragoza at [facility], 7th floor clinic tower. Appt letters for MRI and Neuro appt placed in the mail.   "

## 2022-06-09 NOTE — TELEPHONE ENCOUNTER
----- Message from Kaitlin Manzano sent at 6/8/2022  9:06 AM CDT -----  Contact: @[phone]  Pt requesting a call back from Laila Mendez regarding scheduling an appt.  Please call to discuss further.

## 2022-06-10 ENCOUNTER — TELEPHONE (OUTPATIENT)
Dept: NEUROLOGY | Facility: CLINIC | Age: 62
End: 2022-06-10
Payer: COMMERCIAL

## 2022-06-11 ENCOUNTER — HOSPITAL ENCOUNTER (EMERGENCY)
Facility: HOSPITAL | Age: 62
Discharge: HOME | End: 2022-06-11
Attending: FAMILY MEDICINE
Payer: COMMERCIAL

## 2022-06-11 VITALS
WEIGHT: 200 LBS | SYSTOLIC BLOOD PRESSURE: 129 MMHG | DIASTOLIC BLOOD PRESSURE: 93 MMHG | HEART RATE: 82 BPM | OXYGEN SATURATION: 99 % | RESPIRATION RATE: 19 BRPM | HEIGHT: 63 IN | TEMPERATURE: 98 F | BODY MASS INDEX: 35.44 KG/M2

## 2022-06-11 DIAGNOSIS — R20.2 PARESTHESIAS: Primary | ICD-10-CM

## 2022-06-11 LAB
ALBUMIN SERPL BCP-MCNC: 4.1 G/DL (ref 3.5–5.2)
ALP SERPL-CCNC: 81 U/L (ref 38–126)
ALT SERPL W/O P-5'-P-CCNC: 30 U/L (ref 10–44)
ANION GAP SERPL CALC-SCNC: 8 MMOL/L (ref 8–16)
AST SERPL-CCNC: 25 U/L (ref 15–46)
BILIRUB SERPL-MCNC: 0.9 MG/DL (ref 0.1–1)
CALCIUM SERPL-MCNC: 9.2 MG/DL (ref 8.7–10.5)
CHLORIDE SERPL-SCNC: 103 MMOL/L (ref 95–110)
CO2 SERPL-SCNC: 28 MMOL/L (ref 23–29)
CREAT SERPL-MCNC: 1.06 MG/DL (ref 0.5–1.4)
EST. GFR  (AFRICAN AMERICAN): >60 ML/MIN/1.73 M^2
EST. GFR  (NON AFRICAN AMERICAN): 57.2 ML/MIN/1.73 M^2
GLUCOSE SERPL-MCNC: 132 MG/DL (ref 70–110)
POTASSIUM SERPL-SCNC: 4.1 MMOL/L (ref 3.5–5.1)
PROT SERPL-MCNC: 7.6 G/DL (ref 6–8.4)
SODIUM SERPL-SCNC: 139 MMOL/L (ref 136–145)
UUN UR-MCNC: 12 MG/DL (ref 7–17)

## 2022-06-11 PROCEDURE — 99283 EMERGENCY DEPT VISIT LOW MDM: CPT | Mod: ER

## 2022-06-11 PROCEDURE — 80053 COMPREHEN METABOLIC PANEL: CPT | Mod: ER | Performed by: FAMILY MEDICINE

## 2022-06-11 NOTE — ED NOTES
"Pt c/o "twitching" x4 wks of face and eye. intermittent pain/numbness/tingling to both sides of her face. Upon exam pt equal and appropriate, except when asked to smile a noticeable droop on right side.    "

## 2022-06-11 NOTE — ED PROVIDER NOTES
Encounter Date: 6/11/2022       History     Chief Complaint   Patient presents with    Numbness     Pt presents to ED with C/O numbness and tingling to L face.  Pt reports she is currently being treated and is scheduled for an MRI 06/15/2022 due to symptoms.      62-year-old female complains of tingling numbness of her face for last 2-3 months.  The symptoms are intermittent and happens on both sides of her face.  It is not unilateral.  Today she again feels tingling numbness symptoms.  No facial deviation.  No drooping eyelids.  No slurring of speech.  Patient uses ivermectin cream for her acne.   Patient has chronic neck pain and lumbar pain with history of surgery.  She does have history of chronic neuropathy for which she was tried with gabapentin and Lyrica but could not tolerate.  History of diabetes and hypertension.    The history is provided by the patient.     Review of patient's allergies indicates:  No Known Allergies  Past Medical History:   Diagnosis Date    Acid reflux     Diabetes mellitus     Diabetes mellitus, type 2     Diet controlled gestational diabetes mellitus in puerperium     History of seasonal allergies     Hypertension      Past Surgical History:   Procedure Laterality Date    ANTERIOR CERVICAL DISCECTOMY W/ FUSION N/A 9/17/2018    Procedure: DISCECTOMY, SPINE, CERVICAL, ANTERIOR APPROACH, WITH FUSION C3-4, C4-5;  Surgeon: Leo Denton MD;  Location: [facility] OR [address] [place] [address];  Service: Neurosurgery;  Laterality: N/A;  toronto II, asa 2, regular bed, supine, josefina    HYSTERECTOMY      2011 [facility] LSO, R SALPINGECT [facility]    MINIMALLY INVASIVE TRANSFORAMINAL LUMBAR INTERBODY FUSION (TLIF) N/A 1/25/2021    Procedure: FUSION, SPINE, LUMBAR, TLIF, MINIMALLY INVASIVE L4-5;  Surgeon: Leo Denton MD;  Location: [facility] OR [address] [place] [address];  Service: Neurosurgery;  Laterality: N/A;  reggie table 4 poster, prone, neuromonitoring, josefina, kriss Mendoza    right knee scope       Family History   Problem  Relation Age of Onset    Diabetes type II Sister     Hyperlipidemia Sister     Diabetes type II Other     Allergic rhinitis Neg Hx     Allergies Neg Hx     Angioedema Neg Hx     Asthma Neg Hx     Atopy Neg Hx     Eczema Neg Hx     Rhinitis Neg Hx     Urticaria Neg Hx     Immunodeficiency Neg Hx      Social History     Tobacco Use    Smoking status: Never Smoker    Smokeless tobacco: Never Used   Substance Use Topics    Alcohol use: No    Drug use: No     Review of Systems   Constitutional: Negative for activity change, appetite change, chills and fever.   HENT: Negative for congestion, ear discharge, rhinorrhea, sinus pressure, sinus pain, sore throat and trouble swallowing.    Eyes: Negative for photophobia, pain, discharge, redness, itching and visual disturbance.   Respiratory: Negative for cough, chest tightness, shortness of breath and wheezing.    Cardiovascular: Negative for chest pain, palpitations and leg swelling.   Gastrointestinal: Negative for abdominal distention, abdominal pain, constipation, diarrhea, nausea and vomiting.   Genitourinary: Negative for dysuria, flank pain, frequency and hematuria.   Musculoskeletal: Positive for back pain and neck pain. Negative for gait problem and neck stiffness.   Skin: Negative for rash and wound.   Neurological: Positive for numbness. Negative for dizziness, tremors, seizures, syncope, facial asymmetry, speech difficulty, weakness, light-headedness and headaches.   Psychiatric/Behavioral: Negative for behavioral problems, confusion, hallucinations and sleep disturbance. The patient is not nervous/anxious.    All other systems reviewed and are negative.      Physical Exam     Initial Vitals [06/11/22 1434]   BP Pulse Resp Temp SpO2   (!) 129/93 82 19 98.3 °F (36.8 °C) 99 %      MAP       --         Physical Exam    Nursing note and vitals reviewed.  Constitutional: Vital signs are normal. She appears well-developed and well-nourished. She is  active. No distress.   HENT:   Head: Normocephalic.   Nose: Nose normal.   Mouth/Throat: Oropharynx is clear and moist and mucous membranes are normal.   Eyes: Conjunctivae, EOM and lids are normal.   Neck: Neck supple.   Normal range of motion.  Cardiovascular: Normal rate, regular rhythm, S1 normal, S2 normal and normal heart sounds.   Pulmonary/Chest: Breath sounds normal. No respiratory distress. She has no wheezes. She has no rales.   Abdominal: Abdomen is soft. Bowel sounds are normal. She exhibits no distension. There is no abdominal tenderness. There is no rebound and no guarding.   Musculoskeletal:         General: Normal range of motion.      Right upper arm: Normal.      Left upper arm: Normal.      Cervical back: Normal range of motion and neck supple.      Right lower leg: Normal.      Left lower leg: Normal.     Neurological: She is alert and oriented to person, place, and time. She has normal strength. She displays normal reflexes. No cranial nerve deficit. GCS score is 15. GCS eye subscore is 4. GCS verbal subscore is 5. GCS motor subscore is 6.   Skin: Skin is warm. Capillary refill takes less than 2 seconds.   Psychiatric: She has a normal mood and affect. Her speech is normal and behavior is normal. Thought content normal. Cognition and memory are normal.         ED Course   Procedures  Labs Reviewed   COMPREHENSIVE METABOLIC PANEL - Abnormal; Notable for the following components:       Result Value    Glucose 132 (*)     eGFR if non  57.2 (*)     All other components within normal limits          Imaging Results    None          Medications - No data to display  Medical Decision Making:   Initial Assessment:   Paresthesias of face for last 2 months on both sides of face.  No facial weakness.  Differential Diagnosis:   Paresthesia, dermatitis, neuropathy  ED Management:  Patient's symptoms are not focal and generalized on face.  The symptoms are intermittent and both sides of the  face.  No facial deviation.  Cranial nerves normal.  Patient uses ivermectin cream.  Unknown if this is related to either make in but advised to follow-up with dermatology.  Advised to follow-up ED with one-sided weakness, slurring of speech.                      Clinical Impression:   Final diagnoses:  [R20.2] Paresthesias (Primary)          ED Disposition Condition    Discharge Stable        ED Prescriptions     None        Follow-up Information     Follow up With Specialties Details Why Contact Info    Anson López MD Internal Medicine Schedule an appointment as soon as possible for a visit in 1 week  [address] [place] [id number] [phone]             Dell Hudson MD  06/12/22 7145

## 2022-06-15 ENCOUNTER — HOSPITAL ENCOUNTER (EMERGENCY)
Facility: HOSPITAL | Age: 62
Discharge: HOME | End: 2022-06-15
Attending: EMERGENCY MEDICINE
Payer: COMMERCIAL

## 2022-06-15 ENCOUNTER — HOSPITAL ENCOUNTER (OUTPATIENT)
Dept: RADIOLOGY | Facility: HOSPITAL | Age: 62
Discharge: HOME | End: 2022-06-15
Attending: PHYSICIAN ASSISTANT
Payer: COMMERCIAL

## 2022-06-15 VITALS
WEIGHT: 200 LBS | RESPIRATION RATE: 19 BRPM | HEART RATE: 101 BPM | SYSTOLIC BLOOD PRESSURE: 148 MMHG | DIASTOLIC BLOOD PRESSURE: 78 MMHG | BODY MASS INDEX: 35.44 KG/M2 | OXYGEN SATURATION: 99 % | TEMPERATURE: 99 F | HEIGHT: 63 IN

## 2022-06-15 DIAGNOSIS — R20.2 PARESTHESIA: Primary | ICD-10-CM

## 2022-06-15 DIAGNOSIS — Z98.1 HISTORY OF FUSION OF CERVICAL SPINE: ICD-10-CM

## 2022-06-15 DIAGNOSIS — M47.812 CERVICAL SPONDYLOSIS: ICD-10-CM

## 2022-06-15 LAB — POCT GLUCOSE: 112 MG/DL (ref 70–110)

## 2022-06-15 PROCEDURE — 82962 GLUCOSE BLOOD TEST: CPT | Mod: ER

## 2022-06-15 PROCEDURE — 99283 EMERGENCY DEPT VISIT LOW MDM: CPT | Mod: 25,ER

## 2022-06-15 PROCEDURE — 72141 MRI NECK SPINE W/O DYE: CPT | Mod: TC,PO

## 2022-06-15 RX ORDER — MELOXICAM 7.5 MG/1
7.5 TABLET ORAL DAILY
Qty: 30 TABLET | Refills: 0 | Status: SHIPPED | OUTPATIENT
Start: 2022-06-15 | End: 2023-03-28

## 2022-06-15 NOTE — ED NOTES
Follow up and discharge instructions given. Pt verbalized understanding.    Respirations even and non-labored. AAO x 3. NADN.

## 2022-06-15 NOTE — ED NOTES
Pt c/o tingling and numbness to right side of neck x 3 weeks. No new complaints noted. Respirations even and non labored. AAO x 3. NADN.

## 2022-06-15 NOTE — DISCHARGE INSTRUCTIONS
You have been prescribed Mobic for pain. This is an Non-Steroidal Anti-Inflammatory (NSAID) Medication. Please do not take any additional NSAIDs while you are taking this medication including (Advil, Aleve, Motrin, Ibuprofen, Mobic\meloxicam, Naprosyn, Toradol, ketoralac, etc.). Please stop taking this medication if you experience: weakness, itching, yellow skin or eyes, joint pains, vomiting blood, blood or black stools, unusual weight gain, or swelling in your arms, legs, hands, or feet.

## 2022-06-16 NOTE — ED PROVIDER NOTES
Encounter Date: 6/15/2022       History     Chief Complaint   Patient presents with    Neck Pain     C/o bilat neck pain and tingling ongoing since Saturday. Was seen here Saturday. Had an MRI today to rule out pinched nerve. Denies any chest pain or sob. Denies any new injury.      HPI: Carmen Son, a 62 y.o. female  has a past medical history of Acid reflux, Diabetes mellitus, Diabetes mellitus, type 2, Diet controlled gestational diabetes mellitus in puerperium, History of seasonal allergies, and Hypertension.     She presents to the ED for evaluation of bilateral neck tingling with and intermittent for the last 3 week.  Per chart review patient had recent visit with Neurosurgery but noted she stated that his stooling over the last 6 months.  Patient had recent MRI today for this concern.  Denies any current focal deficits.  No known alleviating or exacerbating movements.  No fever.  No medications tried within the last 3 weeks for this issue.  Patient has upcoming appointment with Neurology to explore this complaint next week.        The history is provided by the patient.     Review of patient's allergies indicates:  No Known Allergies  Past Medical History:   Diagnosis Date    Acid reflux     Diabetes mellitus     Diabetes mellitus, type 2     Diet controlled gestational diabetes mellitus in puerperium     History of seasonal allergies     Hypertension      Past Surgical History:   Procedure Laterality Date    ANTERIOR CERVICAL DISCECTOMY W/ FUSION N/A 9/17/2018    Procedure: DISCECTOMY, SPINE, CERVICAL, ANTERIOR APPROACH, WITH FUSION C3-4, C4-5;  Surgeon: Leo Denton MD;  Location: [facility] OR [address] [place] [address];  Service: Neurosurgery;  Laterality: N/A;  toronto II, asa 2, regular bed, supine, josefina    HYSTERECTOMY      2011 [facility] LSO, R [facility]ECT [facility]    MINIMALLY INVASIVE TRANSFORAMINAL LUMBAR INTERBODY FUSION (TLIF) N/A 1/25/2021    Procedure: FUSION, SPINE, LUMBAR, TLIF, MINIMALLY INVASIVE  L4-5;  Surgeon: Leo Denton MD;  Location: [facility] OR [address] [place] [address];  Service: Neurosurgery;  Laterality: N/A;  reggie table 4 poster, prone, neuromonitoring, josefina, kriss Mendoza    right knee scope       Family History   Problem Relation Age of Onset    Diabetes type II Sister     Hyperlipidemia Sister     Diabetes type II Other     Allergic rhinitis Neg Hx     Allergies Neg Hx     Angioedema Neg Hx     Asthma Neg Hx     Atopy Neg Hx     Eczema Neg Hx     Rhinitis Neg Hx     Urticaria Neg Hx     Immunodeficiency Neg Hx      Social History     Tobacco Use    Smoking status: Never Smoker    Smokeless tobacco: Never Used   Substance Use Topics    Alcohol use: No    Drug use: No     Review of Systems   Constitutional: Negative for fever.   HENT: Negative for congestion.    Musculoskeletal: Negative for arthralgias, neck pain and neck stiffness.   Skin: Negative for color change and rash.   Neurological: Negative for weakness, light-headedness and numbness.        Intermittent bilateral neck tingling    Psychiatric/Behavioral: Negative for agitation and suicidal ideas.   All other systems reviewed and are negative.      Physical Exam     Initial Vitals [06/15/22 1631]   BP Pulse Resp Temp SpO2   (!) 148/78 101 19 98.5 °F (36.9 °C) 99 %      MAP       --         Physical Exam    Nursing note and vitals reviewed.  Constitutional: She appears well-developed and well-nourished. She is not diaphoretic. No distress.   HENT:   Head: Normocephalic and atraumatic.   Right Ear: External ear normal.   Left Ear: External ear normal.   Nose: Nose normal.   Eyes: Conjunctivae and EOM are normal.   Neck:   Normal range of motion.   Full passive range of motion without pain.     Cardiovascular: Normal rate and regular rhythm.   Pulmonary/Chest: No respiratory distress.   Musculoskeletal:         General: Normal range of motion.      Cervical back: Full passive range of motion without pain and normal range of motion. No spinous  process tenderness or muscular tenderness.     Neurological: She is alert and oriented to person, place, and time. No cranial nerve deficit or sensory deficit. GCS score is 15. GCS eye subscore is 4. GCS verbal subscore is 5. GCS motor subscore is 6.   Skin: No rash noted.   Psychiatric: She has a normal mood and affect. Thought content normal.         ED Course   Procedures  Labs Reviewed   POCT GLUCOSE - Abnormal; Notable for the following components:       Result Value    POCT Glucose 112 (*)     All other components within normal limits          Imaging Results    None          Medications - No data to display  Medical Decision Making:   Initial Assessment:   Bilateral, intermittent neck tingling  Differential Diagnosis:   Radiculopathy, paresthesia, neuropathy  ED Management:  Patient's symptoms are not focal and generalized on face.  The symptoms are intermittent and both sides of the face.  No facial deviation.  Cranial nerves normal.  Encouraged patient to keep upcoming appointment with Neurology and return with any new or worsening.  Case discussed with attending physician.                      Clinical Impression:   Final diagnoses:  [R20.2] Paresthesia (Primary)          ED Disposition Condition    Discharge Stable        ED Prescriptions     Medication Sig Dispense Start Date End Date Auth. Provider    meloxicam (MOBIC) 7.5 MG tablet Take 1 tablet (7.5 mg total) by mouth once daily. 30 tablet 6/15/2022  Meg Viramontes PA-C        Follow-up Information     Follow up With Specialties Details Why Contact Info    Anson López MD Internal Medicine   [address] [place] [address] [phone]             Meg Viramontes PA-C  06/16/22 6229

## 2022-06-21 ENCOUNTER — OFFICE VISIT (OUTPATIENT)
Dept: NEUROLOGY | Facility: CLINIC | Age: 62
End: 2022-06-21
Payer: COMMERCIAL

## 2022-06-21 VITALS
BODY MASS INDEX: 36.41 KG/M2 | HEIGHT: 63 IN | SYSTOLIC BLOOD PRESSURE: 140 MMHG | DIASTOLIC BLOOD PRESSURE: 96 MMHG | WEIGHT: 205.5 LBS | HEART RATE: 78 BPM

## 2022-06-21 DIAGNOSIS — M54.2 NECK PAIN: ICD-10-CM

## 2022-06-21 DIAGNOSIS — R51.9 FACIAL PAIN: ICD-10-CM

## 2022-06-21 DIAGNOSIS — R20.2 FACIAL PARESTHESIA: Primary | ICD-10-CM

## 2022-06-21 PROCEDURE — 3080F PR MOST RECENT DIASTOLIC BLOOD PRESSURE >= 90 MM HG: ICD-10-PCS | Mod: CPTII,S$GLB,, | Performed by: PHYSICIAN ASSISTANT

## 2022-06-21 PROCEDURE — 1160F RVW MEDS BY RX/DR IN RCRD: CPT | Mod: CPTII,S$GLB,, | Performed by: PHYSICIAN ASSISTANT

## 2022-06-21 PROCEDURE — 1159F MED LIST DOCD IN RCRD: CPT | Mod: CPTII,S$GLB,, | Performed by: PHYSICIAN ASSISTANT

## 2022-06-21 PROCEDURE — 1159F PR MEDICATION LIST DOCUMENTED IN MEDICAL RECORD: ICD-10-PCS | Mod: CPTII,S$GLB,, | Performed by: PHYSICIAN ASSISTANT

## 2022-06-21 PROCEDURE — 3008F PR BODY MASS INDEX (BMI) DOCUMENTED: ICD-10-PCS | Mod: CPTII,S$GLB,, | Performed by: PHYSICIAN ASSISTANT

## 2022-06-21 PROCEDURE — 3044F HG A1C LEVEL LT 7.0%: CPT | Mod: CPTII,S$GLB,, | Performed by: PHYSICIAN ASSISTANT

## 2022-06-21 PROCEDURE — 99999 PR PBB SHADOW E&M-EST. PATIENT-LVL V: ICD-10-PCS | Mod: PBBFAC,,, | Performed by: PHYSICIAN ASSISTANT

## 2022-06-21 PROCEDURE — 3077F SYST BP >= 140 MM HG: CPT | Mod: CPTII,S$GLB,, | Performed by: PHYSICIAN ASSISTANT

## 2022-06-21 PROCEDURE — 4010F ACE/ARB THERAPY RXD/TAKEN: CPT | Mod: CPTII,S$GLB,, | Performed by: PHYSICIAN ASSISTANT

## 2022-06-21 PROCEDURE — 99999 PR PBB SHADOW E&M-EST. PATIENT-LVL V: CPT | Mod: PBBFAC,,, | Performed by: PHYSICIAN ASSISTANT

## 2022-06-21 PROCEDURE — 3080F DIAST BP >= 90 MM HG: CPT | Mod: CPTII,S$GLB,, | Performed by: PHYSICIAN ASSISTANT

## 2022-06-21 PROCEDURE — 3044F PR MOST RECENT HEMOGLOBIN A1C LEVEL <7.0%: ICD-10-PCS | Mod: CPTII,S$GLB,, | Performed by: PHYSICIAN ASSISTANT

## 2022-06-21 PROCEDURE — 3008F BODY MASS INDEX DOCD: CPT | Mod: CPTII,S$GLB,, | Performed by: PHYSICIAN ASSISTANT

## 2022-06-21 PROCEDURE — 3077F PR MOST RECENT SYSTOLIC BLOOD PRESSURE >= 140 MM HG: ICD-10-PCS | Mod: CPTII,S$GLB,, | Performed by: PHYSICIAN ASSISTANT

## 2022-06-21 PROCEDURE — 99204 OFFICE O/P NEW MOD 45 MIN: CPT | Mod: S$GLB,,, | Performed by: PHYSICIAN ASSISTANT

## 2022-06-21 PROCEDURE — 1160F PR REVIEW ALL MEDS BY PRESCRIBER/CLIN PHARMACIST DOCUMENTED: ICD-10-PCS | Mod: CPTII,S$GLB,, | Performed by: PHYSICIAN ASSISTANT

## 2022-06-21 PROCEDURE — 4010F PR ACE/ARB THEARPY RXD/TAKEN: ICD-10-PCS | Mod: CPTII,S$GLB,, | Performed by: PHYSICIAN ASSISTANT

## 2022-06-21 PROCEDURE — 99204 PR OFFICE/OUTPT VISIT, NEW, LEVL IV, 45-59 MIN: ICD-10-PCS | Mod: S$GLB,,, | Performed by: PHYSICIAN ASSISTANT

## 2022-06-21 RX ORDER — OXCARBAZEPINE 150 MG/1
150 TABLET, FILM COATED ORAL 2 TIMES DAILY
Qty: 60 TABLET | Refills: 1 | Status: SHIPPED | OUTPATIENT
Start: 2022-06-21 | End: 2022-08-01 | Stop reason: ALTCHOICE

## 2022-06-21 NOTE — PROGRESS NOTES
"Subjective:         Patient ID: Carmen Son is a 62 y.o. female.  Referred by:   Sanjeev Cruz  [address] [place] [address]  Chief Complaint:  Consult      History of Present Illness  This is a 62 y.o. female with a  history of lumbar radiculopathy s/p L4/5 MIS TLIF on 1/25/2021, and cervical radiculopathy s/p ACDF C3-4, C4-5 in 2017 who presents to clinic alone and was referred to neurology by neurosurgery SANJEEV Servin  for evaluation of facial pain/numbness.   Patient reports she has been experiencing abnormal facial sensation with associated pain for the past 2 months. She furher describes her symptoms as episodic facial numbness/tingling  that begins on both sides of her neck and travels along her jawline across her forehead,cheek bones, and to the tops of her ears bilaterally, and the right side of the bridge of her nose. She notes in addition to the numbness and tingling she also also will experiences episodes of brief stabbing/burning pain along the left side of her cheek. She notes associated symptoms ot her facial pain and paraesthesias  Are tightness/pressure sensation" under her eyes,and when this occurs it makes it hard to close her eyes", and notes this sensation is worse when she tries to raises her eyebrows, also notes experiencing left eye twitching, as well as dry eyes, and photophobia. She further notes this morning si the first time  she experienced the burning/stabbing sensation on the left side of her cheek, which lasted a couple seconds and then resolved. She notes her other facial symptoms usually last several minutes and can be relieved by rubbing her forehead. She notes the numbness/tingling at the tops of her ears can be triggered by wearing a mask or wearing heavy earrings, but other she denies noticing any particular triggers to her facial numbness/tingling or burning facial pain, such as talking, chewing, cold water or touching her face.   She " notes her symptoms occur daily, but they seem to come out of no where with no identified triggers or certain time of day of occurrence. She notes these symptoms are starting to affect her sleep, and notes she woke up in the early morning around 5 am with a stabbing pain on her left cheek without radiation elsewhere. She notes when these symptoms first began she thought maybe they were secondary to suing dapsone and clindamycin facial medication for treatment of her rosacea so she stopped taking this as well and symptoms persisted. She also notes she saw her dentist last Wednesday thinking the symptoms were due to a dental infection but this workup was negative for any dental issues. She also notes she has gone to the ED several times for these symptoms with negative workup, and most recently given mobic RX. She notes she has tried relieving her current symptoms with tylenol and mobic, which she notes alleviates the pain for about 2-3 hours.     . Also notes cervical radiculopathy symptoms of worsening episodic numbness/tingling down both her arms and has seen NSGY and completed cervical spine MRI and hs followup with NSGY on the 28th to review imaging.   She notes she has experienced similar symptoms in the past and ws diagnosed with parasthesias and trigeminal neuralgia but notes these symptoms at that time were confined to the front of both of her ears. She notes at that time she was prescribed gabapentin and lyrica by her psychiatrist,  but stopped due to intolerable side effects, and notes these symptoms subsided after she completed cervical spine surgery for tx of cervical stenosis and symptoms did not recur until now. She notes she feels like she is under a lot of stress in the last 4-5 months, and asked if there was anything she could take for her anxiety.   She denies facial weakness or facial drooping,diplopia, blurry vision, changes in hearing, denies trouble talking, denies dysphagia, denies dizziness or  gait abnormality, denies N/V.        Medications tried and failed: gabapentin, lyrica, carb    Past Medical History:   Diagnosis Date    Acid reflux     Diabetes mellitus     Diabetes mellitus, type 2     Diet controlled gestational diabetes mellitus in puerperium     History of seasonal allergies     Hypertension        Past Surgical History:   Procedure Laterality Date    ANTERIOR CERVICAL DISCECTOMY W/ FUSION N/A 9/17/2018    Procedure: DISCECTOMY, SPINE, CERVICAL, ANTERIOR APPROACH, WITH FUSION C3-4, C4-5;  Surgeon: Leo Denton MD;  Location: [facility] OR [address] [place] [address];  Service: Neurosurgery;  Laterality: N/A;  toronto II, asa 2, regular bed, supine, josefina    HYSTERECTOMY      2011 [facility], [facility]ECT [facility]    MINIMALLY INVASIVE TRANSFORAMINAL LUMBAR INTERBODY FUSION (TLIF) N/A 1/25/2021    Procedure: FUSION, SPINE, LUMBAR, TLIF, MINIMALLY INVASIVE L4-5;  Surgeon: Leo Denton MD;  Location: [facility] OR [address] [place] [address];  Service: Neurosurgery;  Laterality: N/A;  [place] table 4 poster, prone, neuromonitoring, josefina, kriss Mendoza    right knee scope         Family History   Problem Relation Age of Onset    Diabetes type II Sister     Hyperlipidemia Sister     Diabetes type II Other     Allergic rhinitis Neg Hx     Allergies Neg Hx     Angioedema Neg Hx     Asthma Neg Hx     Atopy Neg Hx     Eczema Neg Hx     Rhinitis Neg Hx     Urticaria Neg Hx     Immunodeficiency Neg Hx        Social History     Socioeconomic History    Marital status:    Tobacco Use    Smoking status: Never Smoker    Smokeless tobacco: Never Used   Substance and Sexual Activity    Alcohol use: No    Drug use: No    Sexual activity: Yes     Partners: Male     Birth control/protection: Surgical       Review of Systems: as per HPI, otherwise a balanced 10 systems review is negative  Objective:     Vitals:    06/21/22 1111   BP: (!) 140/96   Pulse: 78       Physical Exam   Constitutional: female appears well-developed and  well-nourished. female is well groomed. NAD   HENT:    Head: Normocephalic and atraumatic. Frontalis was NTTP, temporalis was NTTP. Facial sensation is slightly diminished sensation in left V3 distrubutio,otherwise intact in V1, V2, and NTTP along V1, V2, V3 distrubution's. There is no hyperpathia or allodynia on my examination today.  No ptosis is noted.  No facial droop.    Eyes: Conjunctivae and EOM are normal  Skin: Skin is warm and dry.  Psychiatric: Mood and affect are normal    Neuro: Patient is alert and oriented to person, place, and time. Language is intact and fluent. Speech is clear and fluent. Recent and remote memory are intact.  Normal attention and concentration.  Facial movement is symmetric. Moves all 4 extremities against gravity. Gait and station normal.  Cranial Nerves II through XII without focal deficit.     Focused examination was undertaken today. Most of the visit time was spent giving guidance, counseling and discussing treatment options.    IMAGING Reviewed:   Results for orders placed or performed during the hospital encounter of 09/26/18   CT Head Without Contrast    Narrative    EXAMINATION:  CT HEAD WITHOUT CONTRAST    CLINICAL HISTORY:  Dizziness and giddiness.;    TECHNIQUE:  Routine noncontrast head CT.    COMPARISON:  None    FINDINGS:  There is no acute intracranial hemorrhage or abnormal extra-axial fluid collection.  There is no abnormal increased or decreased density within the brain parenchyma.  Gray-white differentiation preserved normal ventricles.  There is no intracranial mass or mass effect.  The calvarium is intact.  Visualized paranasal sinuses and mastoids are well aerated.      Impression    Normal head CT.    All CT scans at this facility are performed  using dose modulation techniques as appropriate to performed exam including the following:  automated exposure control; adjustment of mA and/or kV according to the patients size (this includes techniques or  standardized protocols for targeted exams where dose is matched to indication/reason for exam: i.e. extremities or head);  iterative reconstruction technique.      Electronically signed by: Arturo Rai MD  Date:    09/26/2018  Time:    18:21        Note: I have independently reviewed any/all imaging/labs/tests and agree with the report (s) as documented.  Any discrepancies will be as noted/demarcated by free text. EVA ORTEGA 6/21/2022  Assessment/Plan:     Problem List Items Addressed This Visit    None     Visit Diagnoses     Facial paresthesia    -  Primary    Relevant Medications    OXcarbazepine (TRILEPTAL) 150 MG Tab    Other Relevant Orders    MRI Brain W WO Contrast    Facial pain        Relevant Medications    OXcarbazepine (TRILEPTAL) 150 MG Tab    Other Relevant Orders    MRI Brain W WO Contrast          This is a 62 y.o. female who presents for evaluation of bilateral L>R facial paraesthesias and pain, symptoms begin on both sides of her neck and travel along her jawline across her forehead,cheek bones, and to the tops of her ears bilaterally, and the right side of the bridge of her nose. Patient also reports paroxysms of burning, stabbing pain along her left cheek in addition to numbness/tingling sensation.  She denies typical triggers to this pain associated with TN such as brushing her teeth, chewing, talking or sensitivity to touch or air. On exam, mild sensory deficits in left V3 distribution in comparison to right V3, but otherwise did not elicit pain upon touch. Patient does have a prior history of similar symptoms,but symptoms are now more widespread than in the past were just confined to in front of both ears. She denies headaches. I will obtain an MRI brain with thin cuts along the trigeminal nerve to further evaluate for secondary causes to her new worsening symptoms. In the meantime we have discussed  treatment options and patient agreed with the following plan:  Will prescribe patient a trial  of oxcarbazepine 150 mg BID for treatment of neuropathic left sided facial pain.  I discussed risks, benefits, and potential side effects of this medication. We will start at a lower dose alternative treatment options offered. Patient has tried and failed gabapentin, lyrica, and carbamazepine without relief of symptoms.   Advised patient to continue followup appointments with neurosurgery for separate issues of neck pain with radicular numbness and tingling down both arms and for review of patients recently completed cervical spine MRI.     I have also Instructed patient to keep a diary of her pain including location, triggers, characteristics, and aggravating factors to delineate between TN vs. other TAC syndromes.     I will see patient back for follow up in 2 months after the results of her brain MRI are completed. At that timeIf there does appear to be a secondary cause to her trigeminal neuralgia compressing of the trigeminal nerve then I would likely make a referral to Neurosurgery    The patient verbalizes understanding and agreement with the treatment plan. Questions were sought and answered to patients stated verbal satisfaction.     Griselda Zaragoza PA-C  [facility]  Department of Neurology     Collaborating Physician, Dr. Worthy, was available during today's encounter.

## 2022-06-28 ENCOUNTER — HOSPITAL ENCOUNTER (OUTPATIENT)
Dept: RADIOLOGY | Facility: HOSPITAL | Age: 62
Discharge: HOME | End: 2022-06-28
Attending: PHYSICIAN ASSISTANT
Payer: COMMERCIAL

## 2022-06-28 ENCOUNTER — OFFICE VISIT (OUTPATIENT)
Dept: NEUROSURGERY | Facility: CLINIC | Age: 62
End: 2022-06-28
Payer: COMMERCIAL

## 2022-06-28 VITALS
HEART RATE: 79 BPM | BODY MASS INDEX: 36.32 KG/M2 | HEIGHT: 63 IN | SYSTOLIC BLOOD PRESSURE: 138 MMHG | WEIGHT: 205 LBS | DIASTOLIC BLOOD PRESSURE: 91 MMHG

## 2022-06-28 DIAGNOSIS — R20.2 FACIAL PARESTHESIA: ICD-10-CM

## 2022-06-28 DIAGNOSIS — R51.9 FACIAL PAIN: ICD-10-CM

## 2022-06-28 DIAGNOSIS — Z98.1 S/P LUMBAR FUSION: ICD-10-CM

## 2022-06-28 DIAGNOSIS — M47.812 CERVICAL SPONDYLOSIS: Primary | ICD-10-CM

## 2022-06-28 PROCEDURE — 25500020 PHARM REV CODE 255: Mod: PO | Performed by: PHYSICIAN ASSISTANT

## 2022-06-28 PROCEDURE — 3044F HG A1C LEVEL LT 7.0%: CPT | Mod: CPTII,S$GLB,, | Performed by: PHYSICIAN ASSISTANT

## 2022-06-28 PROCEDURE — 4010F PR ACE/ARB THEARPY RXD/TAKEN: ICD-10-PCS | Mod: CPTII,S$GLB,, | Performed by: PHYSICIAN ASSISTANT

## 2022-06-28 PROCEDURE — 1159F MED LIST DOCD IN RCRD: CPT | Mod: CPTII,S$GLB,, | Performed by: PHYSICIAN ASSISTANT

## 2022-06-28 PROCEDURE — 70553 MRI BRAIN STEM W/O & W/DYE: CPT | Mod: TC,PO

## 2022-06-28 PROCEDURE — 3080F PR MOST RECENT DIASTOLIC BLOOD PRESSURE >= 90 MM HG: ICD-10-PCS | Mod: CPTII,S$GLB,, | Performed by: PHYSICIAN ASSISTANT

## 2022-06-28 PROCEDURE — 99999 PR PBB SHADOW E&M-EST. PATIENT-LVL V: CPT | Mod: PBBFAC,,, | Performed by: PHYSICIAN ASSISTANT

## 2022-06-28 PROCEDURE — 1160F PR REVIEW ALL MEDS BY PRESCRIBER/CLIN PHARMACIST DOCUMENTED: ICD-10-PCS | Mod: CPTII,S$GLB,, | Performed by: PHYSICIAN ASSISTANT

## 2022-06-28 PROCEDURE — 3075F PR MOST RECENT SYSTOLIC BLOOD PRESS GE 130-139MM HG: ICD-10-PCS | Mod: CPTII,S$GLB,, | Performed by: PHYSICIAN ASSISTANT

## 2022-06-28 PROCEDURE — 4010F ACE/ARB THERAPY RXD/TAKEN: CPT | Mod: CPTII,S$GLB,, | Performed by: PHYSICIAN ASSISTANT

## 2022-06-28 PROCEDURE — 3044F PR MOST RECENT HEMOGLOBIN A1C LEVEL <7.0%: ICD-10-PCS | Mod: CPTII,S$GLB,, | Performed by: PHYSICIAN ASSISTANT

## 2022-06-28 PROCEDURE — 99213 OFFICE O/P EST LOW 20 MIN: CPT | Mod: S$GLB,,, | Performed by: PHYSICIAN ASSISTANT

## 2022-06-28 PROCEDURE — 3080F DIAST BP >= 90 MM HG: CPT | Mod: CPTII,S$GLB,, | Performed by: PHYSICIAN ASSISTANT

## 2022-06-28 PROCEDURE — 1159F PR MEDICATION LIST DOCUMENTED IN MEDICAL RECORD: ICD-10-PCS | Mod: CPTII,S$GLB,, | Performed by: PHYSICIAN ASSISTANT

## 2022-06-28 PROCEDURE — 99213 PR OFFICE/OUTPT VISIT, EST, LEVL III, 20-29 MIN: ICD-10-PCS | Mod: S$GLB,,, | Performed by: PHYSICIAN ASSISTANT

## 2022-06-28 PROCEDURE — 3008F PR BODY MASS INDEX (BMI) DOCUMENTED: ICD-10-PCS | Mod: CPTII,S$GLB,, | Performed by: PHYSICIAN ASSISTANT

## 2022-06-28 PROCEDURE — 3008F BODY MASS INDEX DOCD: CPT | Mod: CPTII,S$GLB,, | Performed by: PHYSICIAN ASSISTANT

## 2022-06-28 PROCEDURE — A9585 GADOBUTROL INJECTION: HCPCS | Mod: PO | Performed by: PHYSICIAN ASSISTANT

## 2022-06-28 PROCEDURE — 3075F SYST BP GE 130 - 139MM HG: CPT | Mod: CPTII,S$GLB,, | Performed by: PHYSICIAN ASSISTANT

## 2022-06-28 PROCEDURE — 1160F RVW MEDS BY RX/DR IN RCRD: CPT | Mod: CPTII,S$GLB,, | Performed by: PHYSICIAN ASSISTANT

## 2022-06-28 PROCEDURE — 99999 PR PBB SHADOW E&M-EST. PATIENT-LVL V: ICD-10-PCS | Mod: PBBFAC,,, | Performed by: PHYSICIAN ASSISTANT

## 2022-06-28 RX ORDER — METHOCARBAMOL 750 MG/1
750 TABLET, FILM COATED ORAL 4 TIMES DAILY PRN
Qty: 40 TABLET | Refills: 0 | Status: SHIPPED | OUTPATIENT
Start: 2022-06-28 | End: 2023-04-26

## 2022-06-28 RX ORDER — GADOBUTROL 604.72 MG/ML
9 INJECTION INTRAVENOUS
Status: COMPLETED | OUTPATIENT
Start: 2022-06-28 | End: 2022-06-28

## 2022-06-28 RX ADMIN — GADOBUTROL 9 ML: 604.72 INJECTION INTRAVENOUS at 03:06

## 2022-06-29 NOTE — PROGRESS NOTES
Neurosurgery  Established Patient    SUBJECTIVE:     Interval History:  Ms. Son is here today for neurosurgical f/u, she reports gradually improving neck pain, she is still having some LUE paresthesias, she states her radicular pain has seemed to subside some since last visit.  She was see by neurology for facial tingling, and started on trileptal which she stated has helped her symptoms.  denies pain, paraesthesias, weakness in extremities.  Denies bowel/bladder dysfunction.        History of Present Illness(5/31/22):  62 yo F with L4/5 MIS TLIF on 1/25/2021, and prior ACDF C3-4, C4-5,  I last saw her on 2/8/22, at the time she was neurologically stable in improvement lower extremity symptoms.  CT shows expected postoperative findings degree fusion.  She reports continued improvement in her low pain, she has been working with physical therapy.  Denies any pain paresthesias weakness in lower extremities.  He has had issues with neck pain and radicular pain in upper extremity drilling down the radial aspect of her arm into her top 2 digits.  He states that this is been going on for about 6 months and seems to be getting worse.  He denies any weakness, changes in hand dexterity, or balance difficulty.  She states that she has gotten an POORNIMA in the past, that was about 5 years ago that did did give her some relief.  She had also prior to that tried gabapentin and Lyrica which she was unable to tolerate secondary to side effects.  Additionally patient complains of facial pain and numbness.  She was previously worked up several years ago for TMJ and trigeminal neuralgia but was never diagnosed.  She states that this has been bothering her more.     Review of patient's allergies indicates:  No Known Allergies    Current Outpatient Medications   Medication Sig Dispense Refill    blood sugar diagnostic Strp 1 strip by Misc.(Non-Drug; Combo Route) route once daily. 90 strip 3    blood sugar diagnostic Strp 1 each by  Misc.(Non-Drug; Combo Route) route once daily. 1 each 0    blood-glucose meter kit Check blood sugar once dialy 1 each 0    OXcarbazepine (TRILEPTAL) 150 MG Tab Take 1 tablet (150 mg total) by mouth 2 (two) times daily. 60 tablet 1    propranoloL (INDERAL LA) 80 MG 24 hr capsule Take 1 capsule (80 mg total) by mouth once daily. 90 capsule 3    ascorbic acid, vitamin C, (VITAMIN C) 500 MG tablet Take 500 mg by mouth once daily.      azelastine (ASTELIN) 137 mcg (0.1 %) nasal spray 1 spray (137 mcg total) by Nasal route 2 (two) times daily. (Patient not taking: Reported on 6/28/2022) 137 mL 3    cholecalciferol, vitamin D3, (VITAMIN D3 ORAL) Vitamin D3      clindamycin (CLEOCIN T) 1 % Swab Apply 1 each topically 2 (two) times daily.      cyanocobalamin (VITAMIN B-12) 1000 MCG tablet B12      dapsone (ACZONE) 7.5 % GlwP Apply topically.      fenofibrate 160 MG Tab Take 1 tablet (160 mg total) by mouth once daily. (Patient not taking: No sig reported) 90 tablet 3    fexofenadine (ALLEGRA) 180 MG tablet Take 1 tablet (180 mg total) by mouth once daily. (Patient not taking: Reported on 6/28/2022) 30 tablet 12    fluticasone propionate (FLONASE) 50 mcg/actuation nasal spray 2 sprays by Each Nostril route once daily.      furosemide (LASIX) 20 MG tablet Take 1 tablet (20 mg total) by mouth daily as needed. (Patient not taking: Reported on 6/28/2022) 30 tablet 3    hylan g-f 20 (SYNVISC-ONE) 48 mg/6 mL Syrg 6 mLs.      ipratropium (ATROVENT) 42 mcg (0.06 %) nasal spray 2 sprays 3 (three) times daily.      ivermectin (SOOLANTRA) 1 % Crea Apply topically.      lancets (ACCU-CHEK SOFTCLIX LANCETS) Misc 1 lancet by Misc.(Non-Drug; Combo Route) route once daily. (Patient not taking: Reported on 6/28/2022) 90 each 4    LIDOcaine (LIDODERM) 5 % Place 1 patch onto the skin daily as needed (pain). Remove & Discard patch within 12 hours or as directed by MD (Patient not taking: No sig reported) 5 patch 0     linaCLOtide (LINZESS) 145 mcg Cap capsule Take 1 capsule (145 mcg total) by mouth once daily. (Patient not taking: Reported on 6/28/2022) 90 capsule 3    losartan (COZAAR) 50 MG tablet Take 1 tablet (50 mg total) by mouth once daily. (Patient not taking: Reported on 6/28/2022) 90 tablet 3    meloxicam (MOBIC) 7.5 MG tablet Take 1 tablet (7.5 mg total) by mouth once daily. (Patient not taking: Reported on 6/28/2022) 30 tablet 0    metFORMIN (GLUCOPHAGE) 500 MG tablet Take 1 tablet (500 mg total) by mouth daily with breakfast. (Patient not taking: Reported on 2/8/2022) 90 tablet 3    methocarbamoL (ROBAXIN) 750 MG Tab Take 1 tablet (750 mg total) by mouth 4 (four) times daily as needed (muscle spams). 40 tablet 0    MULTIVITAMIN ORAL multivitamin      naproxen (NAPROSYN) 500 MG tablet Take 500 mg by mouth 2 (two) times daily.      omeprazole (PRILOSEC) 40 MG capsule Take 1 capsule (40 mg total) by mouth once daily. (Patient not taking: Reported on 6/28/2022) 90 capsule 3    ondansetron (ZOFRAN) 4 MG tablet Take 1 tablet (4 mg total) by mouth every 8 (eight) hours as needed for Nausea. (Patient not taking: Reported on 6/28/2022) 45 tablet 1    potassium chloride SA (K-DUR,KLOR-CON) 20 MEQ tablet Take 1 tablet (20 mEq total) by mouth once daily. (Patient not taking: Reported on 6/28/2022) 30 tablet 3    prenatal vit calc,iron,folic (PRENATAL VITAMIN ORAL) Take by mouth.      semaglutide (OZEMPIC) 0.25 mg or 0.5 mg(2 mg/1.5 mL) pen injector Inject 0.25 mg into the skin every 7 days. (Patient not taking: No sig reported) 1 pen 3     No current facility-administered medications for this visit.       Past Medical History:   Diagnosis Date    Acid reflux     Diabetes mellitus     Diabetes mellitus, type 2     Diet controlled gestational diabetes mellitus in puerperium     History of seasonal allergies     Hypertension      Past Surgical History:   Procedure Laterality Date    ANTERIOR CERVICAL DISCECTOMY W/  "FUSION N/A 9/17/2018    Procedure: DISCECTOMY, SPINE, CERVICAL, ANTERIOR APPROACH, WITH FUSION C3-4, C4-5;  Surgeon: Leo Denton MD;  [facility];  Service: Neurosurgery;  Laterality: N/A;  toronto II, asa 2, regular bed, supine, josefina    HYSTERECTOMY      2011 [facility], [facility]    MINIMALLY INVASIVE TRANSFORAMINAL LUMBAR INTERBODY FUSION (TLIF) N/A 1/25/2021    Procedure: FUSION, SPINE, LUMBAR, TLIF, MINIMALLY INVASIVE L4-5;  Surgeon: Leo Denton MD;  Location: [facility] OR [address] [place] [address];  Service: Neurosurgery;  Laterality: N/A;  [place] table 4 poster, prone, neuromonitoring, josefina, kriss Kelly    right knee scope       Family History     Problem Relation (Age of Onset)    Diabetes type II Sister, Other    Hyperlipidemia Sister        Social History     Socioeconomic History    Marital status:    Tobacco Use    Smoking status: Never Smoker    Smokeless tobacco: Never Used   Substance and Sexual Activity    Alcohol use: No    Drug use: No    Sexual activity: Yes     Partners: Male     Birth control/protection: Surgical       Review of Systems  Constitutional: no fever or chills  Eyes: no visual changes  ENT: no nasal congestion or sore throat  Respiratory: no cough or shortness of breath  Cardiovascular: no chest pain or palpitations  Gastrointestinal: no nausea or vomiting  Genitourinary: no hematuria or dysuria  Integument/Breast: no rash or pruritis  Hematologic/Lymphatic: no easy bruising or lymphadenopathy  Musculoskeletal: no arthralgias or myalgias  Neurological: no seizures or tremors    OBJECTIVE:     Vital Signs  Pulse: 79  BP: (!) 138/91  Pain Score:   3  Height: 5' 3" (160 cm)  Weight: 93 kg (205 lb)  Body mass index is 36.31 kg/m².    Neurosurgery Physical Exam  General: no distress  Neurologic: Alert and oriented. Thought content appropriate.  Head: normocephalic  Cranial nerves: face symmetric, tongue midline, pupils equal, round, reactive to light with " accomodation, extraocular muscles intact  Sensory: response to light touch throughout  Motor Strength:full strength upper and lower extremities      Diagnostic Results: personally reviewed  EXAMINATION:  MRI CERVICAL SPINE WITHOUT CONTRAST     CLINICAL HISTORY:  cervical radiculopathy; Arthrodesis status     TECHNIQUE:  Multiplanar, multisequence MR images of the cervical spine were performed without the administration of contrast.     COMPARISON:  CT cervical spine 12/01/2020; MRI cervical spine 08/13/2018     FINDINGS:  Alignment: Straightening of the normal cervical lordosis.     Vertebrae: There are postsurgical changes of a C3-C4 and C4-C5 interbody fusion.  Visualized cervical vertebral body heights are maintained.  Metallic artifact obscures evaluation of the C3 through C5 vertebral bodies.  No abnormal osseous edema.  No evidence of acute fracture.  Endplate degenerative changes appreciated at C6-C7.  Visualized marrow signal is otherwise within normal limits.     Discs: Disc desiccation within the remaining discs with height loss most pronounced at C6-C7.     Cord: Small focus of right lateral cord signal abnormality at C3-C4 may be artifactual.  The remaining cervical cord is within normal limits     Skull base and craniocervical junction: Within normal limits.     Degenerative findings:     C2-C3: Broad-based central disc osteophyte complex effaces the ventral thecal sac.  Minor flattening of the ventral cord.  No spinal canal stenosis or neural foraminal stenosis.     C3-C4: Postsurgical changes of an interbody fusion.  No spinal canal stenosis or neural foraminal stenosis.     C4-C5: Postsurgical changes of an interbody fusion.  Uncovertebral joint spurring with at least mild neural foraminal stenosis although evaluation is limited by metallic artifact.  No spinal canal stenosis.     C5-C6: Small central disc osteophyte complex.  No spinal canal stenosis or neural foraminal stenosis     C6-C7: Minor  broad-based disc osteophyte complex.  Mild facet arthropathy and uncovertebral joint spurring with mild left-sided neural foraminal stenosis.  No significant spinal canal stenosis.     C7-T1: No significant disc bulge.  Left-sided facet arthropathy with mild left-sided neural foraminal stenosis.  No significant spinal canal stenosis.     T1-T2: No significant disc bulge or canal stenosis.  Left-sided facet arthropathy with mild left-sided neural foraminal stenosis.     Paraspinal muscles & soft tissues: Within normal limits.     Impression:     Postsurgical changes of a C3-C4 and C4-C5 intervertebral body fusion.  No spinal canal stenosis at the operative levels.  Mild C4-C5 neural foraminal stenosis.     Tiny focus of right lateral C3-C4 cord signal abnormality may be artifactual or reflect a tiny focus of myelomalacia.     Mild left-sided C6-C7, C7-T1 and T1-T2 neural foraminal stenosis.        Electronically signed by: Phong Leigh  Date:                                            06/15/2022  Time:                                           15:56    ASSESSMENT/PLAN:     63 yo F with L4/5 MIS TLIF on 1/25/2021, and prior ACDF C3-4, C4-5, with continued left sided cervical radiculopathy, somewhat subsided today.  Patient is neurologically stable without focal deficits.  MRI shows stable surgical hardware without central canal stenosis. There is mild left sided foraminal stenosis in the lower cervical spine at C6-7 and below.  Again she is unable to tolerate Neurontin/lyrica side effects.  She is etablished with pain management at outside facility, who she is scheduled to she.  She has gotten improvement with PT in the past, will re order. Follow up in 6 months, she was encouraged to call the clinic with any questions or concerns.      Dash Servin Jr. PA-C  [facility]  Department of Neurosurgery        Note dictated with voice recognition software, please excuse any grammatical errors.

## 2022-06-30 ENCOUNTER — LAB VISIT (OUTPATIENT)
Dept: LAB | Facility: HOSPITAL | Age: 62
End: 2022-06-30
Attending: STUDENT IN AN ORGANIZED HEALTH CARE EDUCATION/TRAINING PROGRAM
Payer: COMMERCIAL

## 2022-06-30 ENCOUNTER — TELEPHONE (OUTPATIENT)
Dept: FAMILY MEDICINE | Facility: CLINIC | Age: 62
End: 2022-06-30
Payer: COMMERCIAL

## 2022-06-30 DIAGNOSIS — R30.0 DYSURIA: Primary | ICD-10-CM

## 2022-06-30 DIAGNOSIS — R30.0 DYSURIA: ICD-10-CM

## 2022-06-30 LAB
AMORPH CRY UR QL COMP ASSIST: NORMAL
BILIRUB UR QL STRIP: NEGATIVE
CLARITY UR REFRACT.AUTO: ABNORMAL
COLOR UR AUTO: YELLOW
GLUCOSE UR QL STRIP: NEGATIVE
HGB UR QL STRIP: NEGATIVE
KETONES UR QL STRIP: NEGATIVE
LEUKOCYTE ESTERASE UR QL STRIP: NEGATIVE
MICROSCOPIC COMMENT: NORMAL
NITRITE UR QL STRIP: NEGATIVE
PH UR STRIP: 6 [PH] (ref 5–8)
PROT UR QL STRIP: NEGATIVE
SP GR UR STRIP: >=1.03 (ref 1–1.03)
URN SPEC COLLECT METH UR: ABNORMAL
UROBILINOGEN UR STRIP-ACNC: NEGATIVE EU/DL

## 2022-06-30 PROCEDURE — 81000 URINALYSIS NONAUTO W/SCOPE: CPT | Mod: PO | Performed by: STUDENT IN AN ORGANIZED HEALTH CARE EDUCATION/TRAINING PROGRAM

## 2022-06-30 NOTE — TELEPHONE ENCOUNTER
----- Message from Evie Dhillon sent at 6/30/2022 12:57 PM CDT -----  Type:  Same Day Appointment Request    Caller is requesting a same day appointment.  Caller declined first available appointment listed below.    Name of Caller:Pt  When is the first available appointment?10/16/2022  Symptoms: UTI  Best Call Back Number: [phone]  Additional Information: n/a

## 2022-06-30 NOTE — TELEPHONE ENCOUNTER
Spoke to patient. Patient stated that she is experiencing burning while urinating, pain in her kidney area and her urine is cloudy/hazy in appearance. Patient would like to have orders for a UA and culture.

## 2022-07-03 ENCOUNTER — HOSPITAL ENCOUNTER (EMERGENCY)
Facility: HOSPITAL | Age: 62
Discharge: HOME | End: 2022-07-03
Attending: EMERGENCY MEDICINE
Payer: COMMERCIAL

## 2022-07-03 VITALS
OXYGEN SATURATION: 99 % | RESPIRATION RATE: 20 BRPM | SYSTOLIC BLOOD PRESSURE: 159 MMHG | DIASTOLIC BLOOD PRESSURE: 88 MMHG | HEART RATE: 76 BPM | TEMPERATURE: 99 F

## 2022-07-03 DIAGNOSIS — R20.2 PARESTHESIA: ICD-10-CM

## 2022-07-03 DIAGNOSIS — R51.9 FACIAL PAIN: Primary | ICD-10-CM

## 2022-07-03 PROCEDURE — 99284 EMERGENCY DEPT VISIT MOD MDM: CPT

## 2022-07-03 RX ORDER — OXCARBAZEPINE 150 MG/1
150 TABLET, FILM COATED ORAL 2 TIMES DAILY
Qty: 30 TABLET | Refills: 0 | Status: SHIPPED | OUTPATIENT
Start: 2022-07-03 | End: 2022-08-01 | Stop reason: ALTCHOICE

## 2022-07-03 RX ORDER — KETOROLAC TROMETHAMINE 10 MG/1
10 TABLET, FILM COATED ORAL EVERY 6 HOURS PRN
Qty: 12 TABLET | Refills: 0 | Status: SHIPPED | OUTPATIENT
Start: 2022-07-03 | End: 2022-07-06

## 2022-07-03 NOTE — ED PROVIDER NOTES
Encounter Date: 7/3/2022       History     Chief Complaint   Patient presents with    Facial Pain     Reports face pain in forehead around eye and around jaw line. Perscribed medication for facial pain, pricky, burning sensation. Patient had MRI done was placed on cymbalta and made tingling worse so she stopped it two days ago.      Patient is a 62-year-old female with a past medical history of type 2 diabetes, hypertension who presents to the ED with complaint of facial pain.  Patient reports shock-like facial pain to the bilateral sides of her face for which she was prescribed oxcarbazepine 150 mg BID by her neurologist.  She reports worsening pain after being started on Cymbalta.  She states she stopped taking her Cymbalta 2 days into it and has increased her oxcarbazepine to 150 mg every 6 hours which has helped with her pain because she began to have additional feelings of prickly sensation to the area of the glabella and around her eyes.  She denies any vision change, speech deficits, focal numbness or weakness or other complaints..          Review of patient's allergies indicates:  No Known Allergies  Past Medical History:   Diagnosis Date    Acid reflux     Diabetes mellitus     Diabetes mellitus, type 2     Diet controlled gestational diabetes mellitus in puerperium     History of seasonal allergies     Hypertension      Past Surgical History:   Procedure Laterality Date    ANTERIOR CERVICAL DISCECTOMY W/ FUSION N/A 9/17/2018    Procedure: DISCECTOMY, SPINE, CERVICAL, ANTERIOR APPROACH, WITH FUSION C3-4, C4-5;  Surgeon: Leo Denton MD;  Location: [facility] OR [address] [place] [address];  Service: Neurosurgery;  Laterality: N/A;  toronto II, asa 2, regular bed, supine, josefina    HYSTERECTOMY      2011 [facility], R SAL[facility]    MINIMALLY INVASIVE TRANSFORAMINAL LUMBAR INTERBODY FUSION (TLIF) N/A 1/25/2021    Procedure: FUSION, SPINE, LUMBAR, TLIF, MINIMALLY INVASIVE L4-5;  Surgeon: Leo Denton MD;  Location:  [facility] OR 2ND FLR;  Service: Neurosurgery;  Laterality: N/A;  regige table 4 poster, prone, neuromonitoring, josefina, kriss Mendoza    right knee scope       Family History   Problem Relation Age of Onset    Diabetes type II Sister     Hyperlipidemia Sister     Diabetes type II Other     Allergic rhinitis Neg Hx     Allergies Neg Hx     Angioedema Neg Hx     Asthma Neg Hx     Atopy Neg Hx     Eczema Neg Hx     Rhinitis Neg Hx     Urticaria Neg Hx     Immunodeficiency Neg Hx      Social History     Tobacco Use    Smoking status: Never Smoker    Smokeless tobacco: Never Used   Substance Use Topics    Alcohol use: No    Drug use: No     Review of Systems   Constitutional: Negative for chills and fever.   Eyes: Negative for photophobia, redness and visual disturbance.   Respiratory: Negative for chest tightness and shortness of breath.    Cardiovascular: Negative for chest pain, palpitations and leg swelling.   Gastrointestinal: Negative for abdominal pain, nausea and vomiting.   Genitourinary: Negative for dysuria.   Musculoskeletal: Negative for back pain, myalgias, neck pain and neck stiffness.   Skin: Negative for pallor and rash.   Allergic/Immunologic: Negative for immunocompromised state.   Neurological: Negative for dizziness, seizures, speech difficulty and headaches.   Psychiatric/Behavioral: Negative for agitation and confusion.       Physical Exam     Initial Vitals [07/03/22 1254]   BP Pulse Resp Temp SpO2   (!) 159/88 75 20 98.4 °F (36.9 °C) 99 %      MAP       --         Physical Exam    Nursing note and vitals reviewed.  Constitutional: She appears well-developed and well-nourished.   HENT:   Head: Normocephalic and atraumatic.   Right Ear: External ear normal.   Left Ear: External ear normal.   Eyes: EOM are normal. Pupils are equal, round, and reactive to light.   Neck: Neck supple.   Normal range of motion.  Cardiovascular: Normal rate, regular rhythm, normal heart sounds and intact distal  pulses.   Pulmonary/Chest: Breath sounds normal.   Abdominal: Abdomen is soft. Bowel sounds are normal.   Musculoskeletal:         General: Normal range of motion.      Cervical back: Normal range of motion and neck supple.     Neurological: She is alert and oriented to person, place, and time. She has normal strength. GCS score is 15. GCS eye subscore is 4. GCS verbal subscore is 5. GCS motor subscore is 6.   No focal neurological deficit  Strength 5/5   Sensation grossly in tact  MAEW  No facial droop  No pronator drift   Gait WNL    Skin: Skin is warm and dry. Capillary refill takes less than 2 seconds.   Psychiatric: She has a normal mood and affect.         ED Course   Procedures  Labs Reviewed - No data to display       Imaging Results    None          Medications - No data to display  Medical Decision Making:   ED Management:  - this appears to be an exacerbation of the patient's previous symptoms; I do not suspect stroke; the patient's symptoms are nonfocall, intermittent and affect both sides of her face; will augment this possible exacerbation with NSAIDs and I will refill her oxcarbazepine so that it can carry her over until her next neurology appointment; pt comfortable with this plan; pt given strict return precautions for any new or worsening symptoms  - No further intervention is indicated at this time after having taken into account the patient's history, physical exam findings, and empirical and objective data obtained during the patient's emergency department workup.   - The patient is at low risk for an emergent medical condition at this time, and I am of the belief that that it is safe to discharge the patient from the emergency department.   - The patient is instructed to follow up as outpatient as indicated on the discharge paperwork.    - I have discussed the specifics of the workup with the patient and the patient has verbalized understanding of the details of the workup, the diagnosis, the  treatment plan, and the need for outpatient follow-up.    - Although the patient has no emergent etiology today this does not preclude the development of an emergent condition so, in addition, I have advised the patient that they can return to the ED and/or activate EMS at any time with worsening of their symptoms, change of their symptoms, or with any other medical complaint.    - The patient remained comfortable and stable during their visit in the ED.    - Discharge and follow-up instructions discussed with the patient who expressed understanding and willingness to comply with my recommendations.  - Results of all emergency department tests  discussed thoroughly with patient; all patient questions answered; pt in agreement with plan  - Pt instructed to follow up with PCP in 2-3 days for recheck of today's complaints  - Pt given strict emergency department return precautions for any new or worsening of symptoms  - Pt discharged from the emergency department in stable condition, in no acute distress                        Clinical Impression:   Final diagnoses:  [R51.9] Facial pain (Primary)  [R20.2] Paresthesia          ED Disposition Condition    Discharge Stable        ED Prescriptions     Medication Sig Dispense Start Date End Date Auth. Provider    ketorolac (TORADOL) 10 mg tablet Take 1 tablet (10 mg total) by mouth every 6 (six) hours as needed for Pain. 12 tablet 7/3/2022 7/6/2022 Gerardo De Guzman MD    OXcarbazepine (TRILEPTAL) 150 MG Tab Take 1 tablet (150 mg total) by mouth 2 (two) times daily. 30 tablet 7/3/2022 7/3/2023 Gerardo De Guzman MD        Follow-up Information     Follow up With Specialties Details Why Contact Info    Anson López MD Internal Medicine Schedule an appointment as soon as possible for a visit   [address] [place] [id number] [phone]             Gerardo De Guzman MD  07/03/22 9237

## 2022-07-03 NOTE — ED NOTES
Patient arrives for evaluation of pain described as burning to midline of face x 3 months - states her pain medication for nerve pain is not working as well as it was and she has increased the frequency of dosing - patient states she recently had an MRI and would like the results - denies visual disturbances or headache - denies trauma or fevers

## 2022-07-07 ENCOUNTER — PATIENT OUTREACH (OUTPATIENT)
Dept: EMERGENCY MEDICINE | Facility: HOSPITAL | Age: 62
End: 2022-07-07
Payer: COMMERCIAL

## 2022-08-01 ENCOUNTER — OFFICE VISIT (OUTPATIENT)
Dept: NEUROLOGY | Facility: CLINIC | Age: 62
End: 2022-08-01
Payer: COMMERCIAL

## 2022-08-01 VITALS
SYSTOLIC BLOOD PRESSURE: 134 MMHG | HEART RATE: 86 BPM | WEIGHT: 204.94 LBS | HEIGHT: 63 IN | BODY MASS INDEX: 36.31 KG/M2 | DIASTOLIC BLOOD PRESSURE: 91 MMHG

## 2022-08-01 DIAGNOSIS — G50.0 TRIGEMINAL NEURALGIA: Primary | ICD-10-CM

## 2022-08-01 DIAGNOSIS — R20.2 FACIAL PARESTHESIA: ICD-10-CM

## 2022-08-01 PROCEDURE — 3008F BODY MASS INDEX DOCD: CPT | Mod: CPTII,S$GLB,, | Performed by: PHYSICIAN ASSISTANT

## 2022-08-01 PROCEDURE — 3044F PR MOST RECENT HEMOGLOBIN A1C LEVEL <7.0%: ICD-10-PCS | Mod: CPTII,S$GLB,, | Performed by: PHYSICIAN ASSISTANT

## 2022-08-01 PROCEDURE — 1160F PR REVIEW ALL MEDS BY PRESCRIBER/CLIN PHARMACIST DOCUMENTED: ICD-10-PCS | Mod: CPTII,S$GLB,, | Performed by: PHYSICIAN ASSISTANT

## 2022-08-01 PROCEDURE — 1160F RVW MEDS BY RX/DR IN RCRD: CPT | Mod: CPTII,S$GLB,, | Performed by: PHYSICIAN ASSISTANT

## 2022-08-01 PROCEDURE — 3080F PR MOST RECENT DIASTOLIC BLOOD PRESSURE >= 90 MM HG: ICD-10-PCS | Mod: CPTII,S$GLB,, | Performed by: PHYSICIAN ASSISTANT

## 2022-08-01 PROCEDURE — 1159F MED LIST DOCD IN RCRD: CPT | Mod: CPTII,S$GLB,, | Performed by: PHYSICIAN ASSISTANT

## 2022-08-01 PROCEDURE — 3080F DIAST BP >= 90 MM HG: CPT | Mod: CPTII,S$GLB,, | Performed by: PHYSICIAN ASSISTANT

## 2022-08-01 PROCEDURE — 3008F PR BODY MASS INDEX (BMI) DOCUMENTED: ICD-10-PCS | Mod: CPTII,S$GLB,, | Performed by: PHYSICIAN ASSISTANT

## 2022-08-01 PROCEDURE — 99999 PR PBB SHADOW E&M-EST. PATIENT-LVL IV: ICD-10-PCS | Mod: PBBFAC,,, | Performed by: PHYSICIAN ASSISTANT

## 2022-08-01 PROCEDURE — 3075F SYST BP GE 130 - 139MM HG: CPT | Mod: CPTII,S$GLB,, | Performed by: PHYSICIAN ASSISTANT

## 2022-08-01 PROCEDURE — 99214 OFFICE O/P EST MOD 30 MIN: CPT | Mod: S$GLB,,, | Performed by: PHYSICIAN ASSISTANT

## 2022-08-01 PROCEDURE — 1159F PR MEDICATION LIST DOCUMENTED IN MEDICAL RECORD: ICD-10-PCS | Mod: CPTII,S$GLB,, | Performed by: PHYSICIAN ASSISTANT

## 2022-08-01 PROCEDURE — 3075F PR MOST RECENT SYSTOLIC BLOOD PRESS GE 130-139MM HG: ICD-10-PCS | Mod: CPTII,S$GLB,, | Performed by: PHYSICIAN ASSISTANT

## 2022-08-01 PROCEDURE — 4010F PR ACE/ARB THEARPY RXD/TAKEN: ICD-10-PCS | Mod: CPTII,S$GLB,, | Performed by: PHYSICIAN ASSISTANT

## 2022-08-01 PROCEDURE — 99214 PR OFFICE/OUTPT VISIT, EST, LEVL IV, 30-39 MIN: ICD-10-PCS | Mod: S$GLB,,, | Performed by: PHYSICIAN ASSISTANT

## 2022-08-01 PROCEDURE — 3044F HG A1C LEVEL LT 7.0%: CPT | Mod: CPTII,S$GLB,, | Performed by: PHYSICIAN ASSISTANT

## 2022-08-01 PROCEDURE — 99999 PR PBB SHADOW E&M-EST. PATIENT-LVL IV: CPT | Mod: PBBFAC,,, | Performed by: PHYSICIAN ASSISTANT

## 2022-08-01 PROCEDURE — 4010F ACE/ARB THERAPY RXD/TAKEN: CPT | Mod: CPTII,S$GLB,, | Performed by: PHYSICIAN ASSISTANT

## 2022-08-01 RX ORDER — OXCARBAZEPINE 300 MG/1
300 TABLET, FILM COATED ORAL 2 TIMES DAILY
Qty: 60 TABLET | Refills: 2 | Status: SHIPPED | OUTPATIENT
Start: 2022-08-01 | End: 2022-08-03 | Stop reason: SDUPTHER

## 2022-08-01 NOTE — PROGRESS NOTES
Established Patient   Subjective:       Patient ID: Carmen Son is a 62 y.o. female.  Chief Complaint: Follow-up      Interval History:  Carmen Son is a 62 y.o. female w/ a history of lumbar radiculopathy s/p L4/5 MIS TLIF on 1/25/2021, and cervical radiculopathy s/p ACDF C3-4, C4-5 in 2017 who is here for follow up of bilateral facial pain and paraesthesias.  Their condition has somewhat changed since I saw her last visit and started her on trileptal 150 mg BID for preventative treatment of suspected trigeminal neuralgia.  She notes initially the trileptal was helpful at preventing her facial pain and parasthesias, but she further notes she was recently seen with psychiatry and prescribed Cymbalta which she notes she took for 2 days and stopped because it made her facial symptoms return. She notes she is currently taking the trileptal 150 mg 3 x a day since she was her symptoms have returned and were no longer controlled with the trlipetal 150 mg BID. She also notes some left sided muscle twitching she states that her  pointed out the other day,but deneis abnormal tardive movements of her mouth and face.   We have also reviewed her  Recently completed brain MRI and specifically discusse that was no acute intracranial abnormalities or any signs of neurovascular compression of the trigeminal nerve on either side and overall no secondary causes of her current symptoms.   She also notes continued neck tightness and was seen with NSGY for continued symptoms of left side cervical radiculopathy and has appointment with pain management coming up and has also been referred to physical therapy.   She denies any focal neurologic signs such as facial drooping, numbness, weakness, vision loss.     Treatments Tried: gabapentin, lyrica    Current Medications:    Current Outpatient Medications:     ascorbic acid, vitamin C, (VITAMIN C) 500 MG tablet, Take 500 mg by mouth once daily., Disp: , Rfl:      azelastine (ASTELIN) 137 mcg (0.1 %) nasal spray, 1 spray (137 mcg total) by Nasal route 2 (two) times daily., Disp: 137 mL, Rfl: 3    blood sugar diagnostic Strp, 1 strip by Misc.(Non-Drug; Combo Route) route once daily., Disp: 90 strip, Rfl: 3    blood sugar diagnostic Strp, 1 each by Misc.(Non-Drug; Combo Route) route once daily., Disp: 1 each, Rfl: 0    blood-glucose meter kit, Check blood sugar once dialy, Disp: 1 each, Rfl: 0    cholecalciferol, vitamin D3, (VITAMIN D3 ORAL), Vitamin D3, Disp: , Rfl:     clindamycin (CLEOCIN T) 1 % Swab, Apply 1 each topically 2 (two) times daily., Disp: , Rfl:     cyanocobalamin (VITAMIN B-12) 1000 MCG tablet, B12, Disp: , Rfl:     dapsone (ACZONE) 7.5 % GlwP, Apply topically., Disp: , Rfl:     fexofenadine (ALLEGRA) 180 MG tablet, Take 1 tablet (180 mg total) by mouth once daily., Disp: 30 tablet, Rfl: 12    fluticasone propionate (FLONASE) 50 mcg/actuation nasal spray, 2 sprays by Each Nostril route once daily., Disp: , Rfl:     furosemide (LASIX) 20 MG tablet, Take 1 tablet (20 mg total) by mouth daily as needed., Disp: 30 tablet, Rfl: 3    hylan g-f 20 (SYNVISC-ONE) 48 mg/6 mL Syrg, 6 mLs., Disp: , Rfl:     ipratropium (ATROVENT) 42 mcg (0.06 %) nasal spray, 2 sprays 3 (three) times daily., Disp: , Rfl:     ivermectin (SOOLANTRA) 1 % Crea, Apply topically., Disp: , Rfl:     lancets (ACCU-CHEK SOFTCLIX LANCETS) Misc, 1 lancet by Misc.(Non-Drug; Combo Route) route once daily., Disp: 90 each, Rfl: 4    LIDOcaine (LIDODERM) 5 %, Place 1 patch onto the skin daily as needed (pain). Remove & Discard patch within 12 hours or as directed by MD, Disp: 5 patch, Rfl: 0    linaCLOtide (LINZESS) 145 mcg Cap capsule, Take 1 capsule (145 mcg total) by mouth once daily., Disp: 90 capsule, Rfl: 3    losartan (COZAAR) 50 MG tablet, Take 1 tablet (50 mg total) by mouth once daily., Disp: 90 tablet, Rfl: 3    meloxicam (MOBIC) 7.5 MG tablet, Take 1 tablet (7.5 mg total)  by mouth once daily., Disp: 30 tablet, Rfl: 0    methocarbamoL (ROBAXIN) 750 MG Tab, Take 1 tablet (750 mg total) by mouth 4 (four) times daily as needed (muscle spams)., Disp: 40 tablet, Rfl: 0    MULTIVITAMIN ORAL, multivitamin, Disp: , Rfl:     naproxen (NAPROSYN) 500 MG tablet, Take 500 mg by mouth 2 (two) times daily., Disp: , Rfl:     omeprazole (PRILOSEC) 40 MG capsule, Take 1 capsule (40 mg total) by mouth once daily., Disp: 90 capsule, Rfl: 3    ondansetron (ZOFRAN) 4 MG tablet, Take 1 tablet (4 mg total) by mouth every 8 (eight) hours as needed for Nausea., Disp: 45 tablet, Rfl: 1    potassium chloride SA (K-DUR,KLOR-CON) 20 MEQ tablet, Take 1 tablet (20 mEq total) by mouth once daily., Disp: 30 tablet, Rfl: 3    prenatal vit calc,iron,folic (PRENATAL VITAMIN ORAL), Take by mouth., Disp: , Rfl:     propranoloL (INDERAL LA) 80 MG 24 hr capsule, Take 1 capsule (80 mg total) by mouth once daily., Disp: 90 capsule, Rfl: 3    semaglutide (OZEMPIC) 0.25 mg or 0.5 mg(2 mg/1.5 mL) pen injector, Inject 0.25 mg into the skin every 7 days., Disp: 1 pen, Rfl: 3    fenofibrate 160 MG Tab, Take 1 tablet (160 mg total) by mouth once daily. (Patient not taking: No sig reported), Disp: 90 tablet, Rfl: 3    metFORMIN (GLUCOPHAGE) 500 MG tablet, Take 1 tablet (500 mg total) by mouth daily with breakfast. (Patient not taking: Reported on 2/8/2022), Disp: 90 tablet, Rfl: 3    OXcarbazepine (TRILEPTAL) 300 MG Tab, Take 1 tablet (300 mg total) by mouth 2 (two) times daily., Disp: 60 tablet, Rfl: 2    ROS: as per HPI, otherwise a balanced 10 systems review is negative.    Objective:     Vitals:    08/01/22 1352   BP: (!) 134/91   Pulse: 86       Physical Exam   Constitutional: she appears well-developed and well-nourished. she is well groomed. NAD   HENT:    Head: Normocephalic and atraumatic.   Eyes: Conjunctivae and EOM are normal  Musculoskeletal: Normal range of motion. No joint stiffness.   Skin: Skin is warm and  dry.  Psychiatric: Mood and affect are normal    Neuro: Patient is alert and oriented to person, place, and time. Language is intact and fluent. Speech is clear and fluent. Recent and remote memory are intact.  Normal attention and concentration.  Facial movement is symmetric. Moves all 4 extremities against gravity. Gait and station normal.  Cranial Nerves II through XII without focal deficit. ontalis was NTTP, temporalis was NTTP. Facial sensation is slightly diminished sensation in left V3 distrubutio,otherwise intact in V1, V2, and NTTP along V1, V2, V3 distrubution's. There is no hyperpathia or allodynia on my examination today.  No ptosis is noted.  No facial droop.    Focused examination was undertaken today. Most of the visit time was spent giving guidance, counseling and discussing treatment options     Radiological/Laboratory Results Reviewed:   Lab Visit on 06/30/2022   Component Date Value Ref Range Status    Specimen UA 06/30/2022 Urine, Clean Catch   Final    Color, UA 06/30/2022 Yellow  Yellow, Straw, Tigist Final    Appearance, UA 06/30/2022 Cloudy (A) Clear Final    pH, UA 06/30/2022 6.0  5.0 - 8.0 Final    Specific Gravity, UA 06/30/2022 >=1.030 (A) 1.005 - 1.030 Final    Protein, UA 06/30/2022 Negative  Negative Final    Glucose, UA 06/30/2022 Negative  Negative Final    Ketones, UA 06/30/2022 Negative  Negative Final    Bilirubin (UA) 06/30/2022 Negative  Negative Final    Occult Blood UA 06/30/2022 Negative  Negative Final    Nitrite, UA 06/30/2022 Negative  Negative Final    Urobilinogen, UA 06/30/2022 Negative  <2.0 EU/dL Final    Leukocytes, UA 06/30/2022 Negative  Negative Final    Amorphous, UA 06/30/2022 Few  None-Moderate Final    Microscopic Comment 06/30/2022 SEE COMMENT   Final   Admission on 06/15/2022, Discharged on 06/15/2022   Component Date Value Ref Range Status    POCT Glucose 06/15/2022 112 (A) 70 - 110 mg/dL Final   Admission on 06/11/2022, Discharged on  06/11/2022   Component Date Value Ref Range Status    Sodium 06/11/2022 139  136 - 145 mmol/L Final    Potassium 06/11/2022 4.1  3.5 - 5.1 mmol/L Final    Chloride 06/11/2022 103  95 - 110 mmol/L Final    CO2 06/11/2022 28  23 - 29 mmol/L Final    Glucose 06/11/2022 132 (A) 70 - 110 mg/dL Final    BUN 06/11/2022 12  7 - 17 mg/dL Final    Creatinine 06/11/2022 1.06  0.50 - 1.40 mg/dL Final    Calcium 06/11/2022 9.2  8.7 - 10.5 mg/dL Final    Total Protein 06/11/2022 7.6  6.0 - 8.4 g/dL Final    Albumin 06/11/2022 4.1  3.5 - 5.2 g/dL Final    Total Bilirubin 06/11/2022 0.9  0.1 - 1.0 mg/dL Final    Alkaline Phosphatase 06/11/2022 81  38 - 126 U/L Final    AST 06/11/2022 25  15 - 46 U/L Final    ALT 06/11/2022 30  10 - 44 U/L Final    Anion Gap 06/11/2022 8  8 - 16 mmol/L Final    eGFR if African American 06/11/2022 >60.0  >60 mL/min/1.73 m^2 Final    eGFR if non African American 06/11/2022 57.2 (A) >60 mL/min/1.73 m^2 Final   Patient Outreach on 05/26/2022   Component Date Value Ref Range Status    Left Eye DM Retinopathy 12/17/2021 Negative   Final-Edited    Right Eye DM Retinopathy 12/17/2021 Negative   Final-Edited     Results for orders placed or performed during the hospital encounter of 06/28/22   MRI Brain W WO Contrast    Narrative    EXAMINATION:  MRI BRAIN W WO CONTRAST    CLINICAL HISTORY:  Cranial neuropathy (CN 5);with thin cuts along the trigeminal nerve to further evaluate for secondary causes;.  Headache, unspecified    TECHNIQUE:  Multiplanar multisequence MR imaging of the brain was performed before and after the administration of 9 mL Gadavist  intravenous contrast.    COMPARISON:  Head CT 09/26/2018    FINDINGS:  Intracranial compartment:    Ventricles and sulci are normal in size for age without evidence of hydrocephalus. No extra-axial blood or fluid collections.    No restricted diffusion.  No focal encephalomalacia.  The brain parenchyma demonstrates no edema, mass or mass  effect.  No gradient susceptibility artifact.  No abnormal intracranial enhancement.    Thin-section T2 weighted images through the origins of both trigeminal nerves demonstrates no evidence of significant neurovascular contact of the bilateral nerve root entry zones.  No abnormal enhancement along the course either trigeminal nerve.    Normal vascular flow voids are preserved.    Skull/extracranial contents (limited evaluation): Tiny mucous retention cyst within the left sphenoid sinus.  Remaining visualized paranasal sinuses and bilateral mastoid air cells are clear.  The globes and orbits appear within normal limits.    Marrow signal is within normal limits.      Impression    No neurovascular contact of the trigeminal nerve root entry zones.  No abnormal trigeminal nerve enhancement.    No acute intracranial abnormality.      Electronically signed by: Phogn Leigh  Date:    06/28/2022  Time:    16:08   Results for orders placed or performed during the hospital encounter of 09/26/18   CT Head Without Contrast    Narrative    EXAMINATION:  CT HEAD WITHOUT CONTRAST    CLINICAL HISTORY:  Dizziness and giddiness.;    TECHNIQUE:  Routine noncontrast head CT.    COMPARISON:  None    FINDINGS:  There is no acute intracranial hemorrhage or abnormal extra-axial fluid collection.  There is no abnormal increased or decreased density within the brain parenchyma.  Gray-white differentiation preserved normal ventricles.  There is no intracranial mass or mass effect.  The calvarium is intact.  Visualized paranasal sinuses and mastoids are well aerated.      Impression    Normal head CT.    All CT scans at this facility are performed  using dose modulation techniques as appropriate to performed exam including the following:  automated exposure control; adjustment of mA and/or kV according to the patients size (this includes techniques or standardized protocols for targeted exams where dose is matched to indication/reason for  exam: i.e. extremities or head);  iterative reconstruction technique.      Electronically signed by: Arturo Rai MD  Date:    09/26/2018  Time:    18:21       Note: I have independently reviewed any/all imaging/labs/tests and agree with the report (s) as documented.  Any discrepancies will be as noted/demarcated by free text. EVA ORTEGA 8/1/2022  Assessment/Plan:     Problem List Items Addressed This Visit    None     Visit Diagnoses     Trigeminal neuralgia    -  Primary    Relevant Medications    OXcarbazepine (TRILEPTAL) 300 MG Tab    Facial paresthesia              Carmen Son 62 y.o. female presents for followup of bilateral L>R facial paraesthesias and pain along her jawline across her forehead,cheek bones, and to the tops of her ears bilaterally, and the right side of the bridge of her nose. Discussed symptoms appear to be consistent with trigeminal neuralgia, discussed treatment options and patient agreed with the following plan:  Preventative medication - increase dose of trileptal to 300 mg BID for preventative tx of trigeminal neuralgia,   I discussed side effects of the medications.   Keep followup appt with psychiatry for alternative medications to Cymbalta,   Keep followup appt with pain management and physical therapy referred by NSGY for further management of left sided cervical radiculopathy   RTC in 3 months      Discussed goals of therapy are to decrease the frequency, intensity, and duration of headaches  The patient verbalizes understanding and agreement with the treatment plan. I have discussed risks, benefits and alternatives to the treatment plan. Questions were sought and answered to the patients stated verbal satisfaction.      Griselda Zaragoza PA-C  [facility]  Department of Neurology       Collaborating Physician, Dr. Worthy, was available during today's encounter.

## 2022-08-02 ENCOUNTER — TELEPHONE (OUTPATIENT)
Dept: NEUROLOGY | Facility: CLINIC | Age: 62
End: 2022-08-02
Payer: COMMERCIAL

## 2022-08-02 DIAGNOSIS — G50.0 TRIGEMINAL NEURALGIA: Primary | ICD-10-CM

## 2022-08-02 NOTE — TELEPHONE ENCOUNTER
----- Message from Mandy Lora sent at 8/2/2022  1:52 PM CDT -----  Contact: @[phone]  Pt is calling in concerned about her medication (OXcarbazepine (TRILEPTAL) 300 MG Tab ). Pt states that she felt jittery, she didn't feel right and couldn't sleep and tossed and turned all night. Pt wanted to know if she can stay at the 150 mg every 8 hours. Please call to discuss further.

## 2022-08-03 RX ORDER — OXCARBAZEPINE 150 MG/1
TABLET, FILM COATED ORAL
Qty: 120 TABLET | Refills: 2 | Status: SHIPPED | OUTPATIENT
Start: 2022-08-03 | End: 2023-03-28

## 2022-08-03 NOTE — TELEPHONE ENCOUNTER
Spoke to patient on phone will re-prescribe Oxcarbazepine/Trileptal prescription for 150 mg with instructions to take 2 150 mg tablets (300 mg total) in the AM  and 1 tablet (150 mg total) in the PM/afternoon x 1 week. Then can increase to 2 tablets (300 mg total) in the AM and 2 tablets (300 mg total) in the PM/afternoon as tolerated. If side effects occur at full prescribed dose then decrease back down to tolerated dose.

## 2022-08-15 ENCOUNTER — PATIENT OUTREACH (OUTPATIENT)
Dept: ADMINISTRATIVE | Facility: HOSPITAL | Age: 62
End: 2022-08-15
Payer: COMMERCIAL

## 2022-08-15 NOTE — LETTER
AUTHORIZATION FOR RELEASE OF   CONFIDENTIAL INFORMATION    Dr Amaya,    We are seeing Carmen Son, date of birth 1960, in the clinic at [facility]. Anson López MD is the patient's PCP. Carmen Son has an outstanding lab/procedure at the time we reviewed her chart. In order to help keep her health information updated, she has authorized us to request the following medical record(s):                            ( X )  COLONOSCOPY          Please fax records to [facility], Addy N Reine, MD, [phone]    If you have any questions, please contact Zaina Zapien at [phone].           Patient Name: Carmen Son  : 1960  Patient Phone #: [phone]

## 2022-08-15 NOTE — PROGRESS NOTES
08/15/2022 Care Everywhere updates requested and reviewed.  Immunizations reconciled. Media reports reviewed.  Duplicate HM overrides and  orders removed.  Overdue HM topic chart audit and/or requested.  Overdue lab testing linked to upcoming lab appointments if applies.    Lab [facility], and [employer] reviewed Lab testing     Care Everywhere,Chart, and [facility] reviewed for past colonoscopy, no reports found  Efax sent to Dr Amaya for Colonoscopy.    Health Maintenance Due   Topic Date Due    Pneumococcal Vaccines (Age 0-64) (2 - PCV) 2022    Foot Exam  2022    COVID-19 Vaccine (4 - Booster) 2022    Shingles Vaccine (2 of 2) 2022

## 2022-08-22 ENCOUNTER — LAB VISIT (OUTPATIENT)
Dept: LAB | Facility: HOSPITAL | Age: 62
End: 2022-08-22
Attending: STUDENT IN AN ORGANIZED HEALTH CARE EDUCATION/TRAINING PROGRAM
Payer: COMMERCIAL

## 2022-08-22 DIAGNOSIS — E78.00 PURE HYPERCHOLESTEROLEMIA: ICD-10-CM

## 2022-08-22 DIAGNOSIS — E11.9 CONTROLLED TYPE 2 DIABETES MELLITUS WITHOUT COMPLICATION, WITHOUT LONG-TERM CURRENT USE OF INSULIN: ICD-10-CM

## 2022-08-22 LAB
ALBUMIN SERPL BCP-MCNC: 3.9 G/DL (ref 3.5–5.2)
ALP SERPL-CCNC: 83 U/L (ref 38–126)
ALT SERPL W/O P-5'-P-CCNC: 19 U/L (ref 10–44)
ANION GAP SERPL CALC-SCNC: 8 MMOL/L (ref 8–16)
AST SERPL-CCNC: 21 U/L (ref 15–46)
BASOPHILS # BLD AUTO: 0.01 K/UL (ref 0–0.2)
BASOPHILS NFR BLD: 0.3 % (ref 0–1.9)
BILIRUB SERPL-MCNC: 0.5 MG/DL (ref 0.1–1)
CALCIUM SERPL-MCNC: 9 MG/DL (ref 8.7–10.5)
CHLORIDE SERPL-SCNC: 106 MMOL/L (ref 95–110)
CHOLEST SERPL-MCNC: 244 MG/DL (ref 120–199)
CHOLEST/HDLC SERPL: 4.4 {RATIO} (ref 2–5)
CO2 SERPL-SCNC: 26 MMOL/L (ref 23–29)
CREAT SERPL-MCNC: 0.99 MG/DL (ref 0.5–1.4)
DIFFERENTIAL METHOD: ABNORMAL
EOSINOPHIL # BLD AUTO: 0.1 K/UL (ref 0–0.5)
EOSINOPHIL NFR BLD: 1.5 % (ref 0–8)
ERYTHROCYTE [DISTWIDTH] IN BLOOD BY AUTOMATED COUNT: 14.6 % (ref 11.5–14.5)
EST. GFR  (NO RACE VARIABLE): >60 ML/MIN/1.73 M^2
ESTIMATED AVG GLUCOSE: 123 MG/DL (ref 68–131)
GLUCOSE SERPL-MCNC: 107 MG/DL (ref 70–110)
HBA1C MFR BLD: 5.9 % (ref 4–5.6)
HCT VFR BLD AUTO: 39.4 % (ref 37–48.5)
HDLC SERPL-MCNC: 56 MG/DL (ref 40–75)
HDLC SERPL: 23 % (ref 20–50)
HGB BLD-MCNC: 11.9 G/DL (ref 12–16)
IMM GRANULOCYTES # BLD AUTO: 0.02 K/UL (ref 0–0.04)
IMM GRANULOCYTES NFR BLD AUTO: 0.5 % (ref 0–0.5)
LDLC SERPL CALC-MCNC: 165.4 MG/DL (ref 63–159)
LYMPHOCYTES # BLD AUTO: 1.9 K/UL (ref 1–4.8)
LYMPHOCYTES NFR BLD: 48.2 % (ref 18–48)
MCH RBC QN AUTO: 24.9 PG (ref 27–31)
MCHC RBC AUTO-ENTMCNC: 30.2 G/DL (ref 32–36)
MCV RBC AUTO: 82 FL (ref 82–98)
MONOCYTES # BLD AUTO: 0.2 K/UL (ref 0.3–1)
MONOCYTES NFR BLD: 5.7 % (ref 4–15)
NEUTROPHILS # BLD AUTO: 1.7 K/UL (ref 1.8–7.7)
NEUTROPHILS NFR BLD: 43.8 % (ref 38–73)
NONHDLC SERPL-MCNC: 188 MG/DL
NRBC BLD-RTO: 0 /100 WBC
PLATELET # BLD AUTO: 240 K/UL (ref 150–450)
PMV BLD AUTO: 10.7 FL (ref 9.2–12.9)
POTASSIUM SERPL-SCNC: 4.2 MMOL/L (ref 3.5–5.1)
PROT SERPL-MCNC: 7.1 G/DL (ref 6–8.4)
RBC # BLD AUTO: 4.78 M/UL (ref 4–5.4)
SODIUM SERPL-SCNC: 140 MMOL/L (ref 136–145)
TRIGL SERPL-MCNC: 113 MG/DL (ref 30–150)
UUN UR-MCNC: 15 MG/DL (ref 7–17)
WBC # BLD AUTO: 3.88 K/UL (ref 3.9–12.7)

## 2022-08-22 PROCEDURE — 36415 COLL VENOUS BLD VENIPUNCTURE: CPT | Mod: PO | Performed by: STUDENT IN AN ORGANIZED HEALTH CARE EDUCATION/TRAINING PROGRAM

## 2022-08-22 PROCEDURE — 85025 COMPLETE CBC W/AUTO DIFF WBC: CPT | Mod: PO | Performed by: STUDENT IN AN ORGANIZED HEALTH CARE EDUCATION/TRAINING PROGRAM

## 2022-08-22 PROCEDURE — 83036 HEMOGLOBIN GLYCOSYLATED A1C: CPT | Mod: PO | Performed by: STUDENT IN AN ORGANIZED HEALTH CARE EDUCATION/TRAINING PROGRAM

## 2022-08-22 PROCEDURE — 80053 COMPREHEN METABOLIC PANEL: CPT | Mod: PO | Performed by: STUDENT IN AN ORGANIZED HEALTH CARE EDUCATION/TRAINING PROGRAM

## 2022-08-22 PROCEDURE — 80061 LIPID PANEL: CPT | Performed by: STUDENT IN AN ORGANIZED HEALTH CARE EDUCATION/TRAINING PROGRAM

## 2022-09-06 ENCOUNTER — PATIENT OUTREACH (OUTPATIENT)
Dept: ADMINISTRATIVE | Facility: HOSPITAL | Age: 62
End: 2022-09-06
Payer: COMMERCIAL

## 2022-09-06 NOTE — PROGRESS NOTES
Care Everywhere updates requested and reviewed.  Immunizations reconciled. Media reports reviewed.  Duplicate HM overrides and  orders removed.  Overdue HM topic chart audit and/or requested.  Overdue lab testing linked to upcoming lab appointments if applies.        Health Maintenance Due   Topic Date Due    Pneumococcal Vaccines (Age 0-64) (2 - PCV) 2022    Foot Exam  2022    COVID-19 Vaccine (4 - Booster) 2022    Shingles Vaccine (2 of 2) 2022    Influenza Vaccine (1) Never done    Mammogram  2022

## 2022-09-06 NOTE — LETTER
September 6, 2022    Carmen Son  [address]  [facility] [address]             [facility]  [address]  [facility] [address]  Phone: [phone] Dear [facility] is committed to your overall health.  To help you get the most out of each of your visits, we will review your information to make sure you are up to date on all of your recommended tests and/or procedures.      Anson López MD  has found that your chart shows you may be due for:    Mammogram due after 11/01/2022  Foot Exam    If you have had any of the above done at another facility, please bring the records or information with you so that your record at [facility] will be complete.  If you would like to schedule any of these, please contact me.    If you are currently taking medication, please bring it with you to your appointment for review.        Thank you for letting us care for you,      Zaina Zapien, Care Coordinator  [facility]  Phone:  [phone]  Fax: [phone]

## 2022-09-12 ENCOUNTER — TELEPHONE (OUTPATIENT)
Dept: FAMILY MEDICINE | Facility: CLINIC | Age: 62
End: 2022-09-12
Payer: COMMERCIAL

## 2022-09-12 ENCOUNTER — HOSPITAL ENCOUNTER (EMERGENCY)
Facility: HOSPITAL | Age: 62
Discharge: HOME | End: 2022-09-12
Attending: FAMILY MEDICINE
Payer: COMMERCIAL

## 2022-09-12 VITALS
DIASTOLIC BLOOD PRESSURE: 100 MMHG | BODY MASS INDEX: 35.97 KG/M2 | HEART RATE: 84 BPM | HEIGHT: 63 IN | SYSTOLIC BLOOD PRESSURE: 165 MMHG | OXYGEN SATURATION: 100 % | WEIGHT: 203 LBS | TEMPERATURE: 99 F | RESPIRATION RATE: 18 BRPM

## 2022-09-12 DIAGNOSIS — E78.00 PURE HYPERCHOLESTEROLEMIA: Primary | ICD-10-CM

## 2022-09-12 DIAGNOSIS — E11.9 CONTROLLED TYPE 2 DIABETES MELLITUS WITHOUT COMPLICATION, WITHOUT LONG-TERM CURRENT USE OF INSULIN: ICD-10-CM

## 2022-09-12 DIAGNOSIS — R20.2 PARESTHESIAS: Primary | ICD-10-CM

## 2022-09-12 LAB — POCT GLUCOSE: 100 MG/DL (ref 70–110)

## 2022-09-12 PROCEDURE — 82962 GLUCOSE BLOOD TEST: CPT | Mod: ER

## 2022-09-12 PROCEDURE — 99283 EMERGENCY DEPT VISIT LOW MDM: CPT | Mod: 25,ER

## 2022-09-12 RX ORDER — FENOFIBRATE 160 MG/1
160 TABLET ORAL DAILY
Qty: 90 TABLET | Refills: 3 | Status: SHIPPED | OUTPATIENT
Start: 2022-09-12 | End: 2022-09-20 | Stop reason: SDUPTHER

## 2022-09-12 RX ORDER — DEXAMETHASONE SODIUM PHOSPHATE 4 MG/ML
8 INJECTION, SOLUTION INTRA-ARTICULAR; INTRALESIONAL; INTRAMUSCULAR; INTRAVENOUS; SOFT TISSUE
Status: DISCONTINUED | OUTPATIENT
Start: 2022-09-12 | End: 2022-09-12 | Stop reason: HOSPADM

## 2022-09-12 RX ORDER — METHYLPREDNISOLONE 4 MG/1
TABLET ORAL
Qty: 21 EACH | Refills: 0 | Status: SHIPPED | OUTPATIENT
Start: 2022-09-12 | End: 2022-10-03

## 2022-09-12 NOTE — TELEPHONE ENCOUNTER
----- Message from Nga Walker sent at 9/12/2022  2:00 PM CDT -----  Type:  RX Refill Request    Who Called:       fenofibrate 160 MG Tab 90 tablet 3 5/27/2021 5/27/2022 --  Sig - Route: Take 1 tablet (160 mg total) by mouth once daily. - Oral  Patient not taking: No sig reported       Sent to pharmacy as: fenofibrate 160 MG Tab  Class: Normal  Order: [id number]  Date/Time Signed: 5/27/2021 11:33      E-Prescribing Status: Receipt confirmed by pharmacy (5/27/2021 11:33 AM CDT)    Pharmacy      [facility] [address] [place], [place] [address] [address] [facility]   Associated Diagnoses    Controlled type 2 diabetes mellitus without complication, without long-term current use of insulin  - Primary       Would the patient rather a call back or a response via [facility]? Pt is requesting  a call back  Best Call Back Number:[phone]  Additional Information: sched 9/20/22

## 2022-09-12 NOTE — ED NOTES
"Pt states took 81mg asa PTA.  States when she was feeling anxious earlier also felt "heart palpitating".  Denies SOB, denies CP.    "

## 2022-09-12 NOTE — ED PROVIDER NOTES
Encounter Date: 9/12/2022       History     Chief Complaint   Patient presents with    Numbness     Pt states has had numbness to right hand x 2 weeks.  States goes to PT for neck problems and was seen by endocrinologist for same c/o but hasn't received results from labwork.  Pt states has had episodes of feeling anxious also.      62-year-old female- complains of numbness in right 4th and 5th digit.  Started about 2 weeks ago.  Previous history of cervical fusion.  Recent MRI.  On Trileptal.  And chiropractor treatment.- no acute injury.  No weakness.  Normal strength.    The history is provided by the patient.   Review of patient's allergies indicates:  No Known Allergies  Past Medical History:   Diagnosis Date    Acid reflux     Diabetes mellitus     Diabetes mellitus, type 2     Diet controlled gestational diabetes mellitus in puerperium     History of seasonal allergies     Hypertension      Past Surgical History:   Procedure Laterality Date    ANTERIOR CERVICAL DISCECTOMY W/ FUSION N/A 9/17/2018    Procedure: DISCECTOMY, SPINE, CERVICAL, ANTERIOR APPROACH, WITH FUSION C3-4, C4-5;  Surgeon: Leo Denton MD;  Location: [facility] OR [address] [place] [address];  Service: Neurosurgery;  Laterality: N/A;  toronto II, asa 2, regular bed, supine, josefina    HYSTERECTOMY      2011 [facility] LSO, R SALPINGECT [facility]    MINIMALLY INVASIVE TRANSFORAMINAL LUMBAR INTERBODY FUSION (TLIF) N/A 1/25/2021    Procedure: FUSION, SPINE, LUMBAR, TLIF, MINIMALLY INVASIVE L4-5;  Surgeon: Leo Denton MD;  Location: [facility] OR [address] [place] [address];  Service: Neurosurgery;  Laterality: N/A;  Industry table 4 poster, prone, neuromonitoring, josefniakrissman    right knee scope       Family History   Problem Relation Age of Onset    Diabetes type II Sister     Hyperlipidemia Sister     Diabetes type II Other     Allergic rhinitis Neg Hx     Allergies Neg Hx     Angioedema Neg Hx     Asthma Neg Hx     Atopy Neg Hx     Eczema Neg Hx     Rhinitis Neg Hx     Urticaria Neg Hx      Immunodeficiency Neg Hx      Social History     Tobacco Use    Smoking status: Never    Smokeless tobacco: Never   Substance Use Topics    Alcohol use: No    Drug use: No     Review of Systems   Constitutional:  Negative for activity change, appetite change, chills and fever.   HENT:  Negative for congestion, ear discharge, rhinorrhea, sinus pressure, sinus pain, sore throat and trouble swallowing.    Eyes:  Negative for photophobia, pain, discharge, redness, itching and visual disturbance.   Respiratory:  Negative for cough, chest tightness, shortness of breath and wheezing.    Cardiovascular:  Negative for chest pain, palpitations and leg swelling.   Gastrointestinal:  Negative for abdominal distention, abdominal pain, constipation, diarrhea, nausea and vomiting.   Genitourinary:  Negative for dysuria, flank pain, frequency and hematuria.   Musculoskeletal:  Negative for back pain, gait problem, neck pain and neck stiffness.   Skin:  Negative for rash and wound.   Neurological:  Positive for numbness. Negative for dizziness, tremors, seizures, syncope, speech difficulty, weakness, light-headedness and headaches.   Psychiatric/Behavioral:  Negative for behavioral problems, confusion, hallucinations and sleep disturbance. The patient is not nervous/anxious.    All other systems reviewed and are negative.    Physical Exam     Initial Vitals [09/12/22 1505]   BP Pulse Resp Temp SpO2   (!) 165/100 84 18 98.5 °F (36.9 °C) 100 %      MAP       --         Physical Exam    Nursing note and vitals reviewed.  Constitutional: Vital signs are normal. She appears well-developed and well-nourished. She is active. No distress.   HENT:   Head: Normocephalic.   Nose: Nose normal.   Mouth/Throat: Oropharynx is clear and moist and mucous membranes are normal.   Eyes: Conjunctivae, EOM and lids are normal.   Neck: Neck supple.   Normal range of motion.  Cardiovascular:  Normal rate, regular rhythm, S1 normal, S2 normal and normal  heart sounds.           Pulmonary/Chest: Breath sounds normal. No respiratory distress.   Musculoskeletal:      Right upper arm: Normal.      Left upper arm: Normal.      Cervical back: Normal range of motion and neck supple.      Right lower leg: Normal.      Left lower leg: Normal.      Comments: Numbness to right 4th and 5th digit.     Neurological: She is alert and oriented to person, place, and time. She has normal strength. GCS eye subscore is 4. GCS verbal subscore is 5. GCS motor subscore is 6.   Skin: Skin is warm. Capillary refill takes less than 2 seconds.   Psychiatric: She has a normal mood and affect. Her speech is normal and behavior is normal. Thought content normal. Cognition and memory are normal.       ED Course   Procedures  Labs Reviewed - No data to display       Imaging Results    None          Medications   dexamethasone injection 8 mg (has no administration in time range)     Medical Decision Making:   ED Management:  Numbness to right 4th and 5th digit.- history of cervical fusion.  Taking Trileptal.  Cervical radiculopathy/neuropathy./ ulnar carpal tunnel.-  Patient is given Decadron and Medrol Dosepak.  Advised to follow-up with neurology.  Follow-up ED immediately with any weakness or worsening symptoms.                        Clinical Impression:   Final diagnoses:  [R20.2] Paresthesias (Primary)      ED Disposition Condition    Discharge Stable          ED Prescriptions       Medication Sig Dispense Start Date End Date Auth. Provider    methylPREDNISolone (MEDROL DOSEPACK) 4 mg tablet use as directed 21 each 9/12/2022 10/3/2022 Dell Hudson MD          Follow-up Information       Follow up With Specialties Details Why Contact Info    Anson López MD Internal Medicine Schedule an appointment as soon as possible for a visit in 2 days If symptoms worsen. F/u Neurology [address] [id number] [phone]               Dell Hudson MD  09/12/22 8086

## 2022-09-19 ENCOUNTER — TELEPHONE (OUTPATIENT)
Dept: NEUROLOGY | Facility: CLINIC | Age: 62
End: 2022-09-19
Payer: COMMERCIAL

## 2022-09-19 NOTE — TELEPHONE ENCOUNTER
----- Message from Hope Corrales MA sent at 9/15/2022  2:30 PM CDT -----  Regarding: Appt  Contact: Carmen  Magdalena is requesting a call back in regards to getting scheduled. Patient stated she has been experiencing numbness in her right pinky and ring finger. Patient also stated she has a slight tremor. Please call patient to assist her with getting scheduled.    Confirmed Contact below:  Contact Name:Carmen Son  Phone Number: [phone]

## 2022-09-20 ENCOUNTER — OFFICE VISIT (OUTPATIENT)
Dept: FAMILY MEDICINE | Facility: CLINIC | Age: 62
End: 2022-09-20
Payer: COMMERCIAL

## 2022-09-20 VITALS
HEIGHT: 63 IN | SYSTOLIC BLOOD PRESSURE: 116 MMHG | WEIGHT: 200.5 LBS | OXYGEN SATURATION: 96 % | TEMPERATURE: 98 F | DIASTOLIC BLOOD PRESSURE: 82 MMHG | BODY MASS INDEX: 35.53 KG/M2

## 2022-09-20 DIAGNOSIS — R60.9 EDEMA, UNSPECIFIED TYPE: ICD-10-CM

## 2022-09-20 DIAGNOSIS — I10 ESSENTIAL HYPERTENSION: ICD-10-CM

## 2022-09-20 DIAGNOSIS — M26.629 CHRONIC TMJ PAIN: ICD-10-CM

## 2022-09-20 DIAGNOSIS — G56.21 ULNAR NEUROPATHY AT WRIST, RIGHT: ICD-10-CM

## 2022-09-20 DIAGNOSIS — K21.9 GASTROESOPHAGEAL REFLUX DISEASE, UNSPECIFIED WHETHER ESOPHAGITIS PRESENT: ICD-10-CM

## 2022-09-20 DIAGNOSIS — G89.29 CHRONIC TMJ PAIN: ICD-10-CM

## 2022-09-20 DIAGNOSIS — Z78.0 POSTMENOPAUSAL: Primary | ICD-10-CM

## 2022-09-20 DIAGNOSIS — Z12.31 SCREENING MAMMOGRAM FOR HIGH-RISK PATIENT: ICD-10-CM

## 2022-09-20 DIAGNOSIS — I95.9 HYPOTENSION, UNSPECIFIED HYPOTENSION TYPE: ICD-10-CM

## 2022-09-20 DIAGNOSIS — E11.9 CONTROLLED TYPE 2 DIABETES MELLITUS WITHOUT COMPLICATION, WITHOUT LONG-TERM CURRENT USE OF INSULIN: ICD-10-CM

## 2022-09-20 DIAGNOSIS — E78.00 PURE HYPERCHOLESTEROLEMIA: ICD-10-CM

## 2022-09-20 PROCEDURE — 1160F PR REVIEW ALL MEDS BY PRESCRIBER/CLIN PHARMACIST DOCUMENTED: ICD-10-PCS | Mod: CPTII,S$GLB,, | Performed by: STUDENT IN AN ORGANIZED HEALTH CARE EDUCATION/TRAINING PROGRAM

## 2022-09-20 PROCEDURE — 4010F ACE/ARB THERAPY RXD/TAKEN: CPT | Mod: CPTII,S$GLB,, | Performed by: STUDENT IN AN ORGANIZED HEALTH CARE EDUCATION/TRAINING PROGRAM

## 2022-09-20 PROCEDURE — 1159F MED LIST DOCD IN RCRD: CPT | Mod: CPTII,S$GLB,, | Performed by: STUDENT IN AN ORGANIZED HEALTH CARE EDUCATION/TRAINING PROGRAM

## 2022-09-20 PROCEDURE — 3074F SYST BP LT 130 MM HG: CPT | Mod: CPTII,S$GLB,, | Performed by: STUDENT IN AN ORGANIZED HEALTH CARE EDUCATION/TRAINING PROGRAM

## 2022-09-20 PROCEDURE — 3044F HG A1C LEVEL LT 7.0%: CPT | Mod: CPTII,S$GLB,, | Performed by: STUDENT IN AN ORGANIZED HEALTH CARE EDUCATION/TRAINING PROGRAM

## 2022-09-20 PROCEDURE — 99214 OFFICE O/P EST MOD 30 MIN: CPT | Mod: S$GLB,,, | Performed by: STUDENT IN AN ORGANIZED HEALTH CARE EDUCATION/TRAINING PROGRAM

## 2022-09-20 PROCEDURE — 3008F BODY MASS INDEX DOCD: CPT | Mod: CPTII,S$GLB,, | Performed by: STUDENT IN AN ORGANIZED HEALTH CARE EDUCATION/TRAINING PROGRAM

## 2022-09-20 PROCEDURE — 3074F PR MOST RECENT SYSTOLIC BLOOD PRESSURE < 130 MM HG: ICD-10-PCS | Mod: CPTII,S$GLB,, | Performed by: STUDENT IN AN ORGANIZED HEALTH CARE EDUCATION/TRAINING PROGRAM

## 2022-09-20 PROCEDURE — 3044F PR MOST RECENT HEMOGLOBIN A1C LEVEL <7.0%: ICD-10-PCS | Mod: CPTII,S$GLB,, | Performed by: STUDENT IN AN ORGANIZED HEALTH CARE EDUCATION/TRAINING PROGRAM

## 2022-09-20 PROCEDURE — 3079F DIAST BP 80-89 MM HG: CPT | Mod: CPTII,S$GLB,, | Performed by: STUDENT IN AN ORGANIZED HEALTH CARE EDUCATION/TRAINING PROGRAM

## 2022-09-20 PROCEDURE — 1160F RVW MEDS BY RX/DR IN RCRD: CPT | Mod: CPTII,S$GLB,, | Performed by: STUDENT IN AN ORGANIZED HEALTH CARE EDUCATION/TRAINING PROGRAM

## 2022-09-20 PROCEDURE — 3079F PR MOST RECENT DIASTOLIC BLOOD PRESSURE 80-89 MM HG: ICD-10-PCS | Mod: CPTII,S$GLB,, | Performed by: STUDENT IN AN ORGANIZED HEALTH CARE EDUCATION/TRAINING PROGRAM

## 2022-09-20 PROCEDURE — 1159F PR MEDICATION LIST DOCUMENTED IN MEDICAL RECORD: ICD-10-PCS | Mod: CPTII,S$GLB,, | Performed by: STUDENT IN AN ORGANIZED HEALTH CARE EDUCATION/TRAINING PROGRAM

## 2022-09-20 PROCEDURE — 3008F PR BODY MASS INDEX (BMI) DOCUMENTED: ICD-10-PCS | Mod: CPTII,S$GLB,, | Performed by: STUDENT IN AN ORGANIZED HEALTH CARE EDUCATION/TRAINING PROGRAM

## 2022-09-20 PROCEDURE — 4010F PR ACE/ARB THEARPY RXD/TAKEN: ICD-10-PCS | Mod: CPTII,S$GLB,, | Performed by: STUDENT IN AN ORGANIZED HEALTH CARE EDUCATION/TRAINING PROGRAM

## 2022-09-20 PROCEDURE — 99214 PR OFFICE/OUTPT VISIT, EST, LEVL IV, 30-39 MIN: ICD-10-PCS | Mod: S$GLB,,, | Performed by: STUDENT IN AN ORGANIZED HEALTH CARE EDUCATION/TRAINING PROGRAM

## 2022-09-20 RX ORDER — OMEPRAZOLE 40 MG/1
40 CAPSULE, DELAYED RELEASE ORAL DAILY
Qty: 90 CAPSULE | Refills: 3 | Status: SHIPPED | OUTPATIENT
Start: 2022-09-20 | End: 2022-12-30 | Stop reason: SDUPTHER

## 2022-09-20 RX ORDER — PROPRANOLOL HYDROCHLORIDE 80 MG/1
80 CAPSULE, EXTENDED RELEASE ORAL DAILY
Qty: 90 CAPSULE | Refills: 3 | Status: SHIPPED | OUTPATIENT
Start: 2022-09-20 | End: 2024-01-09

## 2022-09-20 RX ORDER — FUROSEMIDE 20 MG/1
20 TABLET ORAL DAILY PRN
Qty: 30 TABLET | Refills: 3 | Status: SHIPPED | OUTPATIENT
Start: 2022-09-20

## 2022-09-20 RX ORDER — LOSARTAN POTASSIUM 50 MG/1
50 TABLET ORAL DAILY
Qty: 90 TABLET | Refills: 3 | Status: SHIPPED | OUTPATIENT
Start: 2022-09-20 | End: 2024-01-09

## 2022-09-20 RX ORDER — FENOFIBRATE 160 MG/1
160 TABLET ORAL DAILY
Qty: 90 TABLET | Refills: 3 | Status: SHIPPED | OUTPATIENT
Start: 2022-09-20 | End: 2023-09-28 | Stop reason: SDUPTHER

## 2022-09-20 NOTE — PROGRESS NOTES
Patient ID: Carmen Son is a 62 y.o. female.     Chief Complaint: Follow-up    Neurologic Problem  The patient's primary symptoms include focal sensory loss. The patient's pertinent negatives include no weakness. This is a new problem. Episode onset: 2 sweeks ago. The neurological problem developed gradually. The problem is unchanged. Affected Side: medial aspect of right hand. Pertinent negatives include no auditory change, chest pain, diaphoresis, dizziness, fatigue, fever, headaches, nausea, palpitations, shortness of breath or vomiting. Past treatments include nothing.        Review of Systems  Review of Systems   Constitutional:  Negative for diaphoresis, fatigue and fever.   HENT:  Negative for ear pain and sinus pain.    Eyes:  Negative for discharge.   Respiratory:  Negative for cough and shortness of breath.    Cardiovascular:  Negative for chest pain, palpitations and leg swelling.   Gastrointestinal:  Negative for diarrhea, nausea and vomiting.   Genitourinary:  Negative for urgency.   Musculoskeletal:  Negative for myalgias.   Skin:  Negative for rash.   Neurological:  Negative for dizziness, weakness and headaches.   Psychiatric/Behavioral:  Negative for depression.    All other systems reviewed and are negative.    Currently Medications  Current Outpatient Medications on File Prior to Visit   Medication Sig Dispense Refill    ascorbic acid, vitamin C, (VITAMIN C) 500 MG tablet Take 500 mg by mouth once daily.      azelastine (ASTELIN) 137 mcg (0.1 %) nasal spray 1 spray (137 mcg total) by Nasal route 2 (two) times daily. 137 mL 3    blood sugar diagnostic Strp 1 each by Misc.(Non-Drug; Combo Route) route once daily. 1 each 0    blood-glucose meter kit Check blood sugar once dialy 1 each 0    cholecalciferol, vitamin D3, (VITAMIN D3 ORAL) Vitamin D3      clindamycin (CLEOCIN T) 1 % Swab Apply 1 each topically 2 (two) times daily.      cyanocobalamin (VITAMIN B-12) 1000 MCG tablet B12       dapsone (ACZONE) 7.5 % GlwP Apply topically.      fexofenadine (ALLEGRA) 180 MG tablet Take 1 tablet (180 mg total) by mouth once daily. 30 tablet 12    fluticasone propionate (FLONASE) 50 mcg/actuation nasal spray 2 sprays by Each Nostril route once daily.      hylan g-f 20 (SYNVISC-ONE) 48 mg/6 mL Syrg 6 mLs.      ipratropium (ATROVENT) 42 mcg (0.06 %) nasal spray 2 sprays 3 (three) times daily.      ivermectin (SOOLANTRA) 1 % Crea Apply topically.      lancets (ACCU-CHEK SOFTCLIX LANCETS) Misc 1 lancet by Misc.(Non-Drug; Combo Route) route once daily. 90 each 4    LIDOcaine (LIDODERM) 5 % Place 1 patch onto the skin daily as needed (pain). Remove & Discard patch within 12 hours or as directed by MD 5 patch 0    meloxicam (MOBIC) 7.5 MG tablet Take 1 tablet (7.5 mg total) by mouth once daily. 30 tablet 0    methocarbamoL (ROBAXIN) 750 MG Tab Take 1 tablet (750 mg total) by mouth 4 (four) times daily as needed (muscle spams). 40 tablet 0    methylPREDNISolone (MEDROL DOSEPACK) 4 mg tablet use as directed 21 each 0    MULTIVITAMIN ORAL multivitamin      naproxen (NAPROSYN) 500 MG tablet Take 500 mg by mouth 2 (two) times daily.      ondansetron (ZOFRAN) 4 MG tablet Take 1 tablet (4 mg total) by mouth every 8 (eight) hours as needed for Nausea. 45 tablet 1    OXcarbazepine (TRILEPTAL) 150 MG Tab Take 2 150 mg tablets (300 mg total) in the AM  and 1 tablet (150 mg total) in the PM/afternoon x 1 week. Then can increase to 2 tablets (300 mg total) in the AM and 2 tablets (300 mg total) in the PM/afternoon. 120 tablet 2    potassium chloride SA (K-DUR,KLOR-CON) 20 MEQ tablet Take 1 tablet (20 mEq total) by mouth once daily. 30 tablet 3    prenatal vit calc,iron,folic (PRENATAL VITAMIN ORAL) Take by mouth.      [DISCONTINUED] fenofibrate 160 MG Tab Take 1 tablet (160 mg total) by mouth once daily. 90 tablet 3    [DISCONTINUED] furosemide (LASIX) 20 MG tablet Take 1 tablet (20 mg total) by mouth daily as needed. 30 tablet  "3    [DISCONTINUED] linaCLOtide (LINZESS) 145 mcg Cap capsule Take 1 capsule (145 mcg total) by mouth once daily. 90 capsule 3    [DISCONTINUED] losartan (COZAAR) 50 MG tablet Take 1 tablet (50 mg total) by mouth once daily. 90 tablet 3    [DISCONTINUED] omeprazole (PRILOSEC) 40 MG capsule Take 1 capsule (40 mg total) by mouth once daily. 90 capsule 3    [DISCONTINUED] propranoloL (INDERAL LA) 80 MG 24 hr capsule Take 1 capsule (80 mg total) by mouth once daily. 90 capsule 3    [DISCONTINUED] semaglutide (OZEMPIC) 0.25 mg or 0.5 mg(2 mg/1.5 mL) pen injector Inject 0.25 mg into the skin every 7 days. 1 pen 3    blood sugar diagnostic Strp 1 strip by Misc.(Non-Drug; Combo Route) route once daily. (Patient not taking: Reported on 9/20/2022) 90 strip 3    metFORMIN (GLUCOPHAGE) 500 MG tablet Take 1 tablet (500 mg total) by mouth daily with breakfast. (Patient not taking: Reported on 2/8/2022) 90 tablet 3     No current facility-administered medications on file prior to visit.       Physical  Exam  Vitals:    09/20/22 1420   BP: 116/82   BP Location: Right arm   Patient Position: Sitting   Temp: 98.3 °F (36.8 °C)   SpO2: 96%   Weight: 90.9 kg (200 lb 8.1 oz)   Height: 5' 3" (1.6 m)      Body mass index is 35.52 kg/m².    Physical Exam  Vitals and nursing note reviewed.   Constitutional:       General: She is not in acute distress.     Appearance: She is not ill-appearing.   HENT:      Head: Normocephalic and atraumatic.      Right Ear: External ear normal.      Left Ear: External ear normal.      Nose: Nose normal.      Mouth/Throat:      Mouth: Mucous membranes are moist.   Eyes:      Extraocular Movements: Extraocular movements intact.      Conjunctiva/sclera: Conjunctivae normal.   Cardiovascular:      Rate and Rhythm: Normal rate and regular rhythm.      Pulses: Normal pulses.      Heart sounds: No murmur heard.  Pulmonary:      Effort: Pulmonary effort is normal. No respiratory distress.      Breath sounds: No " wheezing.   Abdominal:      General: There is no distension.      Palpations: Abdomen is soft. There is no mass.      Tenderness: There is no abdominal tenderness.   Musculoskeletal:         General: No swelling.      Cervical back: Normal range of motion.   Skin:     Coloration: Skin is not jaundiced.      Findings: No rash.   Neurological:      General: No focal deficit present.      Mental Status: She is alert and oriented to person, place, and time.      Comments: Decreased sensation to ulnar aspect of right hand and 4th and 5th digits. Normal  strength   Psychiatric:         Mood and Affect: Mood normal.         Thought Content: Thought content normal.       Labs:    Complete Blood Count  Lab Results   Component Value Date    RBC 4.78 08/22/2022    HGB 11.9 (L) 08/22/2022    HCT 39.4 08/22/2022    MCV 82 08/22/2022    MCH 24.9 (L) 08/22/2022    MCHC 30.2 (L) 08/22/2022    RDW 14.6 (H) 08/22/2022     08/22/2022    MPV 10.7 08/22/2022    GRAN 1.7 (L) 08/22/2022    GRAN 43.8 08/22/2022    LYMPH 1.9 08/22/2022    LYMPH 48.2 (H) 08/22/2022    MONO 0.2 (L) 08/22/2022    MONO 5.7 08/22/2022    EOS 0.1 08/22/2022    BASO 0.01 08/22/2022    EOSINOPHIL 1.5 08/22/2022    BASOPHIL 0.3 08/22/2022    DIFFMETHOD Automated 08/22/2022       Comprehensive Metabolic Panel  Lab Results   Component Value Date     08/22/2022    BUN 15 08/22/2022    CREATININE 0.99 08/22/2022     08/22/2022    K 4.2 08/22/2022     08/22/2022    PROT 7.1 08/22/2022    ALBUMIN 3.9 08/22/2022    BILITOT 0.5 08/22/2022    AST 21 08/22/2022    ALKPHOS 83 08/22/2022    CO2 26 08/22/2022    ALT 19 08/22/2022    ANIONGAP 8 08/22/2022    EGFRNONAA 57.2 (A) 06/11/2022    ESTGFRAFRICA >60.0 06/11/2022       TSH  No results found for: TSH    Imaging:  MRI Brain W WO Contrast  Narrative: EXAMINATION:  MRI BRAIN W WO CONTRAST    CLINICAL HISTORY:  Cranial neuropathy (CN 5);with thin cuts along the trigeminal nerve to further evaluate  for secondary causes;.  Headache, unspecified    TECHNIQUE:  Multiplanar multisequence MR imaging of the brain was performed before and after the administration of 9 mL Gadavist  intravenous contrast.    COMPARISON:  Head CT 09/26/2018    FINDINGS:  Intracranial compartment:    Ventricles and sulci are normal in size for age without evidence of hydrocephalus. No extra-axial blood or fluid collections.    No restricted diffusion.  No focal encephalomalacia.  The brain parenchyma demonstrates no edema, mass or mass effect.  No gradient susceptibility artifact.  No abnormal intracranial enhancement.    Thin-section T2 weighted images through the origins of both trigeminal nerves demonstrates no evidence of significant neurovascular contact of the bilateral nerve root entry zones.  No abnormal enhancement along the course either trigeminal nerve.    Normal vascular flow voids are preserved.    Skull/extracranial contents (limited evaluation): Tiny mucous retention cyst within the left sphenoid sinus.  Remaining visualized paranasal sinuses and bilateral mastoid air cells are clear.  The globes and orbits appear within normal limits.    Marrow signal is within normal limits.  Impression: No neurovascular contact of the trigeminal nerve root entry zones.  No abnormal trigeminal nerve enhancement.    No acute intracranial abnormality.    Electronically signed by: Phong Leigh  Date:    06/28/2022  Time:    16:08      Assessment/Plan:    Problem List Items Addressed This Visit          Cardiac/Vascular    Hyperlipidemia    Relevant Medications    fenofibrate 160 MG Tab    Essential hypertension    Relevant Medications    losartan (COZAAR) 50 MG tablet       Endocrine    Controlled type 2 diabetes mellitus without complication, without long-term current use of insulin    Relevant Medications    semaglutide (OZEMPIC) 0.25 mg or 0.5 mg(2 mg/1.5 mL) pen injector     Other Visit Diagnoses       Postmenopausal    -  Primary     Relevant Orders    DXA Bone Density Spine And Hip    Screening mammogram for high-risk patient        Relevant Orders    Mammo Digital Screening Bilat w/ John    DXA Bone Density Spine And Hip    Chronic TMJ pain        Relevant Orders    Ambulatory referral/consult to Physical/Occupational Therapy    Ulnar neuropathy at wrist, right        Relevant Orders    Ambulatory referral/consult to Orthopedics    Edema, unspecified type        Relevant Medications    furosemide (LASIX) 20 MG tablet    Gastroesophageal reflux disease, unspecified whether esophagitis present        Relevant Medications    omeprazole (PRILOSEC) 40 MG capsule    Hypotension, unspecified hypotension type        Relevant Medications    propranoloL (INDERAL LA) 80 MG 24 hr capsule             Discussed how to stay healthy including: diet, exercise, refraining from smoking and discussed screening exams / tests needed for age, sex and family Hx.    RTC 6 mo    Anson López MD

## 2022-09-21 ENCOUNTER — TELEPHONE (OUTPATIENT)
Dept: FAMILY MEDICINE | Facility: CLINIC | Age: 62
End: 2022-09-21
Payer: COMMERCIAL

## 2022-11-02 LAB — BCS RECOMMENDATION EXT: NORMAL

## 2022-11-15 ENCOUNTER — PATIENT OUTREACH (OUTPATIENT)
Dept: ADMINISTRATIVE | Facility: HOSPITAL | Age: 62
End: 2022-11-15
Payer: COMMERCIAL

## 2022-12-15 ENCOUNTER — PATIENT OUTREACH (OUTPATIENT)
Dept: ADMINISTRATIVE | Facility: HOSPITAL | Age: 62
End: 2022-12-15
Payer: COMMERCIAL

## 2022-12-15 NOTE — PROGRESS NOTES
Care Everywhere updates requested and reviewed.  Immunizations reconciled. Media reports reviewed.  Duplicate HM overrides and  orders removed.  Overdue HM topic chart audit and/or requested.  Overdue lab testing linked to upcoming lab appointments if applies.    Lab [facility] and [facility] reviewed for Lab testing       Efax sent to Dr Gutiérrez for eye exam    Health Maintenance Due   Topic Date Due    Pneumococcal Vaccines (Age 0-64) (2 - PCV) 2022    Foot Exam  2022    COVID-19 Vaccine (4 - Booster) 2022    Shingles Vaccine (2 of 2) 2022    Influenza Vaccine (1) Never done    Diabetes Urine Screening  2022

## 2022-12-23 ENCOUNTER — TELEPHONE (OUTPATIENT)
Dept: FAMILY MEDICINE | Facility: CLINIC | Age: 62
End: 2022-12-23
Payer: COMMERCIAL

## 2022-12-23 DIAGNOSIS — E78.00 PURE HYPERCHOLESTEROLEMIA: Primary | ICD-10-CM

## 2022-12-23 DIAGNOSIS — I10 ESSENTIAL HYPERTENSION: ICD-10-CM

## 2022-12-23 DIAGNOSIS — E11.9 CONTROLLED TYPE 2 DIABETES MELLITUS WITHOUT COMPLICATION, WITHOUT LONG-TERM CURRENT USE OF INSULIN: ICD-10-CM

## 2022-12-28 ENCOUNTER — LAB VISIT (OUTPATIENT)
Dept: LAB | Facility: HOSPITAL | Age: 62
End: 2022-12-28
Attending: STUDENT IN AN ORGANIZED HEALTH CARE EDUCATION/TRAINING PROGRAM
Payer: COMMERCIAL

## 2022-12-28 DIAGNOSIS — E78.00 PURE HYPERCHOLESTEROLEMIA: ICD-10-CM

## 2022-12-28 DIAGNOSIS — I10 ESSENTIAL HYPERTENSION: ICD-10-CM

## 2022-12-28 DIAGNOSIS — E11.9 CONTROLLED TYPE 2 DIABETES MELLITUS WITHOUT COMPLICATION, WITHOUT LONG-TERM CURRENT USE OF INSULIN: ICD-10-CM

## 2022-12-28 LAB
ALBUMIN SERPL BCP-MCNC: 4.3 G/DL (ref 3.5–5.2)
ALP SERPL-CCNC: 66 U/L (ref 38–126)
ALT SERPL W/O P-5'-P-CCNC: 23 U/L (ref 10–44)
ANION GAP SERPL CALC-SCNC: 6 MMOL/L (ref 8–16)
AST SERPL-CCNC: 25 U/L (ref 15–46)
BASOPHILS # BLD AUTO: 0.02 K/UL (ref 0–0.2)
BASOPHILS NFR BLD: 0.6 % (ref 0–1.9)
BILIRUB SERPL-MCNC: 0.5 MG/DL (ref 0.1–1)
CALCIUM SERPL-MCNC: 9.3 MG/DL (ref 8.7–10.5)
CHLORIDE SERPL-SCNC: 105 MMOL/L (ref 95–110)
CHOLEST SERPL-MCNC: 244 MG/DL (ref 120–199)
CHOLEST/HDLC SERPL: 3.5 {RATIO} (ref 2–5)
CO2 SERPL-SCNC: 31 MMOL/L (ref 23–29)
CREAT SERPL-MCNC: 0.94 MG/DL (ref 0.5–1.4)
DIFFERENTIAL METHOD: ABNORMAL
EOSINOPHIL # BLD AUTO: 0.1 K/UL (ref 0–0.5)
EOSINOPHIL NFR BLD: 1.7 % (ref 0–8)
ERYTHROCYTE [DISTWIDTH] IN BLOOD BY AUTOMATED COUNT: 14.5 % (ref 11.5–14.5)
EST. GFR  (NO RACE VARIABLE): >60 ML/MIN/1.73 M^2
ESTIMATED AVG GLUCOSE: 128 MG/DL (ref 68–131)
GLUCOSE SERPL-MCNC: 110 MG/DL (ref 70–110)
HBA1C MFR BLD: 6.1 % (ref 4–5.6)
HCT VFR BLD AUTO: 39.5 % (ref 37–48.5)
HDLC SERPL-MCNC: 70 MG/DL (ref 40–75)
HDLC SERPL: 28.7 % (ref 20–50)
HGB BLD-MCNC: 12.3 G/DL (ref 12–16)
IMM GRANULOCYTES # BLD AUTO: 0.01 K/UL (ref 0–0.04)
IMM GRANULOCYTES NFR BLD AUTO: 0.3 % (ref 0–0.5)
LDLC SERPL CALC-MCNC: 153.6 MG/DL (ref 63–159)
LYMPHOCYTES # BLD AUTO: 1.8 K/UL (ref 1–4.8)
LYMPHOCYTES NFR BLD: 50.8 % (ref 18–48)
MCH RBC QN AUTO: 25.6 PG (ref 27–31)
MCHC RBC AUTO-ENTMCNC: 31.1 G/DL (ref 32–36)
MCV RBC AUTO: 82 FL (ref 82–98)
MONOCYTES # BLD AUTO: 0.2 K/UL (ref 0.3–1)
MONOCYTES NFR BLD: 5.6 % (ref 4–15)
NEUTROPHILS # BLD AUTO: 1.5 K/UL (ref 1.8–7.7)
NEUTROPHILS NFR BLD: 41 % (ref 38–73)
NONHDLC SERPL-MCNC: 174 MG/DL
NRBC BLD-RTO: 0 /100 WBC
PLATELET # BLD AUTO: 284 K/UL (ref 150–450)
PMV BLD AUTO: 10.5 FL (ref 9.2–12.9)
POTASSIUM SERPL-SCNC: 4.2 MMOL/L (ref 3.5–5.1)
PROT SERPL-MCNC: 7.3 G/DL (ref 6–8.4)
RBC # BLD AUTO: 4.81 M/UL (ref 4–5.4)
SODIUM SERPL-SCNC: 142 MMOL/L (ref 136–145)
TRIGL SERPL-MCNC: 102 MG/DL (ref 30–150)
TSH SERPL DL<=0.005 MIU/L-ACNC: 0.72 UIU/ML (ref 0.4–4)
UUN UR-MCNC: 15 MG/DL (ref 7–17)
WBC # BLD AUTO: 3.56 K/UL (ref 3.9–12.7)

## 2022-12-28 PROCEDURE — 84443 ASSAY THYROID STIM HORMONE: CPT | Mod: PO | Performed by: STUDENT IN AN ORGANIZED HEALTH CARE EDUCATION/TRAINING PROGRAM

## 2022-12-28 PROCEDURE — 83036 HEMOGLOBIN GLYCOSYLATED A1C: CPT | Performed by: STUDENT IN AN ORGANIZED HEALTH CARE EDUCATION/TRAINING PROGRAM

## 2022-12-28 PROCEDURE — 85025 COMPLETE CBC W/AUTO DIFF WBC: CPT | Mod: PO | Performed by: STUDENT IN AN ORGANIZED HEALTH CARE EDUCATION/TRAINING PROGRAM

## 2022-12-28 PROCEDURE — 36415 COLL VENOUS BLD VENIPUNCTURE: CPT | Mod: PO | Performed by: STUDENT IN AN ORGANIZED HEALTH CARE EDUCATION/TRAINING PROGRAM

## 2022-12-28 PROCEDURE — 80061 LIPID PANEL: CPT | Performed by: STUDENT IN AN ORGANIZED HEALTH CARE EDUCATION/TRAINING PROGRAM

## 2022-12-28 PROCEDURE — 80053 COMPREHEN METABOLIC PANEL: CPT | Mod: PO | Performed by: STUDENT IN AN ORGANIZED HEALTH CARE EDUCATION/TRAINING PROGRAM

## 2022-12-30 ENCOUNTER — OFFICE VISIT (OUTPATIENT)
Dept: FAMILY MEDICINE | Facility: CLINIC | Age: 62
End: 2022-12-30
Payer: COMMERCIAL

## 2022-12-30 VITALS
BODY MASS INDEX: 35.66 KG/M2 | OXYGEN SATURATION: 98 % | HEIGHT: 63 IN | SYSTOLIC BLOOD PRESSURE: 124 MMHG | HEART RATE: 88 BPM | WEIGHT: 201.25 LBS | TEMPERATURE: 98 F | DIASTOLIC BLOOD PRESSURE: 64 MMHG

## 2022-12-30 DIAGNOSIS — E11.9 CONTROLLED TYPE 2 DIABETES MELLITUS WITHOUT COMPLICATION, WITHOUT LONG-TERM CURRENT USE OF INSULIN: ICD-10-CM

## 2022-12-30 DIAGNOSIS — K21.9 GASTROESOPHAGEAL REFLUX DISEASE, UNSPECIFIED WHETHER ESOPHAGITIS PRESENT: ICD-10-CM

## 2022-12-30 DIAGNOSIS — I10 ESSENTIAL HYPERTENSION: ICD-10-CM

## 2022-12-30 DIAGNOSIS — E78.00 PURE HYPERCHOLESTEROLEMIA: Primary | ICD-10-CM

## 2022-12-30 DIAGNOSIS — F41.9 ANXIETY: ICD-10-CM

## 2022-12-30 DIAGNOSIS — E66.01 MORBID OBESITY: ICD-10-CM

## 2022-12-30 PROCEDURE — 3078F DIAST BP <80 MM HG: CPT | Mod: CPTII,S$GLB,, | Performed by: STUDENT IN AN ORGANIZED HEALTH CARE EDUCATION/TRAINING PROGRAM

## 2022-12-30 PROCEDURE — 3074F PR MOST RECENT SYSTOLIC BLOOD PRESSURE < 130 MM HG: ICD-10-PCS | Mod: CPTII,S$GLB,, | Performed by: STUDENT IN AN ORGANIZED HEALTH CARE EDUCATION/TRAINING PROGRAM

## 2022-12-30 PROCEDURE — 3008F BODY MASS INDEX DOCD: CPT | Mod: CPTII,S$GLB,, | Performed by: STUDENT IN AN ORGANIZED HEALTH CARE EDUCATION/TRAINING PROGRAM

## 2022-12-30 PROCEDURE — 1160F RVW MEDS BY RX/DR IN RCRD: CPT | Mod: CPTII,S$GLB,, | Performed by: STUDENT IN AN ORGANIZED HEALTH CARE EDUCATION/TRAINING PROGRAM

## 2022-12-30 PROCEDURE — 1160F PR REVIEW ALL MEDS BY PRESCRIBER/CLIN PHARMACIST DOCUMENTED: ICD-10-PCS | Mod: CPTII,S$GLB,, | Performed by: STUDENT IN AN ORGANIZED HEALTH CARE EDUCATION/TRAINING PROGRAM

## 2022-12-30 PROCEDURE — 3008F PR BODY MASS INDEX (BMI) DOCUMENTED: ICD-10-PCS | Mod: CPTII,S$GLB,, | Performed by: STUDENT IN AN ORGANIZED HEALTH CARE EDUCATION/TRAINING PROGRAM

## 2022-12-30 PROCEDURE — 4010F ACE/ARB THERAPY RXD/TAKEN: CPT | Mod: CPTII,S$GLB,, | Performed by: STUDENT IN AN ORGANIZED HEALTH CARE EDUCATION/TRAINING PROGRAM

## 2022-12-30 PROCEDURE — 3074F SYST BP LT 130 MM HG: CPT | Mod: CPTII,S$GLB,, | Performed by: STUDENT IN AN ORGANIZED HEALTH CARE EDUCATION/TRAINING PROGRAM

## 2022-12-30 PROCEDURE — 99214 OFFICE O/P EST MOD 30 MIN: CPT | Mod: S$GLB,,, | Performed by: STUDENT IN AN ORGANIZED HEALTH CARE EDUCATION/TRAINING PROGRAM

## 2022-12-30 PROCEDURE — 3044F HG A1C LEVEL LT 7.0%: CPT | Mod: CPTII,S$GLB,, | Performed by: STUDENT IN AN ORGANIZED HEALTH CARE EDUCATION/TRAINING PROGRAM

## 2022-12-30 PROCEDURE — 4010F PR ACE/ARB THEARPY RXD/TAKEN: ICD-10-PCS | Mod: CPTII,S$GLB,, | Performed by: STUDENT IN AN ORGANIZED HEALTH CARE EDUCATION/TRAINING PROGRAM

## 2022-12-30 PROCEDURE — 99214 PR OFFICE/OUTPT VISIT, EST, LEVL IV, 30-39 MIN: ICD-10-PCS | Mod: S$GLB,,, | Performed by: STUDENT IN AN ORGANIZED HEALTH CARE EDUCATION/TRAINING PROGRAM

## 2022-12-30 PROCEDURE — 1159F PR MEDICATION LIST DOCUMENTED IN MEDICAL RECORD: ICD-10-PCS | Mod: CPTII,S$GLB,, | Performed by: STUDENT IN AN ORGANIZED HEALTH CARE EDUCATION/TRAINING PROGRAM

## 2022-12-30 PROCEDURE — 3078F PR MOST RECENT DIASTOLIC BLOOD PRESSURE < 80 MM HG: ICD-10-PCS | Mod: CPTII,S$GLB,, | Performed by: STUDENT IN AN ORGANIZED HEALTH CARE EDUCATION/TRAINING PROGRAM

## 2022-12-30 PROCEDURE — 3044F PR MOST RECENT HEMOGLOBIN A1C LEVEL <7.0%: ICD-10-PCS | Mod: CPTII,S$GLB,, | Performed by: STUDENT IN AN ORGANIZED HEALTH CARE EDUCATION/TRAINING PROGRAM

## 2022-12-30 PROCEDURE — 1159F MED LIST DOCD IN RCRD: CPT | Mod: CPTII,S$GLB,, | Performed by: STUDENT IN AN ORGANIZED HEALTH CARE EDUCATION/TRAINING PROGRAM

## 2022-12-30 RX ORDER — MUPIROCIN 20 MG/G
OINTMENT TOPICAL
COMMUNITY

## 2022-12-30 RX ORDER — OMEPRAZOLE 40 MG/1
40 CAPSULE, DELAYED RELEASE ORAL DAILY
Qty: 90 CAPSULE | Refills: 3 | Status: SHIPPED | OUTPATIENT
Start: 2022-12-30 | End: 2023-03-28

## 2022-12-30 RX ORDER — BUSPIRONE HYDROCHLORIDE 10 MG/1
10 TABLET ORAL 2 TIMES DAILY
COMMUNITY
Start: 2022-12-08 | End: 2022-12-30 | Stop reason: DRUGHIGH

## 2022-12-30 RX ORDER — BUSPIRONE HYDROCHLORIDE 10 MG/1
20 TABLET ORAL 2 TIMES DAILY
Qty: 120 TABLET | Refills: 11 | Status: SHIPPED | OUTPATIENT
Start: 2022-12-30 | End: 2023-03-28 | Stop reason: SDUPTHER

## 2022-12-30 NOTE — PROGRESS NOTES
Patient ID: Carmen Son is a 62 y.o. female.     Chief Complaint: Follow-up (3 mon)    Follow-up  Pertinent negatives include no chest pain, coughing, fever, headaches, myalgias, nausea, rash, vomiting or weakness.    Tremor- has been following with neurology. Work up to this point has been unrevealing. She is starting this think this is related to anxiety. She is taking buspar.     Constipation- improved with linzess    GERD- well controlled with prilosec    Review of Systems  Review of Systems   Constitutional:  Negative for fever.   HENT:  Negative for ear pain and sinus pain.    Eyes:  Negative for discharge.   Respiratory:  Negative for cough and shortness of breath.    Cardiovascular:  Negative for chest pain and leg swelling.   Gastrointestinal:  Negative for diarrhea, nausea and vomiting.   Genitourinary:  Negative for urgency.   Musculoskeletal:  Negative for myalgias.   Skin:  Negative for rash.   Neurological:  Negative for weakness and headaches.   Psychiatric/Behavioral:  Negative for depression.    All other systems reviewed and are negative.    Currently Medications  Current Outpatient Medications on File Prior to Visit   Medication Sig Dispense Refill    ascorbic acid, vitamin C, (VITAMIN C) 500 MG tablet Take 500 mg by mouth once daily.      fenofibrate 160 MG Tab Take 1 tablet (160 mg total) by mouth once daily. 90 tablet 3    fexofenadine (ALLEGRA) 180 MG tablet Take 1 tablet (180 mg total) by mouth once daily. 30 tablet 12    fluticasone propionate (FLONASE) 50 mcg/actuation nasal spray 2 sprays by Each Nostril route once daily.      furosemide (LASIX) 20 MG tablet Take 1 tablet (20 mg total) by mouth daily as needed. 30 tablet 3    hylan g-f 20 (SYNVISC-ONE) 48 mg/6 mL Syrg 6 mLs.      ipratropium (ATROVENT) 42 mcg (0.06 %) nasal spray 2 sprays 3 (three) times daily.      LIDOcaine (LIDODERM) 5 % Place 1 patch onto the skin daily as needed (pain). Remove & Discard patch within 12  hours or as directed by MD 5 patch 0    linaCLOtide (LINZESS) 145 mcg Cap capsule Take 1 capsule (145 mcg total) by mouth once daily. 90 capsule 3    losartan (COZAAR) 50 MG tablet Take 1 tablet (50 mg total) by mouth once daily. 90 tablet 3    methocarbamoL (ROBAXIN) 750 MG Tab Take 1 tablet (750 mg total) by mouth 4 (four) times daily as needed (muscle spams). 40 tablet 0    MULTIVITAMIN ORAL multivitamin      ondansetron (ZOFRAN) 4 MG tablet Take 1 tablet (4 mg total) by mouth every 8 (eight) hours as needed for Nausea. 45 tablet 1    OXcarbazepine (TRILEPTAL) 150 MG Tab Take 2 150 mg tablets (300 mg total) in the AM  and 1 tablet (150 mg total) in the PM/afternoon x 1 week. Then can increase to 2 tablets (300 mg total) in the AM and 2 tablets (300 mg total) in the PM/afternoon. 120 tablet 2    potassium chloride SA (K-DUR,KLOR-CON) 20 MEQ tablet Take 1 tablet (20 mEq total) by mouth once daily. 30 tablet 3    propranoloL (INDERAL LA) 80 MG 24 hr capsule Take 1 capsule (80 mg total) by mouth once daily. 90 capsule 3    [DISCONTINUED] busPIRone (BUSPAR) 10 MG tablet Take 10 mg by mouth 2 (two) times daily.      [DISCONTINUED] omeprazole (PRILOSEC) 40 MG capsule Take 1 capsule (40 mg total) by mouth once daily. 90 capsule 3    [DISCONTINUED] semaglutide (OZEMPIC) 0.25 mg or 0.5 mg(2 mg/1.5 mL) pen injector Inject 0.25 mg into the skin every 7 days. 1 pen 3    azelastine (ASTELIN) 137 mcg (0.1 %) nasal spray 1 spray (137 mcg total) by Nasal route 2 (two) times daily. (Patient not taking: Reported on 12/30/2022) 137 mL 3    blood sugar diagnostic Strp 1 strip by Misc.(Non-Drug; Combo Route) route once daily. (Patient not taking: Reported on 12/30/2022) 90 strip 3    blood sugar diagnostic Strp 1 each by Misc.(Non-Drug; Combo Route) route once daily. (Patient not taking: Reported on 12/30/2022) 1 each 0    blood-glucose meter kit Check blood sugar once dialy (Patient not taking: Reported on 12/30/2022) 1 each 0     "cholecalciferol, vitamin D3, (VITAMIN D3 ORAL) Vitamin D3      cyanocobalamin (VITAMIN B-12) 1000 MCG tablet B12      dapsone (ACZONE) 7.5 % GlwP Apply topically.      ivermectin (SOOLANTRA) 1 % Crea Apply topically.      lancets (ACCU-CHEK SOFTCLIX LANCETS) Misc 1 lancet by Misc.(Non-Drug; Combo Route) route once daily. (Patient not taking: Reported on 12/30/2022) 90 each 4    meloxicam (MOBIC) 7.5 MG tablet Take 1 tablet (7.5 mg total) by mouth once daily. (Patient not taking: Reported on 12/30/2022) 30 tablet 0    metFORMIN (GLUCOPHAGE) 500 MG tablet Take 1 tablet (500 mg total) by mouth daily with breakfast. (Patient not taking: Reported on 2/8/2022) 90 tablet 3    mupirocin (BACTROBAN) 2 % ointment mupirocin 2 % topical ointment   APPLY OINTMENT TOPICALLY TWICE DAILY FOR 7 DAYS AS DIRECTED      naproxen (NAPROSYN) 500 MG tablet Take 500 mg by mouth 2 (two) times daily.      prenatal vit calc,iron,folic (PRENATAL VITAMIN ORAL) Take by mouth.      [DISCONTINUED] clindamycin (CLEOCIN T) 1 % Swab Apply 1 each topically 2 (two) times daily.       No current facility-administered medications on file prior to visit.       Physical  Exam  Vitals:    12/30/22 1451   BP: 124/64   BP Location: Right arm   Patient Position: Sitting   Pulse: 88   Temp: 98.1 °F (36.7 °C)   TempSrc: Oral   SpO2: 98%   Weight: 91.3 kg (201 lb 4.5 oz)   Height: 5' 3" (1.6 m)      Body mass index is 35.66 kg/m².    Physical Exam  Vitals and nursing note reviewed.   Constitutional:       General: She is not in acute distress.     Appearance: She is not ill-appearing.   HENT:      Head: Normocephalic and atraumatic.      Right Ear: External ear normal.      Left Ear: External ear normal.      Nose: Nose normal.      Mouth/Throat:      Mouth: Mucous membranes are moist.   Eyes:      Extraocular Movements: Extraocular movements intact.      Conjunctiva/sclera: Conjunctivae normal.   Cardiovascular:      Rate and Rhythm: Normal rate and regular " rhythm.      Pulses: Normal pulses.      Heart sounds: No murmur heard.  Pulmonary:      Effort: Pulmonary effort is normal. No respiratory distress.      Breath sounds: No wheezing.   Abdominal:      General: There is no distension.      Palpations: Abdomen is soft. There is no mass.      Tenderness: There is no abdominal tenderness.   Musculoskeletal:         General: No swelling.      Cervical back: Normal range of motion.   Skin:     Coloration: Skin is not jaundiced.      Findings: No rash.   Neurological:      General: No focal deficit present.      Mental Status: She is alert and oriented to person, place, and time.   Psychiatric:         Mood and Affect: Mood normal.         Thought Content: Thought content normal.       Labs:    Complete Blood Count  Lab Results   Component Value Date    RBC 4.81 12/28/2022    HGB 12.3 12/28/2022    HCT 39.5 12/28/2022    MCV 82 12/28/2022    MCH 25.6 (L) 12/28/2022    MCHC 31.1 (L) 12/28/2022    RDW 14.5 12/28/2022     12/28/2022    MPV 10.5 12/28/2022    GRAN 1.5 (L) 12/28/2022    GRAN 41.0 12/28/2022    LYMPH 1.8 12/28/2022    LYMPH 50.8 (H) 12/28/2022    MONO 0.2 (L) 12/28/2022    MONO 5.6 12/28/2022    EOS 0.1 12/28/2022    BASO 0.02 12/28/2022    EOSINOPHIL 1.7 12/28/2022    BASOPHIL 0.6 12/28/2022    DIFFMETHOD Automated 12/28/2022       Comprehensive Metabolic Panel  Lab Results   Component Value Date     12/28/2022    BUN 15 12/28/2022    CREATININE 0.94 12/28/2022     12/28/2022    K 4.2 12/28/2022     12/28/2022    PROT 7.3 12/28/2022    ALBUMIN 4.3 12/28/2022    BILITOT 0.5 12/28/2022    AST 25 12/28/2022    ALKPHOS 66 12/28/2022    CO2 31 (H) 12/28/2022    ALT 23 12/28/2022    ANIONGAP 6 (L) 12/28/2022       TSH  Lab Results   Component Value Date    TSH 0.725 12/28/2022       Imaging:  MRI Brain W WO Contrast  Narrative: EXAMINATION:  MRI BRAIN W WO CONTRAST    CLINICAL HISTORY:  Cranial neuropathy (CN 5);with thin cuts along the  trigeminal nerve to further evaluate for secondary causes;.  Headache, unspecified    TECHNIQUE:  Multiplanar multisequence MR imaging of the brain was performed before and after the administration of 9 mL Gadavist  intravenous contrast.    COMPARISON:  Head CT 09/26/2018    FINDINGS:  Intracranial compartment:    Ventricles and sulci are normal in size for age without evidence of hydrocephalus. No extra-axial blood or fluid collections.    No restricted diffusion.  No focal encephalomalacia.  The brain parenchyma demonstrates no edema, mass or mass effect.  No gradient susceptibility artifact.  No abnormal intracranial enhancement.    Thin-section T2 weighted images through the origins of both trigeminal nerves demonstrates no evidence of significant neurovascular contact of the bilateral nerve root entry zones.  No abnormal enhancement along the course either trigeminal nerve.    Normal vascular flow voids are preserved.    Skull/extracranial contents (limited evaluation): Tiny mucous retention cyst within the left sphenoid sinus.  Remaining visualized paranasal sinuses and bilateral mastoid air cells are clear.  The globes and orbits appear within normal limits.    Marrow signal is within normal limits.  Impression: No neurovascular contact of the trigeminal nerve root entry zones.  No abnormal trigeminal nerve enhancement.    No acute intracranial abnormality.    Electronically signed by: Phong Leigh  Date:    06/28/2022  Time:    16:08      Assessment/Plan:    1. Pure hypercholesterolemia  -     LIPID PANEL; Future; Expected date: 12/30/2022    2. Controlled type 2 diabetes mellitus without complication, without long-term current use of insulin  -     semaglutide (OZEMPIC) 0.25 mg or 0.5 mg(2 mg/1.5 mL) pen injector; Inject 0.5 mg into the skin every 7 days.  Dispense: 1 pen; Refill: 3  -     CBC Auto Differential; Future  -     Comprehensive metabolic panel; Future; Expected date: 12/30/2022  -      HEMOGLOBIN A1C; Future; Expected date: 12/30/2022    3. Gastroesophageal reflux disease, unspecified whether esophagitis present  -     omeprazole (PRILOSEC) 40 MG capsule; Take 1 capsule (40 mg total) by mouth once daily.  Dispense: 90 capsule; Refill: 3    4. Essential hypertension    5. Anxiety  -     busPIRone (BUSPAR) 10 MG tablet; Take 2 tablets (20 mg total) by mouth 2 (two) times daily.  Dispense: 120 tablet; Refill: 11         Discussed how to stay healthy including: diet, exercise, refraining from smoking and discussed screening exams / tests needed for age, sex and family Hx.    RTC 3 mo    Anson López MD

## 2023-01-09 ENCOUNTER — TELEPHONE (OUTPATIENT)
Dept: FAMILY MEDICINE | Facility: CLINIC | Age: 63
End: 2023-01-09
Payer: COMMERCIAL

## 2023-01-09 NOTE — TELEPHONE ENCOUNTER
----- Message from Antoine Son sent at 1/9/2023  2:53 PM CST -----  Type:  RX Refill Request    Who Called: pt  Refill or New Rx: ALTERNATIVE rx to semaglutide (OZEMPIC) 0.25 mg or 0.5 mg(2 mg/1.5 mL) pen injector 1 pen 3   Preferred Pharmacy with phone number:  Local or Mail Order: local  Ordering Provider: Dr. López  Would the patient rather a call back or a response via [facility]? call  Best Call Back Number: [phone]  Additional Information:  pt states her med went up to $140 (from $290) and wants to know can Dr. López prescribe a cheaper med alternative

## 2023-01-13 ENCOUNTER — TELEPHONE (OUTPATIENT)
Dept: FAMILY MEDICINE | Facility: CLINIC | Age: 63
End: 2023-01-13
Payer: COMMERCIAL

## 2023-01-13 DIAGNOSIS — M81.6 LOCALIZED OSTEOPOROSIS WITHOUT CURRENT PATHOLOGICAL FRACTURE: Primary | ICD-10-CM

## 2023-01-13 RX ORDER — ERGOCALCIFEROL 1.25 MG/1
50000 CAPSULE ORAL
Qty: 12 CAPSULE | Refills: 3 | Status: SHIPPED | OUTPATIENT
Start: 2023-01-13 | End: 2024-01-02

## 2023-01-13 RX ORDER — ALENDRONATE SODIUM 70 MG/1
70 TABLET ORAL
Qty: 12 TABLET | Refills: 3 | Status: SHIPPED | OUTPATIENT
Start: 2023-01-13 | End: 2024-01-02

## 2023-01-13 NOTE — TELEPHONE ENCOUNTER
Patient was taking ozempic - she got the cost reduced to $140 a month but that is too expensive    Please advise

## 2023-01-13 NOTE — TELEPHONE ENCOUNTER
Please call patient. Bone density scans shows osteoporosis, which is brittle bones. You will need to start 2 medications for this. These have been sent to your pharmacy.

## 2023-01-25 DIAGNOSIS — E11.9 TYPE 2 DIABETES MELLITUS WITHOUT COMPLICATION: ICD-10-CM

## 2023-03-07 ENCOUNTER — HOSPITAL ENCOUNTER (EMERGENCY)
Facility: HOSPITAL | Age: 63
Discharge: HOME | End: 2023-03-07
Attending: EMERGENCY MEDICINE
Payer: COMMERCIAL

## 2023-03-07 VITALS
RESPIRATION RATE: 20 BRPM | WEIGHT: 200 LBS | SYSTOLIC BLOOD PRESSURE: 156 MMHG | TEMPERATURE: 98 F | BODY MASS INDEX: 35.44 KG/M2 | DIASTOLIC BLOOD PRESSURE: 85 MMHG | HEIGHT: 63 IN | HEART RATE: 64 BPM | OXYGEN SATURATION: 100 %

## 2023-03-07 DIAGNOSIS — S62.639B OPEN FRACTURE OF TUFT OF DISTAL PHALANX OF FINGER: Primary | ICD-10-CM

## 2023-03-07 DIAGNOSIS — S62.639A CLOSED FRACTURE OF TUFT OF DISTAL PHALANX OF FINGER: ICD-10-CM

## 2023-03-07 DIAGNOSIS — S61.213A LACERATION OF LEFT MIDDLE FINGER, FOREIGN BODY PRESENCE UNSPECIFIED, NAIL DAMAGE STATUS UNSPECIFIED, INITIAL ENCOUNTER: ICD-10-CM

## 2023-03-07 PROCEDURE — 63600175 PHARM REV CODE 636 W HCPCS: Mod: ER | Performed by: PHYSICIAN ASSISTANT

## 2023-03-07 PROCEDURE — 25000003 PHARM REV CODE 250: Mod: ER | Performed by: PHYSICIAN ASSISTANT

## 2023-03-07 PROCEDURE — 96372 THER/PROPH/DIAG INJ SC/IM: CPT | Performed by: PHYSICIAN ASSISTANT

## 2023-03-07 PROCEDURE — 99284 EMERGENCY DEPT VISIT MOD MDM: CPT | Mod: 25,ER

## 2023-03-07 PROCEDURE — 12001 RPR S/N/AX/GEN/TRNK 2.5CM/<: CPT | Mod: ER

## 2023-03-07 RX ORDER — SULFAMETHOXAZOLE AND TRIMETHOPRIM 800; 160 MG/1; MG/1
1 TABLET ORAL 2 TIMES DAILY
Qty: 14 TABLET | Refills: 0 | Status: SHIPPED | OUTPATIENT
Start: 2023-03-07 | End: 2023-03-14

## 2023-03-07 RX ORDER — CEFAZOLIN SODIUM 1 G/3ML
1 INJECTION, POWDER, FOR SOLUTION INTRAMUSCULAR; INTRAVENOUS
Status: COMPLETED | OUTPATIENT
Start: 2023-03-07 | End: 2023-03-07

## 2023-03-07 RX ORDER — HYDROCODONE BITARTRATE AND ACETAMINOPHEN 5; 325 MG/1; MG/1
1 TABLET ORAL
Status: COMPLETED | OUTPATIENT
Start: 2023-03-07 | End: 2023-03-07

## 2023-03-07 RX ORDER — BUPIVACAINE HYDROCHLORIDE 5 MG/ML
5 INJECTION, SOLUTION EPIDURAL; INTRACAUDAL
Status: COMPLETED | OUTPATIENT
Start: 2023-03-07 | End: 2023-03-07

## 2023-03-07 RX ORDER — HYDROCODONE BITARTRATE AND ACETAMINOPHEN 5; 325 MG/1; MG/1
1 TABLET ORAL EVERY 6 HOURS PRN
Qty: 12 TABLET | Refills: 0 | Status: SHIPPED | OUTPATIENT
Start: 2023-03-07 | End: 2023-03-10

## 2023-03-07 RX ORDER — LIDOCAINE HYDROCHLORIDE 10 MG/ML
5 INJECTION, SOLUTION EPIDURAL; INFILTRATION; INTRACAUDAL; PERINEURAL
Status: COMPLETED | OUTPATIENT
Start: 2023-03-07 | End: 2023-03-07

## 2023-03-07 RX ADMIN — HYDROCODONE BITARTRATE AND ACETAMINOPHEN 1 TABLET: 5; 325 TABLET ORAL at 03:03

## 2023-03-07 RX ADMIN — BUPIVACAINE HYDROCHLORIDE 25 MG: 5 INJECTION, SOLUTION EPIDURAL; INTRACAUDAL; PERINEURAL at 04:03

## 2023-03-07 RX ADMIN — LIDOCAINE HYDROCHLORIDE 50 MG: 10 INJECTION, SOLUTION EPIDURAL; INFILTRATION; INTRACAUDAL; PERINEURAL at 04:03

## 2023-03-07 RX ADMIN — CEFAZOLIN 1 G: 330 INJECTION, POWDER, FOR SOLUTION INTRAMUSCULAR; INTRAVENOUS at 04:03

## 2023-03-07 NOTE — ED PROVIDER NOTES
Encounter Date: 3/7/2023       History     Chief Complaint   Patient presents with    Finger Injury     2nd and 3rd fingers on left hand were smashed, pain and swelling      62-year-old female with type 2 diabetes and hypertension presents to the emergency department for crush injury of the left hand.  Injury occurred less than 1 hour ago.  Patient states that a barrel of quarters fell on to her left hand crushing her 2nd and 3rd fingers.  Patient reports that she is not taken anything for pain.     The history is provided by the patient. No  was used.   Review of patient's allergies indicates:  No Known Allergies  Past Medical History:   Diagnosis Date    Acid reflux     Diabetes mellitus     Diabetes mellitus, type 2     Diet controlled gestational diabetes mellitus in puerperium     History of seasonal allergies     Hypertension      Past Surgical History:   Procedure Laterality Date    ANTERIOR CERVICAL DISCECTOMY W/ FUSION N/A 9/17/2018    Procedure: DISCECTOMY, SPINE, CERVICAL, ANTERIOR APPROACH, WITH FUSION C3-4, C4-5;  Surgeon: Leo Denton MD;  Location: [facility] OR [address] [place] [address];  Service: Neurosurgery;  Laterality: N/A;  toronto II, asa 2, regular bed, supine, josefina    HYSTERECTOMY      2011 [facility], R [facility]ECT [facility]    MINIMALLY INVASIVE TRANSFORAMINAL LUMBAR INTERBODY FUSION (TLIF) N/A 1/25/2021    Procedure: FUSION, SPINE, LUMBAR, TLIF, MINIMALLY INVASIVE L4-5;  Surgeon: Leo Denton MD;  Location: [facility] OR [address] [place] [address];  Service: Neurosurgery;  Laterality: N/A;  [place] table 4 poster, prone, neuromonitoring, josefina, kriss Buchananman    right knee scope       Family History   Problem Relation Age of Onset    Diabetes type II Sister     Hyperlipidemia Sister     Diabetes type II Other     Allergic rhinitis Neg Hx     Allergies Neg Hx     Angioedema Neg Hx     Asthma Neg Hx     Atopy Neg Hx     Eczema Neg Hx     Rhinitis Neg Hx     Urticaria Neg Hx     Immunodeficiency Neg Hx      Social History      Tobacco Use    Smoking status: Never    Smokeless tobacco: Never   Substance Use Topics    Alcohol use: No    Drug use: No     Review of Systems   Constitutional:  Negative for chills and fever.   Musculoskeletal:  Positive for arthralgias and joint swelling.   Skin:  Positive for wound. Negative for color change.   Neurological:  Negative for weakness and numbness.   All other systems reviewed and are negative.    Physical Exam     Initial Vitals [03/07/23 1523]   BP Pulse Resp Temp SpO2   121/82 84 18 98.1 °F (36.7 °C) 100 %      MAP       --         Physical Exam    Nursing note and vitals reviewed.  Constitutional: She appears well-developed and well-nourished. She is not diaphoretic. No distress.   HENT:   Head: Normocephalic and atraumatic.   Right Ear: External ear normal.   Left Ear: External ear normal.   Nose: Nose normal.   Eyes: EOM are normal.   Musculoskeletal:         General: Tenderness and edema present.      Comments: Swelling of 2nd and 3rd digits of the left hand.  1 cm laceration over the 3rd digit middle phalange.  Soft tissue expulsion from under nail bed of the digit.  The nails of both digits are intact.  Capillary refill less than 2 seconds. Normal range of motion.  No discharge, signs of infection.     Neurological: She is alert and oriented to person, place, and time.   Skin: Skin is warm. Capillary refill takes less than 2 seconds. No rash noted. No erythema. No pallor.   Psychiatric: Her behavior is normal. Judgment normal.       ED Course   Lac Repair    Date/Time: 3/7/2023 5:42 PM  Performed by: ELIA Mendoza  Authorized by: Archie Vo MD     Consent:     Consent obtained:  Verbal    Risks, benefits, and alternatives were discussed: yes    Universal protocol:     Procedure explained and questions answered to patient or proxy's satisfaction: yes      Imaging studies available: yes      Required blood products, implants, devices, and special equipment available: no       Patient identity confirmed:  Verbally with patient  Anesthesia:     Anesthesia method:  Topical application and nerve block    Block needle gauge:  25 G    Block anesthetic:  Bupivacaine 0.5% w/o epi and lidocaine 1% w/o epi    Block technique:  Digital    Block injection procedure:  Anatomic landmarks identified, introduced needle, incremental injection, negative aspiration for blood and anatomic landmarks palpated    Block outcome:  Anesthesia achieved  Laceration details:     Location:  Finger    Finger location:  L long finger    Length (cm):  2 (1 cm laceration, 0.5cm laceration, 0.5cm laceration)  Pre-procedure details:     Preparation:  Patient was prepped and draped in usual sterile fashion  Exploration:     Limited defect created (wound extended): no      Hemostasis achieved with:  Direct pressure    Imaging obtained: x-ray      Imaging outcome: foreign body not noted      Wound exploration: wound explored through full range of motion and entire depth of wound visualized      Wound extent: underlying fracture      Wound extent: no foreign bodies/material noted, no nerve damage noted, no tendon damage noted and no vascular damage noted      Contaminated: no    Treatment:     Area cleansed with:  Povidone-iodine    Amount of cleaning:  Standard    Irrigation solution:  Sterile saline    Irrigation method:  Syringe    Visualized foreign bodies/material removed: no      Debridement:  Minimal    Undermining:  None    Scar revision: no    Skin repair:     Repair method:  Sutures    Suture size:  4-0    Suture material:  Nylon    Suture technique:  Simple interrupted    Number of sutures:  6  Approximation:     Approximation:  Close  Repair type:     Repair type:  Intermediate  Post-procedure details:     Dressing:  Non-adherent dressing and splint for protection    Procedure completion:  Tolerated  Labs Reviewed - No data to display       Imaging Results              X-Ray Finger 2 or More Views Left (Final  result)  Result time 03/07/23 16:03:39      Final result by JOSE F Camarena Sr., MD (03/07/23 16:03:39)                   Impression:      1. There are acute appearing fractures throughout the distal phalanx of the long finger.  2. There is an acute appearing fracture in the distal aspect of the distal phalanx of the index finger.      Electronically signed by: Chester Camarena MD  Date:    03/07/2023  Time:    16:03               Narrative:    EXAMINATION:  XR FINGER 2 OR MORE VIEWS LEFT    CLINICAL HISTORY:  2nd and 3rd finger crush injury;    COMPARISON:  A plain film examination of the left hand performed on 07/28/2021.    FINDINGS:  There are acute appearing fractures throughout the distal phalanx of the long finger.  There is an acute appearing fracture in the distal aspect of the distal phalanx of the index finger.  There is no dislocation.                                    X-Rays:   Independently Interpreted Readings:   Other Readings:  There are acute fractures of the 2nd and 3rd distal phalanxes.   Medications   HYDROcodone-acetaminophen 5-325 mg per tablet 1 tablet (1 tablet Oral Given 3/7/23 1553)   BUPivacaine (PF) 0.5% (5 mg/mL) injection 25 mg (25 mg Subcutaneous Given by Other 3/7/23 1600)   LIDOcaine (PF) 10 mg/ml (1%) injection 50 mg (50 mg Infiltration Given by Other 3/7/23 1600)   ceFAZolin injection 1 g (1 g Intramuscular Given 3/7/23 1629)     Medical Decision Making:   Initial Assessment:   Urgent evaluation of a 62-year-old female with type 2 diabetes and hypertension that presents to the emergency department for crush injury of the left hand.  Injury occurred less than 1 hour ago.  The patient states that she is not taken anything for pain.  Patient is normotensive and afebrile.  X-ray of the left hand pending.  Differential Diagnosis:   Fracture, sprain, dislocation, contusion, laceration  Clinical Tests:   Radiological Study: Ordered and Reviewed  ED Management:  X-ray demonstrated axis  of the distal phalanxes of the 2nd and 3rd digits.  Patient also had lacerations noted to the 3rd digit.  Procedure documented in procedure note.  Patient was given a shot of Ancef in the ED.  Patient will be placed on Bactrim for 7 days.  Referral has been placed to a orthopedic surgeon for her to follow-up with in the next 2 days.  Wound care instructions provided and discussed with the patient.  Patient expressed understanding.  ED return precautions discussed with patient and patient expressed understanding.                        Clinical Impression:   Final diagnoses:  [S62.639B] Open fracture of tuft of distal phalanx of finger (Primary)  [S62.831X] Closed fracture of tuft of distal phalanx of finger  [S61.213A] Laceration of left middle finger, foreign body presence unspecified, nail damage status unspecified, initial encounter        ED Disposition Condition    Discharge Stable          ED Prescriptions       Medication Sig Dispense Start Date End Date Auth. Provider    sulfamethoxazole-trimethoprim 800-160mg (BACTRIM DS) 800-160 mg Tab Take 1 tablet by mouth 2 (two) times daily. for 7 days 14 tablet 3/7/2023 3/14/2023 ELIA Mendoza    HYDROcodone-acetaminophen (NORCO) 5-325 mg per tablet Take 1 tablet by mouth every 6 (six) hours as needed for Pain. 12 tablet 3/7/2023 3/10/2023 ELIA Mendoza          Follow-up Information       Follow up With Specialties Details Why Contact Info    Delvis Sanchez Jr., MD Hand Surgery, Orthopedic Surgery Schedule an appointment as soon as possible for a visit in 2 days wound care and fracture follow-up [address] [facility]  [address]  [place] [address]  [phone]               ELIA Mendoza  03/07/23 2057

## 2023-03-14 ENCOUNTER — TELEPHONE (OUTPATIENT)
Dept: NEUROLOGY | Facility: CLINIC | Age: 63
End: 2023-03-14
Payer: COMMERCIAL

## 2023-03-14 NOTE — TELEPHONE ENCOUNTER
Patient came to the office on yesterday.  Mrs Son was informed  on 03/13/23 that her appt will be  cancelled due to incorrectly scheduled.  Patient verbally understood then.  Mrs son stated that she was informed the the appointment was still scheduled.  I was able to have her appt scheduled with the correct department  while she was here and verbally give her the appt date and time.     04/26/23 @ 9:30 with Jennifer 7th floor Neurology  Patient was ok with this.    The appt is on a 3 day  7 day slot and will be scheduled once it is opened.

## 2023-03-15 ENCOUNTER — PATIENT OUTREACH (OUTPATIENT)
Dept: ADMINISTRATIVE | Facility: HOSPITAL | Age: 63
End: 2023-03-15
Payer: COMMERCIAL

## 2023-03-15 NOTE — LETTER
AUTHORIZATION FOR RELEASE OF   CONFIDENTIAL INFORMATION    Dr Figueroa,    We are seeing Carmen Son, date of birth 1960, in the clinic at [facility]. Anson López MD is the patient's PCP. Carmen Son has an outstanding lab/procedure at the time we reviewed her chart. In order to help keep her health information updated, she has authorized us to request the following medical record(s):                                          ( X )  EYE EXAM                   Please fax records to [facility], Addy N Reine, MD, [phone]    If you have any questions, please contact Zaina at [phone].             Patient Name: Carmen Son  : 1960  Patient Phone #: [phone]

## 2023-03-15 NOTE — PROGRESS NOTES
Care Everywhere updates requested and reviewed.  Immunizations reconciled. Media reports reviewed.  Duplicate HM overrides and  orders removed.  Overdue HM topic chart audit and/or requested.  Overdue lab testing linked to upcoming lab appointments if applies.    Efax sent to Dr Figueroa for eye exam.    Health Maintenance Due   Topic Date Due    Pneumococcal Vaccines (Age 0-64) (2 - PCV) 2022    Foot Exam  2022    COVID-19 Vaccine (4 - Booster) 2022    Shingles Vaccine (2 of 2) 2022    Influenza Vaccine (1) Never done    Diabetes Urine Screening  2022

## 2023-03-28 ENCOUNTER — LAB VISIT (OUTPATIENT)
Dept: LAB | Facility: HOSPITAL | Age: 63
End: 2023-03-28
Attending: STUDENT IN AN ORGANIZED HEALTH CARE EDUCATION/TRAINING PROGRAM
Payer: COMMERCIAL

## 2023-03-28 ENCOUNTER — OFFICE VISIT (OUTPATIENT)
Dept: FAMILY MEDICINE | Facility: CLINIC | Age: 63
End: 2023-03-28
Payer: COMMERCIAL

## 2023-03-28 VITALS
TEMPERATURE: 98 F | SYSTOLIC BLOOD PRESSURE: 122 MMHG | DIASTOLIC BLOOD PRESSURE: 76 MMHG | HEART RATE: 74 BPM | OXYGEN SATURATION: 99 % | WEIGHT: 195.75 LBS | HEIGHT: 63 IN | BODY MASS INDEX: 34.68 KG/M2

## 2023-03-28 DIAGNOSIS — R73.9 HYPERGLYCEMIA: ICD-10-CM

## 2023-03-28 DIAGNOSIS — Z13.6 SCREENING FOR CARDIOVASCULAR CONDITION: Primary | ICD-10-CM

## 2023-03-28 DIAGNOSIS — E11.9 CONTROLLED TYPE 2 DIABETES MELLITUS WITHOUT COMPLICATION, WITHOUT LONG-TERM CURRENT USE OF INSULIN: ICD-10-CM

## 2023-03-28 DIAGNOSIS — E78.00 PURE HYPERCHOLESTEROLEMIA: ICD-10-CM

## 2023-03-28 DIAGNOSIS — F41.9 ANXIETY: ICD-10-CM

## 2023-03-28 DIAGNOSIS — E66.01 MORBID OBESITY: ICD-10-CM

## 2023-03-28 DIAGNOSIS — M81.0 AGE-RELATED OSTEOPOROSIS WITHOUT CURRENT PATHOLOGICAL FRACTURE: ICD-10-CM

## 2023-03-28 DIAGNOSIS — K21.9 GASTROESOPHAGEAL REFLUX DISEASE, UNSPECIFIED WHETHER ESOPHAGITIS PRESENT: ICD-10-CM

## 2023-03-28 LAB
ALBUMIN SERPL BCP-MCNC: 4.1 G/DL (ref 3.5–5.2)
ALP SERPL-CCNC: 65 U/L (ref 38–126)
ALT SERPL W/O P-5'-P-CCNC: 17 U/L (ref 10–44)
ANION GAP SERPL CALC-SCNC: 3 MMOL/L (ref 8–16)
AST SERPL-CCNC: 20 U/L (ref 15–46)
BASOPHILS # BLD AUTO: 0.02 K/UL (ref 0–0.2)
BASOPHILS NFR BLD: 0.5 % (ref 0–1.9)
BILIRUB SERPL-MCNC: 0.7 MG/DL (ref 0.1–1)
CALCIUM SERPL-MCNC: 9 MG/DL (ref 8.7–10.5)
CHLORIDE SERPL-SCNC: 106 MMOL/L (ref 95–110)
CHOLEST SERPL-MCNC: 251 MG/DL (ref 120–199)
CHOLEST/HDLC SERPL: 4 {RATIO} (ref 2–5)
CO2 SERPL-SCNC: 30 MMOL/L (ref 23–29)
CREAT SERPL-MCNC: 0.96 MG/DL (ref 0.5–1.4)
DIFFERENTIAL METHOD: ABNORMAL
EOSINOPHIL # BLD AUTO: 0.1 K/UL (ref 0–0.5)
EOSINOPHIL NFR BLD: 1.2 % (ref 0–8)
ERYTHROCYTE [DISTWIDTH] IN BLOOD BY AUTOMATED COUNT: 14.2 % (ref 11.5–14.5)
EST. GFR  (NO RACE VARIABLE): >60 ML/MIN/1.73 M^2
ESTIMATED AVG GLUCOSE: 117 MG/DL (ref 68–131)
GLUCOSE SERPL-MCNC: 113 MG/DL (ref 70–110)
HBA1C MFR BLD: 5.7 % (ref 4–5.6)
HCT VFR BLD AUTO: 38.7 % (ref 37–48.5)
HDLC SERPL-MCNC: 63 MG/DL (ref 40–75)
HDLC SERPL: 25.1 % (ref 20–50)
HGB BLD-MCNC: 12.1 G/DL (ref 12–16)
IMM GRANULOCYTES # BLD AUTO: 0 K/UL (ref 0–0.04)
IMM GRANULOCYTES NFR BLD AUTO: 0 % (ref 0–0.5)
LDLC SERPL CALC-MCNC: 173 MG/DL (ref 63–159)
LYMPHOCYTES # BLD AUTO: 2.1 K/UL (ref 1–4.8)
LYMPHOCYTES NFR BLD: 52.3 % (ref 18–48)
MCH RBC QN AUTO: 25.9 PG (ref 27–31)
MCHC RBC AUTO-ENTMCNC: 31.3 G/DL (ref 32–36)
MCV RBC AUTO: 83 FL (ref 82–98)
MONOCYTES # BLD AUTO: 0.2 K/UL (ref 0.3–1)
MONOCYTES NFR BLD: 5.7 % (ref 4–15)
NEUTROPHILS # BLD AUTO: 1.6 K/UL (ref 1.8–7.7)
NEUTROPHILS NFR BLD: 40.3 % (ref 38–73)
NONHDLC SERPL-MCNC: 188 MG/DL
NRBC BLD-RTO: 0 /100 WBC
PLATELET # BLD AUTO: 257 K/UL (ref 150–450)
PMV BLD AUTO: 10.4 FL (ref 9.2–12.9)
POTASSIUM SERPL-SCNC: 4.1 MMOL/L (ref 3.5–5.1)
PROT SERPL-MCNC: 7.2 G/DL (ref 6–8.4)
RBC # BLD AUTO: 4.68 M/UL (ref 4–5.4)
SODIUM SERPL-SCNC: 139 MMOL/L (ref 136–145)
TRIGL SERPL-MCNC: 75 MG/DL (ref 30–150)
UUN UR-MCNC: 14 MG/DL (ref 7–17)
WBC # BLD AUTO: 4.07 K/UL (ref 3.9–12.7)

## 2023-03-28 PROCEDURE — 36415 COLL VENOUS BLD VENIPUNCTURE: CPT | Mod: PO | Performed by: STUDENT IN AN ORGANIZED HEALTH CARE EDUCATION/TRAINING PROGRAM

## 2023-03-28 PROCEDURE — 3074F SYST BP LT 130 MM HG: CPT | Mod: CPTII,S$GLB,, | Performed by: STUDENT IN AN ORGANIZED HEALTH CARE EDUCATION/TRAINING PROGRAM

## 2023-03-28 PROCEDURE — 83036 HEMOGLOBIN GLYCOSYLATED A1C: CPT | Performed by: STUDENT IN AN ORGANIZED HEALTH CARE EDUCATION/TRAINING PROGRAM

## 2023-03-28 PROCEDURE — 1160F RVW MEDS BY RX/DR IN RCRD: CPT | Mod: CPTII,S$GLB,, | Performed by: STUDENT IN AN ORGANIZED HEALTH CARE EDUCATION/TRAINING PROGRAM

## 2023-03-28 PROCEDURE — 3078F PR MOST RECENT DIASTOLIC BLOOD PRESSURE < 80 MM HG: ICD-10-PCS | Mod: CPTII,S$GLB,, | Performed by: STUDENT IN AN ORGANIZED HEALTH CARE EDUCATION/TRAINING PROGRAM

## 2023-03-28 PROCEDURE — 3066F PR DOCUMENTATION OF TREATMENT FOR NEPHROPATHY: ICD-10-PCS | Mod: CPTII,S$GLB,, | Performed by: STUDENT IN AN ORGANIZED HEALTH CARE EDUCATION/TRAINING PROGRAM

## 2023-03-28 PROCEDURE — 80053 COMPREHEN METABOLIC PANEL: CPT | Mod: PO | Performed by: STUDENT IN AN ORGANIZED HEALTH CARE EDUCATION/TRAINING PROGRAM

## 2023-03-28 PROCEDURE — 3044F PR MOST RECENT HEMOGLOBIN A1C LEVEL <7.0%: ICD-10-PCS | Mod: CPTII,S$GLB,, | Performed by: STUDENT IN AN ORGANIZED HEALTH CARE EDUCATION/TRAINING PROGRAM

## 2023-03-28 PROCEDURE — 99214 PR OFFICE/OUTPT VISIT, EST, LEVL IV, 30-39 MIN: ICD-10-PCS | Mod: S$GLB,,, | Performed by: STUDENT IN AN ORGANIZED HEALTH CARE EDUCATION/TRAINING PROGRAM

## 2023-03-28 PROCEDURE — 3078F DIAST BP <80 MM HG: CPT | Mod: CPTII,S$GLB,, | Performed by: STUDENT IN AN ORGANIZED HEALTH CARE EDUCATION/TRAINING PROGRAM

## 2023-03-28 PROCEDURE — 1159F PR MEDICATION LIST DOCUMENTED IN MEDICAL RECORD: ICD-10-PCS | Mod: CPTII,S$GLB,, | Performed by: STUDENT IN AN ORGANIZED HEALTH CARE EDUCATION/TRAINING PROGRAM

## 2023-03-28 PROCEDURE — 1160F PR REVIEW ALL MEDS BY PRESCRIBER/CLIN PHARMACIST DOCUMENTED: ICD-10-PCS | Mod: CPTII,S$GLB,, | Performed by: STUDENT IN AN ORGANIZED HEALTH CARE EDUCATION/TRAINING PROGRAM

## 2023-03-28 PROCEDURE — 3008F PR BODY MASS INDEX (BMI) DOCUMENTED: ICD-10-PCS | Mod: CPTII,S$GLB,, | Performed by: STUDENT IN AN ORGANIZED HEALTH CARE EDUCATION/TRAINING PROGRAM

## 2023-03-28 PROCEDURE — 3066F NEPHROPATHY DOC TX: CPT | Mod: CPTII,S$GLB,, | Performed by: STUDENT IN AN ORGANIZED HEALTH CARE EDUCATION/TRAINING PROGRAM

## 2023-03-28 PROCEDURE — 3008F BODY MASS INDEX DOCD: CPT | Mod: CPTII,S$GLB,, | Performed by: STUDENT IN AN ORGANIZED HEALTH CARE EDUCATION/TRAINING PROGRAM

## 2023-03-28 PROCEDURE — 4010F PR ACE/ARB THEARPY RXD/TAKEN: ICD-10-PCS | Mod: CPTII,S$GLB,, | Performed by: STUDENT IN AN ORGANIZED HEALTH CARE EDUCATION/TRAINING PROGRAM

## 2023-03-28 PROCEDURE — 3074F PR MOST RECENT SYSTOLIC BLOOD PRESSURE < 130 MM HG: ICD-10-PCS | Mod: CPTII,S$GLB,, | Performed by: STUDENT IN AN ORGANIZED HEALTH CARE EDUCATION/TRAINING PROGRAM

## 2023-03-28 PROCEDURE — 99214 OFFICE O/P EST MOD 30 MIN: CPT | Mod: S$GLB,,, | Performed by: STUDENT IN AN ORGANIZED HEALTH CARE EDUCATION/TRAINING PROGRAM

## 2023-03-28 PROCEDURE — 4010F ACE/ARB THERAPY RXD/TAKEN: CPT | Mod: CPTII,S$GLB,, | Performed by: STUDENT IN AN ORGANIZED HEALTH CARE EDUCATION/TRAINING PROGRAM

## 2023-03-28 PROCEDURE — 85025 COMPLETE CBC W/AUTO DIFF WBC: CPT | Mod: PO | Performed by: STUDENT IN AN ORGANIZED HEALTH CARE EDUCATION/TRAINING PROGRAM

## 2023-03-28 PROCEDURE — 3061F PR NEG MICROALBUMINURIA RESULT DOCUMENTED/REVIEW: ICD-10-PCS | Mod: CPTII,S$GLB,, | Performed by: STUDENT IN AN ORGANIZED HEALTH CARE EDUCATION/TRAINING PROGRAM

## 2023-03-28 PROCEDURE — 3044F HG A1C LEVEL LT 7.0%: CPT | Mod: CPTII,S$GLB,, | Performed by: STUDENT IN AN ORGANIZED HEALTH CARE EDUCATION/TRAINING PROGRAM

## 2023-03-28 PROCEDURE — 3061F NEG MICROALBUMINURIA REV: CPT | Mod: CPTII,S$GLB,, | Performed by: STUDENT IN AN ORGANIZED HEALTH CARE EDUCATION/TRAINING PROGRAM

## 2023-03-28 PROCEDURE — 1159F MED LIST DOCD IN RCRD: CPT | Mod: CPTII,S$GLB,, | Performed by: STUDENT IN AN ORGANIZED HEALTH CARE EDUCATION/TRAINING PROGRAM

## 2023-03-28 PROCEDURE — 80061 LIPID PANEL: CPT | Performed by: STUDENT IN AN ORGANIZED HEALTH CARE EDUCATION/TRAINING PROGRAM

## 2023-03-28 RX ORDER — BUSPIRONE HYDROCHLORIDE 10 MG/1
20 TABLET ORAL 2 TIMES DAILY
Qty: 120 TABLET | Refills: 11 | Status: SHIPPED | OUTPATIENT
Start: 2023-03-28

## 2023-03-28 RX ORDER — OMEPRAZOLE 40 MG/1
40 CAPSULE, DELAYED RELEASE ORAL
Qty: 90 CAPSULE | Refills: 3 | Status: SHIPPED | OUTPATIENT
Start: 2023-03-28 | End: 2024-01-10

## 2023-04-03 NOTE — PROGRESS NOTES
Patient ID: Carmen Son is a 62 y.o. female.     Chief Complaint: Follow-up    Follow-up  Pertinent negatives include no chest pain, coughing, fever, headaches, myalgias, nausea, rash, vomiting or weakness.    Patient here for follow up    GERD- symptoms are controlled taking prilosec    Htn- blood pressure at goal    HLD- denies chest pain and shortness of breath        Review of Systems  Review of Systems   Constitutional:  Negative for fever.   HENT:  Negative for ear pain and sinus pain.    Eyes:  Negative for discharge.   Respiratory:  Negative for cough and shortness of breath.    Cardiovascular:  Negative for chest pain and leg swelling.   Gastrointestinal:  Negative for diarrhea, nausea and vomiting.   Genitourinary:  Negative for urgency.   Musculoskeletal:  Negative for myalgias.   Skin:  Negative for rash.   Neurological:  Negative for weakness and headaches.   Psychiatric/Behavioral:  Negative for depression.    All other systems reviewed and are negative.    Currently Medications  Current Outpatient Medications on File Prior to Visit   Medication Sig Dispense Refill    alendronate (FOSAMAX) 70 MG tablet Take 1 tablet (70 mg total) by mouth every 7 days. 12 tablet 3    ascorbic acid, vitamin C, (VITAMIN C) 500 MG tablet Take 500 mg by mouth once daily.      cholecalciferol, vitamin D3, (VITAMIN D3 ORAL) Vitamin D3      cyanocobalamin (VITAMIN B-12) 1000 MCG tablet B12      dapsone (ACZONE) 7.5 % GlwP Apply topically.      ergocalciferol (ERGOCALCIFEROL) 50,000 unit Cap Take 1 capsule (50,000 Units total) by mouth every 7 days. 12 capsule 3    fenofibrate 160 MG Tab Take 1 tablet (160 mg total) by mouth once daily. 90 tablet 3    fexofenadine (ALLEGRA) 180 MG tablet Take 1 tablet (180 mg total) by mouth once daily. 30 tablet 12    fluticasone propionate (FLONASE) 50 mcg/actuation nasal spray 2 sprays by Each Nostril route once daily.      furosemide (LASIX) 20 MG tablet Take 1 tablet (20 mg  "total) by mouth daily as needed. 30 tablet 3    hylan g-f 20 (SYNVISC-ONE) 48 mg/6 mL Syrg 6 mLs.      ipratropium (ATROVENT) 42 mcg (0.06 %) nasal spray 2 sprays 3 (three) times daily.      ivermectin (SOOLANTRA) 1 % Crea Apply topically.      LIDOcaine (LIDODERM) 5 % Place 1 patch onto the skin daily as needed (pain). Remove & Discard patch within 12 hours or as directed by MD 5 patch 0    linaCLOtide (LINZESS) 145 mcg Cap capsule Take 1 capsule (145 mcg total) by mouth once daily. 90 capsule 3    losartan (COZAAR) 50 MG tablet Take 1 tablet (50 mg total) by mouth once daily. 90 tablet 3    methocarbamoL (ROBAXIN) 750 MG Tab Take 1 tablet (750 mg total) by mouth 4 (four) times daily as needed (muscle spams). 40 tablet 0    MULTIVITAMIN ORAL multivitamin      mupirocin (BACTROBAN) 2 % ointment mupirocin 2 % topical ointment   APPLY OINTMENT TOPICALLY TWICE DAILY FOR 7 DAYS AS DIRECTED      naproxen (NAPROSYN) 500 MG tablet Take 500 mg by mouth 2 (two) times daily.      ondansetron (ZOFRAN) 4 MG tablet Take 1 tablet (4 mg total) by mouth every 8 (eight) hours as needed for Nausea. 45 tablet 1    potassium chloride SA (K-DUR,KLOR-CON) 20 MEQ tablet Take 1 tablet (20 mEq total) by mouth once daily. 30 tablet 3    prenatal vit calc,iron,folic (PRENATAL VITAMIN ORAL) Take by mouth.      propranoloL (INDERAL LA) 80 MG 24 hr capsule Take 1 capsule (80 mg total) by mouth once daily. 90 capsule 3     No current facility-administered medications on file prior to visit.       Physical  Exam  Vitals:    03/28/23 1452   BP: 122/76   BP Location: Right leg   Patient Position: Sitting   Pulse: 74   Temp: 98.2 °F (36.8 °C)   SpO2: 99%   Weight: 88.8 kg (195 lb 12.3 oz)   Height: 5' 3" (1.6 m)      Body mass index is 34.68 kg/m².  Wt Readings from Last 3 Encounters:   03/28/23 88.8 kg (195 lb 12.3 oz)   03/07/23 90.7 kg (200 lb)   12/30/22 91.3 kg (201 lb 4.5 oz)       Physical Exam  Vitals and nursing note reviewed. "   Constitutional:       General: She is not in acute distress.     Appearance: She is not ill-appearing.   HENT:      Head: Normocephalic and atraumatic.      Right Ear: External ear normal.      Left Ear: External ear normal.      Nose: Nose normal.      Mouth/Throat:      Mouth: Mucous membranes are moist.   Eyes:      Extraocular Movements: Extraocular movements intact.      Conjunctiva/sclera: Conjunctivae normal.   Cardiovascular:      Rate and Rhythm: Normal rate and regular rhythm.      Pulses: Normal pulses.      Heart sounds: No murmur heard.  Pulmonary:      Effort: Pulmonary effort is normal. No respiratory distress.      Breath sounds: No wheezing.   Abdominal:      General: There is no distension.      Palpations: Abdomen is soft. There is no mass.      Tenderness: There is no abdominal tenderness.   Musculoskeletal:         General: No swelling.      Cervical back: Normal range of motion.   Skin:     Coloration: Skin is not jaundiced.      Findings: No rash.   Neurological:      General: No focal deficit present.      Mental Status: She is alert and oriented to person, place, and time.   Psychiatric:         Mood and Affect: Mood normal.         Thought Content: Thought content normal.       Labs:    Complete Blood Count  Lab Results   Component Value Date    RBC 4.68 03/28/2023    HGB 12.1 03/28/2023    HCT 38.7 03/28/2023    MCV 83 03/28/2023    MCH 25.9 (L) 03/28/2023    MCHC 31.3 (L) 03/28/2023    RDW 14.2 03/28/2023     03/28/2023    MPV 10.4 03/28/2023    GRAN 1.6 (L) 03/28/2023    GRAN 40.3 03/28/2023    LYMPH 2.1 03/28/2023    LYMPH 52.3 (H) 03/28/2023    MONO 0.2 (L) 03/28/2023    MONO 5.7 03/28/2023    EOS 0.1 03/28/2023    BASO 0.02 03/28/2023    EOSINOPHIL 1.2 03/28/2023    BASOPHIL 0.5 03/28/2023    DIFFMETHOD Automated 03/28/2023       Comprehensive Metabolic Panel  Lab Results   Component Value Date     (H) 03/28/2023    BUN 14 03/28/2023    CREATININE 0.96 03/28/2023    NA  139 03/28/2023    K 4.1 03/28/2023     03/28/2023    PROT 7.2 03/28/2023    ALBUMIN 4.1 03/28/2023    BILITOT 0.7 03/28/2023    AST 20 03/28/2023    ALKPHOS 65 03/28/2023    CO2 30 (H) 03/28/2023    ALT 17 03/28/2023    ANIONGAP 3 (L) 03/28/2023       TSH  Lab Results   Component Value Date    TSH 0.725 12/28/2022       Imaging:  X-Ray Finger 2 or More Views Left  Narrative: EXAMINATION:  XR FINGER 2 OR MORE VIEWS LEFT    CLINICAL HISTORY:  2nd and 3rd finger crush injury;    COMPARISON:  A plain film examination of the left hand performed on 07/28/2021.    FINDINGS:  There are acute appearing fractures throughout the distal phalanx of the long finger.  There is an acute appearing fracture in the distal aspect of the distal phalanx of the index finger.  There is no dislocation.  Impression: 1. There are acute appearing fractures throughout the distal phalanx of the long finger.  2. There is an acute appearing fracture in the distal aspect of the distal phalanx of the index finger.    Electronically signed by: Chester Camarena MD  Date:    03/07/2023  Time:    16:03      Assessment/Plan:    1. Screening for cardiovascular condition  -     Lipid Panel; Future; Expected date: 03/28/2023    2. Gastroesophageal reflux disease, unspecified whether esophagitis present  -     omeprazole (PRILOSEC) 40 MG capsule; Take 1 capsule (40 mg total) by mouth before breakfast.  Dispense: 90 capsule; Refill: 3    3. Anxiety  -     busPIRone (BUSPAR) 10 MG tablet; Take 2 tablets (20 mg total) by mouth 2 (two) times daily.  Dispense: 120 tablet; Refill: 11  -     CBC Auto Differential; Future  -     Comprehensive metabolic panel; Future; Expected date: 03/28/2023  -     TSH; Future; Expected date: 03/28/2023    4. Hyperglycemia  -     HEMOGLOBIN A1C; Future; Expected date: 03/28/2023    5. Age-related osteoporosis without current pathological fracture    6. Controlled type 2 diabetes mellitus without complication, without long-term  current use of insulin  Assessment & Plan:  - most recent a1c 5.7  - continue current medications.      7. Morbid obesity  Assessment & Plan:  - bmi 34.86  - discussed diet and exercise           Discussed how to stay healthy including: diet, exercise, refraining from smoking and discussed screening exams / tests needed for age, sex and family Hx.    RTC 6 mo    Anson López MD

## 2023-04-05 ENCOUNTER — TELEPHONE (OUTPATIENT)
Dept: FAMILY MEDICINE | Facility: CLINIC | Age: 63
End: 2023-04-05
Payer: COMMERCIAL

## 2023-04-05 NOTE — TELEPHONE ENCOUNTER
Cholesterol is elevated. We need to discuss starting a statin or starting an injection. What do you prefer?

## 2023-04-05 NOTE — TELEPHONE ENCOUNTER
----- Message from Laura Foley sent at 4/5/2023 11:34 AM CDT -----  Regarding: results  Contact: [phone]  Type:  Test Results    Who Called:  self   Name of Test (Lab/Mammo/Etc):  labs   Date of Test:  03/28  Ordering Provider:    Where the test was performed:  [facility]   Would the patient rather a call back or a response via [facility]?  Call   Best Call Back Number:  [phone]  Additional Information:

## 2023-04-10 NOTE — TELEPHONE ENCOUNTER
Spoke to pt about results.    Pt asked how often would she have to take the injections?        Jerich[facility] Staff  Caller: pt (Today, 10:52 AM)  Type:  Patient Returning Call     Who Called:pt   Who Left Message for Patient:Marci Casas   Does the patient know what this is regarding?:yes   Would the patient rather a call back or a response via [facility]? call   Best Call Back Number:[phone]   Additional Information:

## 2023-04-24 ENCOUNTER — TELEPHONE (OUTPATIENT)
Dept: FAMILY MEDICINE | Facility: CLINIC | Age: 63
End: 2023-04-24
Payer: COMMERCIAL

## 2023-04-24 DIAGNOSIS — E78.00 PURE HYPERCHOLESTEROLEMIA: Primary | ICD-10-CM

## 2023-04-24 NOTE — TELEPHONE ENCOUNTER
----- Message from Zahra Mikey sent at 4/24/2023 11:57 AM CDT -----  Regarding: call back  Contact: self/ walked in  Please call pt at [phone].  States she wants a copy of her labs that was most recently done and someone was supposed to call her back in regards to some kind of injections she should be starting.

## 2023-04-24 NOTE — TELEPHONE ENCOUNTER
Improvement of a1c. Great job. Injection for cholesterol has been ordered and is being reviewed by [facility]. Liver and kidneys look great. Blood counts look great. See me back in 6 months

## 2023-04-25 ENCOUNTER — SPECIALTY PHARMACY (OUTPATIENT)
Dept: PHARMACY | Facility: CLINIC | Age: 63
End: 2023-04-25
Payer: COMMERCIAL

## 2023-04-25 ENCOUNTER — TELEPHONE (OUTPATIENT)
Dept: PHARMACY | Facility: CLINIC | Age: 63
End: 2023-04-25
Payer: COMMERCIAL

## 2023-04-25 NOTE — TELEPHONE ENCOUNTER
Hello, this is Dayami Sumner, clinical pharmacist with [facility] that is part of your care team.  We have begun working on your prescription that your doctor has sent us. Our next steps include:     Working with your insurance company to obtain approval for your medication  Working with you to ensure your medication is affordable     We will be calling you along the way with updates on your medication but if you have any concerns or receive information that you would like to discuss please reach us at [phone].    Welcome call outcome: Left voicemail. Need to see what statins pt has tried and why they failed them prior to submitting PA.

## 2023-04-25 NOTE — TELEPHONE ENCOUNTER
Incoming call from pt returning call, pt reports she has tried and failed rosuvastatin and atorvastatin due to severe myalgias.

## 2023-04-25 NOTE — TELEPHONE ENCOUNTER
Specialty Pharmacy - Initial Clinical Assessment    Specialty Medication Orders Linked to Encounter      Flowsheet Row Most Recent Value   Medication #1 evolocumab (REPATHA SURECLICK) 140 mg/mL PnIj (Order#[id number], Rx#[id number])          Patient Diagnosis   E78.5 - Hyperlipidemia    Subjective    Carmen Son is a 62 y.o. female, who is followed by the specialty pharmacy service for management and education.    Recent Encounters       Date Type Provider Description    04/25/2023 Specialty Pharmacy Dayami Sumner, [facility] Initial Clinical Assessment    04/25/2023 Specialty Pharmacy Day[facility] Sum[facility], [facility] Referral Authorization            Current Outpatient Medications   Medication Sig    alendronate (FOSAMAX) 70 MG tablet Take 1 tablet (70 mg total) by mouth every 7 days.    ascorbic acid, vitamin C, (VITAMIN C) 500 MG tablet Take 500 mg by mouth once daily.    busPIRone (BUSPAR) 10 MG tablet Take 2 tablets (20 mg total) by mouth 2 (two) times daily.    cholecalciferol, vitamin D3, (VITAMIN D3 ORAL) Vitamin D3    cyanocobalamin (VITAMIN B-12) 1000 MCG tablet B12    dapsone (ACZONE) 7.5 % GlwP Apply topically.    ergocalciferol (ERGOCALCIFEROL) 50,000 unit Cap Take 1 capsule (50,000 Units total) by mouth every 7 days.    evolocumab (REPATHA SURECLICK) 140 mg/mL PnIj Inject 1 mL (140 mg total) into the skin every 14 (fourteen) days.    fenofibrate 160 MG Tab Take 1 tablet (160 mg total) by mouth once daily.    fexofenadine (ALLEGRA) 180 MG tablet Take 1 tablet (180 mg total) by mouth once daily.    fluticasone propionate (FLONASE) 50 mcg/actuation nasal spray 2 sprays by Each Nostril route once daily.    furosemide (LASIX) 20 MG tablet Take 1 tablet (20 mg total) by mouth daily as needed.    hylan g-f 20 (SYNVISC-ONE) 48 mg/6 mL Syrg 6 mLs.    ipratropium (ATROVENT) 42 mcg (0.06 %) nasal spray 2 sprays 3 (three) times daily.    ivermectin (SOOLANTRA) 1 % Crea Apply topically.    LIDOcaine (LIDODERM) 5 %  Place 1 patch onto the skin daily as needed (pain). Remove & Discard patch within 12 hours or as directed by MD    linaCLOtide (LINZESS) 145 mcg Cap capsule Take 1 capsule (145 mcg total) by mouth once daily.    losartan (COZAAR) 50 MG tablet Take 1 tablet (50 mg total) by mouth once daily.    methocarbamoL (ROBAXIN) 750 MG Tab Take 1 tablet (750 mg total) by mouth 4 (four) times daily as needed (muscle spams).    MULTIVITAMIN ORAL multivitamin    mupirocin (BACTROBAN) 2 % ointment mupirocin 2 % topical ointment   APPLY OINTMENT TOPICALLY TWICE DAILY FOR 7 DAYS AS DIRECTED    naproxen (NAPROSYN) 500 MG tablet Take 500 mg by mouth 2 (two) times daily.    omeprazole (PRILOSEC) 40 MG capsule Take 1 capsule (40 mg total) by mouth before breakfast.    ondansetron (ZOFRAN) 4 MG tablet Take 1 tablet (4 mg total) by mouth every 8 (eight) hours as needed for Nausea.    potassium chloride SA (K-DUR,KLOR-CON) 20 MEQ tablet Take 1 tablet (20 mEq total) by mouth once daily.    prenatal vit calc,iron,folic (PRENATAL VITAMIN ORAL) Take by mouth.    propranoloL (INDERAL LA) 80 MG 24 hr capsule Take 1 capsule (80 mg total) by mouth once daily.   Last reviewed on 4/2/2023  8:16 PM by Anson López MD    Review of patient's allergies indicates:  No Known AllergiesLast reviewed on  4/24/2023 4:37 PM by Anson López          Assessment Questions - Documented Responses      Flowsheet Row Most Recent Value   Assessment    Goals of Therapy Status Discussed (new start)   Status of the patients ability to self-administer: Is Able   Welcome packet contents reviewed and discussed with patient? Yes   Assesment completed? Yes   Plan Therapy being initiated   Do you need to open a clinical intervention (i-vent)? No   Do you want to schedule first shipment? Yes   Medication #1 Assessment Info    Patient status New medication   Is this medication appropriate for the patient? Yes   Is this medication effective? Not yet started          Refill  "Questions - Documented Responses      Flowsheet Row Most Recent Value   Refill Screening Questions    When does the patient need to receive the medication? 04/27/23   Refill Delivery Questions    How will the patient receive the medication? [facility]   When does the patient need to receive the medication? 04/27/23   Shipping Address Home   Address in [facility] confirmed and updated if neccessary? Yes   Expected Copay ($) 4.99   Is the patient able to afford the medication copay? Yes   Payment Method CC on file   Days supply of Refill 28   Supplies needed? No supplies needed   Refill activity completed? Yes   Refill activity plan Refill scheduled   Shipment/Pickup Date: 04/26/23            Objective    She has a past medical history of Acid reflux, Diabetes mellitus, Diabetes mellitus, type 2, Diet controlled gestational diabetes mellitus in puerperium, History of seasonal allergies, and Hypertension.    Tried/failed medications: statins    BP Readings from Last 4 Encounters:   03/28/23 122/76   03/07/23 (!) 156/85   12/30/22 124/64   09/20/22 116/82     Ht Readings from Last 4 Encounters:   03/28/23 5' 3" (1.6 m)   03/07/23 5' 3" (1.6 m)   12/30/22 5' 3" (1.6 m)   09/20/22 5' 3" (1.6 m)     Wt Readings from Last 4 Encounters:   03/28/23 88.8 kg (195 lb 12.3 oz)   03/07/23 90.7 kg (200 lb)   12/30/22 91.3 kg (201 lb 4.5 oz)   09/20/22 90.9 kg (200 lb 8.1 oz)       The goals of prescribed drug therapy management include:  Supporting patient to meet the prescriber's medical treatment objectives  Improving or maintaining quality of life  Maintaining optimal therapy adherence  Minimizing and managing side effects      Goals of Therapy Status: Discussed (new start)    Assessment/Plan  Patient plans to start therapy on 04/27/23      Indication, dosage, appropriateness, effectiveness, safety and convenience of her specialty medication(s) were reviewed today.     Patient Education       Pt expressed understanding of " medication and had no questions at this time.    Tasks added this encounter   4/25/2024 - Benefits Review (Annual recurrence)   Tasks due within next 3 months   No tasks due.     Dayami Sumner, PharmD  [facility]  [address]  [facility] [address]  Phone: [phone]  Fax: [phone]

## 2023-04-26 ENCOUNTER — OFFICE VISIT (OUTPATIENT)
Dept: NEUROLOGY | Facility: CLINIC | Age: 63
End: 2023-04-26
Payer: COMMERCIAL

## 2023-04-26 VITALS
SYSTOLIC BLOOD PRESSURE: 121 MMHG | WEIGHT: 202.19 LBS | HEART RATE: 77 BPM | BODY MASS INDEX: 35.81 KG/M2 | DIASTOLIC BLOOD PRESSURE: 86 MMHG

## 2023-04-26 DIAGNOSIS — F34.89 OTHER SPECIFIED PERSISTENT MOOD DISORDERS: ICD-10-CM

## 2023-04-26 DIAGNOSIS — F41.1 GAD (GENERALIZED ANXIETY DISORDER): ICD-10-CM

## 2023-04-26 DIAGNOSIS — G89.29 CHRONIC INTRACTABLE HEADACHE, UNSPECIFIED HEADACHE TYPE: Primary | ICD-10-CM

## 2023-04-26 DIAGNOSIS — R51.9 CHRONIC INTRACTABLE HEADACHE, UNSPECIFIED HEADACHE TYPE: Primary | ICD-10-CM

## 2023-04-26 DIAGNOSIS — F33.9 EPISODE OF RECURRENT MAJOR DEPRESSIVE DISORDER, UNSPECIFIED DEPRESSION EPISODE SEVERITY: ICD-10-CM

## 2023-04-26 DIAGNOSIS — I10 ESSENTIAL HYPERTENSION: ICD-10-CM

## 2023-04-26 DIAGNOSIS — M54.2 CERVICALGIA: ICD-10-CM

## 2023-04-26 DIAGNOSIS — E11.9 CONTROLLED TYPE 2 DIABETES MELLITUS WITHOUT COMPLICATION, WITHOUT LONG-TERM CURRENT USE OF INSULIN: ICD-10-CM

## 2023-04-26 DIAGNOSIS — G25.0 ESSENTIAL TREMOR: ICD-10-CM

## 2023-04-26 PROCEDURE — 1160F RVW MEDS BY RX/DR IN RCRD: CPT | Mod: CPTII,S$GLB,, | Performed by: NURSE PRACTITIONER

## 2023-04-26 PROCEDURE — 4010F ACE/ARB THERAPY RXD/TAKEN: CPT | Mod: CPTII,S$GLB,, | Performed by: NURSE PRACTITIONER

## 2023-04-26 PROCEDURE — 3044F HG A1C LEVEL LT 7.0%: CPT | Mod: CPTII,S$GLB,, | Performed by: NURSE PRACTITIONER

## 2023-04-26 PROCEDURE — 3079F PR MOST RECENT DIASTOLIC BLOOD PRESSURE 80-89 MM HG: ICD-10-PCS | Mod: CPTII,S$GLB,, | Performed by: NURSE PRACTITIONER

## 2023-04-26 PROCEDURE — 99215 PR OFFICE/OUTPT VISIT, EST, LEVL V, 40-54 MIN: ICD-10-PCS | Mod: S$GLB,,, | Performed by: NURSE PRACTITIONER

## 2023-04-26 PROCEDURE — 3066F PR DOCUMENTATION OF TREATMENT FOR NEPHROPATHY: ICD-10-PCS | Mod: CPTII,S$GLB,, | Performed by: NURSE PRACTITIONER

## 2023-04-26 PROCEDURE — 3079F DIAST BP 80-89 MM HG: CPT | Mod: CPTII,S$GLB,, | Performed by: NURSE PRACTITIONER

## 2023-04-26 PROCEDURE — 99999 PR PBB SHADOW E&M-EST. PATIENT-LVL V: CPT | Mod: PBBFAC,,, | Performed by: NURSE PRACTITIONER

## 2023-04-26 PROCEDURE — 99215 OFFICE O/P EST HI 40 MIN: CPT | Mod: S$GLB,,, | Performed by: NURSE PRACTITIONER

## 2023-04-26 PROCEDURE — 4010F PR ACE/ARB THEARPY RXD/TAKEN: ICD-10-PCS | Mod: CPTII,S$GLB,, | Performed by: NURSE PRACTITIONER

## 2023-04-26 PROCEDURE — 1160F PR REVIEW ALL MEDS BY PRESCRIBER/CLIN PHARMACIST DOCUMENTED: ICD-10-PCS | Mod: CPTII,S$GLB,, | Performed by: NURSE PRACTITIONER

## 2023-04-26 PROCEDURE — 3008F BODY MASS INDEX DOCD: CPT | Mod: CPTII,S$GLB,, | Performed by: NURSE PRACTITIONER

## 2023-04-26 PROCEDURE — 3061F PR NEG MICROALBUMINURIA RESULT DOCUMENTED/REVIEW: ICD-10-PCS | Mod: CPTII,S$GLB,, | Performed by: NURSE PRACTITIONER

## 2023-04-26 PROCEDURE — 3074F PR MOST RECENT SYSTOLIC BLOOD PRESSURE < 130 MM HG: ICD-10-PCS | Mod: CPTII,S$GLB,, | Performed by: NURSE PRACTITIONER

## 2023-04-26 PROCEDURE — 3044F PR MOST RECENT HEMOGLOBIN A1C LEVEL <7.0%: ICD-10-PCS | Mod: CPTII,S$GLB,, | Performed by: NURSE PRACTITIONER

## 2023-04-26 PROCEDURE — 3008F PR BODY MASS INDEX (BMI) DOCUMENTED: ICD-10-PCS | Mod: CPTII,S$GLB,, | Performed by: NURSE PRACTITIONER

## 2023-04-26 PROCEDURE — 3066F NEPHROPATHY DOC TX: CPT | Mod: CPTII,S$GLB,, | Performed by: NURSE PRACTITIONER

## 2023-04-26 PROCEDURE — 3061F NEG MICROALBUMINURIA REV: CPT | Mod: CPTII,S$GLB,, | Performed by: NURSE PRACTITIONER

## 2023-04-26 PROCEDURE — 1159F MED LIST DOCD IN RCRD: CPT | Mod: CPTII,S$GLB,, | Performed by: NURSE PRACTITIONER

## 2023-04-26 PROCEDURE — 3074F SYST BP LT 130 MM HG: CPT | Mod: CPTII,S$GLB,, | Performed by: NURSE PRACTITIONER

## 2023-04-26 PROCEDURE — 1159F PR MEDICATION LIST DOCUMENTED IN MEDICAL RECORD: ICD-10-PCS | Mod: CPTII,S$GLB,, | Performed by: NURSE PRACTITIONER

## 2023-04-26 PROCEDURE — 99999 PR PBB SHADOW E&M-EST. PATIENT-LVL V: ICD-10-PCS | Mod: PBBFAC,,, | Performed by: NURSE PRACTITIONER

## 2023-04-26 RX ORDER — ZONISAMIDE 50 MG/1
50 CAPSULE ORAL NIGHTLY
Qty: 90 CAPSULE | Refills: 0 | Status: SHIPPED | OUTPATIENT
Start: 2023-04-26 | End: 2023-07-24

## 2023-04-26 NOTE — PROGRESS NOTES
"NEW PATIENT CONSULT  SUBJECTIVE:  Patient ID: Carmen Son   MRN: [id number]  Referred By: No ref. provider found  Chief Complaint: Consult    History of Present Illness:   62 y.o. female with neck pain, HTN, headaches, T2DM, anxiety/depression, tremor, who presents to clinic alone for evaluation of headaches. Previous patient of ELIA Zaragoza, she is a new patient to me, last seen August 2022, at that time she was started on trileptal which initially was helping.    She describes a "crawling" sensation across her forehead, can also experience a pressure sensation in her temples, can be right/left or bilaterally distribution.  Can also have pressure sensation throughout the occipitalis, like "tightness", "like a bee or wasp sting".  Lasts hours, fluctuating in intensity.  Associated symptoms include "feeling as if I can hardly hold by head up", neck pain, photophobia.  Occurs 15 days per month.     She does have a history of what she believes in migraines, but was seen by ENT and was then told it was her sinuses.      Went to dentist to rule out dental problem.      Went to psychiatrist as she thought possibly anxiety contributing to her symptoms, was started on buspar 20 mg bid which has been helping.      Hypertension well controlled on losartan 50 mg daily.      Diagnosed with essential tremor for which she is currently prescribed propranolol 80 mg LA daily.  Propranolol has helped tremor, she has not noticed any improvement in migraines since starting propranolol.     Notes from ELIA Zaragoza:  History of Present Illness  This is a 62 y.o. female with a  history of lumbar radiculopathy s/p L4/5 MIS TLIF on 1/25/2021, and cervical radiculopathy s/p ACDF C3-4, C4-5 in 2017 who presents to clinic alone and was referred to neurology by neurosurgery ELIA Servin  for evaluation of facial pain/numbness.   Patient reports she has been experiencing abnormal facial sensation with associated pain for the past 2 " "months. She marin describes her symptoms as episodic facial numbness/tingling  that begins on both sides of her neck and travels along her jawline across her forehead,cheek bones, and to the tops of her ears bilaterally, and the right side of the bridge of her nose. She notes in addition to the numbness and tingling she also also will experiences episodes of brief stabbing/burning pain along the left side of her cheek. She notes associated symptoms ot her facial pain and paraesthesias  Are tightness/pressure sensation" under her eyes,and when this occurs it makes it hard to close her eyes", and notes this sensation is worse when she tries to raises her eyebrows, also notes experiencing left eye twitching, as well as dry eyes, and photophobia. She further notes this morning si the first time  she experienced the burning/stabbing sensation on the left side of her cheek, which lasted a couple seconds and then resolved. She notes her other facial symptoms usually last several minutes and can be relieved by rubbing her forehead. She notes the numbness/tingling at the tops of her ears can be triggered by wearing a mask or wearing heavy earrings, but other she denies noticing any particular triggers to her facial numbness/tingling or burning facial pain, such as talking, chewing, cold water or touching her face.   She notes her symptoms occur daily, but they seem to come out of no where with no identified triggers or certain time of day of occurrence. She notes these symptoms are starting to affect her sleep, and notes she woke up in the early morning around 5 am with a stabbing pain on her left cheek without radiation elsewhere. She notes when these symptoms first began she thought maybe they were secondary to suing dapsone and clindamycin facial medication for treatment of her rosacea so she stopped taking this as well and symptoms persisted. She also notes she saw her dentist last Wednesday thinking the symptoms were " due to a dental infection but this workup was negative for any dental issues. She also notes she has gone to the ED several times for these symptoms with negative workup, and most recently given mobic RX. She notes she has tried relieving her current symptoms with tylenol and mobic, which she notes alleviates the pain for about 2-3 hours.      . Also notes cervical radiculopathy symptoms of worsening episodic numbness/tingling down both her arms and has seen NSGY and completed cervical spine MRI and hs followup with NSGY on the 28th to review imaging.   She notes she has experienced similar symptoms in the past and ws diagnosed with parasthesias and trigeminal neuralgia but notes these symptoms at that time were confined to the front of both of her ears. She notes at that time she was prescribed gabapentin and lyrica by her psychiatrist,  but stopped due to intolerable side effects, and notes these symptoms subsided after she completed cervical spine surgery for tx of cervical stenosis and symptoms did not recur until now. She notes she feels like she is under a lot of stress in the last 4-5 months, and asked if there was anything she could take for her anxiety.   She denies facial weakness or facial drooping,diplopia, blurry vision, changes in hearing, denies trouble talking, denies dysphagia, denies dizziness or gait abnormality, denies N/V.    Interval History:  Caremn Son is a 62 y.o. female w/ a history of lumbar radiculopathy s/p L4/5 MIS TLIF on 1/25/2021, and cervical radiculopathy s/p ACDF C3-4, C4-5 in 2017 who is here for follow up of bilateral facial pain and paraesthesias.  Their condition has somewhat changed since I saw her last visit and started her on trileptal 150 mg BID for preventative treatment of suspected trigeminal neuralgia.  She notes initially the trileptal was helpful at preventing her facial pain and parasthesias, but she further notes she was recently seen with psychiatry  and prescribed Cymbalta which she notes she took for 2 days and stopped because it made her facial symptoms return. She notes she is currently taking the trileptal 150 mg 3 x a day since she was her symptoms have returned and were no longer controlled with the trlipetal 150 mg BID. She also notes some left sided muscle twitching she states that her  pointed out the other day,but deneis abnormal tardive movements of her mouth and face.   We have also reviewed her  Recently completed brain MRI and specifically discusse that was no acute intracranial abnormalities or any signs of neurovascular compression of the trigeminal nerve on either side and overall no secondary causes of her current symptoms.   She also notes continued neck tightness and was seen with NSGY for continued symptoms of left side cervical radiculopathy and has appointment with pain management coming up and has also been referred to physical therapy.   She denies any focal neurologic signs such as facial drooping, numbness, weakness, vision loss.      Treatments Tried and Response  Gabapentin   Lyrica  Carbamazepine  Flexeril    Toradol   Labetalol   Meloxicam   Propranolol -for essential tremor   Trileptal - caused side effects   Zonisamide - given today     Current Medications:    alendronate (FOSAMAX) 70 MG tablet, Take 1 tablet (70 mg total) by mouth every 7 days., Disp: 12 tablet, Rfl: 3    ascorbic acid, vitamin C, (VITAMIN C) 500 MG tablet, Take 500 mg by mouth once daily., Disp: , Rfl:     busPIRone (BUSPAR) 10 MG tablet, Take 2 tablets (20 mg total) by mouth 2 (two) times daily., Disp: 120 tablet, Rfl: 11    cholecalciferol, vitamin D3, (VITAMIN D3 ORAL), Vitamin D3, Disp: , Rfl:     cyanocobalamin (VITAMIN B-12) 1000 MCG tablet, B12, Disp: , Rfl:     dapsone (ACZONE) 7.5 % GlwP, Apply topically., Disp: , Rfl:     ergocalciferol (ERGOCALCIFEROL) 50,000 unit Cap, Take 1 capsule (50,000 Units total) by mouth every 7 days., Disp: 12  capsule, Rfl: 3    evolocumab (REPATHA SURECLICK) 140 mg/mL PnIj, Inject 1 mL (140 mg total) into the skin every 14 (fourteen) days., Disp: 2 mL, Rfl: 0    fenofibrate 160 MG Tab, Take 1 tablet (160 mg total) by mouth once daily., Disp: 90 tablet, Rfl: 3    fexofenadine (ALLEGRA) 180 MG tablet, Take 1 tablet (180 mg total) by mouth once daily., Disp: 30 tablet, Rfl: 12    fluticasone propionate (FLONASE) 50 mcg/actuation nasal spray, 2 sprays by Each Nostril route once daily., Disp: , Rfl:     furosemide (LASIX) 20 MG tablet, Take 1 tablet (20 mg total) by mouth daily as needed., Disp: 30 tablet, Rfl: 3    hylan g-f 20 (SYNVISC-ONE) 48 mg/6 mL Syrg, 6 mLs., Disp: , Rfl:     ipratropium (ATROVENT) 42 mcg (0.06 %) nasal spray, 2 sprays 3 (three) times daily., Disp: , Rfl:     ivermectin (SOOLANTRA) 1 % Crea, Apply topically., Disp: , Rfl:     LIDOcaine (LIDODERM) 5 %, Place 1 patch onto the skin daily as needed (pain). Remove & Discard patch within 12 hours or as directed by MD, Disp: 5 patch, Rfl: 0    linaCLOtide (LINZESS) 145 mcg Cap capsule, Take 1 capsule (145 mcg total) by mouth once daily., Disp: 90 capsule, Rfl: 3    losartan (COZAAR) 50 MG tablet, Take 1 tablet (50 mg total) by mouth once daily., Disp: 90 tablet, Rfl: 3    MULTIVITAMIN ORAL, multivitamin, Disp: , Rfl:     mupirocin (BACTROBAN) 2 % ointment, mupirocin 2 % topical ointment  APPLY OINTMENT TOPICALLY TWICE DAILY FOR 7 DAYS AS DIRECTED, Disp: , Rfl:     omeprazole (PRILOSEC) 40 MG capsule, Take 1 capsule (40 mg total) by mouth before breakfast., Disp: 90 capsule, Rfl: 3    ondansetron (ZOFRAN) 4 MG tablet, Take 1 tablet (4 mg total) by mouth every 8 (eight) hours as needed for Nausea., Disp: 45 tablet, Rfl: 1    potassium chloride SA (K-DUR,KLOR-CON) 20 MEQ tablet, Take 1 tablet (20 mEq total) by mouth once daily., Disp: 30 tablet, Rfl: 3    propranoloL (INDERAL LA) 80 MG 24 hr capsule, Take 1 capsule (80 mg total) by mouth once daily., Disp: 90  capsule, Rfl: 3    naproxen (NAPROSYN) 500 MG tablet, Take 500 mg by mouth 2 (two) times daily., Disp: , Rfl:     prenatal vit calc,iron,folic (PRENATAL VITAMIN ORAL), Take by mouth., Disp: , Rfl:     zonisamide (ZONEGRAN) 50 MG Cap, Take 1 capsule (50 mg total) by mouth every evening., Disp: 90 capsule, Rfl: 0    Review of Systems - A review of 10+ systems was conducted with pertinent positive and negative findings documented in HPI with all other systems reviewed and negative.    PFSH: Past medical, family, and social history reviewed as documented in chart with pertinent positive medical, family, and social history detailed in HPI.    OBJECTIVE:  Vitals:  LMP 04/07/2013      Physical Exam:  Constitutional: she appears well-developed and well-nourished. she is well groomed. NAD    Review of Data:   Notes from ELIA Zaragoza, family medicine, cardiology reviewed   Labs:  Lab Visit on 03/28/2023   Component Date Value Ref Range Status    WBC 03/28/2023 4.07  3.90 - 12.70 K/uL Final    RBC 03/28/2023 4.68  4.00 - 5.40 M/uL Final    Hemoglobin 03/28/2023 12.1  12.0 - 16.0 g/dL Final    Hematocrit 03/28/2023 38.7  37.0 - 48.5 % Final    MCV 03/28/2023 83  82 - 98 fL Final    MCH 03/28/2023 25.9 (L)  27.0 - 31.0 pg Final    MCHC 03/28/2023 31.3 (L)  32.0 - 36.0 g/dL Final    RDW 03/28/2023 14.2  11.5 - 14.5 % Final    Platelets 03/28/2023 257  150 - 450 K/uL Final    MPV 03/28/2023 10.4  9.2 - 12.9 fL Final    Immature Granulocytes 03/28/2023 0.0  0.0 - 0.5 % Final    Gran # (ANC) 03/28/2023 1.6 (L)  1.8 - 7.7 K/uL Final    Immature Grans (Abs) 03/28/2023 0.00  0.00 - 0.04 K/uL Final    Lymph # 03/28/2023 2.1  1.0 - 4.8 K/uL Final    Mono # 03/28/2023 0.2 (L)  0.3 - 1.0 K/uL Final    Eos # 03/28/2023 0.1  0.0 - 0.5 K/uL Final    Baso # 03/28/2023 0.02  0.00 - 0.20 K/uL Final    nRBC 03/28/2023 0  0 /100 WBC Final    Gran % 03/28/2023 40.3  38.0 - 73.0 % Final    Lymph % 03/28/2023 52.3 (H)  18.0 - 48.0 % Final    Mono %  03/28/2023 5.7  4.0 - 15.0 % Final    Eosinophil % 03/28/2023 1.2  0.0 - 8.0 % Final    Basophil % 03/28/2023 0.5  0.0 - 1.9 % Final    Differential Method 03/28/2023 Automated   Final    Sodium 03/28/2023 139  136 - 145 mmol/L Final    Potassium 03/28/2023 4.1  3.5 - 5.1 mmol/L Final    Chloride 03/28/2023 106  95 - 110 mmol/L Final    CO2 03/28/2023 30 (H)  23 - 29 mmol/L Final    Glucose 03/28/2023 113 (H)  70 - 110 mg/dL Final    BUN 03/28/2023 14  7 - 17 mg/dL Final    Creatinine 03/28/2023 0.96  0.50 - 1.40 mg/dL Final    Calcium 03/28/2023 9.0  8.7 - 10.5 mg/dL Final    Total Protein 03/28/2023 7.2  6.0 - 8.4 g/dL Final    Albumin 03/28/2023 4.1  3.5 - 5.2 g/dL Final    Total Bilirubin 03/28/2023 0.7  0.1 - 1.0 mg/dL Final    Alkaline Phosphatase 03/28/2023 65  38 - 126 U/L Final    AST 03/28/2023 20  15 - 46 U/L Final    ALT 03/28/2023 17  10 - 44 U/L Final    Anion Gap 03/28/2023 3 (L)  8 - 16 mmol/L Final    eGFR 03/28/2023 >60.0  >60 mL/min/1.73 m^2 Final    Hemoglobin A1C 03/28/2023 5.7 (H)  4.0 - 5.6 % Final    Estimated Avg Glucose 03/28/2023 117  68 - 131 mg/dL Final    Cholesterol 03/28/2023 251 (H)  120 - 199 mg/dL Final    Triglycerides 03/28/2023 75  30 - 150 mg/dL Final    HDL 03/28/2023 63  40 - 75 mg/dL Final    LDL Cholesterol 03/28/2023 173.0 (H)  63.0 - 159.0 mg/dL Final    HDL/Cholesterol Ratio 03/28/2023 25.1  20.0 - 50.0 % Final    Total Cholesterol/HDL Ratio 03/28/2023 4.0  2.0 - 5.0 Final    Non-HDL Cholesterol 03/28/2023 188  mg/dL Final   Lab Visit on 03/28/2023   Component Date Value Ref Range Status    Microalbumin, Urine 03/28/2023 6.0  ug/mL Final    Creatinine, Urine 03/28/2023 149.0  15.0 - 325.0 mg/dL Final    Microalb/Creat Ratio 03/28/2023 4.0  0.0 - 30.0 ug/mg Final   Lab Visit on 12/28/2022   Component Date Value Ref Range Status    WBC 12/28/2022 3.56 (L)  3.90 - 12.70 K/uL Final    RBC 12/28/2022 4.81  4.00 - 5.40 M/uL Final    Hemoglobin 12/28/2022 12.3  12.0 - 16.0 g/dL  Final    Hematocrit 12/28/2022 39.5  37.0 - 48.5 % Final    MCV 12/28/2022 82  82 - 98 fL Final    MCH 12/28/2022 25.6 (L)  27.0 - 31.0 pg Final    MCHC 12/28/2022 31.1 (L)  32.0 - 36.0 g/dL Final    RDW 12/28/2022 14.5  11.5 - 14.5 % Final    Platelets 12/28/2022 284  150 - 450 K/uL Final    MPV 12/28/2022 10.5  9.2 - 12.9 fL Final    Immature Granulocytes 12/28/2022 0.3  0.0 - 0.5 % Final    Gran # (ANC) 12/28/2022 1.5 (L)  1.8 - 7.7 K/uL Final    Immature Grans (Abs) 12/28/2022 0.01  0.00 - 0.04 K/uL Final    Lymph # 12/28/2022 1.8  1.0 - 4.8 K/uL Final    Mono # 12/28/2022 0.2 (L)  0.3 - 1.0 K/uL Final    Eos # 12/28/2022 0.1  0.0 - 0.5 K/uL Final    Baso # 12/28/2022 0.02  0.00 - 0.20 K/uL Final    nRBC 12/28/2022 0  0 /100 WBC Final    Gran % 12/28/2022 41.0  38.0 - 73.0 % Final    Lymph % 12/28/2022 50.8 (H)  18.0 - 48.0 % Final    Mono % 12/28/2022 5.6  4.0 - 15.0 % Final    Eosinophil % 12/28/2022 1.7  0.0 - 8.0 % Final    Basophil % 12/28/2022 0.6  0.0 - 1.9 % Final    Differential Method 12/28/2022 Automated   Final    Sodium 12/28/2022 142  136 - 145 mmol/L Final    Potassium 12/28/2022 4.2  3.5 - 5.1 mmol/L Final    Chloride 12/28/2022 105  95 - 110 mmol/L Final    CO2 12/28/2022 31 (H)  23 - 29 mmol/L Final    Glucose 12/28/2022 110  70 - 110 mg/dL Final    BUN 12/28/2022 15  7 - 17 mg/dL Final    Creatinine 12/28/2022 0.94  0.50 - 1.40 mg/dL Final    Calcium 12/28/2022 9.3  8.7 - 10.5 mg/dL Final    Total Protein 12/28/2022 7.3  6.0 - 8.4 g/dL Final    Albumin 12/28/2022 4.3  3.5 - 5.2 g/dL Final    Total Bilirubin 12/28/2022 0.5  0.1 - 1.0 mg/dL Final    Alkaline Phosphatase 12/28/2022 66  38 - 126 U/L Final    AST 12/28/2022 25  15 - 46 U/L Final    ALT 12/28/2022 23  10 - 44 U/L Final    Anion Gap 12/28/2022 6 (L)  8 - 16 mmol/L Final    eGFR 12/28/2022 >60.0  >60 mL/min/1.73 m^2 Final    Hemoglobin A1C 12/28/2022 6.1 (H)  4.0 - 5.6 % Final    Estimated Avg Glucose 12/28/2022 128  68 - 131 mg/dL  Final    Cholesterol 12/28/2022 244 (H)  120 - 199 mg/dL Final    Triglycerides 12/28/2022 102  30 - 150 mg/dL Final    HDL 12/28/2022 70  40 - 75 mg/dL Final    LDL Cholesterol 12/28/2022 153.6  63.0 - 159.0 mg/dL Final    HDL/Cholesterol Ratio 12/28/2022 28.7  20.0 - 50.0 % Final    Total Cholesterol/HDL Ratio 12/28/2022 3.5  2.0 - 5.0 Final    Non-HDL Cholesterol 12/28/2022 174  mg/dL Final    TSH 12/28/2022 0.725  0.400 - 4.000 uIU/mL Final   Patient Outreach on 11/15/2022   Component Date Value Ref Range Status    BCS Recommendation External 11/02/2022 No follow-up frequency specified   Final     Imaging:  Results for orders placed or performed during the hospital encounter of 06/28/22   MRI Brain W WO Contrast    Narrative    EXAMINATION:  MRI BRAIN W WO CONTRAST    CLINICAL HISTORY:  Cranial neuropathy (CN 5);with thin cuts along the trigeminal nerve to further evaluate for secondary causes;.  Headache, unspecified    TECHNIQUE:  Multiplanar multisequence MR imaging of the brain was performed before and after the administration of 9 mL Gadavist  intravenous contrast.    COMPARISON:  Head CT 09/26/2018    FINDINGS:  Intracranial compartment:    Ventricles and sulci are normal in size for age without evidence of hydrocephalus. No extra-axial blood or fluid collections.    No restricted diffusion.  No focal encephalomalacia.  The brain parenchyma demonstrates no edema, mass or mass effect.  No gradient susceptibility artifact.  No abnormal intracranial enhancement.    Thin-section T2 weighted images through the origins of both trigeminal nerves demonstrates no evidence of significant neurovascular contact of the bilateral nerve root entry zones.  No abnormal enhancement along the course either trigeminal nerve.    Normal vascular flow voids are preserved.    Skull/extracranial contents (limited evaluation): Tiny mucous retention cyst within the left sphenoid sinus.  Remaining visualized paranasal sinuses and  bilateral mastoid air cells are clear.  The globes and orbits appear within normal limits.    Marrow signal is within normal limits.      Impression    No neurovascular contact of the trigeminal nerve root entry zones.  No abnormal trigeminal nerve enhancement.    No acute intracranial abnormality.      Electronically signed by: Phong Leigh  Date:    06/28/2022  Time:    16:08   Results for orders placed or performed during the hospital encounter of 09/26/18   CT Head Without Contrast    Narrative    EXAMINATION:  CT HEAD WITHOUT CONTRAST    CLINICAL HISTORY:  Dizziness and giddiness.;    TECHNIQUE:  Routine noncontrast head CT.    COMPARISON:  None    FINDINGS:  There is no acute intracranial hemorrhage or abnormal extra-axial fluid collection.  There is no abnormal increased or decreased density within the brain parenchyma.  Gray-white differentiation preserved normal ventricles.  There is no intracranial mass or mass effect.  The calvarium is intact.  Visualized paranasal sinuses and mastoids are well aerated.      Impression    Normal head CT.    All CT scans at this facility are performed  using dose modulation techniques as appropriate to performed exam including the following:  automated exposure control; adjustment of mA and/or kV according to the patients size (this includes techniques or standardized protocols for targeted exams where dose is matched to indication/reason for exam: i.e. extremities or head);  iterative reconstruction technique.      Electronically signed by: Arturo Rai MD  Date:    09/26/2018  Time:    18:21     Note: I have independently reviewed any/all imaging/labs/tests and agree with the report (s) as documented.  Any discrepancies will be as noted/demarcated by free text.  LOIS PALACIOS 4/26/2023    ASSESSMENT:  1. Chronic intractable headache, unspecified headache type    2. Cervicalgia    3. Controlled type 2 diabetes mellitus without complication, without long-term current use  of insulin    4. Essential hypertension    5. Essential tremor    6. LINNETTE (generalized anxiety disorder)    7. Episode of recurrent major depressive disorder, unspecified depression episode severity      PLAN:  - Discussed symptoms are not consistent with trigeminal neuralgia  Though her current description of headaches does not meet diagnostirc criteria for migraine headache, I feel symptoms are most likely migraine complicated by HTN, anxiety/depression, tremor, T2DM, and neck pain.  She is agreeable to the following treatment plan.   - Referral to physical therapy for neck pain    - Start zonisamide 50 mg nightly for headache prevention   - May need higher dose zonisamide, encouraged her to contact office in one month with update - no benefit/no side effects to zonisamide, would likely recommend increasing dose to 100 mg nightly   - Future treatment considerations - gabapentin vs monthly cgrp injectable  Would be reasonable to trial triptan in the future  - Start diligently tracking headaches - particularly with attention to possible triggers, duration and associated symptoms   - Risks, benefits, and potential side effects of zonisamide discussed  - Alternative treatment options offered   - T2DM - stable/A1c steadily improving, management per PCP, will avoid use of steroids if possible   - Tremor - continue propranolol 80 mg LA daily, management per PCP  - HTN - stable on losartan 50 mg daily and propranolol 80 mg LA daily, will avoid use of anti-hypertensive meds for headache prevention   - MDD/LINNETTE - continue buspar 20 mg bid, management per outside psychiatrist, will avoid use of anti-depressants for migraine prevention    - RTC in 3 months or sooner if needed     Orders Placed This Encounter    Ambulatory referral/consult to Physical/Occupational Therapy    zonisamide (ZONEGRAN) 50 MG Cap     I have discussed realistic goals of care with patient at length as well as medication options, and need for lifestyle  adjustment. I have explained that treatment will take time. We have agreed that the goal will be to reduce frequency/intensity/quantity of HA, not to be completely HA free. I have explained my non narcotic policy regarding headache treatment.    Patient to track frequency of headaches.  Patient agreeable to work on lifestyle adjustments.    40 minutes of total time spent on the encounter, which includes 26 minutes face to face time and 14 minutes non face to face time preparing to see the patient (eg, review of tests), obtaining and/or reviewing separately obtained history, documenting clinical information in the electronic or other health record, independently interpreting results (not separately reported) and communicating results to the patient/family/caregiver or care coordination (not separately reported).     Questions and concerns were sought and answered to the patient's stated verbal satisfaction.  The patient verbalizes understanding and agreement with the above stated treatment plan.     CC: MD She Johnson, FNP-C  [facility]  [phone]    Dr. Victor was available during today's encounter.

## 2023-05-08 ENCOUNTER — TELEPHONE (OUTPATIENT)
Dept: NEUROLOGY | Facility: CLINIC | Age: 63
End: 2023-05-08
Payer: COMMERCIAL

## 2023-05-08 NOTE — TELEPHONE ENCOUNTER
"Outgoing call to patient regarding her last message wanting to know if she can stop taking the zonegran because she feels it is not helping. Informed patient of Jennifer's recommendation to continue taking zonegran as it can take weeks to months to become effective for migraine prevention. Educated patient that 4 weeks would be the soonest Jennifer would expect it to start helping. Patient states she has an appt with a psychiatrist to see if they would prescribe her xanax "to help with the pain". Patient states she has taken xanax before and it helped her head pain. Advised patient per Jennifer's recommendations to continue taking zonegran and see what the psychiatrist suggests/if he would even think xanax would be appropriate. Patient verbalized understanding.   "

## 2023-05-08 NOTE — TELEPHONE ENCOUNTER
----- Message from NADER Carter sent at 5/8/2023 10:37 AM CDT -----  Contact: pt  I would not recommend stopping zonisamide - please inform her zonisamide (and all therapies for migraine prevention) can take weeks to months to start working - 4 weeks would be the soonest I would expect to see results on zonisamide, may take up to 3 months.  ----- Message -----  From: Buffy Gaytan MA  Sent: 5/8/2023  10:15 AM CDT  To: NADER Carter      ----- Message -----  From: Nori Baez  Sent: 5/8/2023   9:50 AM CDT  To: Romana Nowak Staff    Pt calling to see if she can stop Rx zonisamide (ZONEGRAN) 50 MG Cap     Says it isn't helping at all , please call     Confirmed patient's contact info below:  Contact Name: Carmen Son  Phone Number: [phone]

## 2023-05-18 ENCOUNTER — SPECIALTY PHARMACY (OUTPATIENT)
Dept: PHARMACY | Facility: CLINIC | Age: 63
End: 2023-05-18
Payer: COMMERCIAL

## 2023-05-18 DIAGNOSIS — E78.00 PURE HYPERCHOLESTEROLEMIA: ICD-10-CM

## 2023-05-19 RX ORDER — EVOLOCUMAB 140 MG/ML
140 INJECTION, SOLUTION SUBCUTANEOUS
Qty: 2 ML | Refills: 3 | Status: ACTIVE | OUTPATIENT
Start: 2023-05-19 | End: 2023-10-09 | Stop reason: SDUPTHER

## 2023-05-22 NOTE — TELEPHONE ENCOUNTER
Specialty Pharmacy - Refill Coordination    Specialty Medication Orders Linked to Encounter      Flowsheet Row Most Recent Value   Medication #1 evolocumab (REPATHA SURECLICK) 140 mg/mL PnIj (Order#[id number], Rx#[id number])            Refill Questions - Documented Responses      Flowsheet Row Most Recent Value   Refill Screening Questions    Would patient like to speak to a pharmacist? No   When does the patient need to receive the medication? 05/29/23   Refill Delivery Questions    How will the patient receive the medication? MEDRx   When does the patient need to receive the medication? 05/29/23   Shipping Address Home   Address in [facility] confirmed and updated if neccessary? Yes   Expected Copay ($) 4.99   Is the patient able to afford the medication copay? Yes   Payment Method CC on file   Days supply of Refill 28   Supplies needed? No supplies needed   Refill activity completed? Yes   Refill activity plan Refill scheduled   Shipment/Pickup Date: 05/25/23            Current Outpatient Medications   Medication Sig    alendronate (FOSAMAX) 70 MG tablet Take 1 tablet (70 mg total) by mouth every 7 days.    ascorbic acid, vitamin C, (VITAMIN C) 500 MG tablet Take 500 mg by mouth once daily.    busPIRone (BUSPAR) 10 MG tablet Take 2 tablets (20 mg total) by mouth 2 (two) times daily.    cholecalciferol, vitamin D3, (VITAMIN D3 ORAL) Vitamin D3    cyanocobalamin (VITAMIN B-12) 1000 MCG tablet B12    dapsone (ACZONE) 7.5 % GlwP Apply topically.    ergocalciferol (ERGOCALCIFEROL) 50,000 unit Cap Take 1 capsule (50,000 Units total) by mouth every 7 days.    evolocumab (REPATHA SURECLICK) 140 mg/mL PnIj Inject 1 mL (140 mg total) into the skin every 14 (fourteen) days.    fenofibrate 160 MG Tab Take 1 tablet (160 mg total) by mouth once daily.    fexofenadine (ALLEGRA) 180 MG tablet Take 1 tablet (180 mg total) by mouth once daily.    fluticasone propionate (FLONASE) 50 mcg/actuation nasal spray 2 sprays by Each  Nostril route once daily.    furosemide (LASIX) 20 MG tablet Take 1 tablet (20 mg total) by mouth daily as needed.    hylan g-f 20 (SYNVISC-ONE) 48 mg/6 mL Syrg 6 mLs.    ipratropium (ATROVENT) 42 mcg (0.06 %) nasal spray 2 sprays 3 (three) times daily.    ivermectin (SOOLANTRA) 1 % Crea Apply topically.    LIDOcaine (LIDODERM) 5 % Place 1 patch onto the skin daily as needed (pain). Remove & Discard patch within 12 hours or as directed by MD    linaCLOtide (LINZESS) 145 mcg Cap capsule Take 1 capsule (145 mcg total) by mouth once daily.    losartan (COZAAR) 50 MG tablet Take 1 tablet (50 mg total) by mouth once daily.    MULTIVITAMIN ORAL multivitamin    mupirocin (BACTROBAN) 2 % ointment mupirocin 2 % topical ointment   APPLY OINTMENT TOPICALLY TWICE DAILY FOR 7 DAYS AS DIRECTED    naproxen (NAPROSYN) 500 MG tablet Take 500 mg by mouth 2 (two) times daily.    omeprazole (PRILOSEC) 40 MG capsule Take 1 capsule (40 mg total) by mouth before breakfast.    ondansetron (ZOFRAN) 4 MG tablet Take 1 tablet (4 mg total) by mouth every 8 (eight) hours as needed for Nausea.    potassium chloride SA (K-DUR,KLOR-CON) 20 MEQ tablet Take 1 tablet (20 mEq total) by mouth once daily.    prenatal vit calc,iron,folic (PRENATAL VITAMIN ORAL) Take by mouth.    propranoloL (INDERAL LA) 80 MG 24 hr capsule Take 1 capsule (80 mg total) by mouth once daily.    zonisamide (ZONEGRAN) 50 MG Cap Take 1 capsule (50 mg total) by mouth every evening.   Last reviewed on 5/8/2023 11:03 PM by NADER Carter    Review of patient's allergies indicates:  No Known Allergies Last reviewed on  5/18/2023 3:19 PM by Nora Pelayo      Tasks added this encounter   No tasks added.   Tasks due within next 3 months   No tasks due.     Dayami Sumner, PharmD  [facility]  [address] [place] [address]  Phone: [phone]  Fax: [phone]

## 2023-06-01 ENCOUNTER — TELEPHONE (OUTPATIENT)
Dept: FAMILY MEDICINE | Facility: CLINIC | Age: 63
End: 2023-06-01
Payer: COMMERCIAL

## 2023-06-01 NOTE — TELEPHONE ENCOUNTER
----- Message from Barb Mosley sent at 6/1/2023 10:00 AM CDT -----  Type:  Patient Returning Call    Who Called:[facility]   Would the patient rather a call back or a response via [facility]? Call back   Best Call Back Number:[phone]   ref# [id number]  Additional Information: requesting a verbal consent to replace  2 malfunction evolocumab (REPATHA SURECLICK) 140 mg/mL PnIj

## 2023-06-16 ENCOUNTER — TELEPHONE (OUTPATIENT)
Dept: FAMILY MEDICINE | Facility: CLINIC | Age: 63
End: 2023-06-16
Payer: COMMERCIAL

## 2023-06-16 ENCOUNTER — SPECIALTY PHARMACY (OUTPATIENT)
Dept: PHARMACY | Facility: CLINIC | Age: 63
End: 2023-06-16
Payer: COMMERCIAL

## 2023-06-16 NOTE — TELEPHONE ENCOUNTER
----- Message from Jericho Collins sent at 6/16/2023 11:01 AM CDT -----  Contact: pt  Type: Requesting to speak with nurse        Who Called: PT  Regarding: blood work orders needed   Would the patient rather a call back or a response via [facility]? Call back  Best Call Back Number: [phone]  Additional Information:

## 2023-06-16 NOTE — TELEPHONE ENCOUNTER
Specialty Pharmacy - Refill Coordination    Specialty Medication Orders Linked to Encounter      Flowsheet Row Most Recent Value   Medication #1 evolocumab (REPATHA SURECLICK) 140 mg/mL PnIj (Order#[id number], Rx#[id number])            Refill Questions - Documented Responses      Flowsheet Row Most Recent Value   Patient Availability and HIPAA Verification    Does patient want to proceed with activity? Yes   HIPAA/medical authority confirmed? Yes   Relationship to patient of person spoken to? Self   Refill Screening Questions    Would patient like to speak to a pharmacist? No   When does the patient need to receive the medication? 06/26/23   Refill Delivery Questions    How will the patient receive the medication? MEDRx   When does the patient need to receive the medication? 06/26/23   Shipping Address Prescription   Address in [facility] confirmed and updated if neccessary? Yes   Expected Copay ($) 4.99   Is the patient able to afford the medication copay? Yes   Payment Method CC on file   Days supply of Refill 28   Supplies needed? No supplies needed   Refill activity completed? Yes   Refill activity plan Refill scheduled   Shipment/Pickup Date: 06/20/23            Current Outpatient Medications   Medication Sig    alendronate (FOSAMAX) 70 MG tablet Take 1 tablet (70 mg total) by mouth every 7 days.    ascorbic acid, vitamin C, (VITAMIN C) 500 MG tablet Take 500 mg by mouth once daily.    busPIRone (BUSPAR) 10 MG tablet Take 2 tablets (20 mg total) by mouth 2 (two) times daily.    cholecalciferol, vitamin D3, (VITAMIN D3 ORAL) Vitamin D3    cyanocobalamin (VITAMIN B-12) 1000 MCG tablet B12    dapsone (ACZONE) 7.5 % GlwP Apply topically.    ergocalciferol (ERGOCALCIFEROL) 50,000 unit Cap Take 1 capsule (50,000 Units total) by mouth every 7 days.    evolocumab (REPATHA SURECLICK) 140 mg/mL PnIj Inject 1 mL (140 mg total) into the skin every 14 (fourteen) days.    fenofibrate 160 MG Tab Take 1 tablet (160 mg  total) by mouth once daily.    fexofenadine (ALLEGRA) 180 MG tablet Take 1 tablet (180 mg total) by mouth once daily.    fluticasone propionate (FLONASE) 50 mcg/actuation nasal spray 2 sprays by Each Nostril route once daily.    furosemide (LASIX) 20 MG tablet Take 1 tablet (20 mg total) by mouth daily as needed.    hylan g-f 20 (SYNVISC-ONE) 48 mg/6 mL Syrg 6 mLs.    ipratropium (ATROVENT) 42 mcg (0.06 %) nasal spray 2 sprays 3 (three) times daily.    ivermectin (SOOLANTRA) 1 % Crea Apply topically.    LIDOcaine (LIDODERM) 5 % Place 1 patch onto the skin daily as needed (pain). Remove & Discard patch within 12 hours or as directed by MD    linaCLOtide (LINZESS) 145 mcg Cap capsule Take 1 capsule (145 mcg total) by mouth once daily.    losartan (COZAAR) 50 MG tablet Take 1 tablet (50 mg total) by mouth once daily.    MULTIVITAMIN ORAL multivitamin    mupirocin (BACTROBAN) 2 % ointment mupirocin 2 % topical ointment   APPLY OINTMENT TOPICALLY TWICE DAILY FOR 7 DAYS AS DIRECTED    naproxen (NAPROSYN) 500 MG tablet Take 500 mg by mouth 2 (two) times daily.    omeprazole (PRILOSEC) 40 MG capsule Take 1 capsule (40 mg total) by mouth before breakfast.    ondansetron (ZOFRAN) 4 MG tablet Take 1 tablet (4 mg total) by mouth every 8 (eight) hours as needed for Nausea.    potassium chloride SA (K-DUR,KLOR-CON) 20 MEQ tablet Take 1 tablet (20 mEq total) by mouth once daily.    prenatal vit calc,iron,folic (PRENATAL VITAMIN ORAL) Take by mouth.    propranoloL (INDERAL LA) 80 MG 24 hr capsule Take 1 capsule (80 mg total) by mouth once daily.    zonisamide (ZONEGRAN) 50 MG Cap Take 1 capsule (50 mg total) by mouth every evening.   Last reviewed on 5/8/2023 11:03 PM by NADER Carter    Review of patient's allergies indicates:  No Known Allergies Last reviewed on  5/18/2023 3:19 PM by Nora Pelayo      Tasks added this encounter   No tasks added.   Tasks due within next 3 months   No tasks due.     Pankaj  [facility], [facility]  [address]  [facility] [address]  Phone: [phone]  Fax: [phone]

## 2023-06-20 ENCOUNTER — OFFICE VISIT (OUTPATIENT)
Dept: FAMILY MEDICINE | Facility: CLINIC | Age: 63
End: 2023-06-20
Payer: COMMERCIAL

## 2023-06-20 VITALS
HEIGHT: 63 IN | OXYGEN SATURATION: 98 % | BODY MASS INDEX: 36.29 KG/M2 | DIASTOLIC BLOOD PRESSURE: 84 MMHG | SYSTOLIC BLOOD PRESSURE: 120 MMHG | WEIGHT: 204.81 LBS | TEMPERATURE: 98 F | HEART RATE: 75 BPM

## 2023-06-20 DIAGNOSIS — G25.0 ESSENTIAL TREMOR: ICD-10-CM

## 2023-06-20 DIAGNOSIS — I10 ESSENTIAL HYPERTENSION: ICD-10-CM

## 2023-06-20 DIAGNOSIS — Z23 NEED FOR SHINGLES VACCINE: ICD-10-CM

## 2023-06-20 DIAGNOSIS — E78.00 PURE HYPERCHOLESTEROLEMIA: ICD-10-CM

## 2023-06-20 DIAGNOSIS — E11.9 CONTROLLED TYPE 2 DIABETES MELLITUS WITHOUT COMPLICATION, WITHOUT LONG-TERM CURRENT USE OF INSULIN: Primary | ICD-10-CM

## 2023-06-20 DIAGNOSIS — D70.8 OTHER NEUTROPENIA: ICD-10-CM

## 2023-06-20 PROCEDURE — 3044F PR MOST RECENT HEMOGLOBIN A1C LEVEL <7.0%: ICD-10-PCS | Mod: CPTII,S$GLB,, | Performed by: STUDENT IN AN ORGANIZED HEALTH CARE EDUCATION/TRAINING PROGRAM

## 2023-06-20 PROCEDURE — 1160F PR REVIEW ALL MEDS BY PRESCRIBER/CLIN PHARMACIST DOCUMENTED: ICD-10-PCS | Mod: CPTII,S$GLB,, | Performed by: STUDENT IN AN ORGANIZED HEALTH CARE EDUCATION/TRAINING PROGRAM

## 2023-06-20 PROCEDURE — 3008F BODY MASS INDEX DOCD: CPT | Mod: CPTII,S$GLB,, | Performed by: STUDENT IN AN ORGANIZED HEALTH CARE EDUCATION/TRAINING PROGRAM

## 2023-06-20 PROCEDURE — 99214 OFFICE O/P EST MOD 30 MIN: CPT | Mod: 25,S$GLB,, | Performed by: STUDENT IN AN ORGANIZED HEALTH CARE EDUCATION/TRAINING PROGRAM

## 2023-06-20 PROCEDURE — 90750 ZOSTER RECOMBINANT VACCINE: ICD-10-PCS | Mod: S$GLB,,, | Performed by: STUDENT IN AN ORGANIZED HEALTH CARE EDUCATION/TRAINING PROGRAM

## 2023-06-20 PROCEDURE — 1159F MED LIST DOCD IN RCRD: CPT | Mod: CPTII,S$GLB,, | Performed by: STUDENT IN AN ORGANIZED HEALTH CARE EDUCATION/TRAINING PROGRAM

## 2023-06-20 PROCEDURE — 3044F HG A1C LEVEL LT 7.0%: CPT | Mod: CPTII,S$GLB,, | Performed by: STUDENT IN AN ORGANIZED HEALTH CARE EDUCATION/TRAINING PROGRAM

## 2023-06-20 PROCEDURE — 3008F PR BODY MASS INDEX (BMI) DOCUMENTED: ICD-10-PCS | Mod: CPTII,S$GLB,, | Performed by: STUDENT IN AN ORGANIZED HEALTH CARE EDUCATION/TRAINING PROGRAM

## 2023-06-20 PROCEDURE — 3074F PR MOST RECENT SYSTOLIC BLOOD PRESSURE < 130 MM HG: ICD-10-PCS | Mod: CPTII,S$GLB,, | Performed by: STUDENT IN AN ORGANIZED HEALTH CARE EDUCATION/TRAINING PROGRAM

## 2023-06-20 PROCEDURE — 4010F PR ACE/ARB THEARPY RXD/TAKEN: ICD-10-PCS | Mod: CPTII,S$GLB,, | Performed by: STUDENT IN AN ORGANIZED HEALTH CARE EDUCATION/TRAINING PROGRAM

## 2023-06-20 PROCEDURE — 3074F SYST BP LT 130 MM HG: CPT | Mod: CPTII,S$GLB,, | Performed by: STUDENT IN AN ORGANIZED HEALTH CARE EDUCATION/TRAINING PROGRAM

## 2023-06-20 PROCEDURE — 99214 PR OFFICE/OUTPT VISIT, EST, LEVL IV, 30-39 MIN: ICD-10-PCS | Mod: 25,S$GLB,, | Performed by: STUDENT IN AN ORGANIZED HEALTH CARE EDUCATION/TRAINING PROGRAM

## 2023-06-20 PROCEDURE — 3079F PR MOST RECENT DIASTOLIC BLOOD PRESSURE 80-89 MM HG: ICD-10-PCS | Mod: CPTII,S$GLB,, | Performed by: STUDENT IN AN ORGANIZED HEALTH CARE EDUCATION/TRAINING PROGRAM

## 2023-06-20 PROCEDURE — 90471 IMMUNIZATION ADMIN: CPT | Mod: S$GLB,,, | Performed by: STUDENT IN AN ORGANIZED HEALTH CARE EDUCATION/TRAINING PROGRAM

## 2023-06-20 PROCEDURE — 90471 ZOSTER RECOMBINANT VACCINE: ICD-10-PCS | Mod: S$GLB,,, | Performed by: STUDENT IN AN ORGANIZED HEALTH CARE EDUCATION/TRAINING PROGRAM

## 2023-06-20 PROCEDURE — 90750 HZV VACC RECOMBINANT IM: CPT | Mod: S$GLB,,, | Performed by: STUDENT IN AN ORGANIZED HEALTH CARE EDUCATION/TRAINING PROGRAM

## 2023-06-20 PROCEDURE — 3079F DIAST BP 80-89 MM HG: CPT | Mod: CPTII,S$GLB,, | Performed by: STUDENT IN AN ORGANIZED HEALTH CARE EDUCATION/TRAINING PROGRAM

## 2023-06-20 PROCEDURE — 4010F ACE/ARB THERAPY RXD/TAKEN: CPT | Mod: CPTII,S$GLB,, | Performed by: STUDENT IN AN ORGANIZED HEALTH CARE EDUCATION/TRAINING PROGRAM

## 2023-06-20 PROCEDURE — 1160F RVW MEDS BY RX/DR IN RCRD: CPT | Mod: CPTII,S$GLB,, | Performed by: STUDENT IN AN ORGANIZED HEALTH CARE EDUCATION/TRAINING PROGRAM

## 2023-06-20 PROCEDURE — 3066F PR DOCUMENTATION OF TREATMENT FOR NEPHROPATHY: ICD-10-PCS | Mod: CPTII,S$GLB,, | Performed by: STUDENT IN AN ORGANIZED HEALTH CARE EDUCATION/TRAINING PROGRAM

## 2023-06-20 PROCEDURE — 3066F NEPHROPATHY DOC TX: CPT | Mod: CPTII,S$GLB,, | Performed by: STUDENT IN AN ORGANIZED HEALTH CARE EDUCATION/TRAINING PROGRAM

## 2023-06-20 PROCEDURE — 3061F NEG MICROALBUMINURIA REV: CPT | Mod: CPTII,S$GLB,, | Performed by: STUDENT IN AN ORGANIZED HEALTH CARE EDUCATION/TRAINING PROGRAM

## 2023-06-20 PROCEDURE — 3061F PR NEG MICROALBUMINURIA RESULT DOCUMENTED/REVIEW: ICD-10-PCS | Mod: CPTII,S$GLB,, | Performed by: STUDENT IN AN ORGANIZED HEALTH CARE EDUCATION/TRAINING PROGRAM

## 2023-06-20 PROCEDURE — 1159F PR MEDICATION LIST DOCUMENTED IN MEDICAL RECORD: ICD-10-PCS | Mod: CPTII,S$GLB,, | Performed by: STUDENT IN AN ORGANIZED HEALTH CARE EDUCATION/TRAINING PROGRAM

## 2023-06-20 RX ORDER — SEMAGLUTIDE 0.68 MG/ML
0.5 INJECTION, SOLUTION SUBCUTANEOUS
Qty: 3 ML | Refills: 3 | Status: SHIPPED | OUTPATIENT
Start: 2023-06-20 | End: 2023-08-07

## 2023-06-20 NOTE — PROGRESS NOTES
Patient ID: Carmen Son is a 63 y.o. female.     Chief Complaint: Obesity    HPI   Obesity- she has gained 10 pounds over the last 2 months. She is dieting and exercising    HTN- blood pressure has been at goal    HLD- denies recent chest pain and shortness of breath    Review of Systems  Review of Systems   Constitutional:  Negative for fever.   HENT:  Negative for ear pain and sinus pain.    Eyes:  Negative for discharge.   Respiratory:  Negative for cough and shortness of breath.    Cardiovascular:  Negative for chest pain and leg swelling.   Gastrointestinal:  Negative for diarrhea, nausea and vomiting.   Genitourinary:  Negative for urgency.   Musculoskeletal:  Negative for myalgias.   Skin:  Negative for rash.   Neurological:  Negative for weakness and headaches.   Psychiatric/Behavioral:  Negative for depression.    All other systems reviewed and are negative.    Currently Medications  Current Outpatient Medications on File Prior to Visit   Medication Sig Dispense Refill    alendronate (FOSAMAX) 70 MG tablet Take 1 tablet (70 mg total) by mouth every 7 days. 12 tablet 3    ascorbic acid, vitamin C, (VITAMIN C) 500 MG tablet Take 500 mg by mouth once daily.      busPIRone (BUSPAR) 10 MG tablet Take 2 tablets (20 mg total) by mouth 2 (two) times daily. 120 tablet 11    cholecalciferol, vitamin D3, (VITAMIN D3 ORAL) Vitamin D3      cyanocobalamin (VITAMIN B-12) 1000 MCG tablet B12      dapsone (ACZONE) 7.5 % GlwP Apply topically.      ergocalciferol (ERGOCALCIFEROL) 50,000 unit Cap Take 1 capsule (50,000 Units total) by mouth every 7 days. 12 capsule 3    evolocumab (REPATHA SURECLICK) 140 mg/mL PnIj Inject 1 mL (140 mg total) into the skin every 14 (fourteen) days. 2 mL 3    fenofibrate 160 MG Tab Take 1 tablet (160 mg total) by mouth once daily. 90 tablet 3    fexofenadine (ALLEGRA) 180 MG tablet Take 1 tablet (180 mg total) by mouth once daily. 30 tablet 12    fluticasone propionate (FLONASE) 50  "mcg/actuation nasal spray 2 sprays by Each Nostril route once daily.      furosemide (LASIX) 20 MG tablet Take 1 tablet (20 mg total) by mouth daily as needed. 30 tablet 3    hylan g-f 20 (SYNVISC-ONE) 48 mg/6 mL Syrg 6 mLs.      ipratropium (ATROVENT) 42 mcg (0.06 %) nasal spray 2 sprays 3 (three) times daily.      ivermectin (SOOLANTRA) 1 % Crea Apply topically.      LIDOcaine (LIDODERM) 5 % Place 1 patch onto the skin daily as needed (pain). Remove & Discard patch within 12 hours or as directed by MD 5 patch 0    linaCLOtide (LINZESS) 145 mcg Cap capsule Take 1 capsule (145 mcg total) by mouth once daily. 90 capsule 3    losartan (COZAAR) 50 MG tablet Take 1 tablet (50 mg total) by mouth once daily. 90 tablet 3    MULTIVITAMIN ORAL multivitamin      mupirocin (BACTROBAN) 2 % ointment mupirocin 2 % topical ointment   APPLY OINTMENT TOPICALLY TWICE DAILY FOR 7 DAYS AS DIRECTED      naproxen (NAPROSYN) 500 MG tablet Take 500 mg by mouth 2 (two) times daily.      omeprazole (PRILOSEC) 40 MG capsule Take 1 capsule (40 mg total) by mouth before breakfast. 90 capsule 3    ondansetron (ZOFRAN) 4 MG tablet Take 1 tablet (4 mg total) by mouth every 8 (eight) hours as needed for Nausea. 45 tablet 1    potassium chloride SA (K-DUR,KLOR-CON) 20 MEQ tablet Take 1 tablet (20 mEq total) by mouth once daily. 30 tablet 3    prenatal vit calc,iron,folic (PRENATAL VITAMIN ORAL) Take by mouth.      propranoloL (INDERAL LA) 80 MG 24 hr capsule Take 1 capsule (80 mg total) by mouth once daily. 90 capsule 3    zonisamide (ZONEGRAN) 50 MG Cap Take 1 capsule (50 mg total) by mouth every evening. 90 capsule 0     No current facility-administered medications on file prior to visit.       Physical  Exam  Vitals:    06/20/23 0914   BP: 120/84   BP Location: Left arm   Patient Position: Sitting   Pulse: 75   Temp: 97.9 °F (36.6 °C)   SpO2: 98%   Weight: 92.9 kg (204 lb 12.9 oz)   Height: 5' 3" (1.6 m)      Body mass index is 36.28 kg/m².  Wt " Readings from Last 3 Encounters:   06/20/23 92.9 kg (204 lb 12.9 oz)   04/26/23 91.7 kg (202 lb 2.6 oz)   03/28/23 88.8 kg (195 lb 12.3 oz)       Physical Exam  Vitals and nursing note reviewed.   Constitutional:       General: She is not in acute distress.     Appearance: She is not ill-appearing.   HENT:      Head: Normocephalic and atraumatic.      Right Ear: External ear normal.      Left Ear: External ear normal.      Nose: Nose normal.      Mouth/Throat:      Mouth: Mucous membranes are moist.   Eyes:      Extraocular Movements: Extraocular movements intact.      Conjunctiva/sclera: Conjunctivae normal.   Cardiovascular:      Rate and Rhythm: Normal rate and regular rhythm.      Pulses: Normal pulses.      Heart sounds: No murmur heard.  Pulmonary:      Effort: Pulmonary effort is normal. No respiratory distress.      Breath sounds: No wheezing.   Abdominal:      General: There is no distension.      Palpations: Abdomen is soft. There is no mass.      Tenderness: There is no abdominal tenderness.   Musculoskeletal:         General: No swelling.      Cervical back: Normal range of motion.   Skin:     Coloration: Skin is not jaundiced.      Findings: No rash.   Neurological:      General: No focal deficit present.      Mental Status: She is alert and oriented to person, place, and time.   Psychiatric:         Mood and Affect: Mood normal.         Thought Content: Thought content normal.       Labs:    Complete Blood Count  Lab Results   Component Value Date    RBC 4.68 03/28/2023    HGB 12.1 03/28/2023    HCT 38.7 03/28/2023    MCV 83 03/28/2023    MCH 25.9 (L) 03/28/2023    MCHC 31.3 (L) 03/28/2023    RDW 14.2 03/28/2023     03/28/2023    MPV 10.4 03/28/2023    GRAN 1.6 (L) 03/28/2023    GRAN 40.3 03/28/2023    LYMPH 2.1 03/28/2023    LYMPH 52.3 (H) 03/28/2023    MONO 0.2 (L) 03/28/2023    MONO 5.7 03/28/2023    EOS 0.1 03/28/2023    BASO 0.02 03/28/2023    EOSINOPHIL 1.2 03/28/2023    BASOPHIL 0.5  03/28/2023    DIFFMETHOD Automated 03/28/2023       Comprehensive Metabolic Panel  Lab Results   Component Value Date     (H) 03/28/2023    BUN 14 03/28/2023    CREATININE 0.96 03/28/2023     03/28/2023    K 4.1 03/28/2023     03/28/2023    PROT 7.2 03/28/2023    ALBUMIN 4.1 03/28/2023    BILITOT 0.7 03/28/2023    AST 20 03/28/2023    ALKPHOS 65 03/28/2023    CO2 30 (H) 03/28/2023    ALT 17 03/28/2023    ANIONGAP 3 (L) 03/28/2023       TSH  Lab Results   Component Value Date    TSH 0.725 12/28/2022       Imaging:  X-Ray Finger 2 or More Views Left  Narrative: EXAMINATION:  XR FINGER 2 OR MORE VIEWS LEFT    CLINICAL HISTORY:  2nd and 3rd finger crush injury;    COMPARISON:  A plain film examination of the left hand performed on 07/28/2021.    FINDINGS:  There are acute appearing fractures throughout the distal phalanx of the long finger.  There is an acute appearing fracture in the distal aspect of the distal phalanx of the index finger.  There is no dislocation.  Impression: 1. There are acute appearing fractures throughout the distal phalanx of the long finger.  2. There is an acute appearing fracture in the distal aspect of the distal phalanx of the index finger.    Electronically signed by: Chester Camarena MD  Date:    03/07/2023  Time:    16:03      Assessment/Plan:    1. Controlled type 2 diabetes mellitus without complication, without long-term current use of insulin  -     semaglutide (OZEMPIC) 0.25 mg or 0.5 mg (2 mg/3 mL) pen injector; Inject 0.5 mg into the skin every 7 days.  Dispense: 3 mL; Refill: 3    2. Pure hypercholesterolemia    3. Essential hypertension    4. Essential tremor    5. Other neutropenia  Assessment & Plan:  - chronic  - labs two months ago revealed WBC lower limit of normal  - continue current meds      6. Need for shingles vaccine  -     (In Office Administered) Zoster Recombinant Vaccine         Discussed how to stay healthy including: diet, exercise, refraining  from smoking and discussed screening exams / tests needed for age, sex and family Hx.    RTC 3 mo    Anson López MD

## 2023-06-22 ENCOUNTER — TELEPHONE (OUTPATIENT)
Dept: FAMILY MEDICINE | Facility: CLINIC | Age: 63
End: 2023-06-22
Payer: COMMERCIAL

## 2023-06-22 NOTE — TELEPHONE ENCOUNTER
----- Message from Vivi Almanza sent at 6/22/2023 12:25 PM CDT -----  Regarding: prior authorization  Contact: [phone]  Patient is requesting a call to confirm paperwork was sent for prior authorization for rx semaglutide (OZEMPIC) 0.25 mg or 0.5 mg (2 mg/3 mL) pen injector  Symptoms/issue:  Pharmacy:[facility] [address] [place] [address] [facility]  Call back number: [phone]  Additional Information:

## 2023-06-22 NOTE — TELEPHONE ENCOUNTER
Spoke to pt. I explained that we will complete the PA as soon as possible and contact her once it has been submitted. I advised that it can take 72 hours or up to 14 days before we hear anything from insurance.

## 2023-07-11 ENCOUNTER — SPECIALTY PHARMACY (OUTPATIENT)
Dept: PHARMACY | Facility: CLINIC | Age: 63
End: 2023-07-11
Payer: COMMERCIAL

## 2023-07-11 NOTE — TELEPHONE ENCOUNTER
Outgoing call to pt regarding repatha refill. Pt has a dose to inject 7/17. Pt will need a dose 7/31. Will follow up 7/24 for refill.

## 2023-07-19 DIAGNOSIS — R51.9 CHRONIC INTRACTABLE HEADACHE, UNSPECIFIED HEADACHE TYPE: ICD-10-CM

## 2023-07-19 DIAGNOSIS — G89.29 CHRONIC INTRACTABLE HEADACHE, UNSPECIFIED HEADACHE TYPE: ICD-10-CM

## 2023-07-24 RX ORDER — ZONISAMIDE 50 MG/1
CAPSULE ORAL
Qty: 90 CAPSULE | Refills: 0 | Status: SHIPPED | OUTPATIENT
Start: 2023-07-24 | End: 2023-08-02

## 2023-07-27 NOTE — TELEPHONE ENCOUNTER
Specialty Pharmacy - Refill Coordination    Specialty Medication Orders Linked to Encounter      Flowsheet Row Most Recent Value   Medication #1 evolocumab (REPATHA SURECLICK) 140 mg/mL PnIj (Order#[id number], Rx#[id number])            Refill Questions - Documented Responses      Flowsheet Row Most Recent Value   Patient Availability and HIPAA Verification    Does patient want to proceed with activity? Yes   HIPAA/medical authority confirmed? Yes   Relationship to patient of person spoken to? Self   Refill Screening Questions    Would patient like to speak to a pharmacist? No   When does the patient need to receive the medication? 08/06/23   Refill Delivery Questions    How will the patient receive the medication? MEDRx   When does the patient need to receive the medication? 08/06/23   Shipping Address Home   Address in [facility] confirmed and updated if neccessary? Yes   Expected Copay ($) 4.99   Is the patient able to afford the medication copay? Yes   Payment Method CC on file   Days supply of Refill 28   Supplies needed? No supplies needed   Refill activity completed? Yes   Refill activity plan Refill scheduled   Shipment/Pickup Date: 08/03/23            Current Outpatient Medications   Medication Sig    alendronate (FOSAMAX) 70 MG tablet Take 1 tablet (70 mg total) by mouth every 7 days.    ascorbic acid, vitamin C, (VITAMIN C) 500 MG tablet Take 500 mg by mouth once daily.    busPIRone (BUSPAR) 10 MG tablet Take 2 tablets (20 mg total) by mouth 2 (two) times daily.    cholecalciferol, vitamin D3, (VITAMIN D3 ORAL) Vitamin D3    cyanocobalamin (VITAMIN B-12) 1000 MCG tablet B12    dapsone (ACZONE) 7.5 % GlwP Apply topically.    ergocalciferol (ERGOCALCIFEROL) 50,000 unit Cap Take 1 capsule (50,000 Units total) by mouth every 7 days.    evolocumab (REPATHA SURECLICK) 140 mg/mL PnIj Inject 1 mL (140 mg total) into the skin every 14 (fourteen) days.    fenofibrate 160 MG Tab Take 1 tablet (160 mg total) by  mouth once daily.    fexofenadine (ALLEGRA) 180 MG tablet Take 1 tablet (180 mg total) by mouth once daily.    fluticasone propionate (FLONASE) 50 mcg/actuation nasal spray 2 sprays by Each Nostril route once daily.    furosemide (LASIX) 20 MG tablet Take 1 tablet (20 mg total) by mouth daily as needed.    hylan g-f 20 (SYNVISC-ONE) 48 mg/6 mL Syrg 6 mLs.    ipratropium (ATROVENT) 42 mcg (0.06 %) nasal spray 2 sprays 3 (three) times daily.    ivermectin (SOOLANTRA) 1 % Crea Apply topically.    LIDOcaine (LIDODERM) 5 % Place 1 patch onto the skin daily as needed (pain). Remove & Discard patch within 12 hours or as directed by MD    linaCLOtide (LINZESS) 145 mcg Cap capsule Take 1 capsule (145 mcg total) by mouth once daily.    losartan (COZAAR) 50 MG tablet Take 1 tablet (50 mg total) by mouth once daily.    MULTIVITAMIN ORAL multivitamin    mupirocin (BACTROBAN) 2 % ointment mupirocin 2 % topical ointment   APPLY OINTMENT TOPICALLY TWICE DAILY FOR 7 DAYS AS DIRECTED    naproxen (NAPROSYN) 500 MG tablet Take 500 mg by mouth 2 (two) times daily.    omeprazole (PRILOSEC) 40 MG capsule Take 1 capsule (40 mg total) by mouth before breakfast.    ondansetron (ZOFRAN) 4 MG tablet Take 1 tablet (4 mg total) by mouth every 8 (eight) hours as needed for Nausea.    potassium chloride SA (K-DUR,KLOR-CON) 20 MEQ tablet Take 1 tablet (20 mEq total) by mouth once daily.    prenatal vit calc,iron,folic (PRENATAL VITAMIN ORAL) Take by mouth.    propranoloL (INDERAL LA) 80 MG 24 hr capsule Take 1 capsule (80 mg total) by mouth once daily.    semaglutide (OZEMPIC) 0.25 mg or 0.5 mg (2 mg/3 mL) pen injector Inject 0.5 mg into the skin every 7 days.    zonisamide (ZONEGRAN) 50 MG Cap TAKE 1 CAPSULE BY MOUTH IN THE EVENING   Last reviewed on 6/20/2023  7:55 PM by [place] N. Maribel, MD    Review of patient's allergies indicates:  No Known Allergies Last reviewed on  6/20/2023 7:55 PM by Anson López      Tasks added this encounter   No  tasks added.   Tasks due within next 3 months   No tasks due.     Dayami Sumner, PharmD  [facility]  [address]  [facility] [address]  Phone: [phone]  Fax: [phone]

## 2023-08-02 ENCOUNTER — OFFICE VISIT (OUTPATIENT)
Dept: NEUROLOGY | Facility: CLINIC | Age: 63
End: 2023-08-02
Payer: COMMERCIAL

## 2023-08-02 VITALS
WEIGHT: 208.75 LBS | BODY MASS INDEX: 36.98 KG/M2 | SYSTOLIC BLOOD PRESSURE: 121 MMHG | DIASTOLIC BLOOD PRESSURE: 84 MMHG | HEART RATE: 80 BPM

## 2023-08-02 DIAGNOSIS — I10 ESSENTIAL HYPERTENSION: ICD-10-CM

## 2023-08-02 DIAGNOSIS — G25.0 ESSENTIAL TREMOR: ICD-10-CM

## 2023-08-02 DIAGNOSIS — R51.9 CHRONIC INTRACTABLE HEADACHE, UNSPECIFIED HEADACHE TYPE: Primary | ICD-10-CM

## 2023-08-02 DIAGNOSIS — M54.2 CERVICALGIA: ICD-10-CM

## 2023-08-02 DIAGNOSIS — F33.9 EPISODE OF RECURRENT MAJOR DEPRESSIVE DISORDER, UNSPECIFIED DEPRESSION EPISODE SEVERITY: ICD-10-CM

## 2023-08-02 DIAGNOSIS — E11.9 CONTROLLED TYPE 2 DIABETES MELLITUS WITHOUT COMPLICATION, WITHOUT LONG-TERM CURRENT USE OF INSULIN: ICD-10-CM

## 2023-08-02 DIAGNOSIS — F41.1 GAD (GENERALIZED ANXIETY DISORDER): ICD-10-CM

## 2023-08-02 DIAGNOSIS — G89.29 CHRONIC INTRACTABLE HEADACHE, UNSPECIFIED HEADACHE TYPE: Primary | ICD-10-CM

## 2023-08-02 DIAGNOSIS — F34.89 OTHER SPECIFIED PERSISTENT MOOD DISORDERS: ICD-10-CM

## 2023-08-02 PROCEDURE — 3008F BODY MASS INDEX DOCD: CPT | Mod: CPTII,S$GLB,, | Performed by: NURSE PRACTITIONER

## 2023-08-02 PROCEDURE — 1159F MED LIST DOCD IN RCRD: CPT | Mod: CPTII,S$GLB,, | Performed by: NURSE PRACTITIONER

## 2023-08-02 PROCEDURE — 3044F PR MOST RECENT HEMOGLOBIN A1C LEVEL <7.0%: ICD-10-PCS | Mod: CPTII,S$GLB,, | Performed by: NURSE PRACTITIONER

## 2023-08-02 PROCEDURE — 4010F ACE/ARB THERAPY RXD/TAKEN: CPT | Mod: CPTII,S$GLB,, | Performed by: NURSE PRACTITIONER

## 2023-08-02 PROCEDURE — 3079F PR MOST RECENT DIASTOLIC BLOOD PRESSURE 80-89 MM HG: ICD-10-PCS | Mod: CPTII,S$GLB,, | Performed by: NURSE PRACTITIONER

## 2023-08-02 PROCEDURE — 3008F PR BODY MASS INDEX (BMI) DOCUMENTED: ICD-10-PCS | Mod: CPTII,S$GLB,, | Performed by: NURSE PRACTITIONER

## 2023-08-02 PROCEDURE — 99214 OFFICE O/P EST MOD 30 MIN: CPT | Mod: S$GLB,,, | Performed by: NURSE PRACTITIONER

## 2023-08-02 PROCEDURE — 99999 PR PBB SHADOW E&M-EST. PATIENT-LVL IV: CPT | Mod: PBBFAC,,, | Performed by: NURSE PRACTITIONER

## 2023-08-02 PROCEDURE — 3074F PR MOST RECENT SYSTOLIC BLOOD PRESSURE < 130 MM HG: ICD-10-PCS | Mod: CPTII,S$GLB,, | Performed by: NURSE PRACTITIONER

## 2023-08-02 PROCEDURE — 3066F NEPHROPATHY DOC TX: CPT | Mod: CPTII,S$GLB,, | Performed by: NURSE PRACTITIONER

## 2023-08-02 PROCEDURE — 99999 PR PBB SHADOW E&M-EST. PATIENT-LVL IV: ICD-10-PCS | Mod: PBBFAC,,, | Performed by: NURSE PRACTITIONER

## 2023-08-02 PROCEDURE — 99214 PR OFFICE/OUTPT VISIT, EST, LEVL IV, 30-39 MIN: ICD-10-PCS | Mod: S$GLB,,, | Performed by: NURSE PRACTITIONER

## 2023-08-02 PROCEDURE — 3066F PR DOCUMENTATION OF TREATMENT FOR NEPHROPATHY: ICD-10-PCS | Mod: CPTII,S$GLB,, | Performed by: NURSE PRACTITIONER

## 2023-08-02 PROCEDURE — 4010F PR ACE/ARB THEARPY RXD/TAKEN: ICD-10-PCS | Mod: CPTII,S$GLB,, | Performed by: NURSE PRACTITIONER

## 2023-08-02 PROCEDURE — 1160F PR REVIEW ALL MEDS BY PRESCRIBER/CLIN PHARMACIST DOCUMENTED: ICD-10-PCS | Mod: CPTII,S$GLB,, | Performed by: NURSE PRACTITIONER

## 2023-08-02 PROCEDURE — 1160F RVW MEDS BY RX/DR IN RCRD: CPT | Mod: CPTII,S$GLB,, | Performed by: NURSE PRACTITIONER

## 2023-08-02 PROCEDURE — 3074F SYST BP LT 130 MM HG: CPT | Mod: CPTII,S$GLB,, | Performed by: NURSE PRACTITIONER

## 2023-08-02 PROCEDURE — 1159F PR MEDICATION LIST DOCUMENTED IN MEDICAL RECORD: ICD-10-PCS | Mod: CPTII,S$GLB,, | Performed by: NURSE PRACTITIONER

## 2023-08-02 PROCEDURE — 3061F NEG MICROALBUMINURIA REV: CPT | Mod: CPTII,S$GLB,, | Performed by: NURSE PRACTITIONER

## 2023-08-02 PROCEDURE — 3079F DIAST BP 80-89 MM HG: CPT | Mod: CPTII,S$GLB,, | Performed by: NURSE PRACTITIONER

## 2023-08-02 PROCEDURE — 3061F PR NEG MICROALBUMINURIA RESULT DOCUMENTED/REVIEW: ICD-10-PCS | Mod: CPTII,S$GLB,, | Performed by: NURSE PRACTITIONER

## 2023-08-02 PROCEDURE — 3044F HG A1C LEVEL LT 7.0%: CPT | Mod: CPTII,S$GLB,, | Performed by: NURSE PRACTITIONER

## 2023-08-02 RX ORDER — ZONISAMIDE 100 MG/1
CAPSULE ORAL
Qty: 90 CAPSULE | Refills: 1 | Status: SHIPPED | OUTPATIENT
Start: 2023-08-02 | End: 2023-11-09 | Stop reason: SDUPTHER

## 2023-08-02 RX ORDER — ALPRAZOLAM 0.25 MG/1
.25-.5 TABLET ORAL DAILY PRN
COMMUNITY
Start: 2023-07-11 | End: 2024-01-10

## 2023-08-02 NOTE — PROGRESS NOTES
Established Patient   SUBJECTIVE:  Patient ID: Carmen Son   Chief Complaint: Follow-up    History of Present Illness:  Carmen Son is a 63 y.o. female who presents to clinic alone for follow-up of headaches.     Recommendations made at last Office Visit on 8/2/23:  - Discussed symptoms are not consistent with trigeminal neuralgia  Though her current description of headaches does not meet diagnostirc criteria for migraine headache, I feel symptoms are most likely migraine complicated by HTN, anxiety/depression, tremor, T2DM, and neck pain.  She is agreeable to the following treatment plan.   - Referral to physical therapy for neck pain    - Start zonisamide 50 mg nightly for headache prevention   - May need higher dose zonisamide, encouraged her to contact office in one month with update - no benefit/no side effects to zonisamide, would likely recommend increasing dose to 100 mg nightly   - Future treatment considerations - gabapentin vs monthly cgrp injectable  Would be reasonable to trial triptan in the future  - Start diligently tracking headaches - particularly with attention to possible triggers, duration and associated symptoms   - Risks, benefits, and potential side effects of zonisamide discussed  - Alternative treatment options offered   - T2DM - stable/A1c steadily improving, management per PCP, will avoid use of steroids if possible   - Tremor - continue propranolol 80 mg LA daily, management per PCP  - HTN - stable on losartan 50 mg daily and propranolol 80 mg LA daily, will avoid use of anti-hypertensive meds for headache prevention   - MDD/LINNETTE - continue buspar 20 mg bid, management per outside psychiatrist, will avoid use of anti-depressants for migraine prevention    - RTC in 3 months or sooner if needed     08/02/2023 - Interval History:  Headaches have been better, still come and go, but does not come on nearly as often.  She is taking zonisamide 50 mg nightly, denies presence  "of side effects.  Was seen by psychiatrist who prescribed xanax to take as needed.  She is doing physical therapy, getting dry needling which has also been helping the tightness in her neck.      Otherwise, information below is still accurate and current.     History of Present Illness:   62 y.o. female with neck pain, HTN, headaches, T2DM, anxiety/depression, tremor, who presents to clinic alone for evaluation of headaches. Previous patient of ELIA Zaragoza, she is a new patient to me, last seen August 2022, at that time she was started on trileptal which initially was helping.    She describes a "crawling" sensation across her forehead, can also experience a pressure sensation in her temples, can be right/left or bilaterally distribution.  Can also have pressure sensation throughout the occipitalis, like "tightness", "like a bee or wasp sting".  Lasts hours, fluctuating in intensity.  Associated symptoms include "feeling as if I can hardly hold by head up", neck pain, photophobia.  Occurs 15 days per month.      She does have a history of what she believes in migraines, but was seen by ENT and was then told it was her sinuses.       Went to dentist to rule out dental problem.       Went to psychiatrist as she thought possibly anxiety contributing to her symptoms, was started on buspar 20 mg bid which has been helping.       Hypertension well controlled on losartan 50 mg daily.       Diagnosed with essential tremor for which she is currently prescribed propranolol 80 mg LA daily.  Propranolol has helped tremor, she has not noticed any improvement in migraines since starting propranolol.      Notes from ELIA Zaragoza:  History of Present Illness  This is a 62 y.o. female with a  history of lumbar radiculopathy s/p L4/5 MIS TLIF on 1/25/2021, and cervical radiculopathy s/p ACDF C3-4, C4-5 in 2017 who presents to clinic alone and was referred to neurology by neurosurgery ELIA Servin  for evaluation of facial " "pain/numbness.   Patient reports she has been experiencing abnormal facial sensation with associated pain for the past 2 months. She furher describes her symptoms as episodic facial numbness/tingling  that begins on both sides of her neck and travels along her jawline across her forehead,cheek bones, and to the tops of her ears bilaterally, and the right side of the bridge of her nose. She notes in addition to the numbness and tingling she also also will experiences episodes of brief stabbing/burning pain along the left side of her cheek. She notes associated symptoms ot her facial pain and paraesthesias  Are tightness/pressure sensation" under her eyes,and when this occurs it makes it hard to close her eyes", and notes this sensation is worse when she tries to raises her eyebrows, also notes experiencing left eye twitching, as well as dry eyes, and photophobia. She further notes this morning si the first time  she experienced the burning/stabbing sensation on the left side of her cheek, which lasted a couple seconds and then resolved. She notes her other facial symptoms usually last several minutes and can be relieved by rubbing her forehead. She notes the numbness/tingling at the tops of her ears can be triggered by wearing a mask or wearing heavy earrings, but other she denies noticing any particular triggers to her facial numbness/tingling or burning facial pain, such as talking, chewing, cold water or touching her face.   She notes her symptoms occur daily, but they seem to come out of no where with no identified triggers or certain time of day of occurrence. She notes these symptoms are starting to affect her sleep, and notes she woke up in the early morning around 5 am with a stabbing pain on her left cheek without radiation elsewhere. She notes when these symptoms first began she thought maybe they were secondary to suing dapsone and clindamycin facial medication for treatment of her rosacea so she stopped " taking this as well and symptoms persisted. She also notes she saw her dentist last Wednesday thinking the symptoms were due to a dental infection but this workup was negative for any dental issues. She also notes she has gone to the ED several times for these symptoms with negative workup, and most recently given mobic RX. She notes she has tried relieving her current symptoms with tylenol and mobic, which she notes alleviates the pain for about 2-3 hours.      . Also notes cervical radiculopathy symptoms of worsening episodic numbness/tingling down both her arms and has seen NSGY and completed cervical spine MRI and hs followup with NSGY on the 28th to review imaging.   She notes she has experienced similar symptoms in the past and ws diagnosed with parasthesias and trigeminal neuralgia but notes these symptoms at that time were confined to the front of both of her ears. She notes at that time she was prescribed gabapentin and lyrica by her psychiatrist,  but stopped due to intolerable side effects, and notes these symptoms subsided after she completed cervical spine surgery for tx of cervical stenosis and symptoms did not recur until now. She notes she feels like she is under a lot of stress in the last 4-5 months, and asked if there was anything she could take for her anxiety.   She denies facial weakness or facial drooping,diplopia, blurry vision, changes in hearing, denies trouble talking, denies dysphagia, denies dizziness or gait abnormality, denies N/V.     Interval History:  Carmen Son is a 62 y.o. female w/ a history of lumbar radiculopathy s/p L4/5 MIS TLIF on 1/25/2021, and cervical radiculopathy s/p ACDF C3-4, C4-5 in 2017 who is here for follow up of bilateral facial pain and paraesthesias.  Their condition has somewhat changed since I saw her last visit and started her on trileptal 150 mg BID for preventative treatment of suspected trigeminal neuralgia.  She notes initially the trileptal  was helpful at preventing her facial pain and parasthesias, but she further notes she was recently seen with psychiatry and prescribed Cymbalta which she notes she took for 2 days and stopped because it made her facial symptoms return. She notes she is currently taking the trileptal 150 mg 3 x a day since she was her symptoms have returned and were no longer controlled with the trlipetal 150 mg BID. She also notes some left sided muscle twitching she states that her  pointed out the other day,but deneis abnormal tardive movements of her mouth and face.   We have also reviewed her  Recently completed brain MRI and specifically discusse that was no acute intracranial abnormalities or any signs of neurovascular compression of the trigeminal nerve on either side and overall no secondary causes of her current symptoms.   She also notes continued neck tightness and was seen with NSGY for continued symptoms of left side cervical radiculopathy and has appointment with pain management coming up and has also been referred to physical therapy.   She denies any focal neurologic signs such as facial drooping, numbness, weakness, vision loss.      Treatments Tried and Response  Gabapentin   Lyrica  Carbamazepine  Flexeril    Toradol   Labetalol   Meloxicam   Propranolol -for essential tremor   Trileptal - caused side effects   Zonisamide 50 mg - helping some   Zonisamide 100 mg - given today     Current Medications:    alendronate (FOSAMAX) 70 MG tablet, Take 1 tablet (70 mg total) by mouth every 7 days., Disp: 12 tablet, Rfl: 3    ALPRAZolam (XANAX) 0.25 MG tablet, Take 0.25-0.5 mg by mouth daily as needed., Disp: , Rfl:     ascorbic acid, vitamin C, (VITAMIN C) 500 MG tablet, Take 500 mg by mouth once daily., Disp: , Rfl:     busPIRone (BUSPAR) 10 MG tablet, Take 2 tablets (20 mg total) by mouth 2 (two) times daily., Disp: 120 tablet, Rfl: 11    cholecalciferol, vitamin D3, (VITAMIN D3 ORAL), Vitamin D3, Disp: , Rfl:      cyanocobalamin (VITAMIN B-12) 1000 MCG tablet, B12, Disp: , Rfl:     dapsone (ACZONE) 7.5 % GlwP, Apply topically., Disp: , Rfl:     ergocalciferol (ERGOCALCIFEROL) 50,000 unit Cap, Take 1 capsule (50,000 Units total) by mouth every 7 days., Disp: 12 capsule, Rfl: 3    evolocumab (REPATHA SURECLICK) 140 mg/mL PnIj, Inject 1 mL (140 mg total) into the skin every 14 (fourteen) days., Disp: 2 mL, Rfl: 3    fenofibrate 160 MG Tab, Take 1 tablet (160 mg total) by mouth once daily., Disp: 90 tablet, Rfl: 3    fexofenadine (ALLEGRA) 180 MG tablet, Take 1 tablet (180 mg total) by mouth once daily., Disp: 30 tablet, Rfl: 12    fluticasone propionate (FLONASE) 50 mcg/actuation nasal spray, 2 sprays by Each Nostril route once daily., Disp: , Rfl:     furosemide (LASIX) 20 MG tablet, Take 1 tablet (20 mg total) by mouth daily as needed., Disp: 30 tablet, Rfl: 3    hylan g-f 20 (SYNVISC-ONE) 48 mg/6 mL Syrg, 6 mLs., Disp: , Rfl:     ipratropium (ATROVENT) 42 mcg (0.06 %) nasal spray, 2 sprays 3 (three) times daily., Disp: , Rfl:     LIDOcaine (LIDODERM) 5 %, Place 1 patch onto the skin daily as needed (pain). Remove & Discard patch within 12 hours or as directed by MD, Disp: 5 patch, Rfl: 0    linaCLOtide (LINZESS) 145 mcg Cap capsule, Take 1 capsule (145 mcg total) by mouth once daily., Disp: 90 capsule, Rfl: 3    losartan (COZAAR) 50 MG tablet, Take 1 tablet (50 mg total) by mouth once daily., Disp: 90 tablet, Rfl: 3    MULTIVITAMIN ORAL, multivitamin, Disp: , Rfl:     naproxen (NAPROSYN) 500 MG tablet, Take 500 mg by mouth 2 (two) times daily., Disp: , Rfl:     omeprazole (PRILOSEC) 40 MG capsule, Take 1 capsule (40 mg total) by mouth before breakfast., Disp: 90 capsule, Rfl: 3    propranoloL (INDERAL LA) 80 MG 24 hr capsule, Take 1 capsule (80 mg total) by mouth once daily., Disp: 90 capsule, Rfl: 3    ivermectin (SOOLANTRA) 1 % Crea, Apply topically., Disp: , Rfl:     mupirocin (BACTROBAN) 2 % ointment, mupirocin 2 %  topical ointment  APPLY OINTMENT TOPICALLY TWICE DAILY FOR 7 DAYS AS DIRECTED, Disp: , Rfl:     ondansetron (ZOFRAN) 4 MG tablet, Take 1 tablet (4 mg total) by mouth every 8 (eight) hours as needed for Nausea. (Patient not taking: Reported on 8/2/2023), Disp: 45 tablet, Rfl: 1    potassium chloride SA (K-DUR,KLOR-CON) 20 MEQ tablet, Take 1 tablet (20 mEq total) by mouth once daily. (Patient not taking: Reported on 8/2/2023), Disp: 30 tablet, Rfl: 3    prenatal vit calc,iron,folic (PRENATAL VITAMIN ORAL), Take by mouth., Disp: , Rfl:     semaglutide (OZEMPIC) 0.25 mg or 0.5 mg (2 mg/3 mL) pen injector, Inject 0.5 mg into the skin every 7 days. (Patient not taking: Reported on 8/2/2023), Disp: 3 mL, Rfl: 3    zonisamide (ZONEGRAN) 100 MG Cap, TAKE 1 CAPSULE BY MOUTH IN THE EVENING, Disp: 90 capsule, Rfl: 1    Review of Systems - A review of 10+ systems was conducted with pertinent positive and negative findings documented in HPI with all other systems reviewed and negative.    PFSH: Past medical, family, and social history reviewed as documented in chart with pertinent positive medical, family, and social history detailed in HPI.    OBJECTIVE:  Vitals:  /84   Pulse 80   Wt 94.7 kg (208 lb 12.4 oz)   LMP 04/07/2013   BMI 36.98 kg/m²      Physical Exam:  Constitutional: she appears well-developed and well-nourished. she is well groomed. NAD    Review of Data:   Notes from family medicine reviewed   Labs:  Lab Visit on 03/28/2023   Component Date Value Ref Range Status    WBC 03/28/2023 4.07  3.90 - 12.70 K/uL Final    RBC 03/28/2023 4.68  4.00 - 5.40 M/uL Final    Hemoglobin 03/28/2023 12.1  12.0 - 16.0 g/dL Final    Hematocrit 03/28/2023 38.7  37.0 - 48.5 % Final    MCV 03/28/2023 83  82 - 98 fL Final    MCH 03/28/2023 25.9 (L)  27.0 - 31.0 pg Final    MCHC 03/28/2023 31.3 (L)  32.0 - 36.0 g/dL Final    RDW 03/28/2023 14.2  11.5 - 14.5 % Final    Platelets 03/28/2023 257  150 - 450 K/uL Final    MPV  03/28/2023 10.4  9.2 - 12.9 fL Final    Immature Granulocytes 03/28/2023 0.0  0.0 - 0.5 % Final    Gran # (ANC) 03/28/2023 1.6 (L)  1.8 - 7.7 K/uL Final    Immature Grans (Abs) 03/28/2023 0.00  0.00 - 0.04 K/uL Final    Lymph # 03/28/2023 2.1  1.0 - 4.8 K/uL Final    Mono # 03/28/2023 0.2 (L)  0.3 - 1.0 K/uL Final    Eos # 03/28/2023 0.1  0.0 - 0.5 K/uL Final    Baso # 03/28/2023 0.02  0.00 - 0.20 K/uL Final    nRBC 03/28/2023 0  0 /100 WBC Final    Gran % 03/28/2023 40.3  38.0 - 73.0 % Final    Lymph % 03/28/2023 52.3 (H)  18.0 - 48.0 % Final    Mono % 03/28/2023 5.7  4.0 - 15.0 % Final    Eosinophil % 03/28/2023 1.2  0.0 - 8.0 % Final    Basophil % 03/28/2023 0.5  0.0 - 1.9 % Final    Differential Method 03/28/2023 Automated   Final    Sodium 03/28/2023 139  136 - 145 mmol/L Final    Potassium 03/28/2023 4.1  3.5 - 5.1 mmol/L Final    Chloride 03/28/2023 106  95 - 110 mmol/L Final    CO2 03/28/2023 30 (H)  23 - 29 mmol/L Final    Glucose 03/28/2023 113 (H)  70 - 110 mg/dL Final    BUN 03/28/2023 14  7 - 17 mg/dL Final    Creatinine 03/28/2023 0.96  0.50 - 1.40 mg/dL Final    Calcium 03/28/2023 9.0  8.7 - 10.5 mg/dL Final    Total Protein 03/28/2023 7.2  6.0 - 8.4 g/dL Final    Albumin 03/28/2023 4.1  3.5 - 5.2 g/dL Final    Total Bilirubin 03/28/2023 0.7  0.1 - 1.0 mg/dL Final    Alkaline Phosphatase 03/28/2023 65  38 - 126 U/L Final    AST 03/28/2023 20  15 - 46 U/L Final    ALT 03/28/2023 17  10 - 44 U/L Final    Anion Gap 03/28/2023 3 (L)  8 - 16 mmol/L Final    eGFR 03/28/2023 >60.0  >60 mL/min/1.73 m^2 Final    Hemoglobin A1C 03/28/2023 5.7 (H)  4.0 - 5.6 % Final    Estimated Avg Glucose 03/28/2023 117  68 - 131 mg/dL Final    Cholesterol 03/28/2023 251 (H)  120 - 199 mg/dL Final    Triglycerides 03/28/2023 75  30 - 150 mg/dL Final    HDL 03/28/2023 63  40 - 75 mg/dL Final    LDL Cholesterol 03/28/2023 173.0 (H)  63.0 - 159.0 mg/dL Final    HDL/Cholesterol Ratio 03/28/2023 25.1  20.0 - 50.0 % Final    Total  Cholesterol/HDL Ratio 03/28/2023 4.0  2.0 - 5.0 Final    Non-HDL Cholesterol 03/28/2023 188  mg/dL Final   Lab Visit on 03/28/2023   Component Date Value Ref Range Status    Microalbumin, Urine 03/28/2023 6.0  ug/mL Final    Creatinine, Urine 03/28/2023 149.0  15.0 - 325.0 mg/dL Final    Microalb/Creat Ratio 03/28/2023 4.0  0.0 - 30.0 ug/mg Final     Imaging:  Results for orders placed or performed during the hospital encounter of 06/28/22   MRI Brain W WO Contrast    Narrative    EXAMINATION:  MRI BRAIN W WO CONTRAST    CLINICAL HISTORY:  Cranial neuropathy (CN 5);with thin cuts along the trigeminal nerve to further evaluate for secondary causes;.  Headache, unspecified    TECHNIQUE:  Multiplanar multisequence MR imaging of the brain was performed before and after the administration of 9 mL Gadavist  intravenous contrast.    COMPARISON:  Head CT 09/26/2018    FINDINGS:  Intracranial compartment:    Ventricles and sulci are normal in size for age without evidence of hydrocephalus. No extra-axial blood or fluid collections.    No restricted diffusion.  No focal encephalomalacia.  The brain parenchyma demonstrates no edema, mass or mass effect.  No gradient susceptibility artifact.  No abnormal intracranial enhancement.    Thin-section T2 weighted images through the origins of both trigeminal nerves demonstrates no evidence of significant neurovascular contact of the bilateral nerve root entry zones.  No abnormal enhancement along the course either trigeminal nerve.    Normal vascular flow voids are preserved.    Skull/extracranial contents (limited evaluation): Tiny mucous retention cyst within the left sphenoid sinus.  Remaining visualized paranasal sinuses and bilateral mastoid air cells are clear.  The globes and orbits appear within normal limits.    Marrow signal is within normal limits.      Impression    No neurovascular contact of the trigeminal nerve root entry zones.  No abnormal trigeminal nerve  enhancement.    No acute intracranial abnormality.      Electronically signed by: Phong Leigh  Date:    06/28/2022  Time:    16:08   Results for orders placed or performed during the hospital encounter of 09/26/18   CT Head Without Contrast    Narrative    EXAMINATION:  CT HEAD WITHOUT CONTRAST    CLINICAL HISTORY:  Dizziness and giddiness.;    TECHNIQUE:  Routine noncontrast head CT.    COMPARISON:  None    FINDINGS:  There is no acute intracranial hemorrhage or abnormal extra-axial fluid collection.  There is no abnormal increased or decreased density within the brain parenchyma.  Gray-white differentiation preserved normal ventricles.  There is no intracranial mass or mass effect.  The calvarium is intact.  Visualized paranasal sinuses and mastoids are well aerated.      Impression    Normal head CT.    All CT scans at this facility are performed  using dose modulation techniques as appropriate to performed exam including the following:  automated exposure control; adjustment of mA and/or kV according to the patients size (this includes techniques or standardized protocols for targeted exams where dose is matched to indication/reason for exam: i.e. extremities or head);  iterative reconstruction technique.      Electronically signed by: Arturo Rai MD  Date:    09/26/2018  Time:    18:21     Note: I have independently reviewed any/all imaging/labs/tests and agree with the report (s) as documented.  Any discrepancies will be as noted/demarcated by free text.  LOIS PALACIOS 8/2/2023    ASSESSMENT:  1. Chronic intractable headache, unspecified headache type    2. Cervicalgia    3. Controlled type 2 diabetes mellitus without complication, without long-term current use of insulin    4. Essential hypertension    5. Essential tremor    6. LINNETTE (generalized anxiety disorder)    7. Episode of recurrent major depressive disorder, unspecified depression episode severity    8. Other specified persistent mood disorders       PLAN:  - For headache prevention - gentle increase in zonisamide to 100 mg nightly   - T2DM - stable/A1c steadily improving, management per PCP, will avoid use of steroids if possible   - Tremor - continue propranolol 80 mg LA daily, management per PCP  - HTN - stable on losartan 50 mg daily and propranolol 80 mg LA daily, will avoid use of anti-hypertensive meds for headache prevention   - MDD/LINNETTE - continue buspar 20 mg bid, management per outside psychiatrist, will avoid use of anti-depressants for migraine prevention    - RTC in 3 months or sooner if needed     Orders Placed This Encounter    zonisamide (ZONEGRAN) 100 MG Cap     Questions and concerns were sought and answered to the patient's stated verbal satisfaction.  The patient verbalizes understanding and agreement with the above stated treatment plan.     CC: MD She Johnson, FNP-C  [facility]   [phone]    Dr. Victor was available during today's encounter.

## 2023-08-27 PROBLEM — T46.6X5A STATIN-INDUCED MYOSITIS: Status: ACTIVE | Noted: 2023-08-27

## 2023-08-27 PROBLEM — M60.9 STATIN-INDUCED MYOSITIS: Status: ACTIVE | Noted: 2023-08-27

## 2023-09-25 ENCOUNTER — LAB VISIT (OUTPATIENT)
Dept: LAB | Facility: HOSPITAL | Age: 63
End: 2023-09-25
Attending: STUDENT IN AN ORGANIZED HEALTH CARE EDUCATION/TRAINING PROGRAM
Payer: COMMERCIAL

## 2023-09-25 DIAGNOSIS — Z13.6 SCREENING FOR CARDIOVASCULAR CONDITION: ICD-10-CM

## 2023-09-25 DIAGNOSIS — F41.9 ANXIETY: ICD-10-CM

## 2023-09-25 DIAGNOSIS — R73.9 HYPERGLYCEMIA: ICD-10-CM

## 2023-09-25 LAB
ALBUMIN SERPL BCP-MCNC: 4.1 G/DL (ref 3.5–5.2)
ALP SERPL-CCNC: 77 U/L (ref 38–126)
ALT SERPL W/O P-5'-P-CCNC: 25 U/L (ref 10–44)
ANION GAP SERPL CALC-SCNC: 12 MMOL/L (ref 8–16)
AST SERPL-CCNC: 32 U/L (ref 15–46)
BASOPHILS # BLD AUTO: 0.01 K/UL (ref 0–0.2)
BASOPHILS NFR BLD: 0.3 % (ref 0–1.9)
BILIRUB SERPL-MCNC: 1 MG/DL (ref 0.1–1)
CALCIUM SERPL-MCNC: 9.7 MG/DL (ref 8.7–10.5)
CHLORIDE SERPL-SCNC: 105 MMOL/L (ref 95–110)
CHOLEST SERPL-MCNC: 207 MG/DL (ref 120–199)
CHOLEST/HDLC SERPL: 4.1 {RATIO} (ref 2–5)
CO2 SERPL-SCNC: 24 MMOL/L (ref 23–29)
CREAT SERPL-MCNC: 1.21 MG/DL (ref 0.5–1.4)
DIFFERENTIAL METHOD: ABNORMAL
EOSINOPHIL # BLD AUTO: 0.1 K/UL (ref 0–0.5)
EOSINOPHIL NFR BLD: 3.6 % (ref 0–8)
ERYTHROCYTE [DISTWIDTH] IN BLOOD BY AUTOMATED COUNT: 14.3 % (ref 11.5–14.5)
EST. GFR  (NO RACE VARIABLE): 51 ML/MIN/1.73 M^2
ESTIMATED AVG GLUCOSE: 131 MG/DL (ref 68–131)
GLUCOSE SERPL-MCNC: 139 MG/DL (ref 70–110)
HBA1C MFR BLD: 6.2 % (ref 4–5.6)
HCT VFR BLD AUTO: 43.5 % (ref 37–48.5)
HDLC SERPL-MCNC: 50 MG/DL (ref 40–75)
HDLC SERPL: 24.2 % (ref 20–50)
HGB BLD-MCNC: 13.4 G/DL (ref 12–16)
IMM GRANULOCYTES # BLD AUTO: 0 K/UL (ref 0–0.04)
IMM GRANULOCYTES NFR BLD AUTO: 0 % (ref 0–0.5)
LDLC SERPL CALC-MCNC: 139 MG/DL (ref 63–159)
LYMPHOCYTES # BLD AUTO: 1.9 K/UL (ref 1–4.8)
LYMPHOCYTES NFR BLD: 47.7 % (ref 18–48)
MCH RBC QN AUTO: 25.5 PG (ref 27–31)
MCHC RBC AUTO-ENTMCNC: 30.8 G/DL (ref 32–36)
MCV RBC AUTO: 83 FL (ref 82–98)
MONOCYTES # BLD AUTO: 0.3 K/UL (ref 0.3–1)
MONOCYTES NFR BLD: 6.4 % (ref 4–15)
NEUTROPHILS # BLD AUTO: 1.6 K/UL (ref 1.8–7.7)
NEUTROPHILS NFR BLD: 42 % (ref 38–73)
NONHDLC SERPL-MCNC: 157 MG/DL
NRBC BLD-RTO: 0 /100 WBC
PLATELET # BLD AUTO: 256 K/UL (ref 150–450)
PMV BLD AUTO: 9.8 FL (ref 9.2–12.9)
POTASSIUM SERPL-SCNC: 4.3 MMOL/L (ref 3.5–5.1)
PROT SERPL-MCNC: 8 G/DL (ref 6–8.4)
RBC # BLD AUTO: 5.25 M/UL (ref 4–5.4)
SODIUM SERPL-SCNC: 141 MMOL/L (ref 136–145)
TRIGL SERPL-MCNC: 90 MG/DL (ref 30–150)
TSH SERPL DL<=0.005 MIU/L-ACNC: 0.51 UIU/ML (ref 0.4–4)
UUN UR-MCNC: 15 MG/DL (ref 7–17)
WBC # BLD AUTO: 3.9 K/UL (ref 3.9–12.7)

## 2023-09-25 PROCEDURE — 80061 LIPID PANEL: CPT | Performed by: STUDENT IN AN ORGANIZED HEALTH CARE EDUCATION/TRAINING PROGRAM

## 2023-09-25 PROCEDURE — 80053 COMPREHEN METABOLIC PANEL: CPT | Mod: PN | Performed by: STUDENT IN AN ORGANIZED HEALTH CARE EDUCATION/TRAINING PROGRAM

## 2023-09-25 PROCEDURE — 84443 ASSAY THYROID STIM HORMONE: CPT | Mod: PN | Performed by: STUDENT IN AN ORGANIZED HEALTH CARE EDUCATION/TRAINING PROGRAM

## 2023-09-25 PROCEDURE — 36415 COLL VENOUS BLD VENIPUNCTURE: CPT | Mod: PN | Performed by: STUDENT IN AN ORGANIZED HEALTH CARE EDUCATION/TRAINING PROGRAM

## 2023-09-25 PROCEDURE — 85025 COMPLETE CBC W/AUTO DIFF WBC: CPT | Mod: PN | Performed by: STUDENT IN AN ORGANIZED HEALTH CARE EDUCATION/TRAINING PROGRAM

## 2023-09-25 PROCEDURE — 83036 HEMOGLOBIN GLYCOSYLATED A1C: CPT | Performed by: STUDENT IN AN ORGANIZED HEALTH CARE EDUCATION/TRAINING PROGRAM

## 2023-09-28 ENCOUNTER — OFFICE VISIT (OUTPATIENT)
Dept: FAMILY MEDICINE | Facility: CLINIC | Age: 63
End: 2023-09-28
Payer: COMMERCIAL

## 2023-09-28 VITALS
HEART RATE: 75 BPM | DIASTOLIC BLOOD PRESSURE: 82 MMHG | BODY MASS INDEX: 35.98 KG/M2 | OXYGEN SATURATION: 98 % | TEMPERATURE: 98 F | WEIGHT: 203.06 LBS | SYSTOLIC BLOOD PRESSURE: 126 MMHG | HEIGHT: 63 IN

## 2023-09-28 DIAGNOSIS — M60.9 STATIN-INDUCED MYOSITIS: ICD-10-CM

## 2023-09-28 DIAGNOSIS — Z12.31 SCREENING MAMMOGRAM FOR HIGH-RISK PATIENT: ICD-10-CM

## 2023-09-28 DIAGNOSIS — E11.9 CONTROLLED TYPE 2 DIABETES MELLITUS WITHOUT COMPLICATION, WITHOUT LONG-TERM CURRENT USE OF INSULIN: ICD-10-CM

## 2023-09-28 DIAGNOSIS — E66.01 CLASS 2 SEVERE OBESITY DUE TO EXCESS CALORIES WITH SERIOUS COMORBIDITY AND BODY MASS INDEX (BMI) OF 35.0 TO 35.9 IN ADULT: ICD-10-CM

## 2023-09-28 DIAGNOSIS — I10 ESSENTIAL HYPERTENSION: Primary | ICD-10-CM

## 2023-09-28 DIAGNOSIS — T46.6X5A STATIN-INDUCED MYOSITIS: ICD-10-CM

## 2023-09-28 DIAGNOSIS — E78.2 MIXED HYPERLIPIDEMIA: ICD-10-CM

## 2023-09-28 PROCEDURE — 1159F MED LIST DOCD IN RCRD: CPT | Mod: CPTII,S$GLB,, | Performed by: STUDENT IN AN ORGANIZED HEALTH CARE EDUCATION/TRAINING PROGRAM

## 2023-09-28 PROCEDURE — 3066F NEPHROPATHY DOC TX: CPT | Mod: CPTII,S$GLB,, | Performed by: STUDENT IN AN ORGANIZED HEALTH CARE EDUCATION/TRAINING PROGRAM

## 2023-09-28 PROCEDURE — 3074F SYST BP LT 130 MM HG: CPT | Mod: CPTII,S$GLB,, | Performed by: STUDENT IN AN ORGANIZED HEALTH CARE EDUCATION/TRAINING PROGRAM

## 2023-09-28 PROCEDURE — 4010F ACE/ARB THERAPY RXD/TAKEN: CPT | Mod: CPTII,S$GLB,, | Performed by: STUDENT IN AN ORGANIZED HEALTH CARE EDUCATION/TRAINING PROGRAM

## 2023-09-28 PROCEDURE — 4010F PR ACE/ARB THEARPY RXD/TAKEN: ICD-10-PCS | Mod: CPTII,S$GLB,, | Performed by: STUDENT IN AN ORGANIZED HEALTH CARE EDUCATION/TRAINING PROGRAM

## 2023-09-28 PROCEDURE — 1159F PR MEDICATION LIST DOCUMENTED IN MEDICAL RECORD: ICD-10-PCS | Mod: CPTII,S$GLB,, | Performed by: STUDENT IN AN ORGANIZED HEALTH CARE EDUCATION/TRAINING PROGRAM

## 2023-09-28 PROCEDURE — 3066F PR DOCUMENTATION OF TREATMENT FOR NEPHROPATHY: ICD-10-PCS | Mod: CPTII,S$GLB,, | Performed by: STUDENT IN AN ORGANIZED HEALTH CARE EDUCATION/TRAINING PROGRAM

## 2023-09-28 PROCEDURE — 3079F DIAST BP 80-89 MM HG: CPT | Mod: CPTII,S$GLB,, | Performed by: STUDENT IN AN ORGANIZED HEALTH CARE EDUCATION/TRAINING PROGRAM

## 2023-09-28 PROCEDURE — 3061F NEG MICROALBUMINURIA REV: CPT | Mod: CPTII,S$GLB,, | Performed by: STUDENT IN AN ORGANIZED HEALTH CARE EDUCATION/TRAINING PROGRAM

## 2023-09-28 PROCEDURE — 3079F PR MOST RECENT DIASTOLIC BLOOD PRESSURE 80-89 MM HG: ICD-10-PCS | Mod: CPTII,S$GLB,, | Performed by: STUDENT IN AN ORGANIZED HEALTH CARE EDUCATION/TRAINING PROGRAM

## 2023-09-28 PROCEDURE — 3074F PR MOST RECENT SYSTOLIC BLOOD PRESSURE < 130 MM HG: ICD-10-PCS | Mod: CPTII,S$GLB,, | Performed by: STUDENT IN AN ORGANIZED HEALTH CARE EDUCATION/TRAINING PROGRAM

## 2023-09-28 PROCEDURE — 3044F HG A1C LEVEL LT 7.0%: CPT | Mod: CPTII,S$GLB,, | Performed by: STUDENT IN AN ORGANIZED HEALTH CARE EDUCATION/TRAINING PROGRAM

## 2023-09-28 PROCEDURE — 3008F PR BODY MASS INDEX (BMI) DOCUMENTED: ICD-10-PCS | Mod: CPTII,S$GLB,, | Performed by: STUDENT IN AN ORGANIZED HEALTH CARE EDUCATION/TRAINING PROGRAM

## 2023-09-28 PROCEDURE — 3008F BODY MASS INDEX DOCD: CPT | Mod: CPTII,S$GLB,, | Performed by: STUDENT IN AN ORGANIZED HEALTH CARE EDUCATION/TRAINING PROGRAM

## 2023-09-28 PROCEDURE — 1160F RVW MEDS BY RX/DR IN RCRD: CPT | Mod: CPTII,S$GLB,, | Performed by: STUDENT IN AN ORGANIZED HEALTH CARE EDUCATION/TRAINING PROGRAM

## 2023-09-28 PROCEDURE — 3044F PR MOST RECENT HEMOGLOBIN A1C LEVEL <7.0%: ICD-10-PCS | Mod: CPTII,S$GLB,, | Performed by: STUDENT IN AN ORGANIZED HEALTH CARE EDUCATION/TRAINING PROGRAM

## 2023-09-28 PROCEDURE — 3061F PR NEG MICROALBUMINURIA RESULT DOCUMENTED/REVIEW: ICD-10-PCS | Mod: CPTII,S$GLB,, | Performed by: STUDENT IN AN ORGANIZED HEALTH CARE EDUCATION/TRAINING PROGRAM

## 2023-09-28 PROCEDURE — 99214 PR OFFICE/OUTPT VISIT, EST, LEVL IV, 30-39 MIN: ICD-10-PCS | Mod: S$GLB,,, | Performed by: STUDENT IN AN ORGANIZED HEALTH CARE EDUCATION/TRAINING PROGRAM

## 2023-09-28 PROCEDURE — 1160F PR REVIEW ALL MEDS BY PRESCRIBER/CLIN PHARMACIST DOCUMENTED: ICD-10-PCS | Mod: CPTII,S$GLB,, | Performed by: STUDENT IN AN ORGANIZED HEALTH CARE EDUCATION/TRAINING PROGRAM

## 2023-09-28 PROCEDURE — 99214 OFFICE O/P EST MOD 30 MIN: CPT | Mod: S$GLB,,, | Performed by: STUDENT IN AN ORGANIZED HEALTH CARE EDUCATION/TRAINING PROGRAM

## 2023-09-28 RX ORDER — FENOFIBRATE 160 MG/1
160 TABLET ORAL DAILY
Qty: 90 TABLET | Refills: 3 | Status: SHIPPED | OUTPATIENT
Start: 2023-09-28 | End: 2024-03-05

## 2023-09-28 RX ORDER — GLIPIZIDE 5 MG/1
5 TABLET, FILM COATED, EXTENDED RELEASE ORAL
Qty: 90 TABLET | Refills: 3 | Status: SHIPPED | OUTPATIENT
Start: 2023-09-28 | End: 2024-09-27

## 2023-09-29 PROBLEM — E78.2 MIXED HYPERLIPIDEMIA: Status: ACTIVE | Noted: 2019-04-17

## 2023-09-29 PROBLEM — E66.812 CLASS 2 SEVERE OBESITY DUE TO EXCESS CALORIES WITH SERIOUS COMORBIDITY AND BODY MASS INDEX (BMI) OF 35.0 TO 35.9 IN ADULT: Status: ACTIVE | Noted: 2020-03-24

## 2023-09-29 NOTE — PROGRESS NOTES
Patient ID: Carmen Son is a 63 y.o. female.     Chief Complaint: f/u labs    HPI   HLD- denies recent chest pain and shortness of breath. Unable to tolerate statin due to muscle pain.    HTN- blood pressure has been at goal    DMII- has not been strict with diabetic diet.     Review of Systems  Review of Systems   Constitutional:  Negative for fever.   HENT:  Negative for ear pain and sinus pain.    Eyes:  Negative for discharge.   Respiratory:  Negative for cough and shortness of breath.    Cardiovascular:  Negative for chest pain and leg swelling.   Gastrointestinal:  Negative for diarrhea, nausea and vomiting.   Genitourinary:  Negative for urgency.   Musculoskeletal:  Negative for myalgias.   Skin:  Negative for rash.   Neurological:  Negative for weakness and headaches.   Psychiatric/Behavioral:  Negative for depression.    All other systems reviewed and are negative.      Currently Medications  Current Outpatient Medications on File Prior to Visit   Medication Sig Dispense Refill    alendronate (FOSAMAX) 70 MG tablet Take 1 tablet (70 mg total) by mouth every 7 days. 12 tablet 3    ALPRAZolam (XANAX) 0.25 MG tablet Take 0.25-0.5 mg by mouth daily as needed.      ascorbic acid, vitamin C, (VITAMIN C) 500 MG tablet Take 500 mg by mouth once daily.      busPIRone (BUSPAR) 10 MG tablet Take 2 tablets (20 mg total) by mouth 2 (two) times daily. 120 tablet 11    cholecalciferol, vitamin D3, (VITAMIN D3 ORAL) Vitamin D3      cyanocobalamin (VITAMIN B-12) 1000 MCG tablet B12      dapsone (ACZONE) 7.5 % GlwP Apply topically.      ergocalciferol (ERGOCALCIFEROL) 50,000 unit Cap Take 1 capsule (50,000 Units total) by mouth every 7 days. 12 capsule 3    evolocumab (REPATHA SURECLICK) 140 mg/mL PnIj Inject 1 mL (140 mg total) into the skin every 14 (fourteen) days. 2 mL 3    fexofenadine (ALLEGRA) 180 MG tablet Take 1 tablet (180 mg total) by mouth once daily. 30 tablet 12    fluticasone propionate (FLONASE)  "50 mcg/actuation nasal spray 2 sprays by Each Nostril route once daily.      furosemide (LASIX) 20 MG tablet Take 1 tablet (20 mg total) by mouth daily as needed. 30 tablet 3    hylan g-f 20 (SYNVISC-ONE) 48 mg/6 mL Syrg 6 mLs.      ipratropium (ATROVENT) 42 mcg (0.06 %) nasal spray 2 sprays 3 (three) times daily.      ivermectin (SOOLANTRA) 1 % Crea Apply topically.      LIDOcaine (LIDODERM) 5 % Place 1 patch onto the skin daily as needed (pain). Remove & Discard patch within 12 hours or as directed by MD 5 patch 0    linaCLOtide (LINZESS) 145 mcg Cap capsule Take 1 capsule (145 mcg total) by mouth once daily. 90 capsule 3    losartan (COZAAR) 50 MG tablet Take 1 tablet (50 mg total) by mouth once daily. 90 tablet 3    MULTIVITAMIN ORAL multivitamin      mupirocin (BACTROBAN) 2 % ointment mupirocin 2 % topical ointment   APPLY OINTMENT TOPICALLY TWICE DAILY FOR 7 DAYS AS DIRECTED      naproxen (NAPROSYN) 500 MG tablet Take 500 mg by mouth 2 (two) times daily.      omeprazole (PRILOSEC) 40 MG capsule Take 1 capsule (40 mg total) by mouth before breakfast. 90 capsule 3    prenatal vit calc,iron,folic (PRENATAL VITAMIN ORAL) Take by mouth.      propranoloL (INDERAL LA) 80 MG 24 hr capsule Take 1 capsule (80 mg total) by mouth once daily. 90 capsule 3    zonisamide (ZONEGRAN) 100 MG Cap TAKE 1 CAPSULE BY MOUTH IN THE EVENING 90 capsule 1     No current facility-administered medications on file prior to visit.       Physical  Exam  Vitals:    09/28/23 1043   BP: 126/82   BP Location: Right arm   Patient Position: Sitting   Pulse: 75   Temp: 98.2 °F (36.8 °C)   SpO2: 98%   Weight: 92.1 kg (203 lb 0.7 oz)   Height: 5' 3" (1.6 m)      Body mass index is 35.97 kg/m².  Wt Readings from Last 3 Encounters:   09/28/23 92.1 kg (203 lb 0.7 oz)   08/02/23 94.7 kg (208 lb 12.4 oz)   06/20/23 92.9 kg (204 lb 12.9 oz)       Physical Exam  Vitals and nursing note reviewed.   Constitutional:       General: She is not in acute " distress.     Appearance: She is not ill-appearing.   HENT:      Head: Normocephalic and atraumatic.      Right Ear: External ear normal.      Left Ear: External ear normal.      Nose: Nose normal.      Mouth/Throat:      Mouth: Mucous membranes are moist.   Eyes:      Extraocular Movements: Extraocular movements intact.      Conjunctiva/sclera: Conjunctivae normal.   Cardiovascular:      Rate and Rhythm: Normal rate and regular rhythm.      Pulses: Normal pulses.      Heart sounds: No murmur heard.  Pulmonary:      Effort: Pulmonary effort is normal. No respiratory distress.      Breath sounds: No wheezing.   Abdominal:      General: There is no distension.      Palpations: Abdomen is soft. There is no mass.      Tenderness: There is no abdominal tenderness.   Musculoskeletal:         General: No swelling.      Cervical back: Normal range of motion.   Skin:     Coloration: Skin is not jaundiced.      Findings: No rash.   Neurological:      General: No focal deficit present.      Mental Status: She is alert and oriented to person, place, and time.   Psychiatric:         Mood and Affect: Mood normal.         Thought Content: Thought content normal.         Labs:    Complete Blood Count  Lab Results   Component Value Date    RBC 5.25 09/25/2023    HGB 13.4 09/25/2023    HCT 43.5 09/25/2023    MCV 83 09/25/2023    MCH 25.5 (L) 09/25/2023    MCHC 30.8 (L) 09/25/2023    RDW 14.3 09/25/2023     09/25/2023    MPV 9.8 09/25/2023    GRAN 1.6 (L) 09/25/2023    GRAN 42.0 09/25/2023    LYMPH 1.9 09/25/2023    LYMPH 47.7 09/25/2023    MONO 0.3 09/25/2023    MONO 6.4 09/25/2023    EOS 0.1 09/25/2023    BASO 0.01 09/25/2023    EOSINOPHIL 3.6 09/25/2023    BASOPHIL 0.3 09/25/2023    DIFFMETHOD Automated 09/25/2023       Comprehensive Metabolic Panel  Lab Results   Component Value Date     (H) 09/25/2023    BUN 15 09/25/2023    CREATININE 1.21 09/25/2023     09/25/2023    K 4.3 09/25/2023     09/25/2023     PROT 8.0 09/25/2023    ALBUMIN 4.1 09/25/2023    BILITOT 1.0 09/25/2023    AST 32 09/25/2023    ALKPHOS 77 09/25/2023    CO2 24 09/25/2023    ALT 25 09/25/2023    ANIONGAP 12 09/25/2023       TSH  Lab Results   Component Value Date    TSH 0.513 09/25/2023       Imaging:  X-Ray Finger 2 or More Views Left  Narrative: EXAMINATION:  XR FINGER 2 OR MORE VIEWS LEFT    CLINICAL HISTORY:  2nd and 3rd finger crush injury;    COMPARISON:  A plain film examination of the left hand performed on 07/28/2021.    FINDINGS:  There are acute appearing fractures throughout the distal phalanx of the long finger.  There is an acute appearing fracture in the distal aspect of the distal phalanx of the index finger.  There is no dislocation.  Impression: 1. There are acute appearing fractures throughout the distal phalanx of the long finger.  2. There is an acute appearing fracture in the distal aspect of the distal phalanx of the index finger.    Electronically signed by: Chester Camarena MD  Date:    03/07/2023  Time:    16:03      Assessment/Plan:    1. Essential hypertension  -     CBC Auto Differential; Future  -     Comprehensive metabolic panel; Future; Expected date: 09/28/2023  -     TSH; Future; Expected date: 09/28/2023    2. Screening mammogram for high-risk patient  -     Mammo Digital Screening Bilat w/ John; Future; Expected date: 09/28/2023    3. Controlled type 2 diabetes mellitus without complication, without long-term current use of insulin  -     glipiZIDE 5 MG TR24; Take 1 tablet (5 mg total) by mouth daily with breakfast.  Dispense: 90 tablet; Refill: 3  -     CBC Auto Differential; Future  -     Comprehensive metabolic panel; Future; Expected date: 09/28/2023  -     HEMOGLOBIN A1C; Future; Expected date: 09/28/2023    4. Mixed hyperlipidemia  -     fenofibrate 160 MG Tab; Take 1 tablet (160 mg total) by mouth once daily.  Dispense: 90 tablet; Refill: 3  -     CBC Auto Differential; Future  -     Comprehensive  metabolic panel; Future; Expected date: 09/28/2023  -     LIPID PANEL; Future; Expected date: 09/28/2023    5. Statin-induced myositis         Discussed how to stay healthy including: diet, exercise, refraining from smoking and discussed screening exams / tests needed for age, sex and family Hx.    RTC 3 mo    Anson López MD

## 2023-10-09 DIAGNOSIS — E78.00 PURE HYPERCHOLESTEROLEMIA: ICD-10-CM

## 2023-10-09 DIAGNOSIS — Z12.31 ENCOUNTER FOR SCREENING MAMMOGRAM FOR MALIGNANT NEOPLASM OF BREAST: Primary | ICD-10-CM

## 2023-10-09 NOTE — TELEPHONE ENCOUNTER
----- Message from Katia Ayon sent at 10/9/2023  9:24 AM CDT -----  Needs advice from nurse:      Who Called:pt  Regarding:needs order for mammo faxed to [facility] fax [phone]  Would the patient rather a call back or VIA [facility]?  Best Call Back number:[phone]  Additional Info:

## 2023-10-10 RX ORDER — EVOLOCUMAB 140 MG/ML
140 INJECTION, SOLUTION SUBCUTANEOUS
Qty: 2 ML | Refills: 3 | Status: ACTIVE | OUTPATIENT
Start: 2023-10-10 | End: 2024-02-15 | Stop reason: SDUPTHER

## 2023-11-08 ENCOUNTER — TELEPHONE (OUTPATIENT)
Dept: NEUROLOGY | Facility: CLINIC | Age: 63
End: 2023-11-08
Payer: COMMERCIAL

## 2023-11-08 NOTE — TELEPHONE ENCOUNTER
Called Pt but was unable to speak with her. LVM     ----- Message from Colleen Collins sent at 11/8/2023 11:55 AM CST -----  Regarding: Pt called to reschedule her appt for headaches and would like a call back today  Appointment Access Request:    Pt called to reschedule her appt for headaches and would like a call back today    Pt can be reached at [phone]

## 2023-11-09 DIAGNOSIS — R51.9 CHRONIC INTRACTABLE HEADACHE, UNSPECIFIED HEADACHE TYPE: ICD-10-CM

## 2023-11-09 DIAGNOSIS — G89.29 CHRONIC INTRACTABLE HEADACHE, UNSPECIFIED HEADACHE TYPE: ICD-10-CM

## 2023-11-09 RX ORDER — ZONISAMIDE 100 MG/1
CAPSULE ORAL
Qty: 90 CAPSULE | Refills: 1 | Status: SHIPPED | OUTPATIENT
Start: 2023-11-09 | End: 2024-03-05 | Stop reason: SDUPTHER

## 2023-11-09 NOTE — TELEPHONE ENCOUNTER
Last ordered:08/02/23    Last seen:08/02/23  Upcoming appt:03/05/24    Spoke to Pt about rescheduling her missed appt. Pt was informed there no appts available at this time. The next available appt will be March due to provider being on maternity leave. Pt will be scheduled for 03/05/24.

## 2023-11-28 ENCOUNTER — TELEPHONE (OUTPATIENT)
Dept: FAMILY MEDICINE | Facility: CLINIC | Age: 63
End: 2023-11-28
Payer: COMMERCIAL

## 2023-11-28 NOTE — TELEPHONE ENCOUNTER
I would suggest using Claritin generic loratadine 10 milligrams daily for 5-7 days also use over-the-counter Robitussin DM or similar generic and then consider using a nasal spray such as normal saline or ocean spray to reduce the amount of mucus in your nose.     I would not use antibiotics at this time

## 2023-11-28 NOTE — TELEPHONE ENCOUNTER
----- Message from Digna Lucas sent at 11/28/2023 10:55 AM CST -----  .Type:  Needs Medical Advice    Who Called: pt    Would the patient rather a call back or a response via [facility]? Call back  Best Call Back Number: [phone]  Additional Information:     Pt would like a call back for medication for a possible sinus infection

## 2023-11-29 NOTE — TELEPHONE ENCOUNTER
Renewed this is not a sign of bacterial infection.  Antibiotics only treat bacterial infections.  This is a viral infection and will have to run its course.

## 2023-11-29 NOTE — TELEPHONE ENCOUNTER
I have informed pt to take claritin and robitussin. That MD is not prescribing antibiotic. Pt stated today her mucus is green, and she feels that could be a sign of a infection. Pt stated she has been taking mucinex bid for 2 weeks. Please advise.

## 2023-11-29 NOTE — TELEPHONE ENCOUNTER
Appointment Access  Laura Foley Staff  Caller: [phone] (Today, 12:20 PM)  Type:  Patient Returning Call    Who Called: pt  Who Left Message for Patient: office  Does the patient know what this is regarding?: appt  Would the patient rather a call back or a response via [facility]?  Call  Best Call Back Number: [phone]  Additional Information:

## 2024-01-02 RX ORDER — ALENDRONATE SODIUM 70 MG/1
70 TABLET ORAL
Qty: 12 TABLET | Refills: 0 | Status: SHIPPED | OUTPATIENT
Start: 2024-01-02

## 2024-01-02 RX ORDER — ERGOCALCIFEROL 1.25 MG/1
50000 CAPSULE ORAL
Qty: 12 CAPSULE | Refills: 0 | Status: SHIPPED | OUTPATIENT
Start: 2024-01-02

## 2024-01-02 NOTE — TELEPHONE ENCOUNTER
Care Due:                  Date            Visit Type   Department     Provider  --------------------------------------------------------------------------------                                EP -                              PRIMARY      [facility] FAMILY  Last Visit: 09-      CARE ([facility])   MEDICINE       Anson López                              EP -                              PRIMARY      [facility] FAM[facility]  Next Visit: 03-      CARE ([facility])   MEDICINE       Anson López                                                            Last  Test          Frequency    Reason                     Performed    Due Date  --------------------------------------------------------------------------------    HBA1C.......  6 months...  glipiZIDE................  09- 03-    Mg Level....  12 months..  alendronate..............  Not Found    Overdue    Phosphate...  12 months..  alendronate..............  Not Found    Overdue    Vitamin D...  12 months..  alendronate..............  Not Found    Overdue    [employer] Embedded Care Due Messages. Reference number: [id number].   1/02/2024 11:01:11 AM CST

## 2024-01-08 DIAGNOSIS — I95.9 HYPOTENSION, UNSPECIFIED HYPOTENSION TYPE: ICD-10-CM

## 2024-01-08 DIAGNOSIS — I10 ESSENTIAL HYPERTENSION: ICD-10-CM

## 2024-01-08 NOTE — TELEPHONE ENCOUNTER
No care due was identified.  [facility] Embedded Care Due Messages. Reference number: [id number].   1/08/2024 9:58:31 AM CST

## 2024-01-09 RX ORDER — LOSARTAN POTASSIUM 50 MG/1
50 TABLET ORAL DAILY
Qty: 90 TABLET | Refills: 2 | Status: SHIPPED | OUTPATIENT
Start: 2024-01-09 | End: 2024-01-17 | Stop reason: SDUPTHER

## 2024-01-09 RX ORDER — PROPRANOLOL HYDROCHLORIDE 80 MG/1
80 CAPSULE, EXTENDED RELEASE ORAL DAILY
Qty: 90 CAPSULE | Refills: 2 | Status: SHIPPED | OUTPATIENT
Start: 2024-01-09 | End: 2024-01-10

## 2024-01-09 NOTE — TELEPHONE ENCOUNTER
Refill Decision Note   Carmen Huy  is requesting a refill authorization.  Brief Assessment and Rationale for Refill:  Approve     Medication Therapy Plan:         Pharmacist review requested: Yes   Extended chart review required: Yes   Comments:     Note composed:3:00 PM 01/09/2024

## 2024-01-09 NOTE — TELEPHONE ENCOUNTER
Refill Routing Note   Medication(s) are not appropriate for processing by [facility] for the following reason(s):        Drug-disease interaction     [facility] action(s):  Defer  Approve             Pharmacist review requested: Yes     Appointments  past 12m or future 3m with PCP    Date Provider   Last Visit   9/28/2023 Anson López MD   Next Visit   3/28/2024 Anson López MD   ED visits in past 90 days: 0        Note composed:9:43 AM 01/09/2024            stated

## 2024-01-10 ENCOUNTER — OFFICE VISIT (OUTPATIENT)
Dept: OBSTETRICS AND GYNECOLOGY | Facility: CLINIC | Age: 64
End: 2024-01-10
Payer: COMMERCIAL

## 2024-01-10 VITALS
SYSTOLIC BLOOD PRESSURE: 126 MMHG | WEIGHT: 207.88 LBS | DIASTOLIC BLOOD PRESSURE: 78 MMHG | BODY MASS INDEX: 36.83 KG/M2 | HEIGHT: 63 IN

## 2024-01-10 DIAGNOSIS — E11.9 TYPE 2 DIABETES MELLITUS WITHOUT COMPLICATION, WITHOUT LONG-TERM CURRENT USE OF INSULIN: ICD-10-CM

## 2024-01-10 DIAGNOSIS — I10 HYPERTENSION, UNSPECIFIED TYPE: ICD-10-CM

## 2024-01-10 DIAGNOSIS — Z90.710 S/P HYSTERECTOMY: ICD-10-CM

## 2024-01-10 DIAGNOSIS — F41.9 ANXIETY: ICD-10-CM

## 2024-01-10 DIAGNOSIS — Z01.419 ENCOUNTER FOR GYNECOLOGICAL EXAMINATION WITHOUT ABNORMAL FINDING: Primary | ICD-10-CM

## 2024-01-10 DIAGNOSIS — R68.82 DECREASED LIBIDO: ICD-10-CM

## 2024-01-10 PROCEDURE — 99999 PR PBB SHADOW E&M-EST. PATIENT-LVL III: CPT | Mod: PBBFAC,,, | Performed by: OBSTETRICS & GYNECOLOGY

## 2024-01-10 PROCEDURE — 99396 PREV VISIT EST AGE 40-64: CPT | Mod: S$GLB,,, | Performed by: OBSTETRICS & GYNECOLOGY

## 2024-01-10 PROCEDURE — 1159F MED LIST DOCD IN RCRD: CPT | Mod: CPTII,S$GLB,, | Performed by: OBSTETRICS & GYNECOLOGY

## 2024-01-10 PROCEDURE — 3078F DIAST BP <80 MM HG: CPT | Mod: CPTII,S$GLB,, | Performed by: OBSTETRICS & GYNECOLOGY

## 2024-01-10 PROCEDURE — 3008F BODY MASS INDEX DOCD: CPT | Mod: CPTII,S$GLB,, | Performed by: OBSTETRICS & GYNECOLOGY

## 2024-01-10 PROCEDURE — 3074F SYST BP LT 130 MM HG: CPT | Mod: CPTII,S$GLB,, | Performed by: OBSTETRICS & GYNECOLOGY

## 2024-01-10 PROCEDURE — 4010F ACE/ARB THERAPY RXD/TAKEN: CPT | Mod: CPTII,S$GLB,, | Performed by: OBSTETRICS & GYNECOLOGY

## 2024-01-10 PROCEDURE — 1160F RVW MEDS BY RX/DR IN RCRD: CPT | Mod: CPTII,S$GLB,, | Performed by: OBSTETRICS & GYNECOLOGY

## 2024-01-10 RX ORDER — PHENOL/SODIUM PHENOLATE
AEROSOL, SPRAY (ML) MUCOUS MEMBRANE
COMMUNITY

## 2024-01-10 RX ORDER — AZELASTINE 1 MG/ML
2 SPRAY, METERED NASAL 2 TIMES DAILY
COMMUNITY

## 2024-01-10 RX ORDER — PROPRANOLOL HYDROCHLORIDE 60 MG/1
CAPSULE, EXTENDED RELEASE ORAL
COMMUNITY

## 2024-01-10 RX ORDER — ALPRAZOLAM 0.5 MG/1
0.5 TABLET, EXTENDED RELEASE ORAL
COMMUNITY

## 2024-01-17 DIAGNOSIS — I10 ESSENTIAL HYPERTENSION: ICD-10-CM

## 2024-01-17 RX ORDER — LOSARTAN POTASSIUM 50 MG/1
50 TABLET ORAL DAILY
Qty: 90 TABLET | Refills: 2 | Status: SHIPPED | OUTPATIENT
Start: 2024-01-17

## 2024-01-17 NOTE — TELEPHONE ENCOUNTER
No care due was identified.  [facility] Embedded Care Due Messages. Reference number: [id number].   1/17/2024 8:42:15 AM CST

## 2024-02-15 DIAGNOSIS — E78.00 PURE HYPERCHOLESTEROLEMIA: ICD-10-CM

## 2024-02-15 RX ORDER — EVOLOCUMAB 140 MG/ML
140 INJECTION, SOLUTION SUBCUTANEOUS
Qty: 2 ML | Refills: 3 | Status: ACTIVE | OUTPATIENT
Start: 2024-02-15

## 2024-02-29 ENCOUNTER — PATIENT OUTREACH (OUTPATIENT)
Dept: ADMINISTRATIVE | Facility: HOSPITAL | Age: 64
End: 2024-02-29
Payer: COMMERCIAL

## 2024-03-05 ENCOUNTER — OFFICE VISIT (OUTPATIENT)
Dept: NEUROLOGY | Facility: CLINIC | Age: 64
End: 2024-03-05
Payer: COMMERCIAL

## 2024-03-05 VITALS
DIASTOLIC BLOOD PRESSURE: 86 MMHG | SYSTOLIC BLOOD PRESSURE: 126 MMHG | HEART RATE: 64 BPM | BODY MASS INDEX: 36.87 KG/M2 | WEIGHT: 208.13 LBS

## 2024-03-05 DIAGNOSIS — F34.89 OTHER SPECIFIED PERSISTENT MOOD DISORDERS: ICD-10-CM

## 2024-03-05 DIAGNOSIS — M54.2 CERVICALGIA: ICD-10-CM

## 2024-03-05 DIAGNOSIS — R51.9 CHRONIC INTRACTABLE HEADACHE, UNSPECIFIED HEADACHE TYPE: Primary | ICD-10-CM

## 2024-03-05 DIAGNOSIS — F41.1 GAD (GENERALIZED ANXIETY DISORDER): ICD-10-CM

## 2024-03-05 DIAGNOSIS — G25.0 ESSENTIAL TREMOR: ICD-10-CM

## 2024-03-05 DIAGNOSIS — M54.50 LOW BACK PAIN, UNSPECIFIED BACK PAIN LATERALITY, UNSPECIFIED CHRONICITY, UNSPECIFIED WHETHER SCIATICA PRESENT: ICD-10-CM

## 2024-03-05 DIAGNOSIS — E11.9 CONTROLLED TYPE 2 DIABETES MELLITUS WITHOUT COMPLICATION, WITHOUT LONG-TERM CURRENT USE OF INSULIN: ICD-10-CM

## 2024-03-05 DIAGNOSIS — F33.9 EPISODE OF RECURRENT MAJOR DEPRESSIVE DISORDER, UNSPECIFIED DEPRESSION EPISODE SEVERITY: ICD-10-CM

## 2024-03-05 DIAGNOSIS — I10 ESSENTIAL HYPERTENSION: ICD-10-CM

## 2024-03-05 DIAGNOSIS — G89.29 CHRONIC INTRACTABLE HEADACHE, UNSPECIFIED HEADACHE TYPE: Primary | ICD-10-CM

## 2024-03-05 PROCEDURE — 4010F ACE/ARB THERAPY RXD/TAKEN: CPT | Mod: CPTII,S$GLB,, | Performed by: NURSE PRACTITIONER

## 2024-03-05 PROCEDURE — 1160F RVW MEDS BY RX/DR IN RCRD: CPT | Mod: CPTII,S$GLB,, | Performed by: NURSE PRACTITIONER

## 2024-03-05 PROCEDURE — G2211 COMPLEX E/M VISIT ADD ON: HCPCS | Mod: S$GLB,,, | Performed by: NURSE PRACTITIONER

## 2024-03-05 PROCEDURE — 1159F MED LIST DOCD IN RCRD: CPT | Mod: CPTII,S$GLB,, | Performed by: NURSE PRACTITIONER

## 2024-03-05 PROCEDURE — 3008F BODY MASS INDEX DOCD: CPT | Mod: CPTII,S$GLB,, | Performed by: NURSE PRACTITIONER

## 2024-03-05 PROCEDURE — 3079F DIAST BP 80-89 MM HG: CPT | Mod: CPTII,S$GLB,, | Performed by: NURSE PRACTITIONER

## 2024-03-05 PROCEDURE — 99999 PR PBB SHADOW E&M-EST. PATIENT-LVL IV: CPT | Mod: PBBFAC,,, | Performed by: NURSE PRACTITIONER

## 2024-03-05 PROCEDURE — 99214 OFFICE O/P EST MOD 30 MIN: CPT | Mod: S$GLB,,, | Performed by: NURSE PRACTITIONER

## 2024-03-05 PROCEDURE — 3074F SYST BP LT 130 MM HG: CPT | Mod: CPTII,S$GLB,, | Performed by: NURSE PRACTITIONER

## 2024-03-05 RX ORDER — ZONISAMIDE 100 MG/1
CAPSULE ORAL
Qty: 90 CAPSULE | Refills: 1 | Status: SHIPPED | OUTPATIENT
Start: 2024-03-05
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
## 2024-03-21 ENCOUNTER — LAB VISIT (OUTPATIENT)
Dept: LAB | Facility: HOSPITAL | Age: 64
End: 2024-03-21
Attending: STUDENT IN AN ORGANIZED HEALTH CARE EDUCATION/TRAINING PROGRAM
Payer: COMMERCIAL

## 2024-03-21 DIAGNOSIS — E78.2 MIXED HYPERLIPIDEMIA: ICD-10-CM

## 2024-03-21 DIAGNOSIS — E11.9 CONTROLLED TYPE 2 DIABETES MELLITUS WITHOUT COMPLICATION, WITHOUT LONG-TERM CURRENT USE OF INSULIN: ICD-10-CM

## 2024-03-21 DIAGNOSIS — I10 ESSENTIAL HYPERTENSION: ICD-10-CM

## 2024-03-21 LAB
ALBUMIN SERPL BCP-MCNC: 3.8 G/DL (ref 3.5–5.2)
ALP SERPL-CCNC: 78 U/L (ref 38–126)
ALT SERPL W/O P-5'-P-CCNC: 16 U/L (ref 10–44)
ANION GAP SERPL CALC-SCNC: 9 MMOL/L (ref 8–16)
AST SERPL-CCNC: 19 U/L (ref 15–46)
BASOPHILS # BLD AUTO: 0.03 K/UL (ref 0–0.2)
BASOPHILS NFR BLD: 0.8 % (ref 0–1.9)
BILIRUB SERPL-MCNC: 0.8 MG/DL (ref 0.1–1)
CALCIUM SERPL-MCNC: 9.2 MG/DL (ref 8.7–10.5)
CHLORIDE SERPL-SCNC: 107 MMOL/L (ref 95–110)
CHOLEST SERPL-MCNC: 177 MG/DL (ref 120–199)
CHOLEST/HDLC SERPL: 3.2 {RATIO} (ref 2–5)
CO2 SERPL-SCNC: 26 MMOL/L (ref 23–29)
CREAT SERPL-MCNC: 0.97 MG/DL (ref 0.5–1.4)
DIFFERENTIAL METHOD BLD: ABNORMAL
EOSINOPHIL # BLD AUTO: 0.1 K/UL (ref 0–0.5)
EOSINOPHIL NFR BLD: 3 % (ref 0–8)
ERYTHROCYTE [DISTWIDTH] IN BLOOD BY AUTOMATED COUNT: 14.2 % (ref 11.5–14.5)
EST. GFR  (NO RACE VARIABLE): >60 ML/MIN/1.73 M^2
ESTIMATED AVG GLUCOSE: 120 MG/DL (ref 68–131)
GLUCOSE SERPL-MCNC: 121 MG/DL (ref 70–110)
HBA1C MFR BLD: 5.8 % (ref 4–5.6)
HCT VFR BLD AUTO: 40 % (ref 37–48.5)
HDLC SERPL-MCNC: 55 MG/DL (ref 40–75)
HDLC SERPL: 31.1 % (ref 20–50)
HGB BLD-MCNC: 12.3 G/DL (ref 12–16)
IMM GRANULOCYTES # BLD AUTO: 0 K/UL (ref 0–0.04)
IMM GRANULOCYTES NFR BLD AUTO: 0 % (ref 0–0.5)
LDLC SERPL CALC-MCNC: 99.6 MG/DL (ref 63–159)
LYMPHOCYTES # BLD AUTO: 1.6 K/UL (ref 1–4.8)
LYMPHOCYTES NFR BLD: 40.9 % (ref 18–48)
MCH RBC QN AUTO: 25.4 PG (ref 27–31)
MCHC RBC AUTO-ENTMCNC: 30.8 G/DL (ref 32–36)
MCV RBC AUTO: 83 FL (ref 82–98)
MONOCYTES # BLD AUTO: 0.3 K/UL (ref 0.3–1)
MONOCYTES NFR BLD: 6.3 % (ref 4–15)
NEUTROPHILS # BLD AUTO: 1.9 K/UL (ref 1.8–7.7)
NEUTROPHILS NFR BLD: 49 % (ref 38–73)
NONHDLC SERPL-MCNC: 122 MG/DL
NRBC BLD-RTO: 0 /100 WBC
PLATELET # BLD AUTO: 232 K/UL (ref 150–450)
PMV BLD AUTO: 10.1 FL (ref 9.2–12.9)
POTASSIUM SERPL-SCNC: 4.5 MMOL/L (ref 3.5–5.1)
PROT SERPL-MCNC: 7.2 G/DL (ref 6–8.4)
RBC # BLD AUTO: 4.84 M/UL (ref 4–5.4)
SODIUM SERPL-SCNC: 142 MMOL/L (ref 136–145)
TRIGL SERPL-MCNC: 112 MG/DL (ref 30–150)
TSH SERPL DL<=0.005 MIU/L-ACNC: 0.7 UIU/ML (ref 0.4–4)
UUN UR-MCNC: 16 MG/DL (ref 7–17)
WBC # BLD AUTO: 3.96 K/UL (ref 3.9–12.7)

## 2024-03-21 PROCEDURE — 85025 COMPLETE CBC W/AUTO DIFF WBC: CPT | Mod: PN | Performed by: STUDENT IN AN ORGANIZED HEALTH CARE EDUCATION/TRAINING PROGRAM

## 2024-03-21 PROCEDURE — 84443 ASSAY THYROID STIM HORMONE: CPT | Mod: PN | Performed by: STUDENT IN AN ORGANIZED HEALTH CARE EDUCATION/TRAINING PROGRAM

## 2024-03-21 PROCEDURE — 83036 HEMOGLOBIN GLYCOSYLATED A1C: CPT | Performed by: STUDENT IN AN ORGANIZED HEALTH CARE EDUCATION/TRAINING PROGRAM

## 2024-03-21 PROCEDURE — 80061 LIPID PANEL: CPT | Performed by: STUDENT IN AN ORGANIZED HEALTH CARE EDUCATION/TRAINING PROGRAM

## 2024-03-21 PROCEDURE — 80053 COMPREHEN METABOLIC PANEL: CPT | Mod: PN | Performed by: STUDENT IN AN ORGANIZED HEALTH CARE EDUCATION/TRAINING PROGRAM

## 2024-03-21 PROCEDURE — 36415 COLL VENOUS BLD VENIPUNCTURE: CPT | Mod: PN | Performed by: STUDENT IN AN ORGANIZED HEALTH CARE EDUCATION/TRAINING PROGRAM

## 2024-03-28 ENCOUNTER — OFFICE VISIT (OUTPATIENT)
Dept: FAMILY MEDICINE | Facility: CLINIC | Age: 64
End: 2024-03-28
Payer: COMMERCIAL

## 2024-03-28 VITALS
SYSTOLIC BLOOD PRESSURE: 120 MMHG | BODY MASS INDEX: 36.72 KG/M2 | OXYGEN SATURATION: 98 % | WEIGHT: 207.25 LBS | HEART RATE: 78 BPM | DIASTOLIC BLOOD PRESSURE: 84 MMHG | HEIGHT: 63 IN

## 2024-03-28 DIAGNOSIS — K58.1 IRRITABLE BOWEL SYNDROME WITH CONSTIPATION: ICD-10-CM

## 2024-03-28 DIAGNOSIS — E11.9 CONTROLLED TYPE 2 DIABETES MELLITUS WITHOUT COMPLICATION, WITHOUT LONG-TERM CURRENT USE OF INSULIN: ICD-10-CM

## 2024-03-28 DIAGNOSIS — E66.01 CLASS 2 SEVERE OBESITY DUE TO EXCESS CALORIES WITH SERIOUS COMORBIDITY AND BODY MASS INDEX (BMI) OF 35.0 TO 35.9 IN ADULT: ICD-10-CM

## 2024-03-28 DIAGNOSIS — G25.0 ESSENTIAL TREMOR: ICD-10-CM

## 2024-03-28 DIAGNOSIS — D70.8 OTHER NEUTROPENIA: ICD-10-CM

## 2024-03-28 DIAGNOSIS — Z79.899 CHRONIC USE OF BENZODIAZEPINE FOR THERAPEUTIC PURPOSE: Primary | ICD-10-CM

## 2024-03-28 DIAGNOSIS — M81.6 LOCALIZED OSTEOPOROSIS WITHOUT CURRENT PATHOLOGICAL FRACTURE: ICD-10-CM

## 2024-03-28 DIAGNOSIS — I10 ESSENTIAL HYPERTENSION: ICD-10-CM

## 2024-03-28 DIAGNOSIS — E78.2 MIXED HYPERLIPIDEMIA: ICD-10-CM

## 2024-03-28 DIAGNOSIS — F33.1 MAJOR DEPRESSIVE DISORDER, RECURRENT, MODERATE: ICD-10-CM

## 2024-03-28 PROCEDURE — 3044F HG A1C LEVEL LT 7.0%: CPT | Mod: CPTII,S$GLB,, | Performed by: STUDENT IN AN ORGANIZED HEALTH CARE EDUCATION/TRAINING PROGRAM

## 2024-03-28 PROCEDURE — 3074F SYST BP LT 130 MM HG: CPT | Mod: CPTII,S$GLB,, | Performed by: STUDENT IN AN ORGANIZED HEALTH CARE EDUCATION/TRAINING PROGRAM

## 2024-03-28 PROCEDURE — 99214 OFFICE O/P EST MOD 30 MIN: CPT | Mod: S$GLB,,, | Performed by: STUDENT IN AN ORGANIZED HEALTH CARE EDUCATION/TRAINING PROGRAM

## 2024-03-28 PROCEDURE — 4010F ACE/ARB THERAPY RXD/TAKEN: CPT | Mod: CPTII,S$GLB,, | Performed by: STUDENT IN AN ORGANIZED HEALTH CARE EDUCATION/TRAINING PROGRAM

## 2024-03-28 PROCEDURE — 1160F RVW MEDS BY RX/DR IN RCRD: CPT | Mod: CPTII,S$GLB,, | Performed by: STUDENT IN AN ORGANIZED HEALTH CARE EDUCATION/TRAINING PROGRAM

## 2024-03-28 PROCEDURE — 3079F DIAST BP 80-89 MM HG: CPT | Mod: CPTII,S$GLB,, | Performed by: STUDENT IN AN ORGANIZED HEALTH CARE EDUCATION/TRAINING PROGRAM

## 2024-03-28 PROCEDURE — 1159F MED LIST DOCD IN RCRD: CPT | Mod: CPTII,S$GLB,, | Performed by: STUDENT IN AN ORGANIZED HEALTH CARE EDUCATION/TRAINING PROGRAM

## 2024-03-28 PROCEDURE — 3008F BODY MASS INDEX DOCD: CPT | Mod: CPTII,S$GLB,, | Performed by: STUDENT IN AN ORGANIZED HEALTH CARE EDUCATION/TRAINING PROGRAM

## 2024-03-28 RX ORDER — ALENDRONATE SODIUM 70 MG/1
70 TABLET ORAL
Qty: 12 TABLET | Refills: 3 | Status: SHIPPED | OUTPATIENT
Start: 2024-03-28

## 2024-03-28 RX ORDER — ERGOCALCIFEROL 1.25 MG/1
50000 CAPSULE ORAL
Qty: 12 CAPSULE | Refills: 3 | Status: SHIPPED | OUTPATIENT
Start: 2024-03-28

## 2024-03-28 NOTE — PROGRESS NOTES
Patient ID: Carmen Son is a 63 y.o. female.     Chief Complaint: f/u labs    Follow-up  Pertinent negatives include no chest pain, coughing, fever, headaches, myalgias, nausea, rash, vomiting or weakness.      HLD- denies recent chest pain and shortness of breath. Unable to tolerate statin due to muscle pain.    HTN- blood pressure has been at goal    DMII- has not been strict with diabetic diet.     Review of Systems  Review of Systems   Constitutional:  Negative for fever.   HENT:  Negative for ear pain and sinus pain.    Eyes:  Negative for discharge.   Respiratory:  Negative for cough and shortness of breath.    Cardiovascular:  Negative for chest pain and leg swelling.   Gastrointestinal:  Negative for diarrhea, nausea and vomiting.   Genitourinary:  Negative for urgency.   Musculoskeletal:  Negative for myalgias.   Skin:  Negative for rash.   Neurological:  Negative for weakness and headaches.   Psychiatric/Behavioral:  Negative for depression.    All other systems reviewed and are negative.      Currently Medications  Current Outpatient Medications on File Prior to Visit   Medication Sig Dispense Refill    ALPRAZolam (XANAX XR) 0.5 MG Tb24 Take 0.5 mg by mouth.      ascorbic acid, vitamin C, (VITAMIN C) 500 MG tablet Take 500 mg by mouth once daily.      azelastine (ASTELIN) 137 mcg (0.1 %) nasal spray 2 sprays by Each Nostril route 2 (two) times daily.      busPIRone (BUSPAR) 10 MG tablet Take 2 tablets (20 mg total) by mouth 2 (two) times daily. 120 tablet 11    cholecalciferol, vitamin D3, (VITAMIN D3 ORAL) Vitamin D3      cyanocobalamin (VITAMIN B-12) 1000 MCG tablet B12      evolocumab (REPATHA SURECLICK) 140 mg/mL PnIj Inject 1 mL (140 mg total) into the skin every 14 (fourteen) days. 2 mL 3    furosemide (LASIX) 20 MG tablet Take 1 tablet (20 mg total) by mouth daily as needed. 30 tablet 3    glipiZIDE 5 MG TR24 Take 1 tablet (5 mg total) by mouth daily with breakfast. 90 tablet 3    hylan  "g-f 20 (SYNVISC-ONE) 48 mg/6 mL Syrg 6 mLs.      LIDOcaine (LIDODERM) 5 % Place 1 patch onto the skin daily as needed (pain). Remove & Discard patch within 12 hours or as directed by MD 5 patch 0    losartan (COZAAR) 50 MG tablet Take 1 tablet (50 mg total) by mouth once daily. 90 tablet 2    MULTIVITAMIN ORAL multivitamin      mupirocin (BACTROBAN) 2 % ointment mupirocin 2 % topical ointment   APPLY OINTMENT TOPICALLY TWICE DAILY FOR 7 DAYS AS DIRECTED      omeprazole 20 mg TbEC       propranoloL (INDERAL LA) 60 MG 24 hr capsule       zonisamide (ZONEGRAN) 100 MG Cap TAKE 1 CAPSULE BY MOUTH IN THE EVENING 90 capsule 1    [DISCONTINUED] alendronate (FOSAMAX) 70 MG tablet Take 1 tablet by mouth once a week 12 tablet 0    [DISCONTINUED] ergocalciferol (ERGOCALCIFEROL) 50,000 unit Cap Take 1 capsule by mouth once a week 12 capsule 0    [DISCONTINUED] linaCLOtide (LINZESS) 145 mcg Cap capsule Take 1 capsule (145 mcg total) by mouth once daily. 90 capsule 3     No current facility-administered medications on file prior to visit.       Physical  Exam  Vitals:    03/28/24 1039   BP: 120/84   BP Location: Left arm   Patient Position: Sitting   Pulse: 78   SpO2: 98%   Weight: 94 kg (207 lb 3.7 oz)   Height: 5' 3" (1.6 m)      Body mass index is 36.71 kg/m².  Wt Readings from Last 3 Encounters:   03/28/24 94 kg (207 lb 3.7 oz)   03/05/24 94.4 kg (208 lb 1.8 oz)   01/10/24 94.3 kg (207 lb 14.3 oz)       Physical Exam  Vitals and nursing note reviewed.   Constitutional:       General: She is not in acute distress.     Appearance: She is not ill-appearing.   HENT:      Head: Normocephalic and atraumatic.      Right Ear: External ear normal.      Left Ear: External ear normal.      Nose: Nose normal.      Mouth/Throat:      Mouth: Mucous membranes are moist.   Eyes:      Extraocular Movements: Extraocular movements intact.      Conjunctiva/sclera: Conjunctivae normal.   Cardiovascular:      Rate and Rhythm: Normal rate and " regular rhythm.      Pulses: Normal pulses.      Heart sounds: No murmur heard.  Pulmonary:      Effort: Pulmonary effort is normal. No respiratory distress.      Breath sounds: No wheezing.   Abdominal:      General: There is no distension.      Palpations: Abdomen is soft. There is no mass.      Tenderness: There is no abdominal tenderness.   Musculoskeletal:         General: No swelling.      Cervical back: Normal range of motion.   Skin:     Coloration: Skin is not jaundiced.      Findings: No rash.   Neurological:      General: No focal deficit present.      Mental Status: She is alert and oriented to person, place, and time.   Psychiatric:         Mood and Affect: Mood normal.         Thought Content: Thought content normal.         Labs:    Complete Blood Count  Lab Results   Component Value Date    RBC 4.84 03/21/2024    HGB 12.3 03/21/2024    HCT 40.0 03/21/2024    MCV 83 03/21/2024    MCH 25.4 (L) 03/21/2024    MCHC 30.8 (L) 03/21/2024    RDW 14.2 03/21/2024     03/21/2024    MPV 10.1 03/21/2024    GRAN 1.9 03/21/2024    GRAN 49.0 03/21/2024    LYMPH 1.6 03/21/2024    LYMPH 40.9 03/21/2024    MONO 0.3 03/21/2024    MONO 6.3 03/21/2024    EOS 0.1 03/21/2024    BASO 0.03 03/21/2024    EOSINOPHIL 3.0 03/21/2024    BASOPHIL 0.8 03/21/2024    DIFFMETHOD Automated 03/21/2024       Comprehensive Metabolic Panel  Lab Results   Component Value Date     (H) 03/21/2024    BUN 16 03/21/2024    CREATININE 0.97 03/21/2024     03/21/2024    K 4.5 03/21/2024     03/21/2024    PROT 7.2 03/21/2024    ALBUMIN 3.8 03/21/2024    BILITOT 0.8 03/21/2024    AST 19 03/21/2024    ALKPHOS 78 03/21/2024    CO2 26 03/21/2024    ALT 16 03/21/2024    ANIONGAP 9 03/21/2024       TSH  Lab Results   Component Value Date    TSH 0.705 03/21/2024       Imaging:  X-Ray Finger 2 or More Views Left  Narrative: EXAMINATION:  XR FINGER 2 OR MORE VIEWS LEFT    CLINICAL HISTORY:  2nd and 3rd finger crush  injury;    COMPARISON:  A plain film examination of the left hand performed on 07/28/2021.    FINDINGS:  There are acute appearing fractures throughout the distal phalanx of the long finger.  There is an acute appearing fracture in the distal aspect of the distal phalanx of the index finger.  There is no dislocation.  Impression: 1. There are acute appearing fractures throughout the distal phalanx of the long finger.  2. There is an acute appearing fracture in the distal aspect of the distal phalanx of the index finger.    Electronically signed by: Chester Camarena MD  Date:    03/07/2023  Time:    16:03      Assessment/Plan:    1. Chronic use of benzodiazepine for therapeutic purpose    2. Essential tremor    3. Essential hypertension  -     CBC Auto Differential; Future  -     Comprehensive metabolic panel; Future; Expected date: 03/28/2024  -     TSH; Future; Expected date: 03/28/2024    4. Mixed hyperlipidemia  -     CBC Auto Differential; Future  -     Comprehensive metabolic panel; Future; Expected date: 03/28/2024  -     LIPID PANEL; Future; Expected date: 03/28/2024    5. Controlled type 2 diabetes mellitus without complication, without long-term current use of insulin  -     CBC Auto Differential; Future  -     Comprehensive metabolic panel; Future; Expected date: 03/28/2024  -     HEMOGLOBIN A1C; Future; Expected date: 03/28/2024  -     Cancel: Microalbumin/Creatinine Ratio, Urine; Future; Expected date: 03/28/2024  -     Microalbumin/Creatinine Ratio, Urine; Future; Expected date: 03/28/2024    6. Localized osteoporosis without current pathological fracture  -     alendronate (FOSAMAX) 70 MG tablet; Take 1 tablet (70 mg total) by mouth every 7 days.  Dispense: 12 tablet; Refill: 3  -     ergocalciferol (ERGOCALCIFEROL) 50,000 unit Cap; Take 1 capsule (50,000 Units total) by mouth every 7 days.  Dispense: 12 capsule; Refill: 3    7. Irritable bowel syndrome with constipation  -     linaCLOtide (LINZESS) 145  mcg Cap capsule; Take 1 capsule (145 mcg total) by mouth once daily.  Dispense: 90 capsule; Refill: 3    8. Major depressive disorder, recurrent, moderate  Assessment & Plan:  - in partial remission  - continue current meds      9. Class 2 severe obesity due to excess calories with serious comorbidity and body mass index (BMI) of 35.0 to 35.9 in adult  Assessment & Plan:  Body mass index is 36.71 kg/m².  Discussed diet and exercise      10. Other neutropenia  Assessment & Plan:  - chronic  - monitor for signs of infection           Discussed how to stay healthy including: diet, exercise, refraining from smoking and discussed screening exams / tests needed for age, sex and family Hx.    RTC 3 mo    Anson López MD

## 2024-04-04 ENCOUNTER — LAB VISIT (OUTPATIENT)
Dept: LAB | Facility: HOSPITAL | Age: 64
End: 2024-04-04
Attending: STUDENT IN AN ORGANIZED HEALTH CARE EDUCATION/TRAINING PROGRAM
Payer: COMMERCIAL

## 2024-04-04 DIAGNOSIS — E11.9 CONTROLLED TYPE 2 DIABETES MELLITUS WITHOUT COMPLICATION, WITHOUT LONG-TERM CURRENT USE OF INSULIN: ICD-10-CM

## 2024-04-04 LAB
ALBUMIN/CREAT UR: NORMAL UG/MG (ref 0–30)
CREAT UR-MCNC: 111 MG/DL (ref 15–325)
MICROALBUMIN UR DL<=1MG/L-MCNC: <5 UG/ML

## 2024-04-04 PROCEDURE — 82043 UR ALBUMIN QUANTITATIVE: CPT | Mod: PN | Performed by: STUDENT IN AN ORGANIZED HEALTH CARE EDUCATION/TRAINING PROGRAM

## 2024-06-11 ENCOUNTER — TELEPHONE (OUTPATIENT)
Dept: NEUROLOGY | Facility: CLINIC | Age: 64
End: 2024-06-11
Payer: COMMERCIAL

## 2024-06-11 NOTE — TELEPHONE ENCOUNTER
Spoke to Pt about rescheduling her July 30 appt. Pt will be rescheduled to August 20 at [place]. Pt did not want the sooner appt at [facility].

## 2024-07-09 DIAGNOSIS — E78.00 PURE HYPERCHOLESTEROLEMIA: ICD-10-CM

## 2024-07-09 RX ORDER — EVOLOCUMAB 140 MG/ML
140 INJECTION, SOLUTION SUBCUTANEOUS
Qty: 2 ML | Refills: 3 | Status: ACTIVE | OUTPATIENT
Start: 2024-07-09

## 2024-07-12 NOTE — TELEPHONE ENCOUNTER
----- Message from Laura Foley sent at 12/23/2022 12:56 PM CST -----  Regarding: Annual/labs  Contact: [phone]  Patient is requesting a call back regarding changing her appointment to an annual visit and sending orders for annual labs to [facility].   Would the patient rather a call back or a response via [facility]?  Call   Best Call Back Number:  [phone]  Additional Information:               report given to ANA Mason. pt resting in stretcher on RA. IV patent and intact. safety measures maintained. CRIS att.

## 2024-08-20 ENCOUNTER — OFFICE VISIT (OUTPATIENT)
Dept: NEUROLOGY | Facility: CLINIC | Age: 64
End: 2024-08-20
Payer: COMMERCIAL

## 2024-08-20 VITALS
BODY MASS INDEX: 38 KG/M2 | DIASTOLIC BLOOD PRESSURE: 76 MMHG | HEART RATE: 74 BPM | SYSTOLIC BLOOD PRESSURE: 115 MMHG | WEIGHT: 214.5 LBS

## 2024-08-20 DIAGNOSIS — F41.1 GAD (GENERALIZED ANXIETY DISORDER): ICD-10-CM

## 2024-08-20 DIAGNOSIS — G89.29 CHRONIC INTRACTABLE HEADACHE, UNSPECIFIED HEADACHE TYPE: Primary | ICD-10-CM

## 2024-08-20 DIAGNOSIS — F34.89 OTHER SPECIFIED PERSISTENT MOOD DISORDERS: ICD-10-CM

## 2024-08-20 DIAGNOSIS — E11.9 CONTROLLED TYPE 2 DIABETES MELLITUS WITHOUT COMPLICATION, WITHOUT LONG-TERM CURRENT USE OF INSULIN: ICD-10-CM

## 2024-08-20 DIAGNOSIS — F33.9 EPISODE OF RECURRENT MAJOR DEPRESSIVE DISORDER, UNSPECIFIED DEPRESSION EPISODE SEVERITY: ICD-10-CM

## 2024-08-20 DIAGNOSIS — M54.50 LOW BACK PAIN, UNSPECIFIED BACK PAIN LATERALITY, UNSPECIFIED CHRONICITY, UNSPECIFIED WHETHER SCIATICA PRESENT: ICD-10-CM

## 2024-08-20 DIAGNOSIS — I10 ESSENTIAL HYPERTENSION: ICD-10-CM

## 2024-08-20 DIAGNOSIS — M54.2 CERVICALGIA: ICD-10-CM

## 2024-08-20 DIAGNOSIS — R51.9 CHRONIC INTRACTABLE HEADACHE, UNSPECIFIED HEADACHE TYPE: Primary | ICD-10-CM

## 2024-08-20 DIAGNOSIS — G25.0 ESSENTIAL TREMOR: ICD-10-CM

## 2024-08-20 PROCEDURE — 3044F HG A1C LEVEL LT 7.0%: CPT | Mod: CPTII,S$GLB,, | Performed by: NURSE PRACTITIONER

## 2024-08-20 PROCEDURE — 99214 OFFICE O/P EST MOD 30 MIN: CPT | Mod: S$GLB,,, | Performed by: NURSE PRACTITIONER

## 2024-08-20 PROCEDURE — 3078F DIAST BP <80 MM HG: CPT | Mod: CPTII,S$GLB,, | Performed by: NURSE PRACTITIONER

## 2024-08-20 PROCEDURE — G2211 COMPLEX E/M VISIT ADD ON: HCPCS | Mod: S$GLB,,, | Performed by: NURSE PRACTITIONER

## 2024-08-20 PROCEDURE — 3061F NEG MICROALBUMINURIA REV: CPT | Mod: CPTII,S$GLB,, | Performed by: NURSE PRACTITIONER

## 2024-08-20 PROCEDURE — 3074F SYST BP LT 130 MM HG: CPT | Mod: CPTII,S$GLB,, | Performed by: NURSE PRACTITIONER

## 2024-08-20 PROCEDURE — 3066F NEPHROPATHY DOC TX: CPT | Mod: CPTII,S$GLB,, | Performed by: NURSE PRACTITIONER

## 2024-08-20 PROCEDURE — 1160F RVW MEDS BY RX/DR IN RCRD: CPT | Mod: CPTII,S$GLB,, | Performed by: NURSE PRACTITIONER

## 2024-08-20 PROCEDURE — 1159F MED LIST DOCD IN RCRD: CPT | Mod: CPTII,S$GLB,, | Performed by: NURSE PRACTITIONER

## 2024-08-20 PROCEDURE — 4010F ACE/ARB THERAPY RXD/TAKEN: CPT | Mod: CPTII,S$GLB,, | Performed by: NURSE PRACTITIONER

## 2024-08-20 PROCEDURE — 99999 PR PBB SHADOW E&M-EST. PATIENT-LVL IV: CPT | Mod: PBBFAC,,, | Performed by: NURSE PRACTITIONER

## 2024-08-20 PROCEDURE — 3008F BODY MASS INDEX DOCD: CPT | Mod: CPTII,S$GLB,, | Performed by: NURSE PRACTITIONER

## 2024-08-20 RX ORDER — ZONISAMIDE 100 MG/1
CAPSULE ORAL
Qty: 90 CAPSULE | Refills: 1 | Status: SHIPPED | OUTPATIENT
Start: 2024-08-20
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
## 2024-09-20 DIAGNOSIS — E11.9 CONTROLLED TYPE 2 DIABETES MELLITUS WITHOUT COMPLICATION, WITHOUT LONG-TERM CURRENT USE OF INSULIN: ICD-10-CM

## 2024-09-20 RX ORDER — GLIPIZIDE 5 MG/1
5 TABLET, FILM COATED, EXTENDED RELEASE ORAL
Qty: 90 TABLET | Refills: 0 | Status: SHIPPED | OUTPATIENT
Start: 2024-09-20

## 2024-09-20 NOTE — TELEPHONE ENCOUNTER
Care Due:                  Date            Visit Type   Department     Provider  --------------------------------------------------------------------------------                                EP -                              PRIMARY      [facility] FAMILY  Last Visit: 03-      CARE ([facility])   MEDICINE       Anson López                               -                              [facility]  Next Visit: 09-      CARE ([facility])   MEDICINE       Anson López                                                            Last  Test          Frequency    Reason                     Performed    Due Date  --------------------------------------------------------------------------------    HBA1C.......  6 months...  glipiZIDE................  03- 09-    Mg Level....  12 months..  alendronate..............  Not Found    Overdue    Phosphate...  12 months..  alendronate..............  Not Found    Overdue    Vitamin D...  12 months..  ergocalciferol...........  Not Found    Overdue    [employer] Embedded Care Due Messages. Reference number: [id number].   9/20/2024 7:06:55 AM CDT

## 2024-09-20 NOTE — TELEPHONE ENCOUNTER
Refill Routing Note   Medication(s) are not appropriate for processing by [facility] for the following reason(s):        Required labs outdated    [facility] action(s):  Defer      Medication Therapy Plan: FLOS      Appointments  past 12m or future 3m with PCP    Date Provider   Last Visit   3/28/2024 Anson López MD   Next Visit   9/25/2024 Anson López MD   ED visits in past 90 days: 0        Note composed:7:15 AM 09/20/2024

## 2024-09-23 ENCOUNTER — LAB VISIT (OUTPATIENT)
Dept: LAB | Facility: HOSPITAL | Age: 64
End: 2024-09-23
Attending: STUDENT IN AN ORGANIZED HEALTH CARE EDUCATION/TRAINING PROGRAM
Payer: COMMERCIAL

## 2024-09-23 DIAGNOSIS — E78.2 MIXED HYPERLIPIDEMIA: ICD-10-CM

## 2024-09-23 DIAGNOSIS — I10 ESSENTIAL HYPERTENSION: ICD-10-CM

## 2024-09-23 DIAGNOSIS — E11.9 CONTROLLED TYPE 2 DIABETES MELLITUS WITHOUT COMPLICATION, WITHOUT LONG-TERM CURRENT USE OF INSULIN: ICD-10-CM

## 2024-09-23 LAB
ALBUMIN SERPL BCP-MCNC: 4 G/DL (ref 3.5–5.2)
ALP SERPL-CCNC: 77 U/L (ref 38–126)
ALT SERPL W/O P-5'-P-CCNC: 20 U/L (ref 10–44)
ANION GAP SERPL CALC-SCNC: 5 MMOL/L (ref 8–16)
AST SERPL-CCNC: 24 U/L (ref 15–46)
BASOPHILS # BLD AUTO: 0.03 K/UL (ref 0–0.2)
BASOPHILS NFR BLD: 0.7 % (ref 0–1.9)
BILIRUB SERPL-MCNC: 0.7 MG/DL (ref 0.1–1)
CALCIUM SERPL-MCNC: 9.3 MG/DL (ref 8.7–10.5)
CHLORIDE SERPL-SCNC: 111 MMOL/L (ref 95–110)
CHOLEST SERPL-MCNC: 168 MG/DL (ref 120–199)
CHOLEST/HDLC SERPL: 2.8 {RATIO} (ref 2–5)
CO2 SERPL-SCNC: 25 MMOL/L (ref 23–29)
CREAT SERPL-MCNC: 1.23 MG/DL (ref 0.5–1.4)
DIFFERENTIAL METHOD BLD: ABNORMAL
EOSINOPHIL # BLD AUTO: 0.1 K/UL (ref 0–0.5)
EOSINOPHIL NFR BLD: 2.6 % (ref 0–8)
ERYTHROCYTE [DISTWIDTH] IN BLOOD BY AUTOMATED COUNT: 15.1 % (ref 11.5–14.5)
EST. GFR  (NO RACE VARIABLE): 49.7 ML/MIN/1.73 M^2
ESTIMATED AVG GLUCOSE: 123 MG/DL (ref 68–131)
GLUCOSE SERPL-MCNC: 98 MG/DL (ref 70–110)
HBA1C MFR BLD: 5.9 % (ref 4–5.6)
HCT VFR BLD AUTO: 40.5 % (ref 37–48.5)
HDLC SERPL-MCNC: 61 MG/DL (ref 40–75)
HDLC SERPL: 36.3 % (ref 20–50)
HGB BLD-MCNC: 12.3 G/DL (ref 12–16)
IMM GRANULOCYTES # BLD AUTO: 0.01 K/UL (ref 0–0.04)
IMM GRANULOCYTES NFR BLD AUTO: 0.2 % (ref 0–0.5)
LDLC SERPL CALC-MCNC: 90.2 MG/DL (ref 63–159)
LYMPHOCYTES # BLD AUTO: 2.2 K/UL (ref 1–4.8)
LYMPHOCYTES NFR BLD: 52 % (ref 18–48)
MCH RBC QN AUTO: 25.2 PG (ref 27–31)
MCHC RBC AUTO-ENTMCNC: 30.4 G/DL (ref 32–36)
MCV RBC AUTO: 83 FL (ref 82–98)
MONOCYTES # BLD AUTO: 0.3 K/UL (ref 0.3–1)
MONOCYTES NFR BLD: 7.7 % (ref 4–15)
NEUTROPHILS # BLD AUTO: 1.6 K/UL (ref 1.8–7.7)
NEUTROPHILS NFR BLD: 36.8 % (ref 38–73)
NONHDLC SERPL-MCNC: 107 MG/DL
NRBC BLD-RTO: 0 /100 WBC
PLATELET # BLD AUTO: 249 K/UL (ref 150–450)
PMV BLD AUTO: 11 FL (ref 9.2–12.9)
POTASSIUM SERPL-SCNC: 5.1 MMOL/L (ref 3.5–5.1)
PROT SERPL-MCNC: 7.2 G/DL (ref 6–8.4)
RBC # BLD AUTO: 4.88 M/UL (ref 4–5.4)
SODIUM SERPL-SCNC: 141 MMOL/L (ref 136–145)
TRIGL SERPL-MCNC: 84 MG/DL (ref 30–150)
TSH SERPL DL<=0.005 MIU/L-ACNC: 0.51 UIU/ML (ref 0.4–4)
UUN UR-MCNC: 17 MG/DL (ref 7–17)
WBC # BLD AUTO: 4.31 K/UL (ref 3.9–12.7)

## 2024-09-23 PROCEDURE — 36415 COLL VENOUS BLD VENIPUNCTURE: CPT | Mod: PN | Performed by: STUDENT IN AN ORGANIZED HEALTH CARE EDUCATION/TRAINING PROGRAM

## 2024-09-23 PROCEDURE — 80061 LIPID PANEL: CPT | Performed by: STUDENT IN AN ORGANIZED HEALTH CARE EDUCATION/TRAINING PROGRAM

## 2024-09-23 PROCEDURE — 84443 ASSAY THYROID STIM HORMONE: CPT | Mod: PN | Performed by: STUDENT IN AN ORGANIZED HEALTH CARE EDUCATION/TRAINING PROGRAM

## 2024-09-23 PROCEDURE — 85025 COMPLETE CBC W/AUTO DIFF WBC: CPT | Mod: PN | Performed by: STUDENT IN AN ORGANIZED HEALTH CARE EDUCATION/TRAINING PROGRAM

## 2024-09-23 PROCEDURE — 83036 HEMOGLOBIN GLYCOSYLATED A1C: CPT | Performed by: STUDENT IN AN ORGANIZED HEALTH CARE EDUCATION/TRAINING PROGRAM

## 2024-09-23 PROCEDURE — 80053 COMPREHEN METABOLIC PANEL: CPT | Mod: PN | Performed by: STUDENT IN AN ORGANIZED HEALTH CARE EDUCATION/TRAINING PROGRAM

## 2024-09-25 ENCOUNTER — OFFICE VISIT (OUTPATIENT)
Dept: FAMILY MEDICINE | Facility: CLINIC | Age: 64
End: 2024-09-25
Payer: COMMERCIAL

## 2024-09-25 VITALS
HEART RATE: 70 BPM | SYSTOLIC BLOOD PRESSURE: 126 MMHG | OXYGEN SATURATION: 95 % | HEIGHT: 63 IN | BODY MASS INDEX: 38.62 KG/M2 | WEIGHT: 217.94 LBS | DIASTOLIC BLOOD PRESSURE: 84 MMHG | TEMPERATURE: 98 F

## 2024-09-25 DIAGNOSIS — Z12.11 COLON CANCER SCREENING: ICD-10-CM

## 2024-09-25 DIAGNOSIS — F33.1 MAJOR DEPRESSIVE DISORDER, RECURRENT, MODERATE: ICD-10-CM

## 2024-09-25 DIAGNOSIS — E78.2 MIXED HYPERLIPIDEMIA: ICD-10-CM

## 2024-09-25 DIAGNOSIS — G25.0 ESSENTIAL TREMOR: ICD-10-CM

## 2024-09-25 DIAGNOSIS — Z98.1 S/P CERVICAL SPINAL FUSION: ICD-10-CM

## 2024-09-25 DIAGNOSIS — M81.6 LOCALIZED OSTEOPOROSIS WITHOUT CURRENT PATHOLOGICAL FRACTURE: ICD-10-CM

## 2024-09-25 DIAGNOSIS — I10 ESSENTIAL HYPERTENSION: ICD-10-CM

## 2024-09-25 DIAGNOSIS — K58.1 IRRITABLE BOWEL SYNDROME WITH CONSTIPATION: ICD-10-CM

## 2024-09-25 DIAGNOSIS — E11.9 CONTROLLED TYPE 2 DIABETES MELLITUS WITHOUT COMPLICATION, WITHOUT LONG-TERM CURRENT USE OF INSULIN: Primary | ICD-10-CM

## 2024-09-25 DIAGNOSIS — Z12.31 SCREENING MAMMOGRAM FOR HIGH-RISK PATIENT: ICD-10-CM

## 2024-09-25 DIAGNOSIS — E66.01 CLASS 2 SEVERE OBESITY DUE TO EXCESS CALORIES WITH SERIOUS COMORBIDITY AND BODY MASS INDEX (BMI) OF 38.0 TO 38.9 IN ADULT: ICD-10-CM

## 2024-09-25 PROCEDURE — 3044F HG A1C LEVEL LT 7.0%: CPT | Mod: CPTII,S$GLB,, | Performed by: STUDENT IN AN ORGANIZED HEALTH CARE EDUCATION/TRAINING PROGRAM

## 2024-09-25 PROCEDURE — 3079F DIAST BP 80-89 MM HG: CPT | Mod: CPTII,S$GLB,, | Performed by: STUDENT IN AN ORGANIZED HEALTH CARE EDUCATION/TRAINING PROGRAM

## 2024-09-25 PROCEDURE — 3008F BODY MASS INDEX DOCD: CPT | Mod: CPTII,S$GLB,, | Performed by: STUDENT IN AN ORGANIZED HEALTH CARE EDUCATION/TRAINING PROGRAM

## 2024-09-25 PROCEDURE — 3061F NEG MICROALBUMINURIA REV: CPT | Mod: CPTII,S$GLB,, | Performed by: STUDENT IN AN ORGANIZED HEALTH CARE EDUCATION/TRAINING PROGRAM

## 2024-09-25 PROCEDURE — 1160F RVW MEDS BY RX/DR IN RCRD: CPT | Mod: CPTII,S$GLB,, | Performed by: STUDENT IN AN ORGANIZED HEALTH CARE EDUCATION/TRAINING PROGRAM

## 2024-09-25 PROCEDURE — 1159F MED LIST DOCD IN RCRD: CPT | Mod: CPTII,S$GLB,, | Performed by: STUDENT IN AN ORGANIZED HEALTH CARE EDUCATION/TRAINING PROGRAM

## 2024-09-25 PROCEDURE — 4010F ACE/ARB THERAPY RXD/TAKEN: CPT | Mod: CPTII,S$GLB,, | Performed by: STUDENT IN AN ORGANIZED HEALTH CARE EDUCATION/TRAINING PROGRAM

## 2024-09-25 PROCEDURE — 3066F NEPHROPATHY DOC TX: CPT | Mod: CPTII,S$GLB,, | Performed by: STUDENT IN AN ORGANIZED HEALTH CARE EDUCATION/TRAINING PROGRAM

## 2024-09-25 PROCEDURE — 99214 OFFICE O/P EST MOD 30 MIN: CPT | Mod: S$GLB,,, | Performed by: STUDENT IN AN ORGANIZED HEALTH CARE EDUCATION/TRAINING PROGRAM

## 2024-09-25 PROCEDURE — 3074F SYST BP LT 130 MM HG: CPT | Mod: CPTII,S$GLB,, | Performed by: STUDENT IN AN ORGANIZED HEALTH CARE EDUCATION/TRAINING PROGRAM

## 2024-09-25 RX ORDER — ALENDRONATE SODIUM 70 MG/1
70 TABLET ORAL
Qty: 12 TABLET | Refills: 3 | Status: SHIPPED | OUTPATIENT
Start: 2024-09-25

## 2024-09-25 RX ORDER — TIRZEPATIDE 2.5 MG/.5ML
2.5 INJECTION, SOLUTION SUBCUTANEOUS
Qty: 4 PEN | Refills: 3 | Status: SHIPPED | OUTPATIENT
Start: 2024-09-25

## 2024-09-25 NOTE — LETTER
[place] [id number] [address] [address]  Phone: [phone]  Fax: [phone] September 25, 2024    Carmen Son  [address] [address]      To Whom It May Concern:    Carmen Son is unable to participate in jury duty due to chronic low back pain caused by spondylolisthesis which makes it difficult for her to sit for long periods of time.    If you have any questions or concerns, please feel free to call my office.    Sincerely,        Anson López MD

## 2024-09-26 ENCOUNTER — TELEPHONE (OUTPATIENT)
Dept: FAMILY MEDICINE | Facility: CLINIC | Age: 64
End: 2024-09-26
Payer: COMMERCIAL

## 2024-09-26 NOTE — TELEPHONE ENCOUNTER
----- Message from Inna West sent at 9/26/2024 10:41 AM CDT -----  Regarding: Mammo External  Hello Team,    Please fax pt Mammo to [facility] Per pt request.      Thank you,  Inna

## 2024-09-27 NOTE — PROGRESS NOTES
Patient ID: Carmen Son is a 64 y.o. female.     Chief Complaint: f/u chronic medical conditions    Follow-up  Pertinent negatives include no chest pain, coughing, fever, headaches, myalgias, nausea, rash, vomiting or weakness.      Obesity- she would like to start mounjaro to assist with weight loss    DMII- following diabetic diet. She is compliant with all medications    HLD- denies recent chest pain and shortness of breath      Review of Systems  Review of Systems   Constitutional:  Negative for fever.   HENT:  Negative for ear pain and sinus pain.    Eyes:  Negative for discharge.   Respiratory:  Negative for cough and shortness of breath.    Cardiovascular:  Negative for chest pain and leg swelling.   Gastrointestinal:  Negative for diarrhea, nausea and vomiting.   Genitourinary:  Negative for urgency.   Musculoskeletal:  Negative for myalgias.   Skin:  Negative for rash.   Neurological:  Negative for weakness and headaches.   Psychiatric/Behavioral:  Negative for depression.    All other systems reviewed and are negative.      Currently Medications  Current Outpatient Medications on File Prior to Visit   Medication Sig Dispense Refill    ALPRAZolam (XANAX XR) 0.5 MG Tb24 Take 0.5 mg by mouth.      ascorbic acid, vitamin C, (VITAMIN C) 500 MG tablet Take 500 mg by mouth once daily.      azelastine (ASTELIN) 137 mcg (0.1 %) nasal spray 2 sprays by Each Nostril route 2 (two) times daily.      busPIRone (BUSPAR) 10 MG tablet Take 2 tablets (20 mg total) by mouth 2 (two) times daily. 120 tablet 11    cholecalciferol, vitamin D3, (VITAMIN D3 ORAL) Vitamin D3      cyanocobalamin (VITAMIN B-12) 1000 MCG tablet B12      ergocalciferol (ERGOCALCIFEROL) 50,000 unit Cap Take 1 capsule (50,000 Units total) by mouth every 7 days. 12 capsule 3    evolocumab (REPATHA SURECLICK) 140 mg/mL PnIj Inject 1 mL (140 mg total) into the skin every 14 (fourteen) days. 2 mL 3    furosemide (LASIX) 20 MG tablet Take 1 tablet  "(20 mg total) by mouth daily as needed. 30 tablet 3    glipiZIDE 5 MG TR24 Take 1 tablet by mouth once daily with breakfast 90 tablet 0    hylan g-f 20 (SYNVISC-ONE) 48 mg/6 mL Syrg 6 mLs.      LIDOcaine (LIDODERM) 5 % Place 1 patch onto the skin daily as needed (pain). Remove & Discard patch within 12 hours or as directed by MD 5 patch 0    losartan (COZAAR) 50 MG tablet Take 1 tablet (50 mg total) by mouth once daily. 90 tablet 2    MULTIVITAMIN ORAL multivitamin      mupirocin (BACTROBAN) 2 % ointment mupirocin 2 % topical ointment   APPLY OINTMENT TOPICALLY TWICE DAILY FOR 7 DAYS AS DIRECTED      omeprazole 20 mg TbEC       propranoloL (INDERAL LA) 60 MG 24 hr capsule       zonisamide (ZONEGRAN) 100 MG Cap TAKE 1 CAPSULE BY MOUTH IN THE EVENING 90 capsule 1     No current facility-administered medications on file prior to visit.       Physical  Exam  Vitals:    09/25/24 1409   BP: 126/84   BP Location: Left arm   Patient Position: Sitting   Pulse: 70   Temp: 97.7 °F (36.5 °C)   SpO2: 95%   Weight: 98.9 kg (217 lb 14.8 oz)   Height: 5' 3" (1.6 m)      Body mass index is 38.6 kg/m².  Wt Readings from Last 3 Encounters:   09/25/24 98.9 kg (217 lb 14.8 oz)   08/20/24 97.3 kg (214 lb 8.1 oz)   03/28/24 94 kg (207 lb 3.7 oz)       Physical Exam  Vitals and nursing note reviewed.   Constitutional:       General: She is not in acute distress.     Appearance: She is obese. She is not ill-appearing.   HENT:      Head: Normocephalic and atraumatic.      Right Ear: External ear normal.      Left Ear: External ear normal.      Nose: Nose normal.      Mouth/Throat:      Mouth: Mucous membranes are moist.   Eyes:      Extraocular Movements: Extraocular movements intact.      Conjunctiva/sclera: Conjunctivae normal.   Cardiovascular:      Rate and Rhythm: Normal rate and regular rhythm.      Pulses: Normal pulses.      Heart sounds: No murmur heard.  Pulmonary:      Effort: Pulmonary effort is normal. No respiratory distress. "      Breath sounds: No wheezing.   Abdominal:      General: There is no distension.      Palpations: Abdomen is soft. There is no mass.      Tenderness: There is no abdominal tenderness.   Musculoskeletal:         General: No swelling.      Cervical back: Normal range of motion.   Skin:     Coloration: Skin is not jaundiced.      Findings: No rash.   Neurological:      General: No focal deficit present.      Mental Status: She is alert and oriented to person, place, and time.   Psychiatric:         Mood and Affect: Mood normal.         Thought Content: Thought content normal.         Labs:    Complete Blood Count  Lab Results   Component Value Date    RBC 4.88 09/23/2024    HGB 12.3 09/23/2024    HCT 40.5 09/23/2024    MCV 83 09/23/2024    MCH 25.2 (L) 09/23/2024    MCHC 30.4 (L) 09/23/2024    RDW 15.1 (H) 09/23/2024     09/23/2024    MPV 11.0 09/23/2024    GRAN 1.6 (L) 09/23/2024    GRAN 36.8 (L) 09/23/2024    LYMPH 2.2 09/23/2024    LYMPH 52.0 (H) 09/23/2024    MONO 0.3 09/23/2024    MONO 7.7 09/23/2024    EOS 0.1 09/23/2024    BASO 0.03 09/23/2024    EOSINOPHIL 2.6 09/23/2024    BASOPHIL 0.7 09/23/2024    DIFFMETHOD Automated 09/23/2024       Comprehensive Metabolic Panel  Lab Results   Component Value Date    GLU 98 09/23/2024    BUN 17 09/23/2024    CREATININE 1.23 09/23/2024     09/23/2024    K 5.1 09/23/2024     (H) 09/23/2024    PROT 7.2 09/23/2024    ALBUMIN 4.0 09/23/2024    BILITOT 0.7 09/23/2024    AST 24 09/23/2024    ALKPHOS 77 09/23/2024    CO2 25 09/23/2024    ALT 20 09/23/2024    ANIONGAP 5 (L) 09/23/2024       TSH  Lab Results   Component Value Date    TSH 0.507 09/23/2024       Imaging:  X-Ray Finger 2 or More Views Left  Narrative: EXAMINATION:  XR FINGER 2 OR MORE VIEWS LEFT    CLINICAL HISTORY:  2nd and 3rd finger crush injury;    COMPARISON:  A plain film examination of the left hand performed on 07/28/2021.    FINDINGS:  There are acute appearing fractures throughout the  distal phalanx of the long finger.  There is an acute appearing fracture in the distal aspect of the distal phalanx of the index finger.  There is no dislocation.  Impression: 1. There are acute appearing fractures throughout the distal phalanx of the long finger.  2. There is an acute appearing fracture in the distal aspect of the distal phalanx of the index finger.    Electronically signed by: Chester Camarena MD  Date:    03/07/2023  Time:    16:03      Assessment/Plan:    1. Controlled type 2 diabetes mellitus without complication, without long-term current use of insulin  -     tirzepatide (MOUNJARO) 2.5 mg/0.5 mL PnIj; Inject 2.5 mg into the skin every 7 days.  Dispense: 4 Pen; Refill: 3  -     Microalbumin/Creatinine Ratio, Urine; Future; Expected date: 09/25/2024    2. Essential hypertension  -     CBC Auto Differential; Future  -     Comprehensive metabolic panel; Future; Expected date: 09/25/2024  -     HEMOGLOBIN A1C; Future; Expected date: 09/25/2024  -     TSH; Future; Expected date: 09/25/2024    3. Mixed hyperlipidemia  -     LIPID PANEL; Future; Expected date: 09/25/2024    4. Localized osteoporosis without current pathological fracture  -     alendronate (FOSAMAX) 70 MG tablet; Take 1 tablet (70 mg total) by mouth every 7 days.  Dispense: 12 tablet; Refill: 3  -     Misc Sendout Test, Blood vitamin D; Future; Expected date: 09/26/2024    5. Colon cancer screening  -     Ambulatory referral/consult to Endo Procedure ; Future; Expected date: 09/26/2024    6. Screening mammogram for high-risk patient  -     Mammo Digital Screening Bilat w/ John; Future; Expected date: 09/25/2024    7. Irritable bowel syndrome with constipation  -     linaCLOtide (LINZESS) 145 mcg Cap capsule; Take 1 capsule (145 mcg total) by mouth once daily.  Dispense: 90 capsule; Refill: 3    8. Essential tremor    9. Major depressive disorder, recurrent, moderate    10. S/P cervical spinal fusion    11. Class 2 severe  obesity due to excess calories with serious comorbidity and body mass index (BMI) of 38.0 to 38.9 in adult         Discussed how to stay healthy including: diet, exercise, refraining from smoking and discussed screening exams / tests needed for age, sex and family Hx.    RTC 6 mo with labs    Anson López MD

## 2024-10-14 ENCOUNTER — TELEPHONE (OUTPATIENT)
Dept: FAMILY MEDICINE | Facility: CLINIC | Age: 64
End: 2024-10-14
Payer: COMMERCIAL

## 2024-10-17 ENCOUNTER — OFFICE VISIT (OUTPATIENT)
Dept: FAMILY MEDICINE | Facility: CLINIC | Age: 64
End: 2024-10-17
Payer: COMMERCIAL

## 2024-10-17 VITALS
BODY MASS INDEX: 38.67 KG/M2 | HEIGHT: 63 IN | HEART RATE: 81 BPM | OXYGEN SATURATION: 98 % | SYSTOLIC BLOOD PRESSURE: 106 MMHG | TEMPERATURE: 99 F | WEIGHT: 218.25 LBS | DIASTOLIC BLOOD PRESSURE: 74 MMHG

## 2024-10-17 DIAGNOSIS — E11.9 CONTROLLED TYPE 2 DIABETES MELLITUS WITHOUT COMPLICATION, WITHOUT LONG-TERM CURRENT USE OF INSULIN: Primary | ICD-10-CM

## 2024-10-17 DIAGNOSIS — R60.0 BILATERAL LOWER EXTREMITY EDEMA: ICD-10-CM

## 2024-10-17 DIAGNOSIS — E66.812 CLASS 2 SEVERE OBESITY DUE TO EXCESS CALORIES WITH SERIOUS COMORBIDITY AND BODY MASS INDEX (BMI) OF 35.0 TO 35.9 IN ADULT: ICD-10-CM

## 2024-10-17 DIAGNOSIS — I10 ESSENTIAL HYPERTENSION: ICD-10-CM

## 2024-10-17 DIAGNOSIS — E66.01 CLASS 2 SEVERE OBESITY DUE TO EXCESS CALORIES WITH SERIOUS COMORBIDITY AND BODY MASS INDEX (BMI) OF 35.0 TO 35.9 IN ADULT: ICD-10-CM

## 2024-10-17 RX ORDER — FUROSEMIDE 20 MG/1
20 TABLET ORAL DAILY PRN
Qty: 30 TABLET | Refills: 3 | Status: SHIPPED | OUTPATIENT
Start: 2024-10-17

## 2024-10-17 NOTE — PATIENT INSTRUCTIONS
When looking at fluid to the legs: first elevate, drink water, and use compression stockings  If this does not work in 24 hours, start fluid pill with potassium rich food such as a banana  Follow if no relief

## 2024-10-17 NOTE — PROGRESS NOTES
Patient ID: Carmen Son is a 64 y.o. female.     Chief Complaint: Joint Swelling    Ms. Son is a 64 year old female with history of DM2 and HTN presents today with complaints of bilateral ankle swelling for 3 days. Symptoms have since resolved.    She reports bilateral ankle swelling that started on Saturday after wearing heels and attending a . The swelling is worse in the evenings and improves in the mornings. She describes the ankles as feeling tight and painful. Potential triggers include wearing heels, consuming salty food at a repass, and having a ham omelet for breakfast. The swelling has improved since the weekend but still occurs to a lesser degree. She has previously taken Lasix (furosemide) as needed for swelling. Out of that medication    She is trying to eat healthier, primarily focusing on salads, meat, and vegetables in her meals. She mainly cooks at home now, with occasional restaurant meals. She wears high heels occasionally, promptly switching to more comfortable footwear like flip-flops after events due to back concerns. She attends social events, including funerals and J[employer] functions. Denies CP, SOB, headache, palpitations, fever, N/V/D.           Review of Systems  Review of Systems   Constitutional:  Negative for fever.   HENT:  Negative for ear pain and sinus pain.    Eyes:  Negative for discharge.   Respiratory:  Negative for cough and wheezing.    Cardiovascular:  Positive for leg swelling. Negative for chest pain.   Gastrointestinal:  Negative for diarrhea, nausea and vomiting.   Genitourinary:  Negative for urgency.   Musculoskeletal:  Negative for myalgias.   Skin:  Negative for rash.   Neurological:  Negative for weakness and headaches.   Psychiatric/Behavioral:  Negative for depression.        Currently Medications  Current Outpatient Medications on File Prior to Visit   Medication Sig Dispense Refill    alendronate (FOSAMAX) 70 MG tablet Take 1 tablet (70 mg  total) by mouth every 7 days. 12 tablet 3    ALPRAZolam (XANAX XR) 0.5 MG Tb24 Take 0.5 mg by mouth.      ascorbic acid, vitamin C, (VITAMIN C) 500 MG tablet Take 500 mg by mouth once daily.      azelastine (ASTELIN) 137 mcg (0.1 %) nasal spray 2 sprays by Each Nostril route 2 (two) times daily.      cholecalciferol, vitamin D3, (VITAMIN D3 ORAL) Vitamin D3      cyanocobalamin (VITAMIN B-12) 1000 MCG tablet B12      ergocalciferol (ERGOCALCIFEROL) 50,000 unit Cap Take 1 capsule (50,000 Units total) by mouth every 7 days. 12 capsule 3    evolocumab (REPATHA SURECLICK) 140 mg/mL PnIj Inject 1 mL (140 mg total) into the skin every 14 (fourteen) days. 2 mL 3    glipiZIDE 5 MG TR24 Take 1 tablet by mouth once daily with breakfast 90 tablet 0    hylan g-f 20 (SYNVISC-ONE) 48 mg/6 mL Syrg 6 mLs.      LIDOcaine (LIDODERM) 5 % Place 1 patch onto the skin daily as needed (pain). Remove & Discard patch within 12 hours or as directed by MD 5 patch 0    linaCLOtide (LINZESS) 145 mcg Cap capsule Take 1 capsule (145 mcg total) by mouth once daily. 90 capsule 3    losartan (COZAAR) 50 MG tablet Take 1 tablet (50 mg total) by mouth once daily. 90 tablet 2    MULTIVITAMIN ORAL multivitamin      omeprazole 20 mg TbEC       propranoloL (INDERAL LA) 60 MG 24 hr capsule       tirzepatide (MOUNJARO) 2.5 mg/0.5 mL PnIj Inject 2.5 mg into the skin every 7 days. 4 Pen 3    zonisamide (ZONEGRAN) 100 MG Cap TAKE 1 CAPSULE BY MOUTH IN THE EVENING 90 capsule 1    busPIRone (BUSPAR) 10 MG tablet Take 2 tablets (20 mg total) by mouth 2 (two) times daily. (Patient not taking: Reported on 10/17/2024) 120 tablet 11    mupirocin (BACTROBAN) 2 % ointment mupirocin 2 % topical ointment   APPLY OINTMENT TOPICALLY TWICE DAILY FOR 7 DAYS AS DIRECTED (Patient not taking: Reported on 10/17/2024)      [DISCONTINUED] furosemide (LASIX) 20 MG tablet Take 1 tablet (20 mg total) by mouth daily as needed. (Patient not taking: Reported on 10/17/2024) 30 tablet 3  "    No current facility-administered medications on file prior to visit.       Physical  Exam  Vitals:    10/17/24 1046   BP: 106/74   BP Location: Left arm   Patient Position: Sitting   Pulse: 81   Temp: 98.7 °F (37.1 °C)   TempSrc: Oral   SpO2: 98%   Weight: 99 kg (218 lb 4.1 oz)   Height: 5' 3" (1.6 m)      Body mass index is 38.66 kg/m².  Wt Readings from Last 3 Encounters:   10/17/24 99 kg (218 lb 4.1 oz)   09/25/24 98.9 kg (217 lb 14.8 oz)   08/20/24 97.3 kg (214 lb 8.1 oz)       Physical Exam  Vitals and nursing note reviewed.   Constitutional:       General: She is not in acute distress.     Appearance: She is morbidly obese. She is not ill-appearing.   HENT:      Head: Normocephalic and atraumatic.      Right Ear: External ear normal.      Left Ear: External ear normal.      Nose: Nose normal.      Mouth/Throat:      Mouth: Mucous membranes are moist.   Eyes:      Extraocular Movements: Extraocular movements intact.      Conjunctiva/sclera: Conjunctivae normal.   Cardiovascular:      Rate and Rhythm: Normal rate and regular rhythm.      Pulses: Normal pulses.           Dorsalis pedis pulses are 2+ on the right side and 2+ on the left side.        Posterior tibial pulses are 2+ on the right side and 2+ on the left side.      Heart sounds: No murmur heard.  Pulmonary:      Effort: Pulmonary effort is normal. No respiratory distress.      Breath sounds: No wheezing.   Abdominal:      General: There is no distension.      Palpations: Abdomen is soft. There is no mass.      Tenderness: There is no abdominal tenderness.   Musculoskeletal:         General: No swelling.      Cervical back: Normal range of motion.      Right foot: Normal range of motion. No deformity, bunion or foot drop.      Left foot: Normal range of motion. No deformity, bunion or foot drop.   Feet:      Right foot:      Protective Sensation: 10 sites tested.  10 sites sensed.      Skin integrity: No ulcer, blister, skin breakdown or fissure.    "   Toenail Condition: Right toenails are normal.      Left foot:      Protective Sensation: 10 sites tested.  10 sites sensed.      Skin integrity: No ulcer, blister, skin breakdown or fissure.      Toenail Condition: Left toenails are normal.   Skin:     Coloration: Skin is not jaundiced.      Findings: No rash.   Neurological:      General: No focal deficit present.      Mental Status: She is alert and oriented to person, place, and time.   Psychiatric:         Mood and Affect: Mood normal.         Thought Content: Thought content normal.         Labs:    Complete Blood Count  Lab Results   Component Value Date    RBC 4.88 09/23/2024    HGB 12.3 09/23/2024    HCT 40.5 09/23/2024    MCV 83 09/23/2024    MCH 25.2 (L) 09/23/2024    MCHC 30.4 (L) 09/23/2024    RDW 15.1 (H) 09/23/2024     09/23/2024    MPV 11.0 09/23/2024    GRAN 1.6 (L) 09/23/2024    GRAN 36.8 (L) 09/23/2024    LYMPH 2.2 09/23/2024    LYMPH 52.0 (H) 09/23/2024    MONO 0.3 09/23/2024    MONO 7.7 09/23/2024    EOS 0.1 09/23/2024    BASO 0.03 09/23/2024    EOSINOPHIL 2.6 09/23/2024    BASOPHIL 0.7 09/23/2024    DIFFMETHOD Automated 09/23/2024       Comprehensive Metabolic Panel  Lab Results   Component Value Date    GLU 98 09/23/2024    BUN 17 09/23/2024    CREATININE 1.23 09/23/2024     09/23/2024    K 5.1 09/23/2024     (H) 09/23/2024    PROT 7.2 09/23/2024    ALBUMIN 4.0 09/23/2024    BILITOT 0.7 09/23/2024    AST 24 09/23/2024    ALKPHOS 77 09/23/2024    CO2 25 09/23/2024    ALT 20 09/23/2024    ANIONGAP 5 (L) 09/23/2024       TSH  Lab Results   Component Value Date    TSH 0.507 09/23/2024       Imaging:  X-Ray Finger 2 or More Views Left  Narrative: EXAMINATION:  XR FINGER 2 OR MORE VIEWS LEFT    CLINICAL HISTORY:  2nd and 3rd finger crush injury;    COMPARISON:  A plain film examination of the left hand performed on 07/28/2021.    FINDINGS:  There are acute appearing fractures throughout the distal phalanx of the long finger.   There is an acute appearing fracture in the distal aspect of the distal phalanx of the index finger.  There is no dislocation.  Impression: 1. There are acute appearing fractures throughout the distal phalanx of the long finger.  2. There is an acute appearing fracture in the distal aspect of the distal phalanx of the index finger.    Electronically signed by: Chester Camarena MD  Date:    03/07/2023  Time:    16:03      Assessment/Plan:    1. Controlled type 2 diabetes mellitus without complication, without long-term current use of insulin  Assessment & Plan:  - Chronic, stable  - Continue glipizide and mounjaro  - Follow with PCP        2. Essential hypertension  Assessment & Plan:  - Chronic, stable  - Continue losartan, propranolol  - Follow with PCP        3. Bilateral lower extremity edema  Comments:  - Elevate, use compression stockings, increase fluids  - Use lasix as needed  Orders:  -     furosemide (LASIX) 20 MG tablet; Take 1 tablet (20 mg total) by mouth daily as needed (swelling).  Dispense: 30 tablet; Refill: 3    4. Class 2 severe obesity due to excess calories with serious comorbidity and body mass index (BMI) of 35.0 to 35.9 in adult  Assessment & Plan:  - Body mass index is 38.66 kg/m².  - Recommendation for healthy diet and increasing exercise as tolerated with goal of 150min/week. Recommend weight loss.           Assessment & Plan    Assessed ankle swelling, noting no current edema during exam  Determined recent swelling likely due to prolonged heel wear, increased salt intake, and reduced mobility  Reviewed recent bloodwork, noting kidney function and potassium levels within acceptable range  Concluded swelling is not chronic or indicative of underlying cardiac issues based on intermittent nature and quick resolution  Decided against prescribing additional potassium due to current levels being on the higher end of normal range    EDEMA:  - Explained that occasional swelling can be normal  depending on activities, diet, and environmental factors.  - Clarified that chronic edema, unlike patient's case, would typically present with persistent swelling and skin changes.  - Ms. Son to increase water intake when experiencing swelling.  - Ms. Son to elevate feet when noticing swelling.  - Recommend using compression stockings as needed for swelling management.  - Ms. Son to limit salt intake to help prevent fluid retention.  - Refilled Lasix (furosemide) for as-needed use.  - Take 1 Lasix with a banana if swelling persists after 24-48 hours of conservative measures.    CARDIOVASCULAR HEALTH:  - Discussed how smoking can harden arteries, potentially leading to cardiovascular events.    MEDICATIONS/SUPPLEMENTS:  - Discontinued routine potassium supplementation due to adequate levels.          Discussed how to stay healthy including: diet, exercise, refraining from smoking and discussed screening exams / tests needed for age, sex and family Hx.    This note was generated with the assistance of ambient listening technology. Verbal consent was obtained by the patient and accompanying visitor(s) for the recording of patient appointment to facilitate this note. I attest to having reviewed and edited the generated note for accuracy, though some syntax or spelling errors may persist. Please contact the author of this note for any clarification.       RTC every 3-6 months with PCP, or [facility]    Jihan Bourgeois PA-C

## 2024-10-17 NOTE — ASSESSMENT & PLAN NOTE
- Body mass index is 38.66 kg/m².  - Recommendation for healthy diet and increasing exercise as tolerated with goal of 150min/week. Recommend weight loss.

## 2024-11-07 DIAGNOSIS — E78.00 PURE HYPERCHOLESTEROLEMIA: ICD-10-CM

## 2024-11-07 RX ORDER — EVOLOCUMAB 140 MG/ML
140 INJECTION, SOLUTION SUBCUTANEOUS
Qty: 2 ML | Refills: 3 | Status: ACTIVE | OUTPATIENT
Start: 2024-11-07

## 2024-11-26 DIAGNOSIS — I95.9 HYPOTENSION, UNSPECIFIED HYPOTENSION TYPE: ICD-10-CM

## 2024-11-27 DIAGNOSIS — I95.9 HYPOTENSION, UNSPECIFIED HYPOTENSION TYPE: ICD-10-CM

## 2024-11-27 DIAGNOSIS — I10 ESSENTIAL HYPERTENSION: ICD-10-CM

## 2024-11-27 RX ORDER — PROPRANOLOL HYDROCHLORIDE 80 MG/1
80 CAPSULE, EXTENDED RELEASE ORAL DAILY
Qty: 90 CAPSULE | Refills: 1 | Status: SHIPPED | OUTPATIENT
Start: 2024-11-27

## 2024-11-27 RX ORDER — LOSARTAN POTASSIUM 50 MG/1
50 TABLET ORAL DAILY
Qty: 90 TABLET | Refills: 2 | Status: SHIPPED | OUTPATIENT
Start: 2024-11-27

## 2024-11-27 RX ORDER — PROPRANOLOL HYDROCHLORIDE 80 MG/1
80 CAPSULE, EXTENDED RELEASE ORAL
Qty: 90 CAPSULE | Refills: 0 | Status: SHIPPED | OUTPATIENT
Start: 2024-11-27 | End: 2024-11-27 | Stop reason: SDUPTHER

## 2024-11-27 NOTE — TELEPHONE ENCOUNTER
Care Due:                  Date            Visit Type   Department     Provider  --------------------------------------------------------------------------------                                EP -                              PRIMARY      [facility] FAMILY  Last Visit: 09-      CARE ([facility])   MEDICINE       Anson López                              EP -                              PRIMARY      [facility] FAMILY  Next Visit: 02-      CARE ([facility])   MEDICINE       Anson López                                                            Last  Test          Frequency    Reason                     Performed    Due Date  --------------------------------------------------------------------------------    Mg Level....  12 months..  alendronate..............  Not Found    Overdue    Phosphate...  12 months..  alendronate..............  Not Found    Overdue    Vitamin D...  12 months..  ergocalciferol...........  Not Found    Overdue    [employer] Embedded Care Due Messages. Reference number: [id number].   11/26/2024 10:57:28 PM CST

## 2024-11-27 NOTE — TELEPHONE ENCOUNTER
Refill Routing Note   Medication(s) are not appropriate for processing by [facility] for the following reason(s):        No active prescription written by provider    [facility] action(s):  Defer     Requires labs : Yes             Appointments  past 12m or future 3m with PCP    Date Provider   Last Visit   9/25/2024 Anson López MD   Next Visit   2/25/2025 Anson López MD   ED visits in past 90 days: 0        Note composed:11:28 PM 11/26/2024

## 2024-11-27 NOTE — TELEPHONE ENCOUNTER
----- Message from Alaina sent at 11/27/2024  8:38 AM CST -----  Type:  RX Refill Request    Who Called: pt  Refill or New Rx:refill  RX Name and Strength:  propranoloL (INDERAL LA) 80 MG 24 hr capsule,     losartan (COZAAR) 50 MG tablet    Preferred Pharmacy with phone number:[facility] [address] [place] [address] [address] [facility]  Local or Mail Order:local  Ordering Provider:maxim  Would the patient rather a call back or a response via [facility]? call  Best Call Back Number: [phone]  Additional Information:

## 2024-11-27 NOTE — TELEPHONE ENCOUNTER
Chief Complaint   Patient presents with    Pressure Behind the Eyes     x 1 week    Nasal Discharge    Cough     more of clearing of throat     Wants to discuss blood pressure meds No care due was identified.  [facility] Embedded Care Due Messages. Reference number: [id number].   11/27/2024 8:56:47 AM CST

## 2024-12-03 LAB — BCS RECOMMENDATION EXT: NORMAL

## 2024-12-04 ENCOUNTER — TELEPHONE (OUTPATIENT)
Dept: FAMILY MEDICINE | Facility: CLINIC | Age: 64
End: 2024-12-04
Payer: COMMERCIAL

## 2024-12-04 DIAGNOSIS — E11.9 CONTROLLED TYPE 2 DIABETES MELLITUS WITHOUT COMPLICATION, WITHOUT LONG-TERM CURRENT USE OF INSULIN: Primary | ICD-10-CM

## 2024-12-04 RX ORDER — TIRZEPATIDE 5 MG/.5ML
5 INJECTION, SOLUTION SUBCUTANEOUS
Qty: 4 PEN | Refills: 3 | Status: SHIPPED | OUTPATIENT
Start: 2024-12-04

## 2024-12-04 NOTE — TELEPHONE ENCOUNTER
----- Message from She sent at 12/4/2024 11:31 AM CST -----  Type:  Needs Medical Advice    Who Called: Patient  Best Call Back Number: [phone]  Additional Information: Patient is requesting a call back    , she states she is not losing weight anymore and would like to increase her current tirzepatide (MOUNJARO) 2.5 mg/0.5 mL PnIj.  Her Rx is currently at [facility] and will wait to  in case it can be increased.

## 2024-12-04 NOTE — TELEPHONE ENCOUNTER
Pt is requesting to have Mounjaro increased to 5 mg. I see that the rx was sent to [facility], but it appears that there's a quantity limitation. We need to call [facility] and see what's going on with rx. Pharmacy is closed right now until 2 pm for lunch.

## 2024-12-04 NOTE — TELEPHONE ENCOUNTER
Spoke with Valerie at [facility] pharmacy. Mounjaro 5 mg is ready for . Pt has been notified that rx is ready for .

## 2024-12-04 NOTE — TELEPHONE ENCOUNTER
----- Message from Agus sent at 12/4/2024 12:37 PM CST -----  Type:  Patient Returning Call    Who Called:Pt  Who Left Message for Patient:ma or nurse   Does the patient know what this is regarding?:medication   Would the patient rather a call back or a response via [facility]? call  Best Call Back Number:[phone]  Additional Information:

## 2024-12-09 ENCOUNTER — OFFICE VISIT (OUTPATIENT)
Dept: FAMILY MEDICINE | Facility: CLINIC | Age: 64
End: 2024-12-09
Payer: COMMERCIAL

## 2024-12-09 VITALS
HEIGHT: 63 IN | WEIGHT: 209.88 LBS | OXYGEN SATURATION: 97 % | TEMPERATURE: 98 F | DIASTOLIC BLOOD PRESSURE: 60 MMHG | SYSTOLIC BLOOD PRESSURE: 120 MMHG | HEART RATE: 88 BPM | BODY MASS INDEX: 37.19 KG/M2

## 2024-12-09 DIAGNOSIS — J06.9 UPPER RESPIRATORY TRACT INFECTION, UNSPECIFIED TYPE: Primary | ICD-10-CM

## 2024-12-09 PROCEDURE — 1159F MED LIST DOCD IN RCRD: CPT | Mod: CPTII,S$GLB,, | Performed by: FAMILY MEDICINE

## 2024-12-09 PROCEDURE — 3078F DIAST BP <80 MM HG: CPT | Mod: CPTII,S$GLB,, | Performed by: FAMILY MEDICINE

## 2024-12-09 PROCEDURE — 99213 OFFICE O/P EST LOW 20 MIN: CPT | Mod: S$GLB,,, | Performed by: FAMILY MEDICINE

## 2024-12-09 PROCEDURE — 3044F HG A1C LEVEL LT 7.0%: CPT | Mod: CPTII,S$GLB,, | Performed by: FAMILY MEDICINE

## 2024-12-09 PROCEDURE — 3066F NEPHROPATHY DOC TX: CPT | Mod: CPTII,S$GLB,, | Performed by: FAMILY MEDICINE

## 2024-12-09 PROCEDURE — 4010F ACE/ARB THERAPY RXD/TAKEN: CPT | Mod: CPTII,S$GLB,, | Performed by: FAMILY MEDICINE

## 2024-12-09 PROCEDURE — 3074F SYST BP LT 130 MM HG: CPT | Mod: CPTII,S$GLB,, | Performed by: FAMILY MEDICINE

## 2024-12-09 PROCEDURE — 3008F BODY MASS INDEX DOCD: CPT | Mod: CPTII,S$GLB,, | Performed by: FAMILY MEDICINE

## 2024-12-09 PROCEDURE — 3061F NEG MICROALBUMINURIA REV: CPT | Mod: CPTII,S$GLB,, | Performed by: FAMILY MEDICINE

## 2024-12-09 RX ORDER — OSELTAMIVIR PHOSPHATE 75 MG/1
75 CAPSULE ORAL DAILY
Qty: 10 CAPSULE | Refills: 0 | Status: SHIPPED | OUTPATIENT
Start: 2024-12-09 | End: 2024-12-19

## 2024-12-13 ENCOUNTER — PATIENT OUTREACH (OUTPATIENT)
Dept: ADMINISTRATIVE | Facility: HOSPITAL | Age: 64
End: 2024-12-13
Payer: COMMERCIAL

## 2024-12-16 NOTE — PROGRESS NOTES
" Patient ID: Carmen Son is a 64 y.o. female.    Chief Complaint: Otalgia and Nasal Congestion    HPI       Carmen Son is a 64 y.o. female    History of Present Illness    CHIEF COMPLAINT:  Patient presents today for ear pain and runny nose after exposure to  with flu.    EAR, NOSE, AND THROAT SYMPTOMS:  She reports left ear ache and rhinorrhea. Her nose runs more in cold weather, even while taking her daily medication. She denies fever, chills, diarrhea, and nausea.    FLU EXPOSURE:  Her  was recently diagnosed with influenza. She denies current flu symptoms.    CURRENT MEDICATIONS:  She takes Zyrtec daily and Xanax as needed. She recently started Mucinex for mucus production and coughing.           Review of Symptoms        Physical Exam    Vitals:    12/09/24 1606   BP: 120/60   BP Location: Right arm   Patient Position: Sitting   Pulse: 88   Temp: 98.2 °F (36.8 °C)   TempSrc: Oral   SpO2: 97%   Weight: 95.2 kg (209 lb 14.1 oz)   Height: 5' 3" (1.6 m)        Constitutional:   Oriented to person, place, and time.appears well-developed and well-nourished.   No distress.     HENT:   Head: Normocephalic and atraumatic.     Right Ear: Tympanic membrane, external ear and ear canal normal     Left Ear: Tympanic membrane, external ear and ear canal normal    Nose: External Normal. Normal turbinates, No rhinorrhea     Mouth/Throat: Uvula is midline, oropharynx is clear and moist and mucous membranes are normal.     Neck: supple no anterior cervical adenopathy    Carotid artery:  Bruit    NEG []   POS[]    Eyes:   Conjunctivae are normal. Right eye exhibits no discharge. Left eye exhibits no discharge. No scleral icterus. No periorbital edema     Cardiovascular:  Regular rate and rhythm with normal S1 and S2     Pulmonary/Chest:   Clear to auscultation bilaterally without wheezes, rhonchi or rales    Musculoskeletal:  No edema. No obvious deformity No wasting     Neurological:   Alert " and oriented to person, place, and time. Coordination normal.     Skin:   Skin is warm and dry.   No diaphoresis.   No rash noted.    Psychiatric: Normal mood and affect. Behavior is normal. Judgment and thought content normal.     Physical Exam    Ears: Left ear: Tiny bit of fluid.         Assessment / Plan:      ICD-10-CM ICD-9-CM   1. Upper respiratory tract infection, unspecified type  J06.9 465.9     Upper respiratory tract infection, unspecified type    Other orders  -     oseltamivir (TAMIFLU) 75 MG capsule; Take 1 capsule (75 mg total) by mouth once daily. for 10 days  Dispense: 10 capsule; Refill: 0        Assessment & Plan    - Assessed patient for potential flu infection due to exposure from   - Determined patient likely has viral illness affecting sinuses and ears, not flu at present  - Recommend preventive Tamiflu dosing due to household exposure  - Identified mild fluid in left ear causing discomfort  - Attributed ear discomfort to possible Eustachian tube inflammation    EAR DISCOMFORT AND DRAINAGE:  - Explained ear anatomy and function of Eustachian tube.  - Described how inflammation can affect ear drainage and cause discomfort.  - Patient to pop ears regularly to help with drainage.    VIRAL INFECTION PREVENTION AND TREATMENT:  - Informed about potential side effects of Tamiflu, noting it may cause some people to feel altered.  - Started Tamiflu 1 tablet daily for 10 days as preventive measure.    NASAL CONGESTION AND RESPIRATORY SYMPTOMS:  - Continued Zyrtec as currently taking.  - Started Flonase nasal spray, 1 spray each nostril twice daily.  - Started Mucinex to thin mucus and improve drainage.    FOLLOW-UP AND MONITORING:  - Contact the office if fever, chills, or flu-like symptoms develop.         This note was generated with the assistance of ambient listening technology. Verbal consent was obtained by the patient and accompanying visitor(s) for the recording of patient appointment to  facilitate this note. I attest to having reviewed and edited the generated note for accuracy, though some syntax or spelling errors may persist. Please contact the author of this note for any clarification.

## 2024-12-18 ENCOUNTER — CLINICAL SUPPORT (OUTPATIENT)
Dept: ENDOSCOPY | Facility: HOSPITAL | Age: 64
End: 2024-12-18
Attending: STUDENT IN AN ORGANIZED HEALTH CARE EDUCATION/TRAINING PROGRAM
Payer: COMMERCIAL

## 2024-12-18 ENCOUNTER — TELEPHONE (OUTPATIENT)
Dept: ENDOSCOPY | Facility: HOSPITAL | Age: 64
End: 2024-12-18

## 2024-12-18 DIAGNOSIS — Z12.11 SPECIAL SCREENING FOR MALIGNANT NEOPLASMS, COLON: ICD-10-CM

## 2024-12-18 DIAGNOSIS — Z12.11 COLON CANCER SCREENING: ICD-10-CM

## 2024-12-18 RX ORDER — SODIUM, POTASSIUM,MAG SULFATES 17.5-3.13G
1 SOLUTION, RECONSTITUTED, ORAL ORAL DAILY
Qty: 1 KIT | Refills: 0 | Status: SHIPPED | OUTPATIENT
Start: 2024-12-18 | End: 2024-12-20

## 2024-12-18 NOTE — TELEPHONE ENCOUNTER
Referral for procedure from Telephone call - direct access patient      Spoke to patient to schedule procedure(s) Colonoscopy       Physician to perform procedure(s) Dr. NORA Horvath  Date of Procedure (s) 02/06/25  Arrival Time 08:15 AM  Time of Procedure(s) 09:15 AM   Location of Procedure(s) [facility] 2nd Floor  Type of Rx Prep sent to patient: Suprep  Instructions provided to patient via Postal Mail    Patient was informed on the following information and verbalized understanding. Screening questionnaire reviewed with patient and complete. If procedure requires anesthesia, a responsible adult needs to be present to accompany the patient home, patient cannot drive after receiving anesthesia. Appointment details are tentative, especially check-in time. Patient will receive a prep-op call 7 days prior to confirm check-in time for procedure. If applicable the patient should contact their pharmacy to verify Rx for procedure prep is ready for pick-up. Patient was advised to call the scheduling department at [phone] if pharmacy states no Rx is available. Patient was advised to call the endoscopy scheduling department if any questions or concerns arise.      [facility]oscopy Scheduling Department

## 2024-12-18 NOTE — TELEPHONE ENCOUNTER
Attempted to contact patient to schedule colonoscopy. The patient did not answer the call. Left voice message requesting a call back at [phone] to get procedure scheduled.

## 2024-12-18 NOTE — TELEPHONE ENCOUNTER
Colonoscopy Procedure Prep Instructions      Date of procedure: 02/06/25 Arrive at: 08:15AM    Location of Department:   [facility] [address] [place] [address]   Take the Elevators to 2nd Floor Endoscopy Procedural Area    As soon as possible:   your prep from pharmacy and over the counter DULCOLAX LAXATIVE TABLETS     On the day before your procedure   What You CAN do:   You may have clear liquids ONLY -see below for list.     Liquids That Are OK to Drink:   Water  Sports drinks (Gatorade, Power-Aid)  Coffee or tea (no cream or nondairy creamer)  Clear juices without pulp (apple, white grape)  Gelatin desserts (no fruit or toppings)  Clear soda (sprite, coke, ginger ale)  Chicken broth (until 12 midnight the night before procedure)      What You CANNOT do:   Do not EAT solid food, drink milk or anything   colored red.  Do not drink alcohol.  Do not take oral medications within 1 hour of starting   each dose of SUPREP.  No gum chewing or candy morning of procedure.      Note:   (Please disregard the insert instructions from pharmacy).  SUPREP Bowel Prep Kit is indicated for cleansing of the colon as a preparation for colonoscopy in adults.   Be sure to tell your doctor about all the medicines you take, including prescription and non-prescription medicines, vitamins, and herbal supplements. SUPREP Bowel Prep Kit may affect how other medicines work.  Medication taken by mouth may not be absorbed properly when taken within 1 hour before the start of each dose of SUPREP Bowel Prep Kit.    It is not uncommon to experience some abdominal cramping, nausea and/or vomiting when taking the prep. If you have nausea and/or vomiting while taking the prep, stop drinking for 20 to 30 minutes then continue.    How to take prep:    SUPREP Bowel Prep Kit is a (2-day) prep.   Both 6-ounce bottles are required for a complete preparation for colonoscopy. Dilute the solution concentrate as directed  prior to use. You must drink water with each dose of SUPREP, and additional water after each dose.    DOSE 1--Day Before Colonoscopy 02/05/25    Drink at least 6 to 8 glasses of clear liquids from time you wake up until you begin your prep and then continue until bedtime to avoid dehydration.     12:00 pm (NOON) Take four (4) Dulcolax (Bisacodyl) tablets with at least 8 ounces or more of clear liquids.      6:00 pm:    You must complete Steps 1 through 4 using one (1) 6-ounce bottle before going to bed as shown below:    Step 1-Pour ONE (1) 6-ounce bottle of SUPREP liquid into the mixing container.  Step 2-Add cool drinking water to the 16-ounce line on the container and mix.  Step 3-Drink ALL the liquid in the container.  Step 4-You must drink two (2) more 16-ounce containers of water over the next 1 hour.  IMPORTANT: If you experience preparation-related symptoms (for example, nausea, bloating, or cramping), stop, or slow the rate of drinking the additional water until your symptoms decrease.    DOSE 2--Day of the Colonoscopy 02/06/25 at 2-3 AM.    For this dose, repeat Steps 1 through 4 shown above using the other 6-ounce bottle.   You may continue drinking water/clear liquids until   2 hours before your colonoscopy or as directed by the scheduling nurse  07:15AM.    For information about your procedure, two (2) things to view prior to colonoscopy:  Please watch this informational video. It is important to watch this animated consent video prior to your arrival. If you haven't watched the video prior to arriving, you are required to watch it during admission which can causes delays.    Options for viewing:   Using a keyboard:  press and hold the control tab (Ctrl) and left mouse click to follow links.           Colonoscopy Instructional Video                                                                                   OR    Type link address into your web browser's address  bar:  [email or web address]      Educational Booklet with pictures:      Colonoscopy Prep - Liquid      Comments:          IMPORTANT INFORMATION TO KNOW BEFORE YOUR PROCEDURE    [facility] 2nd Floor         If your procedure requires the administration of anesthesia, it is necessary for a responsible adult to drive you home. (Medical Transportation, Uber, Lyft, Taxi, etc. may ONLY be used if a responsible adult is present to accompany you home.  The responsible adult CAN'T be the  of the service).      person must be available to return to pick you up within 15 minutes of being notified of discharge.       Please bring a picture ID, insurance card, & copayment      Take Medications as directed below:    If you are taking any injectable medication (s) for weight loss and/or diabetes  weekly, hold for 8 days prior to your scheduled procedure, please stop taking Mounjaro (Tirzepatide)}on 1/29/25. After the procedure, your provider will inform you  of when to resume injection.    If you begin taking any blood thinning medications, injectable weight loss/diabetes medications (other than insulin) , or Adipex (Phentermine) please contact the endoscopy scheduling department listed below as soon as possible.    If you are diabetic see the attached instruction sheet regarding your medication.     If you take HEART, BLOOD PRESSURE, SEIZURE, PAIN, LUNG (including inhalers/nebulizers), ANTI-REJECTION (transplant patients), or PSYCHIATRIC medications, please take at your regular times with a sip of water or as directed by the scheduling nurse.     Important contact information:    Endoscopy [phone] Hours of operation Monday-Friday 8:00-4:30pm.    Questions about insurance or financial obligations call [phone] or [phone].    If you have questions regarding the prep or need to reschedule, please call [phone]. After hours questions requiring  immediate assistance, contact [facility] nurse line at [phone] or [phone].   NOTE:     On occasion, unforeseen circumstances may cause a delay in your procedure start time. We respect your time and appreciate your patience during these circumstances.      Comments:       Are you ready for your Colonoscopy?      __ If you take blood thinners,weight loss or diabetic injectable medications, have you stopped taking them according to your doctor's instructions before your procedures?  __ Have you stopped eating solid foods and followed a clear liquid diet a full day before your procedure or followed the diet       guidelines indicated in your instructions?       REMINDER: NO BROTH AFTER MIDNIGHT THE DAY BEFORE PROCEDURE.   __ Have you completed all your prep solution? PLEASE DO NOT FOLLOW the insert/or box instructions from pharmacy)  __ Have you taken your blood pressure, heart, seizure, or other essential medications the morning of your procedure?  __ Have you planned for a ride to and from procedure?  (Medical Transportation, Uber, Lyft, Taxi, etc. may ONLY be used if a responsible adult is present to accompany you home). The responsible adult CANNOT be the  of the service.  person must be available to return and pick you up within 15 minutes of being notified of discharge.           Questions or Concerns?  Please call us!  [phone] (M-F) [phone] (Nights and weekends)    Listed below are some helpful tips:    Please bring protective cases for eyewear and hearing aids-wear comfortable clothing/shoes.  Follow prep instructions closely so you don't have to do it twice.  Bowel prep is prescription used to clean out the colon before a colonoscopy. The prep increases movement of your colon by causing you to have diarrhea (loose stools). Cleaning out stool from the colon helps your doctor to see in your colon clearly during this procedure.   It is important to stay hydrated before,  during and after bowel prep to prevent loss of fluid (dehydration). You can have water and your choice of clear liquids. Reminder: these liquids you will drink in addition to the bowel prep.     Preparing the mixture:    First, mix the prep with water.  Make the taste better by adding a sugar free drink mix (Crystal Light) can improve the taste of your prep.   Use a large bore (opening) straw. Place towards the back of mouth (throat) as tolerated.  Prepare a prep mixture that is lightly chilled, but not ice-cold. Drinking a large amount of ice-cold liquid can make you feel very ill.  Avoid drinking any RED beverages or eating popsicles with this color for the 24 hours leading up to your procedure. This color can look like blood in the colon.    Consuming the prep:    Take your time. If you feel ill, take a 15-minute break from drinking the prep mixture  Combat hunger and dehydration with clear liquids. Options like JELL-O, or popsicles will help.  Settle in with good reading material. The goal is to clean out 6 feet of colon, so you can plan on spending a good deal of time in the bathroom. Have some good reading material on-hand or an iPad ready to keep yourself entertained!  Keep yourself comfortable. We recommend applying personal hygiene wipes, Tucks pads and a soothing ointment, like A&D ointment, Desitin, or Vaseline to your bottom before starting and as needed to protect your skin.

## 2024-12-18 NOTE — PLAN OF CARE
Attempted to contact patient to schedule colonoscopy. The patient did not answer the call. Left voice message requesting a call back at [phone] to get procedure scheduled.

## 2024-12-19 ENCOUNTER — PATIENT OUTREACH (OUTPATIENT)
Dept: ADMINISTRATIVE | Facility: HOSPITAL | Age: 64
End: 2024-12-19
Payer: COMMERCIAL

## 2024-12-19 NOTE — LETTER
24    AUTHORIZATION FOR RELEASE OF   CONFIDENTIAL INFORMATION    [facility]    We are seeing Carmen Son, date of birth 1960, in the clinic at [facility]. Anson López MD is the patient's PCP. Carmen Son has an outstanding lab/procedure at the time we reviewed her chart. In order to help keep her health information updated, she has authorized us to request the following medical record(s):       LATEST EYE EXAM       Please fax records to [facility], Reine, Addy N., MD,  at [phone] or email to [email or web address].              Carmen Son  MRN: [id number]  : 1960  Age: 63 y.o.  Sex: female         Patient/Legal Guardian Signature  This signature was collected at 2023    Kasandra Son       _______________________________   Printed Name/Relationship to Patient      Consent for Examination and Treatment: I hereby authorize the providers and employees of [facility] ([facility]) to provide medical treatment/services which includes, but is not limited to, performing and administering tests and diagnostic procedures that are deemed necessary, including, but not limited to, imaging examinations, blood tests and other laboratory procedures as may be required by the hospital, clinic, or may be ordered by my physician(s) or persons working under the general and/or special instructions of my physician(s).      I understand and agree that this consent covers all authorized persons, including but not limited to physicians, residents, nurse practitioners, physicians' assistants, specialists, consultants, student nurses, and independently contracted physicians, who are called upon by the physician in charge, to carry out the diagnostic procedures and medical or surgical treatment.     I hereby authorize [facility] to retain or dispose of any specimens or tissue, should there be such remaining from any test or procedure.     I hereby authorize  and give consent for [facility] providers and employees to take photographs, images or videotapes of such diagnostic, surgical or treatment procedures of Patient as may be required by [facility] or as may be ordered by a physician. I further acknowledge and agree that [facility] may use cameras or other devices for patient monitoring.     I am aware that the practice of medicine is not an exact science, and I acknowledge that no guarantees have been made to me as to the outcome of any tests, procedures or treatment.     Authorization for Release of Information: I understand that my insurance company and/or their agents may need information necessary to make determinations about payment/reimbursement. I hereby provide authorization to release to all insurance companies, their successors, assignees, other parties with whom they may have contracted, or others acting on their behalf, that are involved with payment for any hospital and/or clinic charges incurred by the patient, any information that they request and deem necessary for payment/reimbursement, and/or quality review.  I further authorize the release of my health information to physicians or other health care practitioners on staff who are involved in my health care now and in the future, and to other health care providers, entities, or institutions for the purpose of my continued care and treatment, including referrals.     REGISTRATION AUTHORIZATION  Form No. 51772 (Rev. 2022)            Patient Name: Carmen Son  : 1960  Patient Phone #: [phone]

## 2024-12-21 DIAGNOSIS — E11.9 CONTROLLED TYPE 2 DIABETES MELLITUS WITHOUT COMPLICATION, WITHOUT LONG-TERM CURRENT USE OF INSULIN: ICD-10-CM

## 2024-12-21 RX ORDER — GLIPIZIDE 5 MG/1
5 TABLET, FILM COATED, EXTENDED RELEASE ORAL
Qty: 90 TABLET | Refills: 1 | Status: SHIPPED | OUTPATIENT
Start: 2024-12-21

## 2024-12-21 NOTE — TELEPHONE ENCOUNTER
No care due was identified.  [facility] Embedded Care Due Messages. Reference number: [id number].   12/21/2024 7:07:03 AM CST

## 2024-12-22 NOTE — TELEPHONE ENCOUNTER
Refill Authorization Note     Refill Decision Note   Carmen Son  is requesting a refill authorization.  Brief Assessment and Rationale for Refill:  Approve     Medication Therapy Plan:       Medication Reconciliation Completed: No   Comments:     No Care Gaps recommended.     Note composed:6:05 PM 12/21/2024

## 2025-01-06 ENCOUNTER — TELEPHONE (OUTPATIENT)
Dept: FAMILY MEDICINE | Facility: CLINIC | Age: 65
End: 2025-01-06
Payer: COMMERCIAL

## 2025-01-06 NOTE — TELEPHONE ENCOUNTER
----- Message from Alaina sent at 1/6/2025  4:10 PM CST -----  Type:  Needs Medical Advice    Who Called: henrry Torres  Would the patient rather a call back or a response via [facility]? call  Best Call Back Number:    Additional Information: received fax request but needing sent again to fax # [phone]

## 2025-01-31 ENCOUNTER — HOSPITAL ENCOUNTER (EMERGENCY)
Facility: HOSPITAL | Age: 65
Discharge: HOME | End: 2025-01-31
Attending: FAMILY MEDICINE
Payer: COMMERCIAL

## 2025-01-31 VITALS
HEART RATE: 104 BPM | WEIGHT: 201 LBS | HEIGHT: 63 IN | DIASTOLIC BLOOD PRESSURE: 108 MMHG | OXYGEN SATURATION: 99 % | SYSTOLIC BLOOD PRESSURE: 161 MMHG | TEMPERATURE: 98 F | RESPIRATION RATE: 20 BRPM | BODY MASS INDEX: 35.61 KG/M2
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
## 2025-01-31 PROCEDURE — 99283 EMERGENCY DEPT VISIT LOW MDM: CPT | Mod: ER

## 2025-01-31 RX ORDER — MUPIROCIN 20 MG/G
OINTMENT TOPICAL 3 TIMES DAILY
Qty: 30 G | Refills: 0 | Status: SHIPPED | OUTPATIENT
Start: 2025-01-31

## 2025-01-31 NOTE — ED PROVIDER NOTES
"Encounter Date: 1/31/2025       History     Chief Complaint   Patient presents with    Animal Bite     Reports to ED c c/o animal bite. 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 Abrasions noted to anterior R thigh. Per pt, the owner stated their up to date on shot "but I dont trust them." Item No. 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  Very superficial abrasion to right forearm and thigh with no bleeding.  Owner claims dog is vaccinated.  Patient had tetanus in 2019.    The history is provided by the patient.     Review of patient's allergies indicates:  No Known Allergies  Past Medical History:   Diagnosis Date    Acid reflux     Diabetes mellitus     Diabetes mellitus, type 2     Diet controlled gestational diabetes mellitus in puerperium     History of seasonal allergies     Hypertension      Past Surgical History:   Procedure Laterality Date    ANTERIOR CERVICAL DISCECTOMY W/ FUSION N/A 9/17/2018    Procedure: DISCECTOMY, SPINE, CERVICAL, ANTERIOR APPROACH, WITH FUSION C3-4, C4-5;  Surgeon: Leo Denton MD;  Location: [facility] OR [address] [place] [address];  Service: Neurosurgery;  Laterality: N/A;  toronto II, asa 2, regular bed, supine, josefina    HYSTERECTOMY      2011 [facility] LSO, R SAL[facility]ECT [facility]    MINIMALLY INVASIVE TRANSFORAMINAL LUMBAR INTERBODY FUSION (TLIF) N/A 1/25/2021    Procedure: FUSION, SPINE, LUMBAR, TLIF, MINIMALLY INVASIVE L4-5;  Surgeon: Leo Denton MD;  Location: [facility] OR [address] [place] [address];  Service: Neurosurgery;  Laterality: N/A;  [place] table 4 poster, prone, neuromonitoring, josefina, kriss [place]    right knee scope       Family History   Problem Relation Name Age of Onset    Diabetes type II Sister      Hyperlipidemia Sister      Diabetes type II Other      Allergic rhinitis Neg Hx      Allergies Neg Hx      Angioedema Neg Hx      Asthma Neg Hx      Atopy Neg Hx      " Eczema Neg Hx      Rhinitis Neg Hx      Urticaria Neg Hx      Immunodeficiency Neg Hx      Breast cancer Neg Hx      Colon cancer Neg Hx      Ovarian cancer Neg Hx       Social History     Tobacco Use    Smoking status: Never     Passive exposure: Never    Smokeless tobacco: Never   Substance Use Topics    Alcohol use: No    Drug use: No     Review of Systems   All other systems reviewed and are negative.      Physical Exam     Initial Vitals [01/31/25 1607]   BP Pulse Resp Temp SpO2   (!) 161/108 104 20 97.8 °F (36.6 °C) 99 %      MAP       --         Physical Exam    Nursing note and vitals reviewed.  Constitutional: Vital signs are normal. She appears well-developed and well-nourished. She is active. No distress.   HENT:   Head: Normocephalic.   Nose: Nose normal. Mouth/Throat: Oropharynx is clear and moist and mucous membranes are normal.   Eyes: Conjunctivae, EOM and lids are normal. Pupils are equal, round, and reactive to light.   Neck: Neck supple.   Normal range of motion.  Cardiovascular:  Normal rate, regular rhythm, S1 normal, S2 normal and normal heart sounds.           Pulmonary/Chest: Breath sounds normal. No respiratory distress. She has no wheezes. She has no rhonchi. She has no rales. She exhibits no tenderness.   Abdominal: Abdomen is soft. Bowel sounds are normal. She exhibits no distension and no mass. There is no abdominal tenderness. There is no rebound and no guarding.   Musculoskeletal:         General: Normal range of motion.      Right upper arm: Normal.      Left upper arm: Normal.      Cervical back: Normal range of motion and neck supple.      Right lower leg: Normal.      Left lower leg: Normal.     Neurological: She is alert and oriented to person, place, and time. She has normal strength. She displays normal reflexes. No cranial nerve deficit or sensory deficit. GCS score is 15. GCS eye subscore is 4. GCS verbal subscore is 5. GCS motor subscore is 6.   Skin: Skin is warm. Capillary  refill takes less than 2 seconds.   Superficial abrasion to right forearm.  Superficial abrasion to right thigh.   Psychiatric: She has a normal mood and affect. Her speech is normal and behavior is normal. Thought content normal. Cognition and memory are normal.         ED Course   Procedures  Labs Reviewed - No data to display       Imaging Results    None          Medications - No data to display  Medical Decision Making  Dog bite with very superficial abrasion to right forearm and right thigh.  No deep wounds.  Dog is immunized as per owner.  Patient had tetanus in 2019.  As per [facility] no rabies documentation in domestic dogs since 2000  I do not think she will require any further immunization with rabies vaccination or immunoglobulins.    She is given Bactroban ointment as needed.  Follow up PCP/ED with any worsening symptoms    Risk  Prescription drug management.                                      Clinical Impression:  Final diagnoses:  [W54.0XXA] Dog bite, initial encounter (Primary)          ED Disposition Condition    Discharge Stable          ED Prescriptions       Medication Sig Dispense Start Date End Date Auth. Provider    mupirocin (BACTROBAN) 2 % ointment Apply topically 3 (three) times daily. 30 g 1/31/2025 -- Dell Hudson MD          Follow-up Information       Follow up With Specialties Details Why Contact Info    Anson López MD Internal Medicine   [address] [address] [phone]               Dell Hudson MD  01/31/25 2964

## 2025-02-05 ENCOUNTER — TELEPHONE (OUTPATIENT)
Dept: ENDOSCOPY | Facility: HOSPITAL | Age: 65
End: 2025-02-05
Payer: COMMERCIAL

## 2025-02-05 VITALS — WEIGHT: 201 LBS | HEIGHT: 63 IN | BODY MASS INDEX: 35.61 KG/M2

## 2025-02-05 NOTE — TELEPHONE ENCOUNTER
Colonoscopy Procedure Prep Instructions      Date of procedure: 2/13/25 Arrive at: 9:00AM    Location of Department:   [facility] [address] [place] [address]   Stop in the hospital lobby on the 1st floor to get registered, then take the hospital elevators to the 2nd floor waiting room    As soon as possible:   your prep from pharmacy and over the counter DULCOLAX LAXATIVE TABLETS     On the day before your procedure   What You CAN do:   You may have clear liquids ONLY -see below for list.     Liquids That Are OK to Drink:   Water  Sports drinks (Gatorade, Power-Aid)  Coffee or tea (no cream or nondairy creamer)  Clear juices without pulp (apple, white grape)  Gelatin desserts (no fruit or toppings)  Clear soda (sprite, coke, ginger ale)  Chicken broth (until 12 midnight the night before procedure)      What You CANNOT do:   Do not EAT solid food, drink milk or anything   colored red.  Do not drink alcohol.  Do not take oral medications within 1 hour of starting   each dose of SUPREP.  No gum chewing or candy morning of procedure.      Note:   (Please disregard the insert instructions from pharmacy).  SUPREP Bowel Prep Kit is indicated for cleansing of the colon as a preparation for colonoscopy in adults.   Be sure to tell your doctor about all the medicines you take, including prescription and non-prescription medicines, vitamins, and herbal supplements. SUPREP Bowel Prep Kit may affect how other medicines work.  Medication taken by mouth may not be absorbed properly when taken within 1 hour before the start of each dose of SUPREP Bowel Prep Kit.    It is not uncommon to experience some abdominal cramping, nausea and/or vomiting when taking the prep. If you have nausea and/or vomiting while taking the prep, stop drinking for 20 to 30 minutes then continue.    How to take prep:    SUPREP Bowel Prep Kit is a (2-day) prep.   Both 6-ounce bottles are required for a complete  preparation for colonoscopy. Dilute the solution concentrate as directed prior to use. You must drink water with each dose of SUPREP, and additional water after each dose.    DOSE 1--Day Before Colonoscopy 2/12/25    Drink at least 6 to 8 glasses of clear liquids from time you wake up until you begin your prep and then continue until bedtime to avoid dehydration.     12:00 pm (NOON) Take four (4) Dulcolax (Bisacodyl) tablets with at least 8 ounces or more of clear liquids.      6:00 pm:    You must complete Steps 1 through 4 using one (1) 6-ounce bottle before going to bed as shown below:    Step 1-Pour ONE (1) 6-ounce bottle of SUPREP liquid into the mixing container.  Step 2-Add cool drinking water to the 16-ounce line on the container and mix.  Step 3-Drink ALL the liquid in the container.  Step 4-You must drink two (2) more 16-ounce containers of water over the next 1 hour.  IMPORTANT: If you experience preparation-related symptoms (for example, nausea, bloating, or cramping), stop, or slow the rate of drinking the additional water until your symptoms decrease.    DOSE 2--Day of the Colonoscopy 2/13/25 at 2-3 AM.    For this dose, repeat Steps 1 through 4 shown above using the other 6-ounce bottle.   You may continue drinking water/clear liquids until   2 hours before your colonoscopy or as directed by the scheduling nurse  8:00AM.    For information about your procedure, two (2) things to view prior to colonoscopy:  Please watch this informational video. It is important to watch this animated consent video prior to your arrival. If you haven't watched the video prior to arriving, you are required to watch it during admission which can causes delays.    Options for viewing:   Using a keyboard:  press and hold the control tab (Ctrl) and left mouse click to follow links.           Colonoscopy Instructional Video                                                                                   OR    Type link  address into your web browser's address bar:  [email or web address]      Educational Booklet with pictures:      Colonoscopy Prep - Liquid      Comments:         IMPORTANT INFORMATION TO KNOW BEFORE YOUR PROCEDURE    [facility] 2nd Floor         If your procedure requires the administration of anesthesia, it is necessary for a responsible adult to drive you home. (Medical Transportation, Uber, Lyft, Taxi, etc. may ONLY be used if a responsible adult is present to accompany you home.  The responsible adult CAN'T be the  of the service).      person must be available to return to pick you up within 15 minutes of being notified of discharge.       Please bring a picture ID, insurance card, & copayment      Take Medications as directed below:    If you are taking any injectable medication (s) for weight loss and/or diabetes  weekly, hold for 8 days prior to your scheduled procedure, please stop taking Mounjaro (Tirzepatide)}on 2/5/25. After the procedure, your provider will inform you  of when to resume injection.      If you begin taking any blood thinning medications, injectable weight loss/diabetes medications (other than insulin) , or Adipex (Phentermine) please contact the endoscopy scheduling department listed below as soon as possible.    If you are diabetic see the attached instruction sheet regarding your medication.     If you take HEART, BLOOD PRESSURE, SEIZURE, PAIN, LUNG (including inhalers/nebulizers), ANTI-REJECTION (transplant patients), or PSYCHIATRIC medications, please take at your regular times with a sip of water or as directed by the scheduling nurse.     Important contact information:    Endoscopy [phone] Hours of operation Monday-Friday 8:00-4:30pm.    Questions about insurance or financial obligations call [phone] or [phone].    If you have questions regarding the prep or need to reschedule, please call  [phone]. After hours questions requiring immediate assistance, contact [facility] nurse line at [phone] or [phone].   NOTE:     On occasion, unforeseen circumstances may cause a delay in your procedure start time. We respect your time and appreciate your patience during these circumstances.      Comments:        If you are unsure about any of these instructions, call [facility] at [phone].                          Oral Medicine  Day of Prep  Day of Procedure           Glyburide    Do NOT take morning of procedure. If you         Glucotrol (Glipizide)  Do NOT take. take twice daily, take with dinner.         Amaryl (Glimepiride)                                Glucophage    Do NOT take morning of procedure. Take         Glumetza  Take as  when you start eating again.          Fortamet (Metformin)  prescribed.                              Januvia (Sitaglipitin)    Do NOT take morning of procedure. Take         Nesina (Aloglipitin)    when you start eating again.          Onglyza (Saxaglipitin)  Take as              Tradjenta (Linaglipitin)  prescribed.                              Invokana (Canagliflozin)               Farxiga (Dapagliflozin)  Stop 2 days  Do NOT take morning of procedure. Take         Jardiance(Empagliflozin) before prep.  when you start eating again.                          Actos (Pioglitazone)  Take as  Do NOT take morning of procedure. Take           prescribed.  when you start eating again.                          Injectable & Combination Daily Dose  Weekly Dose           Medicine                Adlyxin (Lixisenatide)  If you take these For weekly dose, hold dose at least 8 days prior        Byetta (Exenatide)  medications daily to appointment. You may take the dose after your        Bydureon (Exenatide XL) on the day of your procedure.            Ozempic (Semaglutide)  appointment hold              Trulicity (Dulaglutide)  that dose until              Victoza  (Liraglutide)  after your              Mounjaro (Tirzepatide)  procedure.              Jennifer Arzate                It is important to monitor your blood sugar while doing the bowel preparation. On the day of your bowel   prep, when you are on a clear liquid diet, you may drink beverages with sugar as your source of glucose.   Be sure to mix the prep with water or sugar free liquid only. Below are instructions on how to adjust your   diabetic medications prior to your scheduled procedure. Call the healthcare provider who manages your   diabetes if you have questions.   Insulin for Type 1   Diabetes Mellitus Day of Prep                                         Day of procedure          Basaglar If you use in the morning, take as prescribed.  If you take in the morning,         Lantus If you use in the evening, inject 70% of dose. inject 80% of dose. If you take        Levemir      in the evenings, inject usual         Toujeo      dose.           [place] Count carbs and adjust dose   Do NOT take morning of procedure.         Apidra accordingly. If not carb counting,  Take when you start eating again.         Humalog 100 take 25% of usual meal dose.             Humalog 200 May use correction dose every              Novolog 4 hours. Do NOT use once sugar-free             liquid prep is started.                             Insulin Pump Count carbs and adjust your   May use temp basal rate at 70 % starting          dose as needed. May use temp basal at midnight until after procedure.          rate at 70 % after sugar-free clear liquid             prep is started until midnight.                              Insulin for Type 2   Diabetes Mellitus Day of Prep                                         Day of procedure          Basaglar If you use in the morning, take as prescribed.  If you take in the morning,         Lantus If you use in the  evening, inject 70% of dose. inject 80% of dose. If you take        Levemir      in the evenings, inject usual         Toujeo      dose.           Tresiba                                                Afrezza Inject 50 % of dose with clear liquid diet.   Do NOT take morning of         Apidra Do NOT use once sugar-free clear liquid prep procedure. Take when you         Fiasp is started. If you are using a correction scale,  start eating meals again.         Humalog 100 take dose every 4 hours as needed.             Humalog 200                Novolog                                NPH  Inject 50% of breakfast and dinner   Do NOT take morning of         doses with clear liquid diet.    procedure. Take usual dose              when you eat dinner.                         Regular  Inject 50% of breakfast dose and do NOT take Do NOT take morning of          dinner dose once sugar-free liquid prep has   procedure. Take usual dose          started. If using correction scale, make take dose when you eat dinner.          every 6 hours as needed.                              Novolog Mix 70/30 Inject 50% of breakfast dose. Inject 25%  Do NOT take breakfast dose.         Humolog Mix 75/25 of dinner dose.     Take usual dose when you eat         Humolog Mix 50/50      dinner.                           U500 Take 50% of AM or breakfast unit niraj dose.  Do NOT take mornining of           Take 25% of lunch or dinner or PM unit niraj dose.  procedure. Take when you               start eating again.                          V-Go  Continue to wear your V-Go device. Do NOT click  Coninue to wear your V-Go         once sugar-free clear liquid prep is started.   device. Resume clicks with               meals.

## 2025-02-05 NOTE — TELEPHONE ENCOUNTER
Pt called to r/s her c-scope as she has an URI.    Referral for procedure from Telephone call - direct access patient      Spoke to pt to reschedule procedure(s) Colonoscopy       Physician to perform procedure(s) Dr. NORA Horvath  Date of Procedure (s) 2/13/25  Arrival Time 9:00 AM  Time of Procedure(s) 10:00 AM   Location of Procedure(s) [address]  Type of Rx Prep sent to patient: Suprep  Instructions provided to patient via Email    Patient was informed on the following information and verbalized understanding. Screening questionnaire reviewed with patient and complete. If procedure requires anesthesia, a responsible adult needs to be present to accompany the patient home, patient cannot drive after receiving anesthesia. Appointment details are tentative, especially check-in time. Patient will receive a prep-op call 7 days prior to confirm check-in time for procedure. If applicable the patient should contact their pharmacy to verify Rx for procedure prep is ready for pick-up. Patient was advised to call the scheduling department at [phone] if pharmacy states no Rx is available. Patient was advised to call the endoscopy scheduling department if any questions or concerns arise.      [facility] Scheduling Department

## 2025-02-10 ENCOUNTER — OFFICE VISIT (OUTPATIENT)
Dept: FAMILY MEDICINE | Facility: CLINIC | Age: 65
End: 2025-02-10
Payer: COMMERCIAL

## 2025-02-10 VITALS
OXYGEN SATURATION: 98 % | HEIGHT: 63 IN | SYSTOLIC BLOOD PRESSURE: 136 MMHG | TEMPERATURE: 98 F | DIASTOLIC BLOOD PRESSURE: 82 MMHG | HEART RATE: 88 BPM | WEIGHT: 207 LBS | BODY MASS INDEX: 36.68 KG/M2

## 2025-02-10 DIAGNOSIS — E66.01 CLASS 2 SEVERE OBESITY DUE TO EXCESS CALORIES WITH SERIOUS COMORBIDITY AND BODY MASS INDEX (BMI) OF 35.0 TO 35.9 IN ADULT: ICD-10-CM

## 2025-02-10 DIAGNOSIS — E11.65 TYPE 2 DIABETES MELLITUS WITH HYPERGLYCEMIA, WITHOUT LONG-TERM CURRENT USE OF INSULIN: ICD-10-CM

## 2025-02-10 DIAGNOSIS — J01.10 ACUTE NON-RECURRENT FRONTAL SINUSITIS: Primary | ICD-10-CM

## 2025-02-10 DIAGNOSIS — E66.812 CLASS 2 SEVERE OBESITY DUE TO EXCESS CALORIES WITH SERIOUS COMORBIDITY AND BODY MASS INDEX (BMI) OF 35.0 TO 35.9 IN ADULT: ICD-10-CM

## 2025-02-10 DIAGNOSIS — F33.1 MAJOR DEPRESSIVE DISORDER, RECURRENT, MODERATE: ICD-10-CM

## 2025-02-10 PROCEDURE — 3075F SYST BP GE 130 - 139MM HG: CPT | Mod: CPTII,S$GLB,, | Performed by: PHYSICIAN ASSISTANT

## 2025-02-10 PROCEDURE — 3008F BODY MASS INDEX DOCD: CPT | Mod: CPTII,S$GLB,, | Performed by: PHYSICIAN ASSISTANT

## 2025-02-10 PROCEDURE — 1159F MED LIST DOCD IN RCRD: CPT | Mod: CPTII,S$GLB,, | Performed by: PHYSICIAN ASSISTANT

## 2025-02-10 PROCEDURE — 99214 OFFICE O/P EST MOD 30 MIN: CPT | Mod: S$GLB,,, | Performed by: PHYSICIAN ASSISTANT

## 2025-02-10 PROCEDURE — 1160F RVW MEDS BY RX/DR IN RCRD: CPT | Mod: CPTII,S$GLB,, | Performed by: PHYSICIAN ASSISTANT

## 2025-02-10 PROCEDURE — 3079F DIAST BP 80-89 MM HG: CPT | Mod: CPTII,S$GLB,, | Performed by: PHYSICIAN ASSISTANT

## 2025-02-10 RX ORDER — AMOXICILLIN AND CLAVULANATE POTASSIUM 875; 125 MG/1; MG/1
1 TABLET, FILM COATED ORAL EVERY 12 HOURS
Qty: 14 TABLET | Refills: 0 | Status: SHIPPED | OUTPATIENT
Start: 2025-02-10

## 2025-02-10 RX ORDER — BENZONATATE 200 MG/1
200 CAPSULE ORAL 3 TIMES DAILY PRN
Qty: 30 CAPSULE | Refills: 0 | Status: SHIPPED | OUTPATIENT
Start: 2025-02-10

## 2025-02-10 NOTE — ASSESSMENT & PLAN NOTE
- Body mass index is 36.67 kg/m².  - Recommendation for healthy diet and increasing exercise as tolerated with goal of 150min/week. Recommend weight loss.  - Increase mounjaro to 7.5mg once weekly

## 2025-02-10 NOTE — PROGRESS NOTES
Patient ID: Carmen Son is a 64 y.o. female.     Chief Complaint: Cough and Nasal Congestion    65 y/o female with PMHx of T2DM, HTN, HLD presents to the office for congestion, ongoing 3 weeks. Associated productive clear cough. Patient reports taking Zyzal and Mucinex for moderate relief. Denies post nasal drip, nausea, vomiting, fever, chills, headaches, or body aches. Patient wants relief for congestion. Patient also mention increase on Mounjaro. Patient currently on 5mg, however, believes she has plateaued on this dose. She hasn't hit her desired goal.  She would like to know if she is able to increase to the next dosage.       Cough  Pertinent negatives include no chest pain, chills, ear pain, fever, headaches, myalgias, rash, sore throat or wheezing.         Review of Systems  Review of Systems   Constitutional:  Negative for chills and fever.   HENT:  Positive for congestion and sinus pain. Negative for ear pain and sore throat.    Eyes:  Positive for pain. Negative for blurred vision, double vision and discharge.   Respiratory:  Positive for cough. Negative for wheezing.    Cardiovascular:  Negative for chest pain and leg swelling.   Gastrointestinal:  Negative for abdominal pain, diarrhea, nausea and vomiting.   Genitourinary:  Negative for urgency.   Musculoskeletal:  Negative for myalgias.   Skin:  Negative for rash.   Neurological:  Negative for dizziness, weakness and headaches.   Psychiatric/Behavioral:  Negative for depression.        Currently Medications  Current Outpatient Medications on File Prior to Visit   Medication Sig Dispense Refill    alendronate (FOSAMAX) 70 MG tablet Take 1 tablet (70 mg total) by mouth every 7 days. 12 tablet 3    ALPRAZolam (XANAX XR) 0.5 MG Tb24 Take 0.5 mg by mouth.      ascorbic acid, vitamin C, (VITAMIN C) 500 MG tablet Take 500 mg by mouth once daily.      azelastine (ASTELIN) 137 mcg (0.1 %) nasal spray 2 sprays by Each Nostril route 2 (two) times  "daily.      ergocalciferol (ERGOCALCIFEROL) 50,000 unit Cap Take 1 capsule (50,000 Units total) by mouth every 7 days. 12 capsule 3    evolocumab (REPATHA SURECLICK) 140 mg/mL PnIj Inject 1 mL (140 mg total) into the skin every 14 (fourteen) days. 2 mL 3    furosemide (LASIX) 20 MG tablet Take 1 tablet (20 mg total) by mouth daily as needed (swelling). 30 tablet 3    linaCLOtide (LINZESS) 145 mcg Cap capsule Take 1 capsule (145 mcg total) by mouth once daily. 90 capsule 3    losartan (COZAAR) 50 MG tablet Take 1 tablet (50 mg total) by mouth once daily. 90 tablet 2    MULTIVITAMIN ORAL multivitamin      mupirocin (BACTROBAN) 2 % ointment Apply topically 3 (three) times daily. 30 g 0    omeprazole 20 mg TbEC       propranoloL (INDERAL LA) 80 MG 24 hr capsule Take 1 capsule (80 mg total) by mouth once daily. 90 capsule 1    zonisamide (ZONEGRAN) 100 MG Cap TAKE 1 CAPSULE BY MOUTH IN THE EVENING 90 capsule 1    [DISCONTINUED] glipiZIDE 5 MG TR24 Take 1 tablet by mouth once daily with breakfast 90 tablet 1    [DISCONTINUED] tirzepatide (MOUNJARO) 5 mg/0.5 mL PnIj Inject 5 mg into the skin every 7 days. 4 Pen 3    [DISCONTINUED] cholecalciferol, vitamin D3, (VITAMIN D3 ORAL) Vitamin D3 (Patient not taking: Reported on 2/10/2025)      [DISCONTINUED] cyanocobalamin (VITAMIN B-12) 1000 MCG tablet B12 (Patient not taking: Reported on 2/10/2025)       No current facility-administered medications on file prior to visit.       Physical  Exam  Vitals:    02/10/25 1344   BP: 136/82   BP Location: Left arm   Patient Position: Sitting   Pulse: 88   Temp: 97.6 °F (36.4 °C)   SpO2: 98%   Weight: 93.9 kg (207 lb 0.2 oz)   Height: 5' 3" (1.6 m)      Body mass index is 36.67 kg/m².  Wt Readings from Last 3 Encounters:   02/10/25 93.9 kg (207 lb 0.2 oz)   02/05/25 91.2 kg (201 lb)   01/31/25 91.2 kg (201 lb)       Physical Exam  Vitals and nursing note reviewed.   Constitutional:       General: She is not in acute distress.     " Appearance: Normal appearance. She is obese. She is not ill-appearing.   HENT:      Head: Normocephalic and atraumatic.      Right Ear: External ear normal.      Left Ear: External ear normal.      Nose: Congestion present.      Right Sinus: Frontal sinus tenderness present.      Left Sinus: Frontal sinus tenderness present.      Mouth/Throat:      Mouth: Mucous membranes are moist.   Eyes:      Extraocular Movements: Extraocular movements intact.      Conjunctiva/sclera: Conjunctivae normal.      Pupils: Pupils are equal, round, and reactive to light.   Cardiovascular:      Rate and Rhythm: Normal rate and regular rhythm.      Pulses: Normal pulses.      Heart sounds: No murmur heard.  Pulmonary:      Effort: Pulmonary effort is normal. No respiratory distress.      Breath sounds: Normal breath sounds. No wheezing.   Abdominal:      General: There is no distension.      Palpations: Abdomen is soft. There is no mass.      Tenderness: There is no abdominal tenderness.   Musculoskeletal:         General: No swelling.      Cervical back: Normal range of motion.   Skin:     Coloration: Skin is not jaundiced.      Findings: No rash.   Neurological:      General: No focal deficit present.      Mental Status: She is alert and oriented to person, place, and time.   Psychiatric:         Mood and Affect: Mood normal.         Thought Content: Thought content normal.       Labs:    Complete Blood Count  Lab Results   Component Value Date    RBC 4.88 09/23/2024    HGB 12.3 09/23/2024    HCT 40.5 09/23/2024    MCV 83 09/23/2024    MCH 25.2 (L) 09/23/2024    MCHC 30.4 (L) 09/23/2024    RDW 15.1 (H) 09/23/2024     09/23/2024    MPV 11.0 09/23/2024    GRAN 1.6 (L) 09/23/2024    GRAN 36.8 (L) 09/23/2024    LYMPH 2.2 09/23/2024    LYMPH 52.0 (H) 09/23/2024    MONO 0.3 09/23/2024    MONO 7.7 09/23/2024    EOS 0.1 09/23/2024    BASO 0.03 09/23/2024    EOSINOPHIL 2.6 09/23/2024    BASOPHIL 0.7 09/23/2024    DIFFMETHOD Automated  09/23/2024       Comprehensive Metabolic Panel  Lab Results   Component Value Date    GLU 98 09/23/2024    BUN 17 09/23/2024    CREATININE 1.23 09/23/2024     09/23/2024    K 5.1 09/23/2024     (H) 09/23/2024    PROT 7.2 09/23/2024    ALBUMIN 4.0 09/23/2024    BILITOT 0.7 09/23/2024    AST 24 09/23/2024    ALKPHOS 77 09/23/2024    CO2 25 09/23/2024    ALT 20 09/23/2024    ANIONGAP 5 (L) 09/23/2024       TSH  Lab Results   Component Value Date    TSH 0.507 09/23/2024       Imaging:  X-Ray Finger 2 or More Views Left  Narrative: EXAMINATION:  XR FINGER 2 OR MORE VIEWS LEFT    CLINICAL HISTORY:  2nd and 3rd finger crush injury;    COMPARISON:  A plain film examination of the left hand performed on 07/28/2021.    FINDINGS:  There are acute appearing fractures throughout the distal phalanx of the long finger.  There is an acute appearing fracture in the distal aspect of the distal phalanx of the index finger.  There is no dislocation.  Impression: 1. There are acute appearing fractures throughout the distal phalanx of the long finger.  2. There is an acute appearing fracture in the distal aspect of the distal phalanx of the index finger.    Electronically signed by: Chester Camarena MD  Date:    03/07/2023  Time:    16:03      Assessment/Plan:    1. Acute non-recurrent frontal sinusitis  Comments:  - Advised patient to take medications as prescribed  Orders:  -     amoxicillin-clavulanate 875-125mg (AUGMENTIN) 875-125 mg per tablet; Take 1 tablet by mouth every 12 (twelve) hours.  Dispense: 14 tablet; Refill: 0  -     benzonatate (TESSALON) 200 MG capsule; Take 1 capsule (200 mg total) by mouth 3 (three) times daily as needed for Cough.  Dispense: 30 capsule; Refill: 0    2. Type 2 diabetes mellitus with hyperglycemia, without long-term current use of insulin  Comments:  - Prescription increase   - Advised patient to take medicaiton as prescribed  Assessment & Plan:  - Chronic, stable  - Continue glipizide and  mounjaro  - Follow with PCP    Orders:  -     tirzepatide 7.5 mg/0.5 mL PnIj; Inject 7.5 mg into the skin every 7 days.  Dispense: 2 mL; Refill: 3    3. Major depressive disorder, recurrent, moderate  Assessment & Plan:  - Chronic, stable  - Continue xanax as needed  - Follow with PCP      4. Class 2 severe obesity due to excess calories with serious comorbidity and body mass index (BMI) of 35.0 to 35.9 in adult  Assessment & Plan:  - Body mass index is 36.67 kg/m².  - Recommendation for healthy diet and increasing exercise as tolerated with goal of 150min/week. Recommend weight loss.  - Increase mounjaro to 7.5mg once weekly              Discussed how to stay healthy including: diet, exercise, refraining from smoking and discussed screening exams / tests needed for age, sex and family Hx.      RTC every 3-6 months with PCP, or PRN    Jihan Bourgeois PA-C

## 2025-02-11 ENCOUNTER — TELEPHONE (OUTPATIENT)
Dept: ENDOSCOPY | Facility: HOSPITAL | Age: 65
End: 2025-02-11
Payer: COMMERCIAL

## 2025-02-11 NOTE — TELEPHONE ENCOUNTER
Referral for procedure from PAT appointment-r/s      Spoke to pt to schedule procedure(s) Colonoscopy       Physician to perform procedure(s) Dr. ricketts  Date of Procedure (s) 3/13/25  Arrival Time 8:15 AM  Time of Procedure(s) 9:15 AM   Location of Procedure(s) [place] 2nd Floor  Type of Rx Prep sent to patient: Suprep  Instructions provided to patient via Postal Mail    Patient was informed on the following information and verbalized understanding. Screening questionnaire reviewed with patient and complete. If procedure requires anesthesia, a responsible adult needs to be present to accompany the patient home, patient cannot drive after receiving anesthesia. Appointment details are tentative, especially check-in time. Patient will receive a prep-op call 7 days prior to confirm check-in time for procedure. If applicable the patient should contact their pharmacy to verify Rx for procedure prep is ready for pick-up. Patient was advised to call the scheduling department at [phone] if pharmacy states no Rx is available. Patient was advised to call the endoscopy scheduling department if any questions or concerns arise.      [facility] Scheduling Department

## 2025-02-11 NOTE — TELEPHONE ENCOUNTER
Colonoscopy Procedure Prep Instructions        Date of procedure: 3/13/25 Arrive at: 815AM     Location of Department:   [facility] [address] [place] [address]   Stop in the hospital lobby on the 1st floor to get registered, then take the hospital elevators to the 2nd floor waiting room     As soon as possible:   your prep from pharmacy and over the counter DULCOLAX LAXATIVE TABLETS      On the day before your procedure   What You CAN do:   You may have clear liquids ONLY -see below for list.      Liquids That Are OK to Drink:   Water  Sports drinks (Gatorade, Power-Aid)  Coffee or tea (no cream or nondairy creamer)  Clear juices without pulp (apple, white grape)  Gelatin desserts (no fruit or toppings)  Clear soda (sprite, coke, ginger ale)  Chicken broth (until 12 midnight the night before procedure)        What You CANNOT do:   Do not EAT solid food, drink milk or anything   colored red.  Do not drink alcohol.  Do not take oral medications within 1 hour of starting   each dose of SUPREP.  No gum chewing or candy morning of procedure.        Note:   (Please disregard the insert instructions from pharmacy).  SUPREP Bowel Prep Kit is indicated for cleansing of the colon as a preparation for colonoscopy in adults.   Be sure to tell your doctor about all the medicines you take, including prescription and non-prescription medicines, vitamins, and herbal supplements. SUPREP Bowel Prep Kit may affect how other medicines work.  Medication taken by mouth may not be absorbed properly when taken within 1 hour before the start of each dose of SUPREP Bowel Prep Kit.     It is not uncommon to experience some abdominal cramping, nausea and/or vomiting when taking the prep. If you have nausea and/or vomiting while taking the prep, stop drinking for 20 to 30 minutes then continue.     How to take prep:     SUPREP Bowel Prep Kit is a (2-day) prep.   Both 6-ounce bottles are required for a  complete preparation for colonoscopy. Dilute the solution concentrate as directed prior to use. You must drink water with each dose of SUPREP, and additional water after each dose.     DOSE 1--Day Before Colonoscopy 3/12/25     Drink at least 6 to 8 glasses of clear liquids from time you wake up until you begin your prep and then continue until bedtime to avoid dehydration.      12:00 pm (NOON) Take four (4) Dulcolax (Bisacodyl) tablets with at least 8 ounces or more of clear liquids.       6:00 pm:     You must complete Steps 1 through 4 using one (1) 6-ounce bottle before going to bed as shown below:     Step 1-Pour ONE (1) 6-ounce bottle of SUPREP liquid into the mixing container.  Step 2-Add cool drinking water to the 16-ounce line on the container and mix.  Step 3-Drink ALL the liquid in the container.  Step 4-You must drink two (2) more 16-ounce containers of water over the next 1 hour.  IMPORTANT: If you experience preparation-related symptoms (for example, nausea, bloating, or cramping), stop, or slow the rate of drinking the additional water until your symptoms decrease.     DOSE 2--Day of the Colonoscopy 3/13/25 at 2-3 AM.     For this dose, repeat Steps 1 through 4 shown above using the other 6-ounce bottle.   You may continue drinking water/clear liquids until   2 hours before your colonoscopy or as directed by the scheduling nurse  715AM.     For information about your procedure, two (2) things to view prior to colonoscopy:  Please watch this informational video. It is important to watch this animated consent video prior to your arrival. If you haven't watched the video prior to arriving, you are required to watch it during admission which can causes delays.     Options for viewing:   Using a keyboard:  press and hold the control tab (Ctrl) and left mouse click to follow links.            Colonoscopy Instructional Video                                                                                    OR     Type link address into your web browser's address bar:  [email or web address]        Educational Booklet with pictures:        Colonoscopy Prep - Liquid        Comments:                    IMPORTANT INFORMATION TO KNOW BEFORE YOUR PROCEDURE     [facility] 2nd Floor            If your procedure requires the administration of anesthesia, it is necessary for a responsible adult to drive you home. (Medical Transportation, Uber, Lyft, Taxi, etc. may ONLY be used if a responsible adult is present to accompany you home.  The responsible adult CAN'T be the  of the service).       person must be available to return to pick you up within 15 minutes of being notified of discharge.         Please bring a picture ID, insurance card, & copayment        Take Medications as directed below:     If you are taking any injectable medication (s) for weight loss and/or diabetes  weekly, hold for 8 days prior to your scheduled procedure, please stop taking Mounjaro (Tirzepatide)}on 3/5/25. After the procedure, your provider will inform you  of when to resume injection.        If you begin taking any blood thinning medications, injectable weight loss/diabetes medications (other than insulin) , or Adipex (Phentermine) please contact the endoscopy scheduling department listed below as soon as possible.     If you are diabetic see the attached instruction sheet regarding your medication.      If you take HEART, BLOOD PRESSURE, SEIZURE, PAIN, LUNG (including inhalers/nebulizers), ANTI-REJECTION (transplant patients), or PSYCHIATRIC medications, please take at your regular times with a sip of water or as directed by the scheduling nurse.      Important contact information:     Endoscopy [phone] Hours of operation Monday-Friday 8:00-4:30pm.     Questions about insurance or financial obligations call [phone] or [phone].     If you have questions regarding  the prep or need to reschedule, please call [phone]. After hours questions requiring immediate assistance, contact [facility] nurse line at [phone] or [phone].   NOTE:      On occasion, unforeseen circumstances may cause a delay in your procedure start time. We respect your time and appreciate your patience during these circumstances.            Comments:

## 2025-02-17 ENCOUNTER — OFFICE VISIT (OUTPATIENT)
Dept: FAMILY MEDICINE | Facility: CLINIC | Age: 65
End: 2025-02-17
Payer: COMMERCIAL

## 2025-02-17 VITALS
OXYGEN SATURATION: 98 % | HEART RATE: 91 BPM | TEMPERATURE: 98 F | SYSTOLIC BLOOD PRESSURE: 130 MMHG | BODY MASS INDEX: 36.17 KG/M2 | HEIGHT: 63 IN | WEIGHT: 204.13 LBS | DIASTOLIC BLOOD PRESSURE: 70 MMHG

## 2025-02-17 DIAGNOSIS — I10 ESSENTIAL HYPERTENSION: ICD-10-CM

## 2025-02-17 DIAGNOSIS — J30.9 CHRONIC ALLERGIC RHINITIS: ICD-10-CM

## 2025-02-17 DIAGNOSIS — J32.1 CHRONIC FRONTAL SINUSITIS: Primary | ICD-10-CM

## 2025-02-17 PROCEDURE — 3075F SYST BP GE 130 - 139MM HG: CPT | Mod: CPTII,S$GLB,, | Performed by: PHYSICIAN ASSISTANT

## 2025-02-17 PROCEDURE — 1160F RVW MEDS BY RX/DR IN RCRD: CPT | Mod: CPTII,S$GLB,, | Performed by: PHYSICIAN ASSISTANT

## 2025-02-17 PROCEDURE — 1159F MED LIST DOCD IN RCRD: CPT | Mod: CPTII,S$GLB,, | Performed by: PHYSICIAN ASSISTANT

## 2025-02-17 PROCEDURE — 3008F BODY MASS INDEX DOCD: CPT | Mod: CPTII,S$GLB,, | Performed by: PHYSICIAN ASSISTANT

## 2025-02-17 PROCEDURE — 3078F DIAST BP <80 MM HG: CPT | Mod: CPTII,S$GLB,, | Performed by: PHYSICIAN ASSISTANT

## 2025-02-17 RX ORDER — TRIAMCINOLONE ACETONIDE 40 MG/ML
80 INJECTION, SUSPENSION INTRA-ARTICULAR; INTRAMUSCULAR ONCE
Status: COMPLETED | OUTPATIENT
Start: 2025-02-17 | End: 2025-02-17

## 2025-02-17 RX ADMIN — TRIAMCINOLONE ACETONIDE 80 MG: 40 INJECTION, SUSPENSION INTRA-ARTICULAR; INTRAMUSCULAR at 01:02
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
## 2025-02-25 ENCOUNTER — OFFICE VISIT (OUTPATIENT)
Dept: FAMILY MEDICINE | Facility: CLINIC | Age: 65
End: 2025-02-25
Payer: COMMERCIAL

## 2025-02-25 VITALS
TEMPERATURE: 97 F | OXYGEN SATURATION: 98 % | WEIGHT: 199.06 LBS | DIASTOLIC BLOOD PRESSURE: 82 MMHG | SYSTOLIC BLOOD PRESSURE: 128 MMHG | HEIGHT: 63 IN | HEART RATE: 97 BPM | BODY MASS INDEX: 35.27 KG/M2

## 2025-02-25 DIAGNOSIS — I10 ESSENTIAL HYPERTENSION: ICD-10-CM

## 2025-02-25 DIAGNOSIS — E66.812 CLASS 2 SEVERE OBESITY DUE TO EXCESS CALORIES WITH SERIOUS COMORBIDITY AND BODY MASS INDEX (BMI) OF 35.0 TO 35.9 IN ADULT: ICD-10-CM

## 2025-02-25 DIAGNOSIS — E78.2 MIXED HYPERLIPIDEMIA: ICD-10-CM

## 2025-02-25 DIAGNOSIS — M81.6 LOCALIZED OSTEOPOROSIS WITHOUT CURRENT PATHOLOGICAL FRACTURE: ICD-10-CM

## 2025-02-25 DIAGNOSIS — K21.9 GASTROESOPHAGEAL REFLUX DISEASE WITHOUT ESOPHAGITIS: ICD-10-CM

## 2025-02-25 DIAGNOSIS — I95.9 HYPOTENSION, UNSPECIFIED HYPOTENSION TYPE: ICD-10-CM

## 2025-02-25 DIAGNOSIS — E11.65 TYPE 2 DIABETES MELLITUS WITH HYPERGLYCEMIA, WITHOUT LONG-TERM CURRENT USE OF INSULIN: Primary | ICD-10-CM

## 2025-02-25 DIAGNOSIS — E66.01 CLASS 2 SEVERE OBESITY DUE TO EXCESS CALORIES WITH SERIOUS COMORBIDITY AND BODY MASS INDEX (BMI) OF 35.0 TO 35.9 IN ADULT: ICD-10-CM

## 2025-02-25 PROCEDURE — 3008F BODY MASS INDEX DOCD: CPT | Mod: CPTII,S$GLB,, | Performed by: STUDENT IN AN ORGANIZED HEALTH CARE EDUCATION/TRAINING PROGRAM

## 2025-02-25 PROCEDURE — 1160F RVW MEDS BY RX/DR IN RCRD: CPT | Mod: CPTII,S$GLB,, | Performed by: STUDENT IN AN ORGANIZED HEALTH CARE EDUCATION/TRAINING PROGRAM

## 2025-02-25 PROCEDURE — 99214 OFFICE O/P EST MOD 30 MIN: CPT | Mod: S$GLB,,, | Performed by: STUDENT IN AN ORGANIZED HEALTH CARE EDUCATION/TRAINING PROGRAM

## 2025-02-25 PROCEDURE — 4010F ACE/ARB THERAPY RXD/TAKEN: CPT | Mod: CPTII,S$GLB,, | Performed by: STUDENT IN AN ORGANIZED HEALTH CARE EDUCATION/TRAINING PROGRAM

## 2025-02-25 PROCEDURE — 1159F MED LIST DOCD IN RCRD: CPT | Mod: CPTII,S$GLB,, | Performed by: STUDENT IN AN ORGANIZED HEALTH CARE EDUCATION/TRAINING PROGRAM

## 2025-02-25 PROCEDURE — 3079F DIAST BP 80-89 MM HG: CPT | Mod: CPTII,S$GLB,, | Performed by: STUDENT IN AN ORGANIZED HEALTH CARE EDUCATION/TRAINING PROGRAM

## 2025-02-25 PROCEDURE — 3074F SYST BP LT 130 MM HG: CPT | Mod: CPTII,S$GLB,, | Performed by: STUDENT IN AN ORGANIZED HEALTH CARE EDUCATION/TRAINING PROGRAM

## 2025-02-25 PROCEDURE — G2211 COMPLEX E/M VISIT ADD ON: HCPCS | Mod: S$GLB,,, | Performed by: STUDENT IN AN ORGANIZED HEALTH CARE EDUCATION/TRAINING PROGRAM

## 2025-02-25 RX ORDER — PROPRANOLOL HYDROCHLORIDE 80 MG/1
80 CAPSULE, EXTENDED RELEASE ORAL DAILY
Qty: 90 CAPSULE | Refills: 1 | Status: SHIPPED | OUTPATIENT
Start: 2025-02-25

## 2025-02-25 RX ORDER — OMEPRAZOLE 20 MG/1
20 CAPSULE, DELAYED RELEASE ORAL DAILY
Qty: 90 CAPSULE | Refills: 3 | Status: SHIPPED | OUTPATIENT
Start: 2025-02-25

## 2025-02-25 RX ORDER — LOSARTAN POTASSIUM 50 MG/1
50 TABLET ORAL DAILY
Qty: 90 TABLET | Refills: 2 | Status: SHIPPED | OUTPATIENT
Start: 2025-02-25

## 2025-02-25 NOTE — PROGRESS NOTES
Patient ID: Carmen Son is a 64 y.o. female.     Chief Complaint: f/u chronic medical conditions    Follow-up  Pertinent negatives include no chest pain, coughing, fever, headaches, myalgias, nausea, rash, vomiting or weakness.     History of Present Illness    Carmen presents today for follow up.    She recently saw an ENT specialist who diagnosed her with temporomandibular joint (TMJ) disorder based on observed joint movement during exam. She received three steroids during the visit.    She reports acid reflux symptoms including mucus production and burning sensation in her throat. She has been using her 's omeprazole 20 mg with noted improvement in symptoms, better sleep, and decreased mucus production.    She is scheduled for RAST testing on March 18th to determine specific allergens. She has been advised to discontinue propranolol one week prior to testing.    Current medications include Losartan and propranolol. She uses Linzess every 2-3 weeks for bowel movements (decreased from weekly). She discontinued Xanax 6 days ago. When previously attempting to stop propranolol, she experienced increased pulse to 130 and palpitations. She discontinued diabetes medication as her endocrinologist indicated she was no longer diabetic due to Mounjaro therapy.         Review of Systems  Review of Systems   Constitutional:  Negative for fever.   HENT:  Negative for ear pain and sinus pain.    Eyes:  Negative for discharge.   Respiratory:  Negative for cough and shortness of breath.    Cardiovascular:  Negative for chest pain and leg swelling.   Gastrointestinal:  Negative for diarrhea, nausea and vomiting.   Genitourinary:  Negative for urgency.   Musculoskeletal:  Negative for myalgias.   Skin:  Negative for rash.   Neurological:  Negative for weakness and headaches.   Psychiatric/Behavioral:  Negative for depression.    All other systems reviewed and are negative.      Currently Medications  Medications  "Ordered Prior to Encounter[1]    Physical  Exam  Vitals:    02/25/25 1348   BP: 128/82   BP Location: Right arm   Patient Position: Sitting   Pulse: 97   Temp: 97.4 °F (36.3 °C)   SpO2: 98%   Weight: 90.3 kg (199 lb 1.2 oz)   Height: 5' 3" (1.6 m)      Body mass index is 35.26 kg/m².  Wt Readings from Last 3 Encounters:   02/25/25 90.3 kg (199 lb 1.2 oz)   02/17/25 92.6 kg (204 lb 2.3 oz)   02/10/25 93.9 kg (207 lb 0.2 oz)       Physical Exam  Vitals and nursing note reviewed.   Constitutional:       General: She is not in acute distress.     Appearance: She is not ill-appearing.   HENT:      Head: Normocephalic and atraumatic.      Right Ear: External ear normal.      Left Ear: External ear normal.      Nose: Nose normal.      Mouth/Throat:      Mouth: Mucous membranes are moist.   Eyes:      Extraocular Movements: Extraocular movements intact.      Conjunctiva/sclera: Conjunctivae normal.   Cardiovascular:      Rate and Rhythm: Normal rate and regular rhythm.      Pulses: Normal pulses.      Heart sounds: No murmur heard.  Pulmonary:      Effort: Pulmonary effort is normal. No respiratory distress.      Breath sounds: No wheezing.   Abdominal:      General: There is no distension.      Palpations: Abdomen is soft. There is no mass.      Tenderness: There is no abdominal tenderness.   Musculoskeletal:         General: No swelling.      Cervical back: Normal range of motion.   Skin:     Coloration: Skin is not jaundiced.      Findings: No rash.   Neurological:      General: No focal deficit present.      Mental Status: She is alert and oriented to person, place, and time.   Psychiatric:         Mood and Affect: Mood normal.         Thought Content: Thought content normal.         Labs:    Complete Blood Count  Lab Results   Component Value Date    RBC 4.88 09/23/2024    HGB 12.3 09/23/2024    HCT 40.5 09/23/2024    MCV 83 09/23/2024    MCH 25.2 (L) 09/23/2024    MCHC 30.4 (L) 09/23/2024    RDW 15.1 (H) 09/23/2024    "  09/23/2024    MPV 11.0 09/23/2024    GRAN 1.6 (L) 09/23/2024    GRAN 36.8 (L) 09/23/2024    LYMPH 2.2 09/23/2024    LYMPH 52.0 (H) 09/23/2024    MONO 0.3 09/23/2024    MONO 7.7 09/23/2024    EOS 0.1 09/23/2024    BASO 0.03 09/23/2024    EOSINOPHIL 2.6 09/23/2024    BASOPHIL 0.7 09/23/2024    DIFFMETHOD Automated 09/23/2024       Comprehensive Metabolic Panel  Lab Results   Component Value Date    GLU 98 09/23/2024    BUN 17 09/23/2024    CREATININE 1.23 09/23/2024     09/23/2024    K 5.1 09/23/2024     (H) 09/23/2024    PROT 7.2 09/23/2024    ALBUMIN 4.0 09/23/2024    BILITOT 0.7 09/23/2024    AST 24 09/23/2024    ALKPHOS 77 09/23/2024    CO2 25 09/23/2024    ALT 20 09/23/2024    ANIONGAP 5 (L) 09/23/2024       TSH  Lab Results   Component Value Date    TSH 0.507 09/23/2024       Imaging:  X-Ray Finger 2 or More Views Left  Narrative: EXAMINATION:  XR FINGER 2 OR MORE VIEWS LEFT    CLINICAL HISTORY:  2nd and 3rd finger crush injury;    COMPARISON:  A plain film examination of the left hand performed on 07/28/2021.    FINDINGS:  There are acute appearing fractures throughout the distal phalanx of the long finger.  There is an acute appearing fracture in the distal aspect of the distal phalanx of the index finger.  There is no dislocation.  Impression: 1. There are acute appearing fractures throughout the distal phalanx of the long finger.  2. There is an acute appearing fracture in the distal aspect of the distal phalanx of the index finger.    Electronically signed by: Chester Camarena MD  Date:    03/07/2023  Time:    16:03      Assessment/Plan:  Assessment & Plan    Assessed acid reflux symptoms, determining Omeprazole 20mg was effective  Evaluated weight loss progress on Ozempic (semaglutide), noting significant results  Reviewed allergy test plans, advising discontinuation of propranolol 1 week prior  Considered TMJ diagnosis from ENT  Noted well-controlled diabetes      ACID REFLUX:  -  Prescribed Omeprazole 20mg to be taken daily, first thing in the morning before breakfast or coffee.  - Noted that the patient reports spitting up mucus and feeling a burn, which improved after taking Omeprazole.  - Confirmed symptoms are consistent with acid reflux.  - Assessed the effectiveness of the current medication and determined the appropriate dosage.  - Explained the preventive action of Omeprazole to the patient.    HYPERTENSION:  - Observed that the patient's blood pressure is well-controlled.  - Assessed the need to temporarily discontinue propranolol for an upcoming allergy test.  - Advised to stop propranolol for 1 week before the allergy test.  - Warned about potential palpitations during the propranolol discontinuation period.  - Sent refills for propranolol and Losartan.  - Discussed potential for increased heart rate and palpitations after stopping propranolol.  - Warned the patient about possible palpitations after discontinuing propranolol.  - Refilled propranolol prescription.      CONSTIPATION:  - Continued Linzess as needed for constipation management.  - Inquired about the patient's bowel movements.  - Confirmed with the patient that bowel movements are satisfactory.  - Assessed the use of Linzess for constipation management.    DIABETES:  - Noted that the patient was previously diagnosed with diabetes but is no longer considered diabetic due to weight loss medication.  - Acknowledged patient's history of diabetes and current management with endocrinologist.  - Confirmed that the patient continues to see endocrinologist for monitoring, despite no longer being considered diabetic.      LABS:  - Blood work ordered for March 24th at 11 AM.    FOLLOW UP:  - Follow up in September (6 months from now).  - Contact the office if any problems arise before the next scheduled appointment.         1. Type 2 diabetes mellitus with hyperglycemia, without long-term current use of insulin  -     Hemoglobin  A1C; Future; Expected date: 02/25/2025  -     TSH; Future; Expected date: 02/25/2025    2. Gastroesophageal reflux disease without esophagitis  -     omeprazole (PRILOSEC) 20 MG capsule; Take 1 capsule (20 mg total) by mouth once daily. Take before breakfast.  Dispense: 90 capsule; Refill: 3    3. Hypotension, unspecified hypotension type  -     propranoloL (INDERAL LA) 80 MG 24 hr capsule; Take 1 capsule (80 mg total) by mouth once daily.  Dispense: 90 capsule; Refill: 1    4. Essential hypertension  -     losartan (COZAAR) 50 MG tablet; Take 1 tablet (50 mg total) by mouth once daily.  Dispense: 90 tablet; Refill: 2  -     CBC Auto Differential; Future  -     Comprehensive Metabolic Panel; Future; Expected date: 02/25/2025    5. Mixed hyperlipidemia  -     Lipid Panel; Future; Expected date: 02/25/2025  -     TSH; Future; Expected date: 02/25/2025    6. Localized osteoporosis without current pathological fracture    7. Class 2 severe obesity due to excess calories with serious comorbidity and body mass index (BMI) of 35.0 to 35.9 in adult         Discussed how to stay healthy including: diet, exercise, refraining from smoking and discussed screening exams / tests needed for age, sex and family Hx.        Anson López MD    This note was generated with the assistance of ambient listening technology. Verbal consent was obtained by the patient and accompanying visitor(s) for the recording of patient appointment to facilitate this note. I attest to having reviewed and edited the generated note for accuracy, though some syntax or spelling errors may persist. Please contact the author of this note for any clarification.           [1]   Current Outpatient Medications on File Prior to Visit   Medication Sig Dispense Refill    alendronate (FOSAMAX) 70 MG tablet Take 1 tablet (70 mg total) by mouth every 7 days. 12 tablet 3    ALPRAZolam (XANAX XR) 0.5 MG Tb24 Take 0.5 mg by mouth.      amoxicillin-clavulanate 875-125mg  (AUGMENTIN) 875-125 mg per tablet Take 1 tablet by mouth every 12 (twelve) hours. 14 tablet 0    ascorbic acid, vitamin C, (VITAMIN C) 500 MG tablet Take 500 mg by mouth once daily.      azelastine (ASTELIN) 137 mcg (0.1 %) nasal spray 2 sprays by Each Nostril route 2 (two) times daily.      benzonatate (TESSALON) 200 MG capsule Take 1 capsule (200 mg total) by mouth 3 (three) times daily as needed for Cough. 30 capsule 0    ergocalciferol (ERGOCALCIFEROL) 50,000 unit Cap Take 1 capsule (50,000 Units total) by mouth every 7 days. 12 capsule 3    evolocumab (REPATHA SURECLICK) 140 mg/mL PnIj Inject 1 mL (140 mg total) into the skin every 14 (fourteen) days. 2 mL 3    furosemide (LASIX) 20 MG tablet Take 1 tablet (20 mg total) by mouth daily as needed (swelling). 30 tablet 3    linaCLOtide (LINZESS) 145 mcg Cap capsule Take 1 capsule (145 mcg total) by mouth once daily. 90 capsule 3    MULTIVITAMIN ORAL multivitamin      mupirocin (BACTROBAN) 2 % ointment Apply topically 3 (three) times daily. 30 g 0    tirzepatide 7.5 mg/0.5 mL PnIj Inject 7.5 mg into the skin every 7 days. 2 mL 3    zonisamide (ZONEGRAN) 100 MG Cap TAKE 1 CAPSULE BY MOUTH IN THE EVENING 90 capsule 1    [DISCONTINUED] losartan (COZAAR) 50 MG tablet Take 1 tablet (50 mg total) by mouth once daily. 90 tablet 2    [DISCONTINUED] omeprazole 20 mg TbEC       [DISCONTINUED] propranoloL (INDERAL LA) 80 MG 24 hr capsule Take 1 capsule (80 mg total) by mouth once daily. 90 capsule 1     No current facility-administered medications on file prior to visit.

## 2025-03-08 DIAGNOSIS — M81.6 LOCALIZED OSTEOPOROSIS WITHOUT CURRENT PATHOLOGICAL FRACTURE: ICD-10-CM

## 2025-03-08 NOTE — TELEPHONE ENCOUNTER
Care Due:                  Date            Visit Type   Department     Provider  --------------------------------------------------------------------------------                                EP -                              PRIMARY      [facility] FAM[facility]  Last Visit: 02-      CARE ([facility])   MEDICINE       Anson López                              EP -                              PRIMARY      [facility]  Next Visit: 09-      CARE ([facility])   MEDICINE       Anson López                                                            Last  Test          Frequency    Reason                     Performed    Due Date  --------------------------------------------------------------------------------    Mg Level....  12 months..  alendronate..............  Not Found    Overdue    Phosphate...  12 months..  alendronate..............  Not Found    Overdue    Vitamin D...  12 months..  ergocalciferol...........  Not Found    Overdue    [employer] Embedded Care Due Messages. Reference number: [id number].   3/08/2025 7:05:23 AM CST

## 2025-03-10 RX ORDER — ERGOCALCIFEROL 1.25 MG/1
50000 CAPSULE ORAL
Qty: 12 CAPSULE | Refills: 0 | Status: SHIPPED | OUTPATIENT
Start: 2025-03-10

## 2025-03-11 ENCOUNTER — TELEPHONE (OUTPATIENT)
Dept: ENDOSCOPY | Facility: HOSPITAL | Age: 65
End: 2025-03-11
Payer: COMMERCIAL

## 2025-03-11 NOTE — TELEPHONE ENCOUNTER
Referral for procedure from telephone call r/s priscilla      Spoke to patient to schedule procedure(s) Colonoscopy       Physician to perform procedure(s) Dr. NORA Horvath  Date of Procedure (s) 4/8/25  Arrival Time 9:00 AM  Time of Procedure(s) 10:00 AM   Location of Procedure(s) [place] 2nd Floor  Type of Rx Prep sent to patient: Suprep  Instructions provided to patient via Email [email or web address]     Patient was informed on the following information and verbalized understanding. Screening questionnaire reviewed with patient and complete. If procedure requires anesthesia, a responsible adult needs to be present to accompany the patient home, patient cannot drive after receiving anesthesia. Appointment details are tentative, especially check-in time. Patient will receive a prep-op call 7 days prior to confirm check-in time for procedure. If applicable the patient should contact their pharmacy to verify Rx for procedure prep is ready for pick-up. Patient was advised to call the scheduling department at [phone] if pharmacy states no Rx is available. Patient was advised to call the endoscopy scheduling department if any questions or concerns arise.      [facility] Scheduling Department

## 2025-03-24 ENCOUNTER — LAB VISIT (OUTPATIENT)
Dept: LAB | Facility: HOSPITAL | Age: 65
End: 2025-03-24
Attending: STUDENT IN AN ORGANIZED HEALTH CARE EDUCATION/TRAINING PROGRAM
Payer: COMMERCIAL

## 2025-03-24 DIAGNOSIS — I10 ESSENTIAL HYPERTENSION: Primary | ICD-10-CM

## 2025-03-24 DIAGNOSIS — E11.9 CONTROLLED TYPE 2 DIABETES MELLITUS WITHOUT COMPLICATION, WITHOUT LONG-TERM CURRENT USE OF INSULIN: ICD-10-CM

## 2025-03-24 DIAGNOSIS — E78.2 MIXED HYPERLIPIDEMIA: ICD-10-CM

## 2025-03-24 LAB
ABSOLUTE EOSINOPHIL (OHS): 0.1 K/UL
ABSOLUTE MONOCYTE (OHS): 0.25 K/UL (ref 0.3–1)
ABSOLUTE NEUTROPHIL COUNT (OHS): 1.65 K/UL (ref 1.8–7.7)
BASOPHILS # BLD AUTO: 0.01 K/UL
BASOPHILS NFR BLD AUTO: 0.3 %
CHOLEST SERPL-MCNC: 257 MG/DL (ref 120–199)
CHOLEST/HDLC SERPL: 4.4 {RATIO} (ref 2–5)
EAG (OHS): 123 MG/DL (ref 68–131)
ERYTHROCYTE [DISTWIDTH] IN BLOOD BY AUTOMATED COUNT: 15.3 % (ref 11.5–14.5)
HBA1C MFR BLD: 5.9 % (ref 4–5.6)
HCT VFR BLD AUTO: 43.2 % (ref 37–48.5)
HDLC SERPL-MCNC: 58 MG/DL (ref 40–75)
HDLC SERPL: 22.6 % (ref 20–50)
HGB BLD-MCNC: 13.2 GM/DL (ref 12–16)
IMM GRANULOCYTES # BLD AUTO: 0.01 K/UL (ref 0–0.04)
IMM GRANULOCYTES NFR BLD AUTO: 0.3 % (ref 0–0.5)
LDLC SERPL CALC-MCNC: 175.6 MG/DL (ref 63–159)
LYMPHOCYTES # BLD AUTO: 1.64 K/UL (ref 1–4.8)
MCH RBC QN AUTO: 25.5 PG (ref 27–50)
MCHC RBC AUTO-ENTMCNC: 30.6 G/DL (ref 32–36)
MCV RBC AUTO: 84 FL (ref 82–98)
NONHDLC SERPL-MCNC: 199 MG/DL
NUCLEATED RBC (/100WBC) (OHS): 0 /100 WBC
PLATELET # BLD AUTO: 281 K/UL (ref 150–450)
PMV BLD AUTO: 10.3 FL (ref 9.2–12.9)
RBC # BLD AUTO: 5.17 M/UL (ref 4–5.4)
RELATIVE EOSINOPHIL (OHS): 2.7 %
RELATIVE LYMPHOCYTE (OHS): 44.8 % (ref 18–48)
RELATIVE MONOCYTE (OHS): 6.8 % (ref 4–15)
RELATIVE NEUTROPHIL (OHS): 45.1 % (ref 38–73)
TRIGL SERPL-MCNC: 117 MG/DL (ref 30–150)
TSH SERPL-ACNC: 0.43 UIU/ML (ref 0.4–4)
WBC # BLD AUTO: 3.66 K/UL (ref 3.9–12.7)

## 2025-03-24 PROCEDURE — 36415 COLL VENOUS BLD VENIPUNCTURE: CPT | Mod: PN

## 2025-03-24 PROCEDURE — 84443 ASSAY THYROID STIM HORMONE: CPT | Mod: PN

## 2025-03-24 PROCEDURE — 80061 LIPID PANEL: CPT | Mod: PN

## 2025-03-24 PROCEDURE — 82043 UR ALBUMIN QUANTITATIVE: CPT | Mod: PN

## 2025-03-24 PROCEDURE — 85025 COMPLETE CBC W/AUTO DIFF WBC: CPT | Mod: PN

## 2025-03-24 PROCEDURE — 83036 HEMOGLOBIN GLYCOSYLATED A1C: CPT | Mod: PN

## 2025-03-24 PROCEDURE — 82306 VITAMIN D 25 HYDROXY: CPT

## 2025-03-25 LAB
25(OH)D3+25(OH)D2 SERPL-MCNC: 48 NG/ML (ref 30–96)
ALBUMIN/CREAT UR: 3.1 UG/MG
CREAT UR-MCNC: 290 MG/DL (ref 15–325)
MICROALBUMIN UR-MCNC: 9 UG/ML (ref ?–5000)

## 2025-04-07 ENCOUNTER — TELEPHONE (OUTPATIENT)
Dept: ENDOSCOPY | Facility: HOSPITAL | Age: 65
End: 2025-04-07
Payer: COMMERCIAL

## 2025-04-07 NOTE — TELEPHONE ENCOUNTER
Received patient's call needing to cancel her colonoscopy for tomorrow. She is out of town. Informed patient she will need a new colonoscopy referral from her ordering provider to get rescheduled. She verbalized an understanding.

## 2025-04-07 NOTE — TELEPHONE ENCOUNTER
Left message instructing patient to call dept @ [phone] between 8am-4pm.    Arrival time to be given @ 0900.  (Message sent via [facility] portal)

## 2025-04-16 NOTE — PROGRESS NOTES
Orthopaedic Consult  Patient:  Brandon Jacobs  YOB: 1951  MRN: [id number]     Acct: [id number]    PCP: Rajeev Brennan, FELI - CNP  Date of Admission: 4/10/2025  Date of Service: Pt seen/examined on 4/16/2025     Chief Complaint: R knee pain  History Of Present Illness: 73 y.o. male who presents with atraumatic onset R knee pain.Pain is so severe it has lead to significant difficulty in mobilization. Has had bilateral knee pain for over ten years but not this severe. Admits to intermittent effusion, but not as severe as this bout as well. Denies any trauma or other msk concerns. Pain is to the R global knee, worsened with weight bearing, limited ROM is to his baseline secondary to the severity of his known OA. No paresthesias or weakness, non radiating aching anterior knee pain. States he has a history of gout and has prior arthrocentesis, but this was several years ago. He admits to catching and feelings of instability. No other msk concerns    Walker dependent secondary to bilateral knee pain, R > L    Past Medical History:        Diagnosis Date    Arthritis     CAD (coronary artery disease) 04/05/2012    CHF (congestive heart failure) (HCC)     Diabetes mellitus (HCC)     Diverticulosis of colon     History of chest pain     History of tobacco abuse     Hyperlipidemia     Hypertension     Pneumonia        Past Surgical History:        Procedure Laterality Date    APPENDECTOMY      CARDIAC SURGERY      heart cath    COLECTOMY  2001    D/T DIVERTICULOSIS    COLONOSCOPY      COLONOSCOPY  6/7/2023    COLONOSCOPY POLYPECTOMY SNARE/COLD BIOPSY performed by Manuela Middleton MD at [facility]    DIAGNOSTIC CARDIAC CATH LAB PROCEDURE  09/17/2010    EF 60% C MILD DISEASE IN THE PROXIMAL  PORTION BEFORE THE BIFURCATION OF D1,MILD DISEASE IS NOTED IN THE RCA , DIFFUSE AT 10-20% RCA ALSO HAS 10-20% LESIONS    DILATATION, ESOPHAGUS      TRANSTHORACIC ECHOCARDIOGRAM  10/22/2010    EF 55-65% C MILD LFT  "CC: Well woman exam    Carmen Son is a 63 y.o. female  presents for a well woman exam.   She has difficulty with orgasm.  She had back surgery, anxiety and depression  She is working on her DM as well.    Past Medical History:   Diagnosis Date    Acid reflux     Diabetes mellitus     Diabetes mellitus, type 2     Diet controlled gestational diabetes mellitus in puerperium     History of seasonal allergies     Hypertension      Past Surgical History:   Procedure Laterality Date    ANTERIOR CERVICAL DISCECTOMY W/ FUSION N/A 2018    Procedure: DISCECTOMY, SPINE, CERVICAL, ANTERIOR APPROACH, WITH FUSION C3-4, C4-5;  Surgeon: Leo Denton MD;  Location: [facility] OR [address] [place] [address];  Service: Neurosurgery;  Laterality: N/A;  toronto II, asa 2, regular bed, supine, josefian    HYSTERECTOMY       [facility], R SALPINGECT [facility]    MINIMALLY INVASIVE TRANSFORAMINAL LUMBAR INTERBODY FUSION (TLIF) N/A 2021    Procedure: FUSION, SPINE, LUMBAR, TLIF, MINIMALLY INVASIVE L4-5;  Surgeon: Leo Denton MD;  Location: [facility] OR [address] [place] [address];  Service: Neurosurgery;  Laterality: N/A;  Centenary table 4 poster, prone, neuromonitoring, josefina, kriss Buchananman    right knee scope       Family History   Problem Relation Age of Onset    Diabetes type II Sister     Hyperlipidemia Sister     Diabetes type II Other     Allergic rhinitis Neg Hx     Allergies Neg Hx     Angioedema Neg Hx     Asthma Neg Hx     Atopy Neg Hx     Eczema Neg Hx     Rhinitis Neg Hx     Urticaria Neg Hx     Immunodeficiency Neg Hx     Breast cancer Neg Hx     Colon cancer Neg Hx     Ovarian cancer Neg Hx      Social History     Tobacco Use    Smoking status: Never    Smokeless tobacco: Never   Substance Use Topics    Alcohol use: No    Drug use: No     OB History          1    Para   1    Term   1       0    AB   0    Living   0         SAB   0    IAB   0    Ectopic   0    Multiple   0    Live Births   0                 /78   Ht 5' 3" (1.6 m)  "  Wt 94.3 kg (207 lb 14.3 oz)   LMP 04/07/2013   BMI 36.83 kg/m²     ROS:  GENERAL: Denies weight gain or weight loss. Feeling well overall.   SKIN: Denies rash or lesions.   HEAD: Denies head injury or headache.   NODES: Denies enlarged lymph nodes.   CHEST: Denies chest pain or shortness of breath.   CARDIOVASCULAR: Denies palpitations or left sided chest pain.   ABDOMEN: No abdominal pain, constipation, diarrhea, nausea, vomiting or rectal bleeding.   URINARY: No frequency, dysuria, hematuria, or burning on urination.  REPRODUCTIVE: See HPI.   BREASTS: The patient performs breast self-examination and denies pain, lumps, or nipple discharge.   HEMATOLOGIC: No easy bruisability or excessive bleeding.   MUSCULOSKELETAL: Denies joint pain or swelling.   NEUROLOGIC: Denies syncope or weakness.   PSYCHIATRIC: Denies depression, anxiety or mood swings.    PE:   APPEARANCE: Well nourished, well developed, in no acute distress.  AFFECT: WNL, alert and oriented x 3.  SKIN: No acne or hirsutism.  NECK: Neck symmetric without masses or thyromegaly.  NODES: No inguinal, cervical, axillary or femoral lymph node enlargement.  CHEST: Good respiratory effort.   ABDOMEN: Soft. No tenderness or masses. No hepatosplenomegaly. No hernias.  BREASTS: Symmetrical, no skin changes or visible lesions. No palpable masses, nipple discharge bilaterally.  PELVIC: Normal external female genitalia without lesions. Normal hair distribution. Adequate perineal body, normal urethral meatus. Vagina atrophic without lesions or discharge. No significant cystocele or rectocele. Bimanual exam shows uterus and cervix to be surgically absent. Adnexa without masses or tenderness.  RECTAL: Rectovaginal exam confirms above with normal sphincter tone, no masses.  EXTREMITIES: No edema.    Diagnosis      ICD-10-CM ICD-9-CM    1. Encounter for gynecological examination without abnormal finding  Z01.419 V72.31       2. S/P hysterectomy  Z90.710 V88.01       3.  Hypertension, unspecified type  I10 401.9       4. Anxiety  F41.9 300.00       5. Type 2 diabetes mellitus without complication, without long-term current use of insulin  E11.9 250.00       6. Decreased libido  R68.82 799.81             MMG was normal in Nov 23 at [facility]  Discussed Pelvic floor PT, possible couple therapy   Kegel exercises    Patient was counseled today on A.C.S. Pap guidelines and recommendations for yearly pelvic exams, mammograms and monthly self breast exams; to see her PCP for other health maintenance.       Follow up if symptoms worsen or fail to improve.                     PA-C, 81 mg at 04/15/25 0940    calcium elemental (OSCAL) tablet 500 mg, 500 mg, Oral, BID, Nahid Morin PA-C, 500 mg at 04/15/25 2117    carvedilol (COREG) tablet 25 mg, 25 mg, Oral, BID, Nahid Morin PA-C, 25 mg at 04/15/25 2118    vitamin B-12 (CYANOCOBALAMIN) tablet 1,000 mcg, 1,000 mcg, Oral, Daily, Nahid Morin PA-C, 1,000 mcg at 04/15/25 0939    ferrous sulfate (IRON 325) tablet 325 mg, 325 mg, Oral, Daily, Nahid Morin PA-C, 325 mg at 04/15/25 1259    gabapentin (NEURONTIN) capsule 300 mg, 300 mg, Oral, BID, Nahid Morin PA-C, 300 mg at 04/15/25 2117    insulin glargine (LANTUS) injection vial 30 Units, 30 Units, SubCUTAneous, Nightly, Nahid Morin PA-KELLI, 30 Units at 04/15/25 2118    metOLazone (ZAROXOLYN) tablet 2.5 mg, 2.5 mg, Oral, Once per day on Monday Thursday, Mone Davis, DO, 2.5 mg at 04/14/25 1030    magnesium oxide (MAG-OX) tablet 400 mg, 400 mg, Oral, Daily, Nahid Morin PA-C, 400 mg at 04/15/25 0939    pantoprazole (PROTONIX) tablet 40 mg, 40 mg, Oral, QAM AC, Nahid Morin PA-C, 40 mg at 04/16/25 0530    doxazosin (CARDURA) tablet 4 mg, 4 mg, Oral, Daily, Nahid Morin PA-C, 4 mg at 04/15/25 0939    glucose chewable tablet 16 g, 4 tablet, Oral, PRN, Nahid Morin, PA-C    dextrose bolus 10% 125 mL, 125 mL, IntraVENous, PRN **OR** dextrose bolus 10% 250 mL, 250 mL, IntraVENous, PRN, Nahid Morin PA-C    glucagon injection 1 mg, 1 mg, SubCUTAneous, PRNPatience Timothy, PA-C    dextrose 10 % infusion, , IntraVENous, Continuous PRPatience NOEL Timothy, PA-C     Allergies:  Lorazepam  Social History:    Social History     Socioeconomic History    Marital status:      Spouse name: Zoya    Number of children: 3    Years of education: Not on file    Highest education level: Not on file   Occupational History    Not on file   Tobacco Use    Smoking status: Former     Current packs/day: 0.00

## 2025-04-21 ENCOUNTER — RESULTS FOLLOW-UP (OUTPATIENT)
Dept: FAMILY MEDICINE | Facility: CLINIC | Age: 65
End: 2025-04-21

## 2025-04-22 ENCOUNTER — TELEPHONE (OUTPATIENT)
Dept: FAMILY MEDICINE | Facility: CLINIC | Age: 65
End: 2025-04-22
Payer: COMMERCIAL

## 2025-04-22 NOTE — TELEPHONE ENCOUNTER
----- Message from Margi sent at 4/22/2025  8:27 AM CDT -----  Regarding: Call Back  Contact: [phone]  Who Called: PT Patient called in requesting to speak with you. Did not specify why. Please advise.

## 2025-04-22 NOTE — TELEPHONE ENCOUNTER
Pt has been notified and verbalized understanding that she will not be provided with a jury duty note.

## 2025-04-22 NOTE — TELEPHONE ENCOUNTER
----- Message from Barb sent at 4/22/2025 11:14 AM CDT -----    Type:  Patient Returning Call    Who Called:Pt     Who Left Message for Patient: Jannette     Does the patient know what this is regarding?: no    Would the patient rather a call back or a response via [facility]? Call     Best Call Back Number: [phone]    Additional Information:

## 2025-04-22 NOTE — TELEPHONE ENCOUNTER
Pt has been notified and verbalized understanding that Dr. López will not provide her with an excuse for jury duty.

## 2025-04-22 NOTE — TELEPHONE ENCOUNTER
Pt spoke she stated that she received letter for jury duty pt would like ti know if dr pan can write letter excusing her due to her not being able to sit for long periods of time

## 2025-04-25 DIAGNOSIS — E78.00 PURE HYPERCHOLESTEROLEMIA: ICD-10-CM

## 2025-04-25 RX ORDER — EVOLOCUMAB 140 MG/ML
140 INJECTION, SOLUTION SUBCUTANEOUS
Qty: 2 ML | Refills: 3 | Status: CANCELLED | OUTPATIENT
Start: 2025-04-25

## 2025-04-25 NOTE — TELEPHONE ENCOUNTER
No care due was identified.  [facility] Embedded Care Due Messages. Reference number: [id number].   4/25/2025 11:32:11 AM CDT

## 2025-04-28 DIAGNOSIS — E78.00 PURE HYPERCHOLESTEROLEMIA: ICD-10-CM

## 2025-04-28 RX ORDER — EVOLOCUMAB 140 MG/ML
140 INJECTION, SOLUTION SUBCUTANEOUS
Qty: 2 ML | Refills: 3 | Status: ACTIVE | OUTPATIENT
Start: 2025-04-28

## 2025-04-28 NOTE — TELEPHONE ENCOUNTER
No care due was identified.  [facility] Embedded Care Due Messages. Reference number: [id number].   4/28/2025 12:36:46 PM CDT

## 2025-05-06 ENCOUNTER — TELEPHONE (OUTPATIENT)
Dept: FAMILY MEDICINE | Facility: CLINIC | Age: 65
End: 2025-05-06
Payer: COMMERCIAL

## 2025-05-06 NOTE — TELEPHONE ENCOUNTER
"Tiffany Chirinos, PharmAnson Salomon MD; [facility] Staff  Hello,    I am reaching out in regards to Ms. Jordan's Repatha that we fill here at [facility]. At our refill call, she reported she doesn't remember her last dose. Given her last delivery was on 3/27, she most likely has missed a dose or two. She reports stopping because she did not feel like "sticking herself anymore" but that she is motivated to restart medication. I wanted to inform you of this lapse in therapy and get your thoughts on how you may like to proceed.    Thanks,  Tiffany Chirinos, [place]  Clinical Pharmacist  [facility]  Phone: [phone]  Fax: [phone]  "

## 2025-05-07 DIAGNOSIS — R60.0 BILATERAL LOWER EXTREMITY EDEMA: ICD-10-CM

## 2025-05-08 RX ORDER — FUROSEMIDE 20 MG/1
20 TABLET ORAL DAILY PRN
Qty: 30 TABLET | Refills: 3 | Status: SHIPPED | OUTPATIENT
Start: 2025-05-08

## 2025-05-29 DIAGNOSIS — M81.6 LOCALIZED OSTEOPOROSIS WITHOUT CURRENT PATHOLOGICAL FRACTURE: ICD-10-CM

## 2025-05-29 RX ORDER — ERGOCALCIFEROL 1.25 MG/1
50000 CAPSULE ORAL
Qty: 12 CAPSULE | Refills: 0 | Status: SHIPPED | OUTPATIENT
Start: 2025-05-29

## 2025-05-29 NOTE — TELEPHONE ENCOUNTER
Care Due:                  Date            Visit Type   Department     Provider  --------------------------------------------------------------------------------                                EP -                              PRIMARY      [facility] FAMILY  Last Visit: 02-      CARE ([facility])   MEDICINE       Anson López                              EP -                              PRIMARY      [facility]  Next Visit: 09-      CARE ([facility])   MEDICINE       Anson López                                                            Last  Test          Frequency    Reason                     Performed    Due Date  --------------------------------------------------------------------------------    Mg Level....  12 months..  alendronate..............  Not Found    Overdue    Phosphate...  12 months..  alendronate..............  Not Found    Overdue    [employer] Embedded Care Due Messages. Reference number: [id number].   5/29/2025 7:06:14 AM CDT

## 2025-06-18 ENCOUNTER — PATIENT OUTREACH (OUTPATIENT)
Dept: ADMINISTRATIVE | Facility: HOSPITAL | Age: 65
End: 2025-06-18
Payer: COMMERCIAL

## 2025-06-18 DIAGNOSIS — Z12.11 SPECIAL SCREENING FOR MALIGNANT NEOPLASMS, COLON: Primary | ICD-10-CM

## 2025-06-18 NOTE — PROGRESS NOTES
Population Health Chart Review & Patient Outreach Details      Additional [facility] Notes:               Updates Requested / Reviewed:      Updated Care Coordination Note, Care Everywhere, and Immunizations Reconciliation Completed or Queried: [facility] Topics Overdue:      [facility] Score: 2     Colon Cancer Screening  Eye Exam    Pneumonia Vaccine  RSV Vaccine                  Health Maintenance Topic(s) Outreach Outcomes & Actions Taken:    Colorectal Cancer Screening - Outreach Outcomes & Actions Taken  : Colonoscopy Case Request / Referral / Home Test Order Placed

## 2025-06-19 ENCOUNTER — TELEPHONE (OUTPATIENT)
Dept: GASTROENTEROLOGY | Facility: CLINIC | Age: 65
End: 2025-06-19
Payer: COMMERCIAL

## 2025-06-25 ENCOUNTER — TELEPHONE (OUTPATIENT)
Dept: FAMILY MEDICINE | Facility: CLINIC | Age: 65
End: 2025-06-25
Payer: COMMERCIAL

## 2025-06-25 DIAGNOSIS — E11.65 TYPE 2 DIABETES MELLITUS WITH HYPERGLYCEMIA, WITHOUT LONG-TERM CURRENT USE OF INSULIN: Primary | ICD-10-CM

## 2025-06-25 RX ORDER — TIRZEPATIDE 10 MG/.5ML
10 INJECTION, SOLUTION SUBCUTANEOUS
Qty: 4 PEN | Refills: 3 | Status: SHIPPED | OUTPATIENT
Start: 2025-06-25

## 2025-06-25 NOTE — TELEPHONE ENCOUNTER
Copied from CRM #[id number]. Topic: Medications - Medication Refill  >> Jun 25, 2025  1:12 PM She wrote:  Type:  RX Refill Request/ dosage increase    Who Called: Pt  ------- (1) Refill    RX Name and Strength:    tirzepatide 7.5 mg/0.5 mL PnIj --> 10MG requested  ----------  Preferred Pharmacy with phone number:  [facility] [address] [place], [place] [address]  [place] [address] [facility] [address]  Phone: [phone] Fax: [phone]    ----------  Best Call Back Number:[phone]

## 2025-07-01 ENCOUNTER — PATIENT MESSAGE (OUTPATIENT)
Dept: GASTROENTEROLOGY | Facility: CLINIC | Age: 65
End: 2025-07-01
Payer: COMMERCIAL

## 2025-07-09 DIAGNOSIS — E11.9 CONTROLLED TYPE 2 DIABETES MELLITUS WITHOUT COMPLICATION, WITHOUT LONG-TERM CURRENT USE OF INSULIN: ICD-10-CM

## 2025-07-09 RX ORDER — GLIPIZIDE 5 MG/1
5 TABLET, FILM COATED, EXTENDED RELEASE ORAL
Qty: 90 TABLET | Refills: 0 | OUTPATIENT
Start: 2025-07-09

## 2025-07-09 NOTE — TELEPHONE ENCOUNTER
Provider Staff:  Action required for this patient    Requires labs      Please see care gap opportunities below in Care Due Message.    Thanks!  [facility]     Appointments      Date Provider   Last Visit   2/25/2025 Anson López MD   Next Visit   9/25/2025 Anson López MD     Refill Decision Note   Carmen Son  is requesting a refill authorization.  Brief Assessment and Rationale for Refill:  Quick Discontinue     Medication Therapy Plan: Glipizide dc'd on 2/10/25      Comments:     Note composed:6:35 PM 07/09/2025

## 2025-07-09 NOTE — TELEPHONE ENCOUNTER
Care Due:                  Date            Visit Type   Department     Provider  --------------------------------------------------------------------------------                                EP -                              PRIMARY      [facility] FAMILY  Last Visit: 02-      CARE ([facility])   MEDICINE       Anson López                              EP -                              PRIMARY      [facility] FAMILY  Next Visit: 09-      CARE ([facility])   MEDICINE       Anson López                                                            Last  Test          Frequency    Reason                     Performed    Due Date  --------------------------------------------------------------------------------    CMP.........  12 months..  alendronate,               09- 09-                             ergocalciferol, losartan.    HBA1C.......  6 months...  tirzepatide..............  03- 09-    [facility] Embedded Care Due Messages. Reference number: [id number].   7/09/2025 7:06:26 AM CDT

## 2025-08-18 DIAGNOSIS — M81.6 LOCALIZED OSTEOPOROSIS WITHOUT CURRENT PATHOLOGICAL FRACTURE: ICD-10-CM

## 2025-08-18 RX ORDER — ERGOCALCIFEROL 1.25 MG/1
50000 CAPSULE ORAL
Qty: 12 CAPSULE | Refills: 0 | Status: SHIPPED | OUTPATIENT
Start: 2025-08-18

## 2025-08-28 DIAGNOSIS — R51.9 CHRONIC INTRACTABLE HEADACHE, UNSPECIFIED HEADACHE TYPE: ICD-10-CM

## 2025-08-28 DIAGNOSIS — G89.29 CHRONIC INTRACTABLE HEADACHE, UNSPECIFIED HEADACHE TYPE: ICD-10-CM

## 2025-09-01 RX ORDER — ZONISAMIDE 100 MG/1
100 CAPSULE ORAL
Qty: 90 CAPSULE | Refills: 0 | Status: SHIPPED | OUTPATIENT
Start: 2025-09-01

## (undated) DEVICE — PIN DISTRACTION DISP 14MM
Type: IMPLANTABLE DEVICE | Site: SPINE CERVICAL | Status: NON-FUNCTIONAL
Removed: 2018-09-17

## (undated) DEVICE — SEE MEDLINE ITEM 156905

## (undated) DEVICE — BANDAGE ADHESIVE

## (undated) DEVICE — Device

## (undated) DEVICE — SUT CTD VICRYL 3-0 CR/SH

## (undated) DEVICE — KIT SPINAL PATIENT CARE JACK

## (undated) DEVICE — KIT SURGIFLO HEMOSTATIC MATRIX

## (undated) DEVICE — CARTRIDGE OIL

## (undated) DEVICE — DRESSING TELFA N ADH 3X8

## (undated) DEVICE — GAUZE SPONGE PEANUT STRL

## (undated) DEVICE — BLADE ELECTRO EDGE INSULATED

## (undated) DEVICE — ADHESIVE DERMABOND ADVANCED

## (undated) DEVICE — DRAPE INCISE IOBAN 2 23X17IN

## (undated) DEVICE — MARKER SKIN STND TIP BLUE BARR

## (undated) DEVICE — CORD BIPOLAR 12 FOOT

## (undated) DEVICE — KIT ACCESS DILATOR SET PROBE

## (undated) DEVICE — DURAPREP SURG SCRUB 26ML

## (undated) DEVICE — DRAPE ABDOMINAL TIBURON 14X11

## (undated) DEVICE — TRAY FOLEY 16FR INFECTION CONT

## (undated) DEVICE — TUBE FRAZIER 5MM 2FT SOFT TIP

## (undated) DEVICE — DRESSING SURGICAL 1/2X1/2

## (undated) DEVICE — GWIRE SPINAL BLNT TIP 1.6X19
Type: IMPLANTABLE DEVICE | Site: SPINE LUMBAR | Status: NON-FUNCTIONAL
Removed: 2021-01-25

## (undated) DEVICE — SUT D SPECIAL VICRYL 2-0

## (undated) DEVICE — KIT SURGIFLO EVITHROM

## (undated) DEVICE — SUT MONOCRYL 4-0 PS-1 UND

## (undated) DEVICE — SUT MCRYL PLUS 4-0 PS2 27IN

## (undated) DEVICE — DRAPE C-ARM ELAS CLIP 42X120IN

## (undated) DEVICE — DRESSING TEGADERM 4.4X5IN

## (undated) DEVICE — BUR BONE CUT MICRO TPS 3X3.8MM

## (undated) DEVICE — ELECTRODE REM PLYHSV RETURN 9

## (undated) DEVICE — DRESSING TRANS 8X12 TEGADERM

## (undated) DEVICE — DIFFUSER

## (undated) DEVICE — DRAPE OPMI STERILE

## (undated) DEVICE — SEE MEDLINE ITEM 146292

## (undated) DEVICE — SEE MEDLINE ITEM 157150

## (undated) DEVICE — NDL SPINAL 18GX3.5 SPINOCAN

## (undated) DEVICE — DRAPE C ARM 42 X 120 10/BX

## (undated) DEVICE — DRAPE C-ARMOR EQUIPMENT COVER

## (undated) DEVICE — SEE MEDLINE ITEM 157131

## (undated) DEVICE — GAUZE SPONGE 4X4 12PLY

## (undated) DEVICE — SPONGE GAUZE 16PLY 4X4

## (undated) DEVICE — DRAPE STERI INSTRUMENT 1018

## (undated) DEVICE — DRAPE THYROID WITH ARMBOARD